# Patient Record
Sex: MALE | Race: WHITE | NOT HISPANIC OR LATINO | Employment: OTHER | URBAN - METROPOLITAN AREA
[De-identification: names, ages, dates, MRNs, and addresses within clinical notes are randomized per-mention and may not be internally consistent; named-entity substitution may affect disease eponyms.]

---

## 2019-10-26 ENCOUNTER — OFFICE VISIT (OUTPATIENT)
Dept: URGENT CARE | Facility: CLINIC | Age: 69
End: 2019-10-26
Payer: MEDICARE

## 2019-10-26 VITALS
HEART RATE: 68 BPM | TEMPERATURE: 98.4 F | HEIGHT: 75 IN | BODY MASS INDEX: 13.55 KG/M2 | OXYGEN SATURATION: 94 % | SYSTOLIC BLOOD PRESSURE: 141 MMHG | RESPIRATION RATE: 18 BRPM | DIASTOLIC BLOOD PRESSURE: 88 MMHG | WEIGHT: 109 LBS

## 2019-10-26 DIAGNOSIS — M79.642 PAIN OF LEFT HAND: Primary | ICD-10-CM

## 2019-10-26 PROCEDURE — G0463 HOSPITAL OUTPT CLINIC VISIT: HCPCS | Performed by: EMERGENCY MEDICINE

## 2019-10-26 PROCEDURE — 99203 OFFICE O/P NEW LOW 30 MIN: CPT | Performed by: EMERGENCY MEDICINE

## 2019-10-26 RX ORDER — HYDROCODONE BITARTRATE AND ACETAMINOPHEN 5; 325 MG/1; MG/1
1 TABLET ORAL EVERY 6 HOURS PRN
COMMUNITY
End: 2020-06-09 | Stop reason: ALTCHOICE

## 2019-10-26 RX ORDER — PROPRANOLOL HYDROCHLORIDE 60 MG/1
60 TABLET ORAL
COMMUNITY

## 2019-10-26 RX ORDER — ALLOPURINOL 300 MG/1
300 TABLET ORAL DAILY
COMMUNITY

## 2019-10-26 RX ORDER — OMEPRAZOLE 40 MG/1
40 CAPSULE, DELAYED RELEASE ORAL DAILY
COMMUNITY
End: 2021-06-18 | Stop reason: ALTCHOICE

## 2019-10-26 RX ORDER — AMINO ACIDS/CHROMIUM
TABLET ORAL
COMMUNITY

## 2019-10-26 RX ORDER — LORATADINE 10 MG/1
10 TABLET ORAL DAILY
COMMUNITY

## 2019-10-26 RX ORDER — MELATONIN
1000 DAILY
COMMUNITY

## 2019-10-26 RX ORDER — UREA 10 %
800 LOTION (ML) TOPICAL DAILY
COMMUNITY

## 2019-10-26 RX ORDER — ASPIRIN 81 MG/1
81 TABLET, CHEWABLE ORAL DAILY
COMMUNITY

## 2019-10-26 RX ORDER — ZINC GLUCONATE 50 MG
50 TABLET ORAL DAILY
COMMUNITY

## 2019-10-26 RX ORDER — CELECOXIB 200 MG/1
200 CAPSULE ORAL 2 TIMES DAILY
COMMUNITY
End: 2021-06-18 | Stop reason: ALTCHOICE

## 2019-10-26 RX ORDER — LOSARTAN POTASSIUM 100 MG/1
100 TABLET ORAL DAILY
COMMUNITY

## 2019-10-26 RX ORDER — TURMERIC ROOT EXTRACT 500 MG
TABLET ORAL
COMMUNITY

## 2019-10-26 RX ORDER — CHLORAL HYDRATE 500 MG
1200 CAPSULE ORAL DAILY
COMMUNITY

## 2019-10-26 RX ORDER — PHENOL 1.4 %
600 AEROSOL, SPRAY (ML) MUCOUS MEMBRANE 2 TIMES DAILY WITH MEALS
COMMUNITY

## 2019-10-26 RX ORDER — MAGNESIUM 30 MG
30 TABLET ORAL 2 TIMES DAILY
COMMUNITY

## 2019-10-26 RX ORDER — NIACIN 500 MG
500 TABLET ORAL
COMMUNITY
End: 2021-06-18 | Stop reason: ALTCHOICE

## 2019-10-26 RX ORDER — PREDNISONE 10 MG/1
TABLET ORAL
Qty: 27 TABLET | Refills: 0 | Status: SHIPPED | OUTPATIENT
Start: 2019-10-26 | End: 2020-06-04 | Stop reason: SDUPTHER

## 2019-10-26 NOTE — PATIENT INSTRUCTIONS
Tendinitis   WHAT YOU NEED TO KNOW:   Tendinitis is painful inflammation or breakdown of your tendons  It may also be called tendinopathy  Tendinitis often occurs in the knee, shoulder, ankle, hip, or elbow  DISCHARGE INSTRUCTIONS:   Medicines:   · Pain medicines  such as acetaminophen or NSAIDs may decrease swelling and pain or fever  These medicines are available without a doctor's order  Ask which medicine to take  Ask how much to take and when to take it  Follow directions  Acetaminophen and NSAIDs can cause liver or kidney damage if not taken correctly  If you take blood thinner medicine, always ask your healthcare provider if NSAIDs are safe for you  Always read the medicine label and follow the directions on it before using these medicine  · Take your medicine as directed  Contact your healthcare provider if you think your medicine is not helping or if you have side effects  Tell him if you are allergic to any medicine  Keep a list of the medicines, vitamins, and herbs you take  Include the amounts, and when and why you take them  Bring the list or the pill bottles to follow-up visits  Carry your medicine list with you in case of an emergency  Management:   · Rest  your tendon as directed to help it heal  Ask your healthcare provider if you need to stop putting weight on your affected area  · Ice  helps decrease swelling and pain  Ice may also help prevent tissue damage  Use an ice pack, or put crushed ice in a plastic bag  Cover it with a towel and place it on the affected area for 10 to 15 minutes every hour or as directed  · Support devices  such as a cane, splint, shoe insert, or brace may help reduce your pain  · Physical therapy  may be ordered by your healthcare provider  This may be used to teach you exercises to help improve movement and strength, and to decrease pain  You may also learn how to improve your posture, and how to lift or exercise correctly    Prevention:   · Stretch and warm up  before you exercise  · Exercise regularly  to strengthen the muscles around your joint  Ease into an exercise routine for the first 3 weeks to prevent another injury  Ask your healthcare provider about the best exercise plan for you  Rest fully between activities  · Use the right equipment  for sports and exercise  Wear braces or tape around weak joints as directed  Follow up with your healthcare provider as directed:  Write down your questions so you remember to ask them during your visits  Contact your healthcare provider if:   · You have increased pain even after you take medicine  · You have questions or concerns about your condition or care  Return to the emergency department if:   · You have increased redness over the joint, or swelling in the joint  · You suddenly cannot move your joint  © 2017 [address] [employer]  Information is for End User's use only and may not be sold, redistributed or otherwise used for commercial purposes  All illustrations and images included in CareNotes® are the copyrighted property of [employer]  or [employer]  The above information is an  only  It is not intended as medical advice for individual conditions or treatments  Talk to your doctor, nurse or pharmacist before following any medical regimen to see if it is safe and effective for you  Gout   WHAT YOU NEED TO KNOW:   Gout is a form of arthritis that causes severe joint pain, redness, swelling, and stiffness  Acute gout pain starts suddenly, gets worse quickly, and stops on its own  Acute gout can become chronic and cause permanent damage to the joints  DISCHARGE INSTRUCTIONS:   Return to the emergency department if:   · You have severe pain in one or more of your joints that you cannot tolerate  · You have a fever or redness that spreads beyond the joint area    Contact your healthcare provider if:   · You have new symptoms, such as a rash, after you start gout treatment  · Your joint pain and swelling do not go away, even after treatment  · You are not urinating as much or as often as you usually do  · You have trouble taking your gout medicines  · You have questions or concerns about your condition or care  Medicines: You may need any of the following:  · Prescription pain medicine  may be given  Ask your healthcare provider how to take this medicine safely  Some prescription pain medicines contain acetaminophen  Do not take other medicines that contain acetaminophen without talking to your healthcare provider  Too much acetaminophen may cause liver damage  Prescription pain medicine may cause constipation  Ask your healthcare provider how to prevent or treat constipation  · NSAIDs , such as ibuprofen, help decrease swelling, pain, and fever  This medicine is available with or without a doctor's order  NSAIDs can cause stomach bleeding or kidney problems in certain people  If you take blood thinner medicine, always ask your healthcare provider if NSAIDs are safe for you  Always read the medicine label and follow directions  · Gout medicine  decreases joint pain and swelling  It may also be given to prevent new gout attacks  · Steroids  reduce inflammation and can help your joint stiffness and pain during gout attacks  · Uric acid medicine  may be given to reduce the amount of uric acid your body makes  Some medicines may help you pass more uric acid when you urinate  · Take your medicine as directed  Contact your healthcare provider if you think your medicine is not helping or if you have side effects  Tell him or her if you are allergic to any medicine  Keep a list of the medicines, vitamins, and herbs you take  Include the amounts, and when and why you take them  Bring the list or the pill bottles to follow-up visits  Carry your medicine list with you in case of an emergency    Follow up with your healthcare provider as directed: Write down your questions so you remember to ask them during your visits  Manage gout:   · Rest your painful joint so it can heal   Your healthcare provider may recommend crutches or a walker if the affected joint is in a leg  · Apply ice to your joint  Ice decreases pain and swelling  Use an ice pack, or put crushed ice in a plastic bag  Cover the ice pack or bag with a towel before you apply it to your painful joint  Apply ice for 15 to 20 minutes every hour, or as directed  · Elevate your joint  Elevation helps reduce swelling and pain  Raise your joint above the level of your heart as often as you can  Prop your painful joint on pillows to keep it above your heart comfortably  · Go to physical therapy if directed  A physical therapist can teach you exercises to improve flexibility and range of motion  Prevent gout attacks:   · Do not eat high-purine foods  These foods include meats, seafood, asparagus, spinach, cauliflower, and some types of beans  Healthcare providers may tell you to eat more low-fat milk products, such as yogurt  Milk products may decrease your risk for gout attacks  Vitamin C and coffee may also help  Your healthcare provider or dietitian can help you create a meal plan  · Drink more liquids as directed  Liquids such as water help remove uric acid from your body  Ask how much liquid to drink each day and which liquids are best for you  · Manage your weight  Weight loss may decrease the amount of uric acid in your body  Exercise can help you lose weight  Talk to your healthcare provider about the best exercises for you  · Control your blood sugar level if you have diabetes  Keep your blood sugar level in a normal range  This can help prevent gout attacks  · Limit or do not drink alcohol as directed  Alcohol can trigger a gout attack  Alcohol also increases your risk for dehydration  Ask your healthcare provider if alcohol is safe for you    © 2017 [facility] is for End User's use only and may not be sold, redistributed or otherwise used for commercial purposes  All illustrations and images included in CareNotes® are the copyrighted property of [employer] M , [employer]  or [employer]  The above information is an  only  It is not intended as medical advice for individual conditions or treatments  Talk to your doctor, nurse or pharmacist before following any medical regimen to see if it is safe and effective for you

## 2019-10-26 NOTE — PROGRESS NOTES
[facility] [facility]        NAME: Milly Loaiza is a 78 y o  male  : 1950    MRN: [id number]  DATE: 2019  TIME: 10:14 AM    Assessment and Plan   Pain of left hand [M79 [id number]]  1  Pain of left hand  predniSONE 10 mg tablet         Patient Instructions     Patient Instructions   Tendinitis   WHAT YOU NEED TO KNOW:   Tendinitis is painful inflammation or breakdown of your tendons  It may also be called tendinopathy  Tendinitis often occurs in the knee, shoulder, ankle, hip, or elbow  DISCHARGE INSTRUCTIONS:   Medicines:   · Pain medicines  such as acetaminophen or NSAIDs may decrease swelling and pain or fever  These medicines are available without a doctor's order  Ask which medicine to take  Ask how much to take and when to take it  Follow directions  Acetaminophen and NSAIDs can cause liver or kidney damage if not taken correctly  If you take blood thinner medicine, always ask your healthcare provider if NSAIDs are safe for you  Always read the medicine label and follow the directions on it before using these medicine  · Take your medicine as directed  Contact your healthcare provider if you think your medicine is not helping or if you have side effects  Tell him if you are allergic to any medicine  Keep a list of the medicines, vitamins, and herbs you take  Include the amounts, and when and why you take them  Bring the list or the pill bottles to follow-up visits  Carry your medicine list with you in case of an emergency  Management:   · Rest  your tendon as directed to help it heal  Ask your healthcare provider if you need to stop putting weight on your affected area  · Ice  helps decrease swelling and pain  Ice may also help prevent tissue damage  Use an ice pack, or put crushed ice in a plastic bag  Cover it with a towel and place it on the affected area for 10 to 15 minutes every hour or as directed      · Support devices  such as a cane, splint, shoe insert, or brace may help reduce your pain  · Physical therapy  may be ordered by your healthcare provider  This may be used to teach you exercises to help improve movement and strength, and to decrease pain  You may also learn how to improve your posture, and how to lift or exercise correctly  Prevention:   · Stretch and warm up  before you exercise  · Exercise regularly  to strengthen the muscles around your joint  Ease into an exercise routine for the first 3 weeks to prevent another injury  Ask your healthcare provider about the best exercise plan for you  Rest fully between activities  · Use the right equipment  for sports and exercise  Wear braces or tape around weak joints as directed  Follow up with your healthcare provider as directed:  Write down your questions so you remember to ask them during your visits  Contact your healthcare provider if:   · You have increased pain even after you take medicine  · You have questions or concerns about your condition or care  Return to the emergency department if:   · You have increased redness over the joint, or swelling in the joint  · You suddenly cannot move your joint  © 2017 [address] [employer]  Information is for End User's use only and may not be sold, redistributed or otherwise used for commercial purposes  All illustrations and images included in CareNotes® are the copyrighted property of [employer]  or [employer]  The above information is an  only  It is not intended as medical advice for individual conditions or treatments  Talk to your doctor, nurse or pharmacist before following any medical regimen to see if it is safe and effective for you  Gout   WHAT YOU NEED TO KNOW:   Gout is a form of arthritis that causes severe joint pain, redness, swelling, and stiffness  Acute gout pain starts suddenly, gets worse quickly, and stops on its own  Acute gout can become chronic and cause permanent damage to the joints    DISCHARGE INSTRUCTIONS:   Return to the emergency department if:   · You have severe pain in one or more of your joints that you cannot tolerate  · You have a fever or redness that spreads beyond the joint area  Contact your healthcare provider if:   · You have new symptoms, such as a rash, after you start gout treatment  · Your joint pain and swelling do not go away, even after treatment  · You are not urinating as much or as often as you usually do  · You have trouble taking your gout medicines  · You have questions or concerns about your condition or care  Medicines: You may need any of the following:  · Prescription pain medicine  may be given  Ask your healthcare provider how to take this medicine safely  Some prescription pain medicines contain acetaminophen  Do not take other medicines that contain acetaminophen without talking to your healthcare provider  Too much acetaminophen may cause liver damage  Prescription pain medicine may cause constipation  Ask your healthcare provider how to prevent or treat constipation  · NSAIDs , such as ibuprofen, help decrease swelling, pain, and fever  This medicine is available with or without a doctor's order  NSAIDs can cause stomach bleeding or kidney problems in certain people  If you take blood thinner medicine, always ask your healthcare provider if NSAIDs are safe for you  Always read the medicine label and follow directions  · Gout medicine  decreases joint pain and swelling  It may also be given to prevent new gout attacks  · Steroids  reduce inflammation and can help your joint stiffness and pain during gout attacks  · Uric acid medicine  may be given to reduce the amount of uric acid your body makes  Some medicines may help you pass more uric acid when you urinate  · Take your medicine as directed  Contact your healthcare provider if you think your medicine is not helping or if you have side effects   Tell him or her if you are allergic to any medicine  Keep a list of the medicines, vitamins, and herbs you take  Include the amounts, and when and why you take them  Bring the list or the pill bottles to follow-up visits  Carry your medicine list with you in case of an emergency  Follow up with your healthcare provider as directed:  Write down your questions so you remember to ask them during your visits  Manage gout:   · Rest your painful joint so it can heal   Your healthcare provider may recommend crutches or a walker if the affected joint is in a leg  · Apply ice to your joint  Ice decreases pain and swelling  Use an ice pack, or put crushed ice in a plastic bag  Cover the ice pack or bag with a towel before you apply it to your painful joint  Apply ice for 15 to 20 minutes every hour, or as directed  · Elevate your joint  Elevation helps reduce swelling and pain  Raise your joint above the level of your heart as often as you can  Prop your painful joint on pillows to keep it above your heart comfortably  · Go to physical therapy if directed  A physical therapist can teach you exercises to improve flexibility and range of motion  Prevent gout attacks:   · Do not eat high-purine foods  These foods include meats, seafood, asparagus, spinach, cauliflower, and some types of beans  Healthcare providers may tell you to eat more low-fat milk products, such as yogurt  Milk products may decrease your risk for gout attacks  Vitamin C and coffee may also help  Your healthcare provider or dietitian can help you create a meal plan  · Drink more liquids as directed  Liquids such as water help remove uric acid from your body  Ask how much liquid to drink each day and which liquids are best for you  · Manage your weight  Weight loss may decrease the amount of uric acid in your body  Exercise can help you lose weight  Talk to your healthcare provider about the best exercises for you      · Control your blood sugar level if you have diabetes  Keep your blood sugar level in a normal range  This can help prevent gout attacks  · Limit or do not drink alcohol as directed  Alcohol can trigger a gout attack  Alcohol also increases your risk for dehydration  Ask your healthcare provider if alcohol is safe for you  © 2017 [address] [employer] Information is for End User's use only and may not be sold, redistributed or otherwise used for commercial purposes  All illustrations and images included in CareNotes® are the copyrighted property of [employer]  or [employer]  The above information is an  only  It is not intended as medical advice for individual conditions or treatments  Talk to your doctor, nurse or pharmacist before following any medical regimen to see if it is safe and effective for you  Follow up with PCP in 3-5 days  Proceed to  ER if symptoms worsen  Chief Complaint     Chief Complaint   Patient presents with    Hand Pain     Left hand pain starting last night  Denies injury          History of Present Illness       Patient complains of left hand pain since last night  He denies injury  He has history of gout, on allopurinol  He recalls that the pain started shortly after he ate quite a bit of shellfish  He denies med noncompliance with allopurinol  Review of Systems   Review of Systems   Constitutional: Negative for chills and fever  Musculoskeletal: Positive for myalgias  Negative for arthralgias, gait problem and joint swelling  Skin: Negative for color change, rash and wound  Neurological: Negative for numbness           Current Medications       Current Outpatient Medications:     allopurinol (ZYLOPRIM) 300 mg tablet, Take 300 mg by mouth daily, Disp: , Rfl:     aspirin 81 mg chewable tablet, Chew 81 mg daily, Disp: , Rfl:     calcium carbonate (OS-BONNIE) 600 MG tablet, Take 600 mg by mouth 2 (two) times a day with meals, Disp: , Rfl:     celecoxib (CeleBREX) 200 mg capsule, Take 200 mg by mouth 2 (two) times a day, Disp: , Rfl:     cholecalciferol (VITAMIN D3) 1,000 units tablet, Take 1,000 Units by mouth daily, Disp: , Rfl:     Chromium 1000 MCG TABS, Take by mouth, Disp: , Rfl:     Flaxseed, Linseed, (FLAXSEED OIL PO), Take by mouth, Disp: , Rfl:     folic acid (FOLVITE) 476 MCG tablet, Take 800 mcg by mouth daily, Disp: , Rfl:     HYDROcodone-acetaminophen (NORCO) 5-325 mg per tablet, Take 1 tablet by mouth every 6 (six) hours as needed for pain, Disp: , Rfl:     loratadine (CLARITIN) 10 mg tablet, Take 10 mg by mouth daily, Disp: , Rfl:     losartan (COZAAR) 100 MG tablet, Take 100 mg by mouth daily, Disp: , Rfl:     magnesium 30 MG tablet, Take 30 mg by mouth 2 (two) times a day, Disp: , Rfl:     niacin 500 mg tablet, Take 500 mg by mouth daily with breakfast, Disp: , Rfl:     Omega-3 Fatty Acids (FISH OIL) 1,000 mg, Take 1,200 mg by mouth daily, Disp: , Rfl:     omeprazole (PriLOSEC) 40 MG capsule, Take 40 mg by mouth daily, Disp: , Rfl:     propranolol (INDERAL) 60 mg tablet, Take 60 mg by mouth 3 (three) times a day, Disp: , Rfl:     SUPER B COMPLEX/C PO, Take by mouth, Disp: , Rfl:     Turmeric 500 MG TABS, Take by mouth, Disp: , Rfl:     zinc gluconate 50 mg tablet, Take 50 mg by mouth daily, Disp: , Rfl:     predniSONE 10 mg tablet, Take once daily all days pills on this schedule 6- 6- 5- 4- 3- 2- 1, Disp: 27 tablet, Rfl: 0    Current Allergies     Allergies as of 10/26/2019 - Reviewed 10/26/2019   Allergen Reaction Noted    Ativan [lorazepam] Delirium 10/26/2019    Levaquin [levofloxacin] Itching 10/26/2019            The following portions of the patient's history were reviewed and updated as appropriate: allergies, current medications, past family history, past medical history, past social history, past surgical history and problem list      Past Medical History:   Diagnosis Date    Allergic     Gout     Hypertension        Past Surgical History:   Procedure Laterality Date    APPENDECTOMY      CHOLECYSTECTOMY      TOTAL KNEE ARTHROPLASTY         History reviewed  No pertinent family history  Medications have been verified  Objective   /88   Pulse 68   Temp 98 4 °F (36 9 °C) (Tympanic)   Resp 18   Ht 6' 3" (1 905 m)   Wt 49 4 kg (109 lb)   SpO2 94%   BMI 13 62 kg/m²        Physical Exam     Physical Exam   Constitutional: He is oriented to person, place, and time  He appears well-developed and well-nourished  No distress  Neck: Neck supple  Cardiovascular: Normal rate and regular rhythm  Pulmonary/Chest: Effort normal and breath sounds normal    Musculoskeletal: He exhibits tenderness  He exhibits no deformity  Tender and swollen left hand at base of thumb, no erythema, increased warmth or other evidence of infection  Neurological: He is alert and oriented to person, place, and time  He has normal reflexes  Skin: Skin is warm and dry  No rash noted  No erythema  Psychiatric: He has a normal mood and affect  His behavior is normal  Judgment and thought content normal    Nursing note and vitals reviewed

## 2019-12-11 ENCOUNTER — APPOINTMENT (EMERGENCY)
Dept: RADIOLOGY | Facility: HOSPITAL | Age: 69
End: 2019-12-11
Payer: MEDICARE

## 2019-12-11 ENCOUNTER — HOSPITAL ENCOUNTER (EMERGENCY)
Facility: HOSPITAL | Age: 69
Discharge: HOME | End: 2019-12-11
Attending: EMERGENCY MEDICINE | Admitting: EMERGENCY MEDICINE
Payer: MEDICARE

## 2019-12-11 ENCOUNTER — APPOINTMENT (EMERGENCY)
Dept: CT IMAGING | Facility: HOSPITAL | Age: 69
End: 2019-12-11
Payer: MEDICARE

## 2019-12-11 VITALS
SYSTOLIC BLOOD PRESSURE: 138 MMHG | RESPIRATION RATE: 20 BRPM | DIASTOLIC BLOOD PRESSURE: 76 MMHG | BODY MASS INDEX: 28.63 KG/M2 | OXYGEN SATURATION: 93 % | HEART RATE: 94 BPM | TEMPERATURE: 98.1 F | WEIGHT: 229.06 LBS

## 2019-12-11 DIAGNOSIS — J18.9 PNEUMONIA OF LEFT UPPER LOBE DUE TO INFECTIOUS ORGANISM: Primary | ICD-10-CM

## 2019-12-11 LAB
ALBUMIN SERPL BCP-MCNC: 3.6 G/DL (ref 3.5–5)
ALP SERPL-CCNC: 88 U/L (ref 46–116)
ALT SERPL W P-5'-P-CCNC: 34 U/L (ref 12–78)
ANION GAP SERPL CALCULATED.3IONS-SCNC: 9 MMOL/L (ref 4–13)
AST SERPL W P-5'-P-CCNC: 33 U/L (ref 5–45)
BASOPHILS # BLD AUTO: 0.05 THOUSANDS/ΜL (ref 0–0.1)
BASOPHILS NFR BLD AUTO: 0 % (ref 0–1)
BILIRUB SERPL-MCNC: 1.73 MG/DL (ref 0.2–1)
BUN SERPL-MCNC: 11 MG/DL (ref 5–25)
CALCIUM SERPL-MCNC: 9.1 MG/DL (ref 8.3–10.1)
CHLORIDE SERPL-SCNC: 96 MMOL/L (ref 100–108)
CO2 SERPL-SCNC: 28 MMOL/L (ref 21–32)
CREAT SERPL-MCNC: 0.82 MG/DL (ref 0.6–1.3)
EOSINOPHIL # BLD AUTO: 0.02 THOUSAND/ΜL (ref 0–0.61)
EOSINOPHIL NFR BLD AUTO: 0 % (ref 0–6)
ERYTHROCYTE [DISTWIDTH] IN BLOOD BY AUTOMATED COUNT: 11.9 % (ref 11.6–15.1)
GFR SERPL CREATININE-BSD FRML MDRD: 90 ML/MIN/1.73SQ M
GLUCOSE SERPL-MCNC: 111 MG/DL (ref 65–140)
HCT VFR BLD AUTO: 42.8 % (ref 36.5–49.3)
HGB BLD-MCNC: 14.8 G/DL (ref 12–17)
IMM GRANULOCYTES # BLD AUTO: 0.09 THOUSAND/UL (ref 0–0.2)
IMM GRANULOCYTES NFR BLD AUTO: 1 % (ref 0–2)
LYMPHOCYTES # BLD AUTO: 1.52 THOUSANDS/ΜL (ref 0.6–4.47)
LYMPHOCYTES NFR BLD AUTO: 9 % (ref 14–44)
MCH RBC QN AUTO: 33.3 PG (ref 26.8–34.3)
MCHC RBC AUTO-ENTMCNC: 34.6 G/DL (ref 31.4–37.4)
MCV RBC AUTO: 96 FL (ref 82–98)
MONOCYTES # BLD AUTO: 1.34 THOUSAND/ΜL (ref 0.17–1.22)
MONOCYTES NFR BLD AUTO: 8 % (ref 4–12)
NEUTROPHILS # BLD AUTO: 13.78 THOUSANDS/ΜL (ref 1.85–7.62)
NEUTS SEG NFR BLD AUTO: 82 % (ref 43–75)
NRBC BLD AUTO-RTO: 0 /100 WBCS
NT-PROBNP SERPL-MCNC: 679 PG/ML
PLATELET # BLD AUTO: 171 THOUSANDS/UL (ref 149–390)
PMV BLD AUTO: 9.3 FL (ref 8.9–12.7)
POTASSIUM SERPL-SCNC: 3.9 MMOL/L (ref 3.5–5.3)
PROT SERPL-MCNC: 8 G/DL (ref 6.4–8.2)
RBC # BLD AUTO: 4.45 MILLION/UL (ref 3.88–5.62)
SODIUM SERPL-SCNC: 133 MMOL/L (ref 136–145)
TROPONIN I SERPL-MCNC: <0.02 NG/ML
WBC # BLD AUTO: 16.8 THOUSAND/UL (ref 4.31–10.16)

## 2019-12-11 PROCEDURE — 71046 X-RAY EXAM CHEST 2 VIEWS: CPT

## 2019-12-11 PROCEDURE — 93005 ELECTROCARDIOGRAM TRACING: CPT

## 2019-12-11 PROCEDURE — 71275 CT ANGIOGRAPHY CHEST: CPT

## 2019-12-11 PROCEDURE — 36415 COLL VENOUS BLD VENIPUNCTURE: CPT | Performed by: PHYSICIAN ASSISTANT

## 2019-12-11 PROCEDURE — 80053 COMPREHEN METABOLIC PANEL: CPT | Performed by: PHYSICIAN ASSISTANT

## 2019-12-11 PROCEDURE — 85025 COMPLETE CBC W/AUTO DIFF WBC: CPT | Performed by: PHYSICIAN ASSISTANT

## 2019-12-11 PROCEDURE — 94640 AIRWAY INHALATION TREATMENT: CPT

## 2019-12-11 PROCEDURE — 99284 EMERGENCY DEPT VISIT MOD MDM: CPT | Performed by: PHYSICIAN ASSISTANT

## 2019-12-11 PROCEDURE — 83880 ASSAY OF NATRIURETIC PEPTIDE: CPT | Performed by: PHYSICIAN ASSISTANT

## 2019-12-11 PROCEDURE — 99285 EMERGENCY DEPT VISIT HI MDM: CPT

## 2019-12-11 PROCEDURE — 84484 ASSAY OF TROPONIN QUANT: CPT | Performed by: PHYSICIAN ASSISTANT

## 2019-12-11 RX ORDER — IPRATROPIUM BROMIDE AND ALBUTEROL SULFATE 2.5; .5 MG/3ML; MG/3ML
3 SOLUTION RESPIRATORY (INHALATION) ONCE
Status: COMPLETED | OUTPATIENT
Start: 2019-12-11 | End: 2019-12-11

## 2019-12-11 RX ORDER — DOXYCYCLINE HYCLATE 100 MG/1
100 CAPSULE ORAL ONCE
Status: COMPLETED | OUTPATIENT
Start: 2019-12-11 | End: 2019-12-11

## 2019-12-11 RX ORDER — DOXYCYCLINE HYCLATE 100 MG/1
100 CAPSULE ORAL EVERY 12 HOURS SCHEDULED
Qty: 20 CAPSULE | Refills: 0 | Status: SHIPPED | OUTPATIENT
Start: 2019-12-11 | End: 2019-12-21

## 2019-12-11 RX ADMIN — IPRATROPIUM BROMIDE AND ALBUTEROL SULFATE 3 ML: 2.5; .5 SOLUTION RESPIRATORY (INHALATION) at 17:41

## 2019-12-11 RX ADMIN — IOHEXOL 85 ML: 350 INJECTION, SOLUTION INTRAVENOUS at 19:24

## 2019-12-11 RX ADMIN — DOXYCYCLINE 100 MG: 100 CAPSULE ORAL at 20:12

## 2019-12-12 NOTE — ED NOTES
Pt states that he has not been getting any sleep at home  Pt states has tried seeing PCP and ENT for symptom relief  Pt has tried mucinex and cough medication       Cesar Gomez RN  12/11/19 [id number]

## 2019-12-12 NOTE — ED ATTENDING ATTESTATION
12/11/2019  IBonita MD, saw and evaluated the patient  I have discussed the patient with the resident/non-physician practitioner and agree with the resident's/non-physician practitioner's findings, Plan of Care, and MDM as documented in the resident's/non-physician practitioner's note, except where noted  All available labs and Radiology studies were reviewed  I was present for key portions of any procedure(s) performed by the resident/non-physician practitioner and I was immediately available to provide assistance  At this point I agree with the current assessment done in the Emergency Department      Bonita Crain MD

## 2019-12-13 LAB
ATRIAL RATE: 95 BPM
P AXIS: 43 DEGREES
PR INTERVAL: 164 MS
QRS AXIS: -17 DEGREES
QRSD INTERVAL: 82 MS
QT INTERVAL: 334 MS
QTC INTERVAL: 419 MS
T WAVE AXIS: 64 DEGREES
VENTRICULAR RATE: 95 BPM

## 2019-12-13 PROCEDURE — 93010 ELECTROCARDIOGRAM REPORT: CPT | Performed by: INTERNAL MEDICINE

## 2019-12-13 NOTE — ED PROVIDER NOTES
History  Chief Complaint   Patient presents with    Shortness of Breath     pt states for the past 3 weeks, cold like symptoms, pt states feels like getting worse with intermittent SOB, constant cough at night and nausea  denies chest pain     The patient is a 71-year-old male with a past medical history of hypertension previous smoking history presents emergency department with a chief complaint of persistent cough x 2 weeks with increased SOB  The patient states that he was 1st evaluated by his ENT physician approximately 2 weeks ago, where he had a persistent dry cough  Patient was placed on a prescription of amoxicillin  Patient completed the course of amoxicillin with little improvement  Patient then followed up additionally 2 more times and was given a prescription of Tessalon Perles and also instructed to take OTC decongestants without improvement  Patient states that over the last 3 days he is now having a more productive cough producing yellow sputum production  Patient admits to subjective fevers and chills  Patient states that over the last 2 days he has felt more short of breath at home  Patient states this typically follows severe coughing spells  Patient denies any chest pain, abdominal pain, dizziness, N/V, dizziness  Prior to Admission Medications   Prescriptions Last Dose Informant Patient Reported? Taking?    Chromium 1000 MCG TABS   Yes No   Sig: Take by mouth   Flaxseed, Linseed, (FLAXSEED OIL PO)   Yes No   Sig: Take by mouth   HYDROcodone-acetaminophen (NORCO) 5-325 mg per tablet   Yes No   Sig: Take 1 tablet by mouth every 6 (six) hours as needed for pain   Omega-3 Fatty Acids (FISH OIL) 1,000 mg   Yes No   Sig: Take 1,200 mg by mouth daily   SUPER B COMPLEX/C PO   Yes No   Sig: Take by mouth   Turmeric 500 MG TABS   Yes No   Sig: Take by mouth   allopurinol (ZYLOPRIM) 300 mg tablet   Yes No   Sig: Take 300 mg by mouth daily   aspirin 81 mg chewable tablet   Yes No   Sig: Chew 81 mg daily   calcium carbonate (OS-BONNIE) 600 MG tablet   Yes No   Sig: Take 600 mg by mouth 2 (two) times a day with meals   celecoxib (CeleBREX) 200 mg capsule   Yes No   Sig: Take 200 mg by mouth 2 (two) times a day   cholecalciferol (VITAMIN D3) 1,000 units tablet   Yes No   Sig: Take 1,000 Units by mouth daily   folic acid (FOLVITE) 065 MCG tablet   Yes No   Sig: Take 800 mcg by mouth daily   loratadine (CLARITIN) 10 mg tablet   Yes No   Sig: Take 10 mg by mouth daily   losartan (COZAAR) 100 MG tablet   Yes No   Sig: Take 100 mg by mouth daily   magnesium 30 MG tablet   Yes No   Sig: Take 30 mg by mouth 2 (two) times a day   niacin 500 mg tablet   Yes No   Sig: Take 500 mg by mouth daily with breakfast   omeprazole (PriLOSEC) 40 MG capsule   Yes No   Sig: Take 40 mg by mouth daily   predniSONE 10 mg tablet   No No   Sig: Take once daily all days pills on this schedule 6- 6- 5- 4- 3- 2- 1   propranolol (INDERAL) 60 mg tablet   Yes No   Sig: Take 60 mg by mouth 3 (three) times a day   zinc gluconate 50 mg tablet   Yes No   Sig: Take 50 mg by mouth daily      Facility-Administered Medications: None       Past Medical History:   Diagnosis Date    Allergic     Gout     Hypertension        Past Surgical History:   Procedure Laterality Date    APPENDECTOMY      CHOLECYSTECTOMY      TOTAL KNEE ARTHROPLASTY         History reviewed  No pertinent family history  I have reviewed and agree with the history as documented  Social History     Tobacco Use    Smoking status: Former Smoker     Types: Cigarettes     Last attempt to quit:      Years since quittin 9    Smokeless tobacco: Never Used   Substance Use Topics    Alcohol use: Yes     Frequency: 2-4 times a month    Drug use: Not Currently        Review of Systems   Constitutional: Negative for chills and fever  HENT: Negative for ear pain  Eyes: Negative for pain and visual disturbance     Respiratory: Negative for shortness of breath and stridor  Cardiovascular: Negative for chest pain and palpitations  Gastrointestinal: Negative for abdominal pain, nausea and vomiting  Genitourinary: Negative for dysuria and hematuria  Musculoskeletal: Negative for arthralgias and back pain  Skin: Negative for color change and rash  Neurological: Negative for seizures and speech difficulty  Physical Exam  Physical Exam   Constitutional: He is oriented to person, place, and time  He appears well-developed and well-nourished  No distress  HENT:   Head: Normocephalic and atraumatic  Mouth/Throat: Oropharynx is clear and moist    Eyes: Pupils are equal, round, and reactive to light  EOM are normal    Neck: Normal range of motion  Cardiovascular: Normal rate and regular rhythm  No murmur heard  Pulmonary/Chest: Effort normal and breath sounds normal  No respiratory distress  Abdominal: Soft  Bowel sounds are normal  There is no tenderness  Neurological: He is alert and oriented to person, place, and time  Skin: Skin is warm and dry  Capillary refill takes less than 2 seconds  Psychiatric: He has a normal mood and affect  His behavior is normal    Nursing note and vitals reviewed        Vital Signs  ED Triage Vitals [12/11/19 1659]   Temperature Pulse Respirations Blood Pressure SpO2   98 1 °F (36 7 °C) 100 22 142/80 95 %      Temp Source Heart Rate Source Patient Position - Orthostatic VS BP Location FiO2 (%)   Oral Monitor Sitting Left arm --      Pain Score       --           Vitals:    12/11/19 1659 12/11/19 1752 12/11/19 1908   BP: 142/80 132/70 138/76   Pulse: 100 93 94   Patient Position - Orthostatic VS: Sitting Lying Sitting         Visual Acuity      ED Medications  Medications   ipratropium-albuterol (DUO-NEB) 0 5-2 5 mg/3 mL inhalation solution 3 mL (3 mL Nebulization Given 12/11/19 1741)   iohexol (OMNIPAQUE) 350 MG/ML injection (MULTI-DOSE) 100 mL (85 mL Intravenous Given 12/11/19 1924)   doxycycline hyclate (VIBRAMYCIN) capsule 100 mg (100 mg Oral Given 12/11/19 2012)       Diagnostic Studies  Results Reviewed     Procedure Component Value Units Date/Time    NT-BNP PRO [[id number]]  (Abnormal) Collected:  12/11/19 1751    Lab Status:  Final result Specimen:  Blood from Arm, Left Updated:  12/11/19 1827     NT-proBNP 679 pg/mL     Comprehensive metabolic panel [[id number]]  (Abnormal) Collected:  12/11/19 1751    Lab Status:  Final result Specimen:  Blood from Arm, Left Updated:  12/11/19 1825     Sodium 133 mmol/L      Potassium 3 9 mmol/L      Chloride 96 mmol/L      CO2 28 mmol/L      ANION GAP 9 mmol/L      BUN 11 mg/dL      Creatinine 0 82 mg/dL      Glucose 111 mg/dL      Calcium 9 1 mg/dL      AST 33 U/L      ALT 34 U/L      Alkaline Phosphatase 88 U/L      Total Protein 8 0 g/dL      Albumin 3 6 g/dL      Total Bilirubin 1 73 mg/dL      eGFR 90 ml/min/1 73sq m     Narrative:       [place] guidelines for Chronic Kidney Disease (CKD):     Stage 1 with normal or high GFR (GFR > 90 mL/min/1 73 square meters)    Stage 2 Mild CKD (GFR = 60-89 mL/min/1 73 square meters)    Stage 3A Moderate CKD (GFR = 45-59 mL/min/1 73 square meters)    Stage 3B Moderate CKD (GFR = 30-44 mL/min/1 73 square meters)    Stage 4 Severe CKD (GFR = 15-29 mL/min/1 73 square meters)    Stage 5 End Stage CKD (GFR <15 mL/min/1 73 square meters)  Note: GFR calculation is accurate only with a steady state creatinine    Troponin I [[id number]]  (Normal) Collected:  12/11/19 1751    Lab Status:  Final result Specimen:  Blood from Arm, Left Updated:  12/11/19 1820     Troponin I <0 02 ng/mL     CBC and differential [[id number]]  (Abnormal) Collected:  12/11/19 1751    Lab Status:  Final result Specimen:  Blood from Arm, Left Updated:  12/11/19 1759     WBC 16 80 Thousand/uL      RBC 4 45 Million/uL      Hemoglobin 14 8 g/dL      Hematocrit 42 8 %      MCV 96 fL      MCH 33 3 pg      MCHC 34 6 g/dL      RDW 11 9 %      MPV 9 3 fL Platelets 376 Thousands/uL      nRBC 0 /100 WBCs      Neutrophils Relative 82 %      Immat GRANS % 1 %      Lymphocytes Relative 9 %      Monocytes Relative 8 %      Eosinophils Relative 0 %      Basophils Relative 0 %      Neutrophils Absolute 13 78 Thousands/µL      Immature Grans Absolute 0 09 Thousand/uL      Lymphocytes Absolute 1 52 Thousands/µL      Monocytes Absolute 1 34 Thousand/µL      Eosinophils Absolute 0 02 Thousand/µL      Basophils Absolute 0 05 Thousands/µL                  CTA ED chest PE study   Final Result by Sanjeev Will MD (12/11 1944)         1  No evidence of acute pulmonary embolus, thoracic aortic aneurysm or dissection  2   Few groundglass opacities in the upper lobes could represent minimal aspiration pneumonitis  Workstation performed: [id number]         XR chest 2 views   Final Result by Addison Oneill MD (12/12 0852)      No acute cardiopulmonary disease  Workstation performed: [id number]                    Procedures  Procedures         ED Course                               MDM  Number of Diagnoses or Management Options  Pneumonia of left upper lobe due to infectious organism [facility]):   Diagnosis management comments: Differential diagnosis included but was not limited to ACS, pneumonia, viral syndrome, pulmonary embolism  The patient is a 69-year-old male who presents emergency department today with a chief complaint of persistent cough times several weeks with the additional complaint of shortness of breath  EKG was unremarkable  Upon physical examination the patient was not in any acute distress  Patient's lung sounds were remarkably clear  Patient's vital signs were stable however the patient's SpO2 on room air was 93%  Patient was mildly tachycardic at 96  Patient was already treated with 1 course of amoxicillin prior to arrival without improvement    Due to the patient's consistent symptoms a CT scan with IV contrast was ordered not only to rule out a pulmonary embolism but also look for any atypical pneumonia  Chest x-ray was relatively unremarkable  Cardiac enzymes and laboratory findings were also otherwise unremarkable  Upon CT scan findings were indicative of ground-glass appearing in upper lobes of bilateral lungs  Due to patient's symptoms patient was treated with doxycycline as an outpatient  Patient was instructed to return if any symptoms worsen  Patient expressed understanding and was in agreement with treatment plan  Amount and/or Complexity of Data Reviewed  Clinical lab tests: ordered and reviewed  Tests in the radiology section of CPT®: ordered and reviewed          Disposition  Final diagnoses:   Pneumonia of left upper lobe due to infectious organism [facility])     Time reflects when diagnosis was documented in both MDM as applicable and the Disposition within this note     Time User Action Codes Description Comment    12/11/2019  8:00 PM Lino Salas Add [J18 1] Pneumonia of left upper lobe due to infectious organism [facility])       ED Disposition     ED Disposition Condition Date/Time Comment    Discharge Stable Wed Dec 11, 2019  7:59 PM Shannon Schwartz discharge to home/self care              Follow-up Information    None         Discharge Medication List as of 12/11/2019  8:01 PM      START taking these medications    Details   doxycycline hyclate (VIBRAMYCIN) 100 mg capsule Take 1 capsule (100 mg total) by mouth every 12 (twelve) hours for 10 days, Starting Wed 12/11/2019, Until Sat 12/21/2019, Normal         CONTINUE these medications which have NOT CHANGED    Details   allopurinol (ZYLOPRIM) 300 mg tablet Take 300 mg by mouth daily, Historical Med      aspirin 81 mg chewable tablet Chew 81 mg daily, Historical Med      calcium carbonate (OS-BONNIE) 600 MG tablet Take 600 mg by mouth 2 (two) times a day with meals, Historical Med      celecoxib (CeleBREX) 200 mg capsule Take 200 mg by mouth 2 (two) times a day, Historical Med      cholecalciferol (VITAMIN D3) 1,000 units tablet Take 1,000 Units by mouth daily, Historical Med      Chromium 1000 MCG TABS Take by mouth, Historical Med      Flaxseed, Linseed, (FLAXSEED OIL PO) Take by mouth, Historical Med      folic acid (FOLVITE) 134 MCG tablet Take 800 mcg by mouth daily, Historical Med      HYDROcodone-acetaminophen (NORCO) 5-325 mg per tablet Take 1 tablet by mouth every 6 (six) hours as needed for pain, Historical Med      loratadine (CLARITIN) 10 mg tablet Take 10 mg by mouth daily, Historical Med      losartan (COZAAR) 100 MG tablet Take 100 mg by mouth daily, Historical Med      magnesium 30 MG tablet Take 30 mg by mouth 2 (two) times a day, Historical Med      niacin 500 mg tablet Take 500 mg by mouth daily with breakfast, Historical Med      Omega-3 Fatty Acids (FISH OIL) 1,000 mg Take 1,200 mg by mouth daily, Historical Med      omeprazole (PriLOSEC) 40 MG capsule Take 40 mg by mouth daily, Historical Med      predniSONE 10 mg tablet Take once daily all days pills on this schedule 6- 6- 5- 4- 3- 2- 1, Normal      propranolol (INDERAL) 60 mg tablet Take 60 mg by mouth 3 (three) times a day, Historical Med      SUPER B COMPLEX/C PO Take by mouth, Historical Med      Turmeric 500 MG TABS Take by mouth, Historical Med      zinc gluconate 50 mg tablet Take 50 mg by mouth daily, Historical Med           No discharge procedures on file      ED Provider  Electronically Signed by           Trever Henry PA-C  12/12/19 2008

## 2020-05-14 ENCOUNTER — HOSPITAL ENCOUNTER (EMERGENCY)
Facility: HOSPITAL | Age: 70
Discharge: HOME | End: 2020-05-14
Attending: EMERGENCY MEDICINE | Admitting: EMERGENCY MEDICINE
Payer: MEDICARE

## 2020-05-14 ENCOUNTER — APPOINTMENT (EMERGENCY)
Dept: RADIOLOGY | Facility: HOSPITAL | Age: 70
End: 2020-05-14
Payer: MEDICARE

## 2020-05-14 ENCOUNTER — APPOINTMENT (EMERGENCY)
Dept: CT IMAGING | Facility: HOSPITAL | Age: 70
End: 2020-05-14
Payer: MEDICARE

## 2020-05-14 VITALS
OXYGEN SATURATION: 97 % | SYSTOLIC BLOOD PRESSURE: 168 MMHG | BODY MASS INDEX: 28.6 KG/M2 | TEMPERATURE: 98 F | HEART RATE: 87 BPM | RESPIRATION RATE: 18 BRPM | WEIGHT: 230 LBS | HEIGHT: 75 IN | DIASTOLIC BLOOD PRESSURE: 88 MMHG

## 2020-05-14 DIAGNOSIS — M79.642 HAND PAIN, LEFT: Primary | ICD-10-CM

## 2020-05-14 PROCEDURE — 99284 EMERGENCY DEPT VISIT MOD MDM: CPT

## 2020-05-14 PROCEDURE — 90471 IMMUNIZATION ADMIN: CPT

## 2020-05-14 PROCEDURE — 73130 X-RAY EXAM OF HAND: CPT

## 2020-05-14 PROCEDURE — 99284 EMERGENCY DEPT VISIT MOD MDM: CPT | Performed by: EMERGENCY MEDICINE

## 2020-05-14 PROCEDURE — 90715 TDAP VACCINE 7 YRS/> IM: CPT | Performed by: EMERGENCY MEDICINE

## 2020-05-14 PROCEDURE — 73090 X-RAY EXAM OF FOREARM: CPT

## 2020-05-14 PROCEDURE — 70450 CT HEAD/BRAIN W/O DYE: CPT

## 2020-05-14 RX ORDER — OXYCODONE HYDROCHLORIDE 5 MG/1
5 TABLET ORAL EVERY 6 HOURS PRN
Qty: 10 TABLET | Refills: 0 | Status: SHIPPED | OUTPATIENT
Start: 2020-05-14 | End: 2020-06-09 | Stop reason: ALTCHOICE

## 2020-05-14 RX ORDER — PREDNISONE 10 MG/1
50 TABLET ORAL DAILY
Qty: 30 TABLET | Refills: 0 | Status: SHIPPED | OUTPATIENT
Start: 2020-05-14 | End: 2020-05-20

## 2020-05-14 RX ORDER — OXYCODONE HYDROCHLORIDE 5 MG/1
5 TABLET ORAL ONCE
Status: COMPLETED | OUTPATIENT
Start: 2020-05-14 | End: 2020-05-14

## 2020-05-14 RX ADMIN — OXYCODONE HYDROCHLORIDE 5 MG: 5 TABLET ORAL at 09:26

## 2020-05-14 RX ADMIN — TETANUS TOXOID, REDUCED DIPHTHERIA TOXOID AND ACELLULAR PERTUSSIS VACCINE, ADSORBED 0.5 ML: 5; 2.5; 8; 8; 2.5 SUSPENSION INTRAMUSCULAR at 09:52

## 2020-06-01 ENCOUNTER — HOSPITAL ENCOUNTER (EMERGENCY)
Facility: HOSPITAL | Age: 70
Discharge: HOME | End: 2020-06-01
Attending: EMERGENCY MEDICINE | Admitting: EMERGENCY MEDICINE
Payer: MEDICARE

## 2020-06-01 ENCOUNTER — APPOINTMENT (EMERGENCY)
Dept: RADIOLOGY | Facility: HOSPITAL | Age: 70
End: 2020-06-01
Payer: MEDICARE

## 2020-06-01 VITALS
DIASTOLIC BLOOD PRESSURE: 90 MMHG | RESPIRATION RATE: 20 BRPM | SYSTOLIC BLOOD PRESSURE: 139 MMHG | HEART RATE: 105 BPM | OXYGEN SATURATION: 98 % | TEMPERATURE: 97.8 F

## 2020-06-01 DIAGNOSIS — M25.551 RIGHT HIP PAIN: Primary | ICD-10-CM

## 2020-06-01 PROCEDURE — 96372 THER/PROPH/DIAG INJ SC/IM: CPT

## 2020-06-01 PROCEDURE — 99284 EMERGENCY DEPT VISIT MOD MDM: CPT | Performed by: EMERGENCY MEDICINE

## 2020-06-01 PROCEDURE — 73502 X-RAY EXAM HIP UNI 2-3 VIEWS: CPT

## 2020-06-01 PROCEDURE — 99283 EMERGENCY DEPT VISIT LOW MDM: CPT

## 2020-06-01 RX ORDER — DEXAMETHASONE SODIUM PHOSPHATE 10 MG/ML
10 INJECTION, SOLUTION INTRAMUSCULAR; INTRAVENOUS ONCE
Status: COMPLETED | OUTPATIENT
Start: 2020-06-01 | End: 2020-06-01

## 2020-06-01 RX ORDER — OXYCODONE HYDROCHLORIDE 5 MG/1
5 TABLET ORAL EVERY 6 HOURS PRN
Qty: 6 TABLET | Refills: 0 | Status: SHIPPED | OUTPATIENT
Start: 2020-06-01 | End: 2020-06-09 | Stop reason: ALTCHOICE

## 2020-06-01 RX ORDER — PREDNISONE 10 MG/1
50 TABLET ORAL DAILY
Qty: 20 TABLET | Refills: 0 | Status: SHIPPED | OUTPATIENT
Start: 2020-06-01 | End: 2020-06-05

## 2020-06-01 RX ADMIN — DEXAMETHASONE SODIUM PHOSPHATE 10 MG: 10 INJECTION, SOLUTION INTRAMUSCULAR; INTRAVENOUS at 14:45

## 2020-06-09 ENCOUNTER — OFFICE VISIT (OUTPATIENT)
Dept: FAMILY MEDICINE CLINIC | Facility: CLINIC | Age: 70
End: 2020-06-09
Payer: MEDICARE

## 2020-06-09 VITALS
DIASTOLIC BLOOD PRESSURE: 88 MMHG | BODY MASS INDEX: 28.35 KG/M2 | SYSTOLIC BLOOD PRESSURE: 138 MMHG | WEIGHT: 228 LBS | HEIGHT: 75 IN

## 2020-06-09 DIAGNOSIS — Z87.39 HISTORY OF GOUT: ICD-10-CM

## 2020-06-09 DIAGNOSIS — K21.00 REFLUX ESOPHAGITIS: Chronic | ICD-10-CM

## 2020-06-09 DIAGNOSIS — Z12.12 SCREENING FOR MALIGNANT NEOPLASM OF THE RECTUM: Primary | ICD-10-CM

## 2020-06-09 DIAGNOSIS — J30.1 SEASONAL ALLERGIC RHINITIS DUE TO POLLEN: Chronic | ICD-10-CM

## 2020-06-09 DIAGNOSIS — M79.642 HAND PAIN, LEFT: ICD-10-CM

## 2020-06-09 DIAGNOSIS — I10 ESSENTIAL HYPERTENSION: ICD-10-CM

## 2020-06-09 DIAGNOSIS — Z12.11 SCREEN FOR COLON CANCER: ICD-10-CM

## 2020-06-09 DIAGNOSIS — M15.9 PRIMARY OSTEOARTHRITIS INVOLVING MULTIPLE JOINTS: Chronic | ICD-10-CM

## 2020-06-09 DIAGNOSIS — E78.00 PURE HYPERCHOLESTEROLEMIA: ICD-10-CM

## 2020-06-09 PROBLEM — F43.10 POSTTRAUMATIC STRESS DISORDER: Chronic | Status: ACTIVE | Noted: 2020-06-09

## 2020-06-09 PROBLEM — M15.0 PRIMARY OSTEOARTHRITIS INVOLVING MULTIPLE JOINTS: Chronic | Status: ACTIVE | Noted: 2020-06-09

## 2020-06-09 PROCEDURE — 3075F SYST BP GE 130 - 139MM HG: CPT | Performed by: FAMILY MEDICINE

## 2020-06-09 PROCEDURE — 3079F DIAST BP 80-89 MM HG: CPT | Performed by: FAMILY MEDICINE

## 2020-06-09 PROCEDURE — 3008F BODY MASS INDEX DOCD: CPT | Performed by: FAMILY MEDICINE

## 2020-06-09 PROCEDURE — 1160F RVW MEDS BY RX/DR IN RCRD: CPT | Performed by: FAMILY MEDICINE

## 2020-06-09 PROCEDURE — 1036F TOBACCO NON-USER: CPT | Performed by: FAMILY MEDICINE

## 2020-06-09 PROCEDURE — 99204 OFFICE O/P NEW MOD 45 MIN: CPT | Performed by: FAMILY MEDICINE

## 2020-12-09 ENCOUNTER — OFFICE VISIT (OUTPATIENT)
Dept: FAMILY MEDICINE CLINIC | Facility: CLINIC | Age: 70
End: 2020-12-09
Payer: MEDICARE

## 2020-12-09 VITALS
BODY MASS INDEX: 29.72 KG/M2 | HEIGHT: 75 IN | OXYGEN SATURATION: 97 % | WEIGHT: 239 LBS | TEMPERATURE: 98 F | HEART RATE: 77 BPM | DIASTOLIC BLOOD PRESSURE: 88 MMHG | SYSTOLIC BLOOD PRESSURE: 136 MMHG

## 2020-12-09 DIAGNOSIS — K21.00 GASTROESOPHAGEAL REFLUX DISEASE WITH ESOPHAGITIS WITHOUT HEMORRHAGE: Chronic | ICD-10-CM

## 2020-12-09 DIAGNOSIS — Z00.00 ENCOUNTER FOR ANNUAL WELLNESS EXAM IN MEDICARE PATIENT: ICD-10-CM

## 2020-12-09 DIAGNOSIS — Z87.39 HISTORY OF GOUT: ICD-10-CM

## 2020-12-09 DIAGNOSIS — Z23 NEEDS FLU SHOT: ICD-10-CM

## 2020-12-09 DIAGNOSIS — I10 ESSENTIAL HYPERTENSION: Primary | ICD-10-CM

## 2020-12-09 DIAGNOSIS — E78.00 PURE HYPERCHOLESTEROLEMIA: ICD-10-CM

## 2020-12-09 DIAGNOSIS — M15.9 PRIMARY OSTEOARTHRITIS INVOLVING MULTIPLE JOINTS: Chronic | ICD-10-CM

## 2020-12-09 DIAGNOSIS — J30.1 SEASONAL ALLERGIC RHINITIS DUE TO POLLEN: Chronic | ICD-10-CM

## 2020-12-09 DIAGNOSIS — N40.0 BENIGN PROSTATIC HYPERPLASIA WITHOUT URINARY OBSTRUCTION: ICD-10-CM

## 2020-12-09 PROCEDURE — G0439 PPPS, SUBSEQ VISIT: HCPCS | Performed by: FAMILY MEDICINE

## 2020-12-09 PROCEDURE — 99214 OFFICE O/P EST MOD 30 MIN: CPT | Performed by: FAMILY MEDICINE

## 2020-12-09 RX ORDER — AMOXICILLIN 500 MG/1
TABLET, FILM COATED ORAL
COMMUNITY

## 2021-06-18 ENCOUNTER — OFFICE VISIT (OUTPATIENT)
Dept: FAMILY MEDICINE CLINIC | Facility: CLINIC | Age: 71
End: 2021-06-18
Payer: MEDICARE

## 2021-06-18 VITALS
HEIGHT: 75 IN | BODY MASS INDEX: 25.74 KG/M2 | WEIGHT: 207 LBS | SYSTOLIC BLOOD PRESSURE: 126 MMHG | HEART RATE: 65 BPM | DIASTOLIC BLOOD PRESSURE: 72 MMHG | OXYGEN SATURATION: 95 %

## 2021-06-18 DIAGNOSIS — K21.00 GASTROESOPHAGEAL REFLUX DISEASE WITH ESOPHAGITIS WITHOUT HEMORRHAGE: Chronic | ICD-10-CM

## 2021-06-18 DIAGNOSIS — Z87.39 HISTORY OF GOUT: ICD-10-CM

## 2021-06-18 DIAGNOSIS — F43.10 POSTTRAUMATIC STRESS DISORDER: Chronic | ICD-10-CM

## 2021-06-18 DIAGNOSIS — I10 ESSENTIAL HYPERTENSION: Primary | ICD-10-CM

## 2021-06-18 DIAGNOSIS — M15.9 PRIMARY OSTEOARTHRITIS INVOLVING MULTIPLE JOINTS: Chronic | ICD-10-CM

## 2021-06-18 PROCEDURE — 99214 OFFICE O/P EST MOD 30 MIN: CPT | Performed by: FAMILY MEDICINE

## 2021-06-18 NOTE — PATIENT INSTRUCTIONS
Overall seems to be doing very well has adjusted diet and activity in showed excellent weight loss  The liver enzymes which are abnormal have now been okay    Continue current meds and follow-up

## 2021-06-18 NOTE — PROGRESS NOTES
Assessment/Plan:    Essential hypertension   Blood pressure is better  Continue current regimen  Reviewed labs from the [place]    Reflux esophagitis   Asymptomatic, and is no longer taking the Prilosec    Primary osteoarthritis involving multiple joints   Overall doing better despite not taking the Celebrex  Push activity as tolerated    History of gout   Asymptomatic, continue current regimen    Posttraumatic stress disorder   Seems to be doing very well  Continue overall follow-up       Diagnoses and all orders for this visit:    Essential hypertension    Gastroesophageal reflux disease with esophagitis without hemorrhage    Primary osteoarthritis involving multiple joints    History of gout    Posttraumatic stress disorder    Other orders  -     Cancel: Hepatitis C Antibody ([facility]); Future  -     Cancel: PNEUMOCOCCAL POLYSACCHARIDE VACCINE 23-VALENT =>3YO SQ IM  -     Cancel: Cologuard; Future  -     Cancel: Ambulatory referral to Gastroenterology; Future          Subjective:      Patient ID: Noe Murphy is a 66 y o  male  Patient seen for 1st office visit  Has history of hypertension, gout, reflux esophagitis, hypercholesterolemia, prostatic hypertrophy, allergic rhinitis and degenerative arthritis  Overall has been feeling well  Does follow up with the [place] and gets lab work there as well as his medication normally  Also has history of posttraumatic stress disorder for which he follows up with Psychology with the [place] and is doing well at this time  With lab work had shown elevated liver enzymes and was told to stop drinking, stop the Celebrex and stop the niacin period the liver enzymes have gone back to normal   Has been watching diet and exercising on a regular basis and has lost  32 lb    Overall is feeling much better      The following portions of the patient's history were reviewed and updated as appropriate: allergies, current medications, past medical history, past social history and problem list BMI Counseling: Body mass index is 25 87 kg/m²  The BMI is above normal  Nutrition recommendations include moderation in carbohydrate intake  Exercise recommendations include moderate physical activity 150 minutes/week  No pharmacotherapy was ordered       Review of Systems   Constitutional: Negative for activity change, appetite change, chills, fatigue, fever and unexpected weight change  HENT: Negative for congestion, rhinorrhea, trouble swallowing and voice change  Eyes: Negative for visual disturbance  Respiratory: Negative for apnea, cough, chest tightness and shortness of breath  Cardiovascular: Negative for chest pain, palpitations and leg swelling  Gastrointestinal: Negative for abdominal distention, abdominal pain, constipation and diarrhea  Endocrine: Negative for polyuria ( nocturia x1)  Genitourinary: Negative for difficulty urinating  Musculoskeletal: Negative for arthralgias and myalgias  Skin: Negative for rash  Allergic/Immunologic: Positive for environmental allergies  Neurological: Negative for dizziness, weakness, light-headedness, numbness and headaches  Hematological: Negative for adenopathy  Psychiatric/Behavioral: Negative for agitation, confusion and sleep disturbance  Objective:      /72 (BP Location: Right arm, Patient Position: Sitting, Cuff Size: Standard)   Pulse 65   Ht 6' 3" (1 905 m)   Wt 93 9 kg (207 lb)   SpO2 95%   BMI 25 87 kg/m²          Physical Exam  Vitals and nursing note reviewed  Constitutional:       Appearance: Normal appearance  He is well-developed  HENT:      Head: Normocephalic  Eyes:      Conjunctiva/sclera: Conjunctivae normal    Neck:      Thyroid: No thyromegaly  Cardiovascular:      Rate and Rhythm: Normal rate and regular rhythm  Heart sounds: Normal heart sounds  No murmur ( rate is 66) heard       Pulmonary:      Effort: Pulmonary effort is normal       Breath sounds: Normal breath sounds  Abdominal:      General: Abdomen is flat  There is no distension  Palpations: Abdomen is soft  There is no mass  Tenderness: There is no abdominal tenderness  Musculoskeletal:         General: Normal range of motion  Cervical back: Normal range of motion and neck supple  Right lower leg: No edema  Left lower leg: No edema  Lymphadenopathy:      Cervical: No cervical adenopathy  Skin:     General: Skin is warm and dry  Findings: No rash  Neurological:      Mental Status: He is alert and oriented to person, place, and time  Motor: No weakness  Coordination: Coordination normal       Gait: Gait normal       Deep Tendon Reflexes: Reflexes abnormal ( reflexes 1+)  Psychiatric:         Mood and Affect: Mood normal          Behavior: Behavior normal          Thought Content:  Thought content normal          Judgment: Judgment normal

## 2021-11-29 ENCOUNTER — HOSPITAL ENCOUNTER (EMERGENCY)
Facility: HOSPITAL | Age: 71
Discharge: HOME | End: 2021-11-29
Attending: EMERGENCY MEDICINE
Payer: MEDICARE

## 2021-11-29 VITALS
BODY MASS INDEX: 25.12 KG/M2 | RESPIRATION RATE: 18 BRPM | OXYGEN SATURATION: 98 % | SYSTOLIC BLOOD PRESSURE: 129 MMHG | HEIGHT: 75 IN | TEMPERATURE: 97.7 F | DIASTOLIC BLOOD PRESSURE: 82 MMHG | WEIGHT: 202 LBS | HEART RATE: 65 BPM

## 2021-11-29 DIAGNOSIS — M10.9 GOUT: ICD-10-CM

## 2021-11-29 DIAGNOSIS — M10.9 ACUTE GOUT: Primary | ICD-10-CM

## 2021-11-29 PROCEDURE — 99283 EMERGENCY DEPT VISIT LOW MDM: CPT

## 2021-11-29 PROCEDURE — 99284 EMERGENCY DEPT VISIT MOD MDM: CPT | Performed by: EMERGENCY MEDICINE

## 2021-11-29 RX ORDER — INDOMETHACIN 25 MG/1
50 CAPSULE ORAL ONCE
Status: COMPLETED | OUTPATIENT
Start: 2021-11-29 | End: 2021-11-29

## 2021-11-29 RX ORDER — INDOMETHACIN 25 MG/1
25 CAPSULE ORAL 3 TIMES DAILY PRN
Qty: 30 CAPSULE | Refills: 0 | Status: SHIPPED | OUTPATIENT
Start: 2021-11-29

## 2021-11-29 RX ADMIN — INDOMETHACIN 50 MG: 25 CAPSULE ORAL at 10:27

## 2021-12-13 ENCOUNTER — OFFICE VISIT (OUTPATIENT)
Dept: FAMILY MEDICINE CLINIC | Facility: CLINIC | Age: 71
End: 2021-12-13
Payer: MEDICARE

## 2021-12-13 VITALS
HEIGHT: 75 IN | SYSTOLIC BLOOD PRESSURE: 126 MMHG | BODY MASS INDEX: 25.12 KG/M2 | OXYGEN SATURATION: 90 % | HEART RATE: 65 BPM | WEIGHT: 202 LBS | DIASTOLIC BLOOD PRESSURE: 74 MMHG

## 2021-12-13 DIAGNOSIS — Z23 ENCOUNTER FOR IMMUNIZATION: ICD-10-CM

## 2021-12-13 DIAGNOSIS — Z87.39 HISTORY OF GOUT: ICD-10-CM

## 2021-12-13 DIAGNOSIS — M15.9 PRIMARY OSTEOARTHRITIS INVOLVING MULTIPLE JOINTS: Chronic | ICD-10-CM

## 2021-12-13 DIAGNOSIS — Z00.00 MEDICARE ANNUAL WELLNESS VISIT, SUBSEQUENT: ICD-10-CM

## 2021-12-13 DIAGNOSIS — I10 ESSENTIAL HYPERTENSION: ICD-10-CM

## 2021-12-13 DIAGNOSIS — N40.0 BENIGN PROSTATIC HYPERPLASIA WITHOUT URINARY OBSTRUCTION: Primary | ICD-10-CM

## 2021-12-13 DIAGNOSIS — G62.9 NEUROPATHY: ICD-10-CM

## 2021-12-13 PROCEDURE — 1123F ACP DISCUSS/DSCN MKR DOCD: CPT | Performed by: FAMILY MEDICINE

## 2021-12-13 PROCEDURE — 99214 OFFICE O/P EST MOD 30 MIN: CPT | Performed by: FAMILY MEDICINE

## 2021-12-13 PROCEDURE — G0439 PPPS, SUBSEQ VISIT: HCPCS | Performed by: FAMILY MEDICINE

## 2021-12-13 RX ORDER — AMITRIPTYLINE HYDROCHLORIDE 10 MG/1
10 TABLET, FILM COATED ORAL
Qty: 60 TABLET | Refills: 5 | Status: SHIPPED | OUTPATIENT
Start: 2021-12-13

## 2021-12-13 RX ORDER — GABAPENTIN 100 MG/1
100 CAPSULE ORAL 3 TIMES DAILY
COMMUNITY

## 2022-04-14 ENCOUNTER — OFFICE VISIT (OUTPATIENT)
Dept: UROLOGY | Facility: CLINIC | Age: 72
End: 2022-04-14
Payer: MEDICARE

## 2022-04-14 VITALS
OXYGEN SATURATION: 96 % | BODY MASS INDEX: 24.87 KG/M2 | HEART RATE: 80 BPM | SYSTOLIC BLOOD PRESSURE: 140 MMHG | DIASTOLIC BLOOD PRESSURE: 82 MMHG | HEIGHT: 75 IN | WEIGHT: 200 LBS

## 2022-04-14 DIAGNOSIS — N40.0 BENIGN PROSTATIC HYPERPLASIA WITHOUT URINARY OBSTRUCTION: ICD-10-CM

## 2022-04-14 DIAGNOSIS — Z80.51 FAMILY HISTORY OF RENAL CELL CARCINOMA: Primary | ICD-10-CM

## 2022-04-14 DIAGNOSIS — Z77.098 H/O AGENT ORANGE EXPOSURE: ICD-10-CM

## 2022-04-14 DIAGNOSIS — Z12.5 PROSTATE CANCER SCREENING: ICD-10-CM

## 2022-04-14 DIAGNOSIS — R82.81 PYURIA: ICD-10-CM

## 2022-04-14 LAB
SL AMB  POCT GLUCOSE, UA: ABNORMAL
SL AMB LEUKOCYTE ESTERASE,UA: ABNORMAL
SL AMB POCT BILIRUBIN,UA: ABNORMAL
SL AMB POCT BLOOD,UA: ABNORMAL
SL AMB POCT CLARITY,UA: ABNORMAL
SL AMB POCT COLOR,UA: YELLOW
SL AMB POCT KETONES,UA: ABNORMAL
SL AMB POCT NITRITE,UA: POSITIVE
SL AMB POCT PH,UA: 6
SL AMB POCT SPECIFIC GRAVITY,UA: 1.01
SL AMB POCT URINE PROTEIN: ABNORMAL
SL AMB POCT UROBILINOGEN: 0.2

## 2022-04-14 PROCEDURE — 99204 OFFICE O/P NEW MOD 45 MIN: CPT | Performed by: UROLOGY

## 2022-04-14 PROCEDURE — 81002 URINALYSIS NONAUTO W/O SCOPE: CPT | Performed by: UROLOGY

## 2022-04-14 NOTE — PROGRESS NOTES
[address] [facility]  [facility]  [address] [facility] [place] [address] [facility]  [phone]  www  [facility]  org      NAME: Sancho Pueblo  AGE: 67 y o  SEX: male  : 1950   MRN: [id number]    DATE: 2022  TIME: 2:12 PM    Assessment and Plan:    Family history of renal cell carcinoma:  Check kidney ultrasound, send results  Prostate cancer screening:  Check PSA and send the results  Today's urinalysis shows trace blood positive nitrites 2+ leukocyte esterase but he is asymptomatic-we will have him get a culture and a urinalysis over the lab at the hospital and send him the results  If all this is normal, follow-up in 1 year  We will determine the need for further imaging etc   At that time  Chief Complaint   No chief complaint on file  History of Present Illness   New patient office visit:  79-year-old man , former pt of Dr Rick Najera, has 2 siblings with renal cell carcinoma  No family history of prostate cancer  No voiding problems  Has chronic lower extremity pain possibly due to neuropathy from [place]  Today's urinalysis shows trace blood, positive nitrites and 2+ leukocyte esterase  He has no symptoms of urinary tract infection  The following portions of the patient's history were reviewed and updated as appropriate: allergies, current medications, past family history, past medical history, past social history, past surgical history and problem list   Past Medical History:   Diagnosis Date    Allergic     Gout     Hypertension     PTSD (post-traumatic stress disorder)      Past Surgical History:   Procedure Laterality Date    APPENDECTOMY      CHOLECYSTECTOMY      TOTAL KNEE ARTHROPLASTY       shoulder  Review of Systems   Review of Systems   Constitutional: Negative for fever  Respiratory: Negative for shortness of breath  Cardiovascular: Negative for chest pain  Genitourinary: Negative  Active Problem List     Patient Active Problem List   Diagnosis    Pure hypercholesterolemia    History of gout    Essential hypertension    Benign prostatic hyperplasia without urinary obstruction    Reflux esophagitis    Seasonal allergic rhinitis due to pollen    Primary osteoarthritis involving multiple joints    Posttraumatic stress disorder    Neuropathy       Objective   /82 (BP Location: Left arm, Patient Position: Sitting, Cuff Size: Large)   Pulse 80   Ht 6' 3" (1 905 m)   Wt 90 7 kg (200 lb)   SpO2 96%   BMI 25 00 kg/m²     Physical Exam  Vitals reviewed  Constitutional:       Appearance: Normal appearance  He is normal weight  HENT:      Head: Normocephalic and atraumatic  Eyes:      Extraocular Movements: Extraocular movements intact  Pulmonary:      Effort: Pulmonary effort is normal    Abdominal:      General: There is no distension  Palpations: Abdomen is soft  There is no mass  Tenderness: There is no abdominal tenderness  There is no right CVA tenderness, left CVA tenderness, guarding or rebound  Hernia: No hernia is present  Genitourinary:     Penis: Normal        Testes: Normal       Prostate: Normal       Rectum: Normal       Comments: Normal circumcised phallus, testicles are descended bilaterally within normal limits, rectal exam reveals normal tone no masses and his prostate is about 30 g, smooth symmetric benign  No nodules  Musculoskeletal:         General: Normal range of motion  Cervical back: Normal range of motion  Skin:     Coloration: Skin is not jaundiced or pale  Neurological:      General: No focal deficit present  Mental Status: He is alert and oriented to person, place, and time  Psychiatric:         Mood and Affect: Mood normal          Behavior: Behavior normal          Thought Content:  Thought content normal          Judgment: Judgment normal              Current Medications     Current Outpatient Medications:     allopurinol (ZYLOPRIM) 300 mg tablet, Take 300 mg by mouth daily, Disp: , Rfl:     amitriptyline (ELAVIL) 10 mg tablet, Take 1 tablet (10 mg total) by mouth daily at bedtime If not effective after 1 week may increase to 2 tablets, Disp: 60 tablet, Rfl: 5    amoxicillin (AMOXIL) 500 MG tablet, amoxicillin 500 mg tablet  take 4 tablets by mouth 1 hour PRIOR TO DENTAL PROCEDURES, Disp: , Rfl:     calcium carbonate (OS-BONNIE) 600 MG tablet, Take 600 mg by mouth 2 (two) times a day with meals, Disp: , Rfl:     cholecalciferol (VITAMIN D3) 1,000 units tablet, Take 1,000 Units by mouth daily, Disp: , Rfl:     Flaxseed, Linseed, (FLAXSEED OIL PO), Take by mouth, Disp: , Rfl:     folic acid (FOLVITE) 794 MCG tablet, Take 800 mcg by mouth daily, Disp: , Rfl:     gabapentin (NEURONTIN) 100 mg capsule, Take 100 mg by mouth 3 (three) times a day, Disp: , Rfl:     indomethacin (INDOCIN) 25 mg capsule, Take 1 capsule (25 mg total) by mouth 3 (three) times a day as needed for mild pain, Disp: 30 capsule, Rfl: 0    loratadine (CLARITIN) 10 mg tablet, Take 10 mg by mouth daily, Disp: , Rfl:     losartan (COZAAR) 100 MG tablet, Take 100 mg by mouth daily, Disp: , Rfl:     magnesium 30 MG tablet, Take 30 mg by mouth 2 (two) times a day, Disp: , Rfl:     Omega-3 Fatty Acids (FISH OIL) 1,000 mg, Take 1,200 mg by mouth daily, Disp: , Rfl:     propranolol (INDERAL) 60 mg tablet, Take 60 mg by mouth daily after dinner, Disp: , Rfl:     SUPER B COMPLEX/C PO, Take by mouth, Disp: , Rfl:     Turmeric 500 MG TABS, Take by mouth, Disp: , Rfl:     zinc gluconate 50 mg tablet, Take 50 mg by mouth daily, Disp: , Rfl:     aspirin 81 mg chewable tablet, Chew 81 mg daily (Patient not taking: Reported on 4/14/2022 ), Disp: , Rfl:     Chromium 1000 MCG TABS, Take by mouth, Disp: , Rfl:         Rosa Long MD

## 2022-04-16 ENCOUNTER — APPOINTMENT (OUTPATIENT)
Dept: LAB | Facility: HOSPITAL | Age: 72
End: 2022-04-16
Attending: UROLOGY
Payer: MEDICARE

## 2022-04-16 ENCOUNTER — HOSPITAL ENCOUNTER (OUTPATIENT)
Dept: ULTRASOUND IMAGING | Facility: HOSPITAL | Age: 72
Discharge: HOME | End: 2022-04-16
Attending: UROLOGY
Payer: MEDICARE

## 2022-04-16 DIAGNOSIS — Z12.5 PROSTATE CANCER SCREENING: ICD-10-CM

## 2022-04-16 DIAGNOSIS — R82.81 PYURIA: ICD-10-CM

## 2022-04-16 DIAGNOSIS — Z77.098 H/O AGENT ORANGE EXPOSURE: ICD-10-CM

## 2022-04-16 DIAGNOSIS — Z80.51 FAMILY HISTORY OF RENAL CELL CARCINOMA: ICD-10-CM

## 2022-04-16 LAB
BACTERIA UR QL AUTO: ABNORMAL /HPF
BILIRUB UR QL STRIP: NEGATIVE
CLARITY UR: ABNORMAL
COLOR UR: ABNORMAL
GLUCOSE UR STRIP-MCNC: NEGATIVE MG/DL
HGB UR QL STRIP.AUTO: ABNORMAL
KETONES UR STRIP-MCNC: NEGATIVE MG/DL
LEUKOCYTE ESTERASE UR QL STRIP: ABNORMAL
NITRITE UR QL STRIP: NEGATIVE
NON-SQ EPI CELLS URNS QL MICRO: ABNORMAL /HPF
PH UR STRIP.AUTO: 6.5 [PH]
PROT UR STRIP-MCNC: NEGATIVE MG/DL
PSA SERPL-MCNC: 1.5 NG/ML (ref 0–4)
RBC #/AREA URNS AUTO: ABNORMAL /HPF
SP GR UR STRIP.AUTO: 1.01 (ref 1–1.03)
UROBILINOGEN UR QL STRIP.AUTO: 0.2 E.U./DL
WBC #/AREA URNS AUTO: ABNORMAL /HPF

## 2022-04-16 PROCEDURE — G0103 PSA SCREENING: HCPCS

## 2022-04-16 PROCEDURE — 87186 SC STD MICRODIL/AGAR DIL: CPT

## 2022-04-16 PROCEDURE — 76770 US EXAM ABDO BACK WALL COMP: CPT

## 2022-04-16 PROCEDURE — 87077 CULTURE AEROBIC IDENTIFY: CPT

## 2022-04-16 PROCEDURE — 87086 URINE CULTURE/COLONY COUNT: CPT

## 2022-04-16 PROCEDURE — 36415 COLL VENOUS BLD VENIPUNCTURE: CPT

## 2022-04-16 PROCEDURE — 81001 URINALYSIS AUTO W/SCOPE: CPT | Performed by: UROLOGY

## 2022-04-18 ENCOUNTER — TELEPHONE (OUTPATIENT)
Dept: UROLOGY | Facility: CLINIC | Age: 72
End: 2022-04-18

## 2022-04-18 LAB — BACTERIA UR CULT: ABNORMAL

## 2022-04-18 NOTE — TELEPHONE ENCOUNTER
----- Message from Radha Moody MD sent at 4/18/2022  2:32 PM EDT -----  To my staff-please let him know that I called Macrobid into his pharmacy    To Dr Rosio Cardenas

## 2022-04-20 ENCOUNTER — TELEPHONE (OUTPATIENT)
Dept: UROLOGY | Facility: CLINIC | Age: 72
End: 2022-04-20

## 2022-04-21 ENCOUNTER — TELEPHONE (OUTPATIENT)
Dept: UROLOGY | Facility: CLINIC | Age: 72
End: 2022-04-21

## 2022-04-21 NOTE — TELEPHONE ENCOUNTER
Spoke w/ pt- had UTI, treated with Mbid  US shows prominent renal column and thickening of bladder wall- ordered CT scan with and without, renal function panel and cysto  He wishes to proceed- will schedule

## 2022-04-22 ENCOUNTER — TELEPHONE (OUTPATIENT)
Dept: UROLOGY | Facility: CLINIC | Age: 72
End: 2022-04-22

## 2022-04-22 NOTE — TELEPHONE ENCOUNTER
----- Message from Norris Jarvis MD sent at 4/21/2022  4:07 PM EDT -----  Please call pt to set up CT, cystoscopy- renal function panel ordered as well   I spoke with him- very nice man

## 2022-04-22 NOTE — TELEPHONE ENCOUNTER
Called and spoke with Felicia Medeiros  He is aware of CT scan and will contact central scheduling to schedule  Also aware to have blood work completed prior to CT scan at any [facility]  Patient scheduled for cysto in [place] with Dr Gardenia Ly on 6/28/22  He was provided with office address  Advised patient office will contact him with CT scan results and can move up cysto if necessary based on results  He is aware to arrive early for cysto appointment with a comfortably full bladder to provide urine sample  Procedure explained to patient as well

## 2022-04-22 NOTE — TELEPHONE ENCOUNTER
Called and spoke with patient  He was driving and his phone service kept cutting in and out  Patient stated he would return call when he gets home  When patient returns call, will schedule him for CT scan with blood work prior, and cysto in [place] with Dr John Lopez

## 2022-04-29 ENCOUNTER — APPOINTMENT (OUTPATIENT)
Dept: LAB | Facility: HOSPITAL | Age: 72
End: 2022-04-29
Attending: UROLOGY
Payer: MEDICARE

## 2022-04-29 DIAGNOSIS — N32.89 BLADDER WALL THICKENING: ICD-10-CM

## 2022-04-29 DIAGNOSIS — Z80.51 FAMILY HISTORY OF RENAL CELL CARCINOMA: ICD-10-CM

## 2022-04-29 LAB
ALBUMIN SERPL BCP-MCNC: 3.8 G/DL (ref 3.5–5)
ANION GAP SERPL CALCULATED.3IONS-SCNC: 5 MMOL/L (ref 4–13)
BUN SERPL-MCNC: 12 MG/DL (ref 5–25)
CALCIUM SERPL-MCNC: 8.9 MG/DL (ref 8.3–10.1)
CHLORIDE SERPL-SCNC: 101 MMOL/L (ref 100–108)
CO2 SERPL-SCNC: 31 MMOL/L (ref 21–32)
CREAT SERPL-MCNC: 0.83 MG/DL (ref 0.6–1.3)
GFR SERPL CREATININE-BSD FRML MDRD: 87 ML/MIN/1.73SQ M
GLUCOSE P FAST SERPL-MCNC: 104 MG/DL (ref 65–99)
PHOSPHATE SERPL-MCNC: 3.7 MG/DL (ref 2.3–4.1)
POTASSIUM SERPL-SCNC: 4.6 MMOL/L (ref 3.5–5.3)
SODIUM SERPL-SCNC: 137 MMOL/L (ref 136–145)

## 2022-04-29 PROCEDURE — 36415 COLL VENOUS BLD VENIPUNCTURE: CPT

## 2022-04-29 PROCEDURE — 80069 RENAL FUNCTION PANEL: CPT

## 2022-05-03 ENCOUNTER — HOSPITAL ENCOUNTER (OUTPATIENT)
Dept: CT IMAGING | Facility: HOSPITAL | Age: 72
Discharge: HOME | End: 2022-05-03
Attending: UROLOGY
Payer: MEDICARE

## 2022-05-03 DIAGNOSIS — N32.89 BLADDER WALL THICKENING: ICD-10-CM

## 2022-05-03 DIAGNOSIS — Z80.51 FAMILY HISTORY OF RENAL CELL CARCINOMA: ICD-10-CM

## 2022-05-03 PROCEDURE — G1004 CDSM NDSC: HCPCS

## 2022-05-03 PROCEDURE — 74178 CT ABD&PLV WO CNTR FLWD CNTR: CPT

## 2022-05-03 RX ADMIN — IOHEXOL 100 ML: 350 INJECTION, SOLUTION INTRAVENOUS at 10:47

## 2022-05-10 ENCOUNTER — TELEPHONE (OUTPATIENT)
Dept: UROLOGY | Facility: CLINIC | Age: 72
End: 2022-05-10

## 2022-05-10 NOTE — TELEPHONE ENCOUNTER
Called and left voicemail message for patient per communication consent with CT scan results and that Dr Alejandra Luther will see him as scheduled for upcoming cystoscopy  Advised to contact the office with any questions

## 2022-06-14 ENCOUNTER — OFFICE VISIT (OUTPATIENT)
Dept: FAMILY MEDICINE CLINIC | Facility: CLINIC | Age: 72
End: 2022-06-14
Payer: MEDICARE

## 2022-06-14 VITALS
DIASTOLIC BLOOD PRESSURE: 68 MMHG | WEIGHT: 202 LBS | BODY MASS INDEX: 25.12 KG/M2 | HEART RATE: 65 BPM | HEIGHT: 75 IN | SYSTOLIC BLOOD PRESSURE: 120 MMHG | OXYGEN SATURATION: 97 %

## 2022-06-14 DIAGNOSIS — Z87.39 HISTORY OF GOUT: ICD-10-CM

## 2022-06-14 DIAGNOSIS — J30.1 SEASONAL ALLERGIC RHINITIS DUE TO POLLEN: Chronic | ICD-10-CM

## 2022-06-14 DIAGNOSIS — G62.9 NEUROPATHY: Chronic | ICD-10-CM

## 2022-06-14 DIAGNOSIS — I10 ESSENTIAL HYPERTENSION: Primary | ICD-10-CM

## 2022-06-14 PROCEDURE — 99214 OFFICE O/P EST MOD 30 MIN: CPT | Performed by: FAMILY MEDICINE

## 2022-06-14 NOTE — PROGRESS NOTES
Assessment/Plan:    Essential hypertension  Overall stable, continue current regimen    Neuropathy  Seems to be doing better with the amitriptyline and may continue    Seasonal allergic rhinitis due to pollen  Doing well, continue p r n  Meds    History of gout  Asymptomatic, continue current regimen       Diagnoses and all orders for this visit:    Essential hypertension    Neuropathy    Seasonal allergic rhinitis due to pollen    History of gout          Subjective:      Patient ID: Jan Radn is a 67 y o  male  Patient   Has history of hypertension, gout, reflux esophagitis, hypercholesterolemia, prostatic hypertrophy, allergic rhinitis and degenerative arthritis  Overall has been feeling well  Does follow up with the [address] [place] [address] and gets lab work there as well as his medication normally  Also has history of posttraumatic stress disorder for which he follows up with Psychology with the [address] [facility] [address] [facility] and is doing well at this time  Had been having trouble with nerve pain in his legs particularly bothersome in the evening  Was given trial of amitriptyline to take at bedtime and this is settling it down for so that he can sleep  Would like to continue this      The following portions of the patient's history were reviewed and updated as appropriate: allergies, current medications, past medical history, past social history and problem list     Review of Systems   Constitutional: Negative for activity change, appetite change, chills, fatigue, fever and unexpected weight change  HENT: Negative for congestion, rhinorrhea and trouble swallowing  Eyes: Negative for visual disturbance  Respiratory: Negative for apnea, cough, chest tightness and shortness of breath  Cardiovascular: Negative for chest pain, palpitations and leg swelling  Gastrointestinal: Negative for abdominal distention, abdominal pain, constipation and diarrhea  Endocrine: Negative for polyuria (Nocturia times 0-1)     Genitourinary: Negative for difficulty urinating  Musculoskeletal: Negative for arthralgias and myalgias  Skin: Negative for rash  Allergic/Immunologic: Positive for environmental allergies  Neurological: Negative for dizziness, weakness, light-headedness, numbness and headaches  Hematological: Negative for adenopathy  Psychiatric/Behavioral: Negative for agitation and sleep disturbance  Objective:      /68 (BP Location: Left arm, Patient Position: Sitting, Cuff Size: Standard)   Pulse 65   Ht 6' 3" (1 905 m)   Wt 91 6 kg (202 lb)   SpO2 97%   BMI 25 25 kg/m²          Physical Exam  Vitals and nursing note reviewed  Constitutional:       Appearance: Normal appearance  He is well-developed  HENT:      Head: Normocephalic  Eyes:      Conjunctiva/sclera: Conjunctivae normal    Neck:      Thyroid: No thyromegaly  Vascular: No carotid bruit  Cardiovascular:      Rate and Rhythm: Normal rate and regular rhythm  Heart sounds: Normal heart sounds  No murmur (Rate is 66) heard  Pulmonary:      Effort: Pulmonary effort is normal       Breath sounds: Normal breath sounds  Abdominal:      General: Abdomen is flat  There is no distension  Palpations: Abdomen is soft  There is no mass  Tenderness: There is no abdominal tenderness  Musculoskeletal:         General: Normal range of motion  Cervical back: Normal range of motion and neck supple  No tenderness  Left lower leg: No edema  Lymphadenopathy:      Cervical: No cervical adenopathy  Skin:     General: Skin is warm and dry  Findings: No rash  Neurological:      Mental Status: He is alert and oriented to person, place, and time  Motor: No weakness        Coordination: Coordination normal       Gait: Gait normal       Deep Tendon Reflexes: Reflexes normal    Psychiatric:         Mood and Affect: Mood normal

## 2022-06-14 NOTE — PATIENT INSTRUCTIONS
Overall seems to be doing well  The amitriptyline seems to be helping the neuropathy and should continue  Will continue all meds as is    Continue to push the walking activity and continue to watch the diet

## 2022-06-28 ENCOUNTER — PROCEDURE VISIT (OUTPATIENT)
Dept: UROLOGY | Facility: CLINIC | Age: 72
End: 2022-06-28
Payer: MEDICARE

## 2022-06-28 VITALS
DIASTOLIC BLOOD PRESSURE: 88 MMHG | SYSTOLIC BLOOD PRESSURE: 132 MMHG | BODY MASS INDEX: 25 KG/M2 | HEART RATE: 74 BPM | WEIGHT: 200 LBS

## 2022-06-28 DIAGNOSIS — N32.89 BLADDER WALL THICKENING: Primary | ICD-10-CM

## 2022-06-28 DIAGNOSIS — R82.81 PYURIA: ICD-10-CM

## 2022-06-28 DIAGNOSIS — Z12.5 PROSTATE CANCER SCREENING: ICD-10-CM

## 2022-06-28 LAB
SL AMB  POCT GLUCOSE, UA: NORMAL
SL AMB LEUKOCYTE ESTERASE,UA: NORMAL
SL AMB POCT BILIRUBIN,UA: NORMAL
SL AMB POCT BLOOD,UA: NORMAL
SL AMB POCT CLARITY,UA: CLEAR
SL AMB POCT COLOR,UA: YELLOW
SL AMB POCT KETONES,UA: NORMAL
SL AMB POCT NITRITE,UA: NORMAL
SL AMB POCT PH,UA: 5
SL AMB POCT SPECIFIC GRAVITY,UA: 1.01
SL AMB POCT URINE PROTEIN: NORMAL
SL AMB POCT UROBILINOGEN: 0.2

## 2022-06-28 PROCEDURE — 52000 CYSTOURETHROSCOPY: CPT | Performed by: UROLOGY

## 2022-06-28 PROCEDURE — 87186 SC STD MICRODIL/AGAR DIL: CPT | Performed by: UROLOGY

## 2022-06-28 PROCEDURE — 87077 CULTURE AEROBIC IDENTIFY: CPT | Performed by: UROLOGY

## 2022-06-28 PROCEDURE — 99213 OFFICE O/P EST LOW 20 MIN: CPT | Performed by: UROLOGY

## 2022-06-28 PROCEDURE — 88112 CYTOPATH CELL ENHANCE TECH: CPT | Performed by: PATHOLOGY

## 2022-06-28 PROCEDURE — 1123F ACP DISCUSS/DSCN MKR DOCD: CPT | Performed by: UROLOGY

## 2022-06-28 PROCEDURE — 81002 URINALYSIS NONAUTO W/O SCOPE: CPT | Performed by: UROLOGY

## 2022-06-28 PROCEDURE — 87086 URINE CULTURE/COLONY COUNT: CPT | Performed by: UROLOGY

## 2022-06-28 RX ORDER — CEPHALEXIN 500 MG/1
500 CAPSULE ORAL ONCE
Qty: 1 CAPSULE | Refills: 0 | Status: SHIPPED | OUTPATIENT
Start: 2022-06-28 | End: 2022-06-28

## 2022-06-28 NOTE — LETTER
2022     Ajith Arguello, [address]  [facility] to [facility] [id number]    Patient: Jozef Garland   YOB: 1950   Date of Visit: 2022       Dear Dr Juan Moore: Thank you for referring Jozef Garland to me for evaluation  Below are my notes for this consultation  If you have questions, please do not hesitate to call me  I look forward to following your patient along with you  Sincerely,        Kwame Gauthier MD        CC: No Recipients  Kwame Gauthier MD  2022  8:55 AM  Incomplete  [address] [facility]  [address], [address] [place] [phone]  www  [facility]  org      NAME: Jozef Garland  AGE: 67 y o  SEX: male  : 1950   MRN: [id number]    DATE: 2022  TIME: 8:55 AM    Assessment and Plan:    Chronic pyuria, asymptomatic  Urine culture sent today along with cytology given that he does has some erythema of the bladder wall and I have asked the lab to check for tuberculosis/mycobacterium  No history of TB  I will send him the results  If these are negative, he will simply live with this condition  Strong family history of renal cell carcinoma:  Check a renal ultrasound in about 2 years  Prostate cancer screening:  PSA normal at 1 5 2022  Follow-up was 1 year with PSA  Chief Complaint   No chief complaint on file  History of Present Illness   Renal ultrasound shows prominent renal column thickening of bladder wall-scheduled for cystoscopy  CT renal protocol was negative  No renal masses  He has a strong family history of renal cell carcinoma  Also has history of pyuria  This is asymptomatic and he has had this for some time  Urine culture negative  No sign of bacteria the last urinalysis         Cystoscopy     Date/Time 2022 8:18 AM     Performed by  Kwame Gauthier MD     Authorized by Kwame Gauthier MD      Universal Protocol:  Consent: Verbal consent obtained  Written consent obtained  Procedure Details:  Procedure type: cystoscopy    Additional Procedure Details: Cystoscopy Procedure Note        Pre-operative Diagnosis:  Bladder wall thickening on renal ultrasound    Post-operative Diagnosis:  Same , sediment and some erythema of the bladder wall    Procedure: Flexible cystoscopy    Surgeon: Mercedes Hall MD    Anesthesia: 1% Xylocaine per urethra    EBL: Minimal    Complications: none    Procedure Details   The risks, benefits, complications, treatment options, and expected outcomes were discussed with the patient  The patient concurred with the proposed plan, giving informed consent  Cystoscopy was performed today under local anesthesia, using sterile technique  The patient was placed in the supine position, prepped with Betadine, and draped in the usual sterile fashion  The flexible cystocope was used to inspect both the urethra and bladder    Findings:  Urethra:  Normal without stricture, prostate only moderately occlusive  Bladder:  Smooth, not trabeculated and there were no stones tumors or other lesions except for some mild erythema of the bladder wall globally and debris  The orifices were orthotopic and intact  No stone or tumor             Specimens:  None                 Complications:  None           Disposition: To home            Condition:  Stable          The following portions of the patient's history were reviewed and updated as appropriate: allergies, current medications, past family history, past medical history, past social history, past surgical history and problem list   Past Medical History:   Diagnosis Date    Allergic     Gout     Hypertension     PTSD (post-traumatic stress disorder)      Past Surgical History:   Procedure Laterality Date    APPENDECTOMY      CHOLECYSTECTOMY      TOTAL KNEE ARTHROPLASTY       shoulder  Review of Systems   Review of Systems   Genitourinary: Negative          Active Problem List     Patient Active Problem List   Diagnosis    Pure hypercholesterolemia    History of gout    Essential hypertension    Benign prostatic hyperplasia without urinary obstruction    Reflux esophagitis    Seasonal allergic rhinitis due to pollen    Primary osteoarthritis involving multiple joints    Posttraumatic stress disorder    Neuropathy       Objective   /88   Pulse 74   Wt 90 7 kg (200 lb)   BMI 25 00 kg/m²     Physical Exam        Current Medications     Current Outpatient Medications:     allopurinol (ZYLOPRIM) 300 mg tablet, Take 300 mg by mouth daily, Disp: , Rfl:     amitriptyline (ELAVIL) 10 mg tablet, Take 1 tablet (10 mg total) by mouth daily at bedtime If not effective after 1 week may increase to 2 tablets, Disp: 60 tablet, Rfl: 5    amoxicillin (AMOXIL) 500 MG tablet, amoxicillin 500 mg tablet  take 4 tablets by mouth 1 hour PRIOR TO DENTAL PROCEDURES, Disp: , Rfl:     calcium carbonate (OS-BONNIE) 600 MG tablet, Take 600 mg by mouth 2 (two) times a day with meals, Disp: , Rfl:     cephalexin (KEFLEX) 500 mg capsule, Take 1 capsule (500 mg total) by mouth 1 (one) time for 1 dose Take after procedure, Disp: 1 capsule, Rfl: 0    cholecalciferol (VITAMIN D3) 1,000 units tablet, Take 1,000 Units by mouth daily, Disp: , Rfl:     Chromium 1000 MCG TABS, Take by mouth, Disp: , Rfl:     Flaxseed, Linseed, (FLAXSEED OIL PO), Take by mouth, Disp: , Rfl:     folic acid (FOLVITE) 669 MCG tablet, Take 800 mcg by mouth daily, Disp: , Rfl:     gabapentin (NEURONTIN) 100 mg capsule, Take 100 mg by mouth 3 (three) times a day, Disp: , Rfl:     indomethacin (INDOCIN) 25 mg capsule, Take 1 capsule (25 mg total) by mouth 3 (three) times a day as needed for mild pain, Disp: 30 capsule, Rfl: 0    loratadine (CLARITIN) 10 mg tablet, Take 10 mg by mouth daily, Disp: , Rfl:     losartan (COZAAR) 100 MG tablet, Take 100 mg by mouth daily, Disp: , Rfl:    magnesium 30 MG tablet, Take 30 mg by mouth 2 (two) times a day, Disp: , Rfl:     nitrofurantoin (MACROBID) 100 mg capsule, Take 1 capsule (100 mg total) by mouth 2 (two) times a day, Disp: 14 capsule, Rfl: 0    Omega-3 Fatty Acids (FISH OIL) 1,000 mg, Take 1,200 mg by mouth daily, Disp: , Rfl:     propranolol (INDERAL) 60 mg tablet, Take 60 mg by mouth daily after dinner, Disp: , Rfl:     SUPER B COMPLEX/C PO, Take by mouth, Disp: , Rfl:     Turmeric 500 MG TABS, Take by mouth, Disp: , Rfl:     zinc gluconate 50 mg tablet, Take 50 mg by mouth daily, Disp: , Rfl:     aspirin 81 mg chewable tablet, Chew 81 mg daily (Patient not taking: No sig reported), Disp: , Rfl:         MD Nhung Tanner MD  2022  8:19 AM  Sign when Signing Visit  [address] [facility]  [place] [address]  AWK:[phone]  www  [facility]  org      NAME: Su Story  AGE: 67 y o  SEX: male  : 1950   MRN: [id number]    DATE: 2022  TIME: 8:17 AM    Assessment and Plan               Chief Complaint   No chief complaint on file  History of Present Illness   Renal ultrasound shows prominent renal column thickening of bladder wall-scheduled for cystoscopy  CT renal protocol was negative  No renal masses  He has a strong family history of renal cell carcinoma  Also has history of pyuria  Cystoscopy     Date/Time 2022 8:18 AM     Performed by  Nhung Reddy MD     Authorized by Nhung Reddy MD      Universal Protocol:  Consent: Verbal consent obtained  Written consent obtained        Procedure Details:  Procedure type: cystoscopy    Additional Procedure Details: Cystoscopy Procedure Note        Pre-operative Diagnosis:  Bladder wall thickening on renal ultrasound    Post-operative Diagnosis:  Same    Procedure: Flexible cystoscopy    Surgeon: Cory Ramos MD    Anesthesia: 1% Xylocaine per urethra    EBL: Minimal    Complications: none    Procedure Details   The risks, benefits, complications, treatment options, and expected outcomes were discussed with the patient  The patient concurred with the proposed plan, giving informed consent  Cystoscopy was performed today under local anesthesia, using sterile technique  The patient was placed in the supine position, prepped with Betadine, and draped in the usual sterile fashion  The flexible cystocope was used to inspect both the urethra and bladder    Findings:  Urethra:    Bladder:  Smooth, not trabeculated and there were no stones tumors or other lesions  The orifices were orthotopic and intact  Specimens:  None                 Complications:  None           Disposition: To home            Condition:  Stable          The following portions of the patient's history were reviewed and updated as appropriate: allergies, current medications, past family history, past medical history, past social history, past surgical history and problem list   Past Medical History:   Diagnosis Date    Allergic     Gout     Hypertension     PTSD (post-traumatic stress disorder)      Past Surgical History:   Procedure Laterality Date    APPENDECTOMY      CHOLECYSTECTOMY      TOTAL KNEE ARTHROPLASTY       shoulder  Review of Systems   Review of Systems    Active Problem List     Patient Active Problem List   Diagnosis    Pure hypercholesterolemia    History of gout    Essential hypertension    Benign prostatic hyperplasia without urinary obstruction    Reflux esophagitis    Seasonal allergic rhinitis due to pollen    Primary osteoarthritis involving multiple joints    Posttraumatic stress disorder    Neuropathy       Objective   There were no vitals taken for this visit      Physical Exam        Current Medications     Current Outpatient Medications:     allopurinol (ZYLOPRIM) 300 mg tablet, Take 300 mg by mouth daily, Disp: , Rfl:     amitriptyline (ELAVIL) 10 mg tablet, Take 1 tablet (10 mg total) by mouth daily at bedtime If not effective after 1 week may increase to 2 tablets, Disp: 60 tablet, Rfl: 5    amoxicillin (AMOXIL) 500 MG tablet, amoxicillin 500 mg tablet  take 4 tablets by mouth 1 hour PRIOR TO DENTAL PROCEDURES, Disp: , Rfl:     aspirin 81 mg chewable tablet, Chew 81 mg daily (Patient not taking: No sig reported), Disp: , Rfl:     calcium carbonate (OS-BONNIE) 600 MG tablet, Take 600 mg by mouth 2 (two) times a day with meals, Disp: , Rfl:     cholecalciferol (VITAMIN D3) 1,000 units tablet, Take 1,000 Units by mouth daily, Disp: , Rfl:     Chromium 1000 MCG TABS, Take by mouth, Disp: , Rfl:     Flaxseed, Linseed, (FLAXSEED OIL PO), Take by mouth, Disp: , Rfl:     folic acid (FOLVITE) 950 MCG tablet, Take 800 mcg by mouth daily, Disp: , Rfl:     gabapentin (NEURONTIN) 100 mg capsule, Take 100 mg by mouth 3 (three) times a day, Disp: , Rfl:     indomethacin (INDOCIN) 25 mg capsule, Take 1 capsule (25 mg total) by mouth 3 (three) times a day as needed for mild pain, Disp: 30 capsule, Rfl: 0    loratadine (CLARITIN) 10 mg tablet, Take 10 mg by mouth daily, Disp: , Rfl:     losartan (COZAAR) 100 MG tablet, Take 100 mg by mouth daily, Disp: , Rfl:     magnesium 30 MG tablet, Take 30 mg by mouth 2 (two) times a day, Disp: , Rfl:     nitrofurantoin (MACROBID) 100 mg capsule, Take 1 capsule (100 mg total) by mouth 2 (two) times a day, Disp: 14 capsule, Rfl: 0    Omega-3 Fatty Acids (FISH OIL) 1,000 mg, Take 1,200 mg by mouth daily, Disp: , Rfl:     propranolol (INDERAL) 60 mg tablet, Take 60 mg by mouth daily after dinner, Disp: , Rfl:     SUPER B COMPLEX/C PO, Take by mouth, Disp: , Rfl:     Turmeric 500 MG TABS, Take by mouth, Disp: , Rfl:     zinc gluconate 50 mg tablet, Take 50 mg by mouth daily, Disp: , Rfl:         Simone Guerra MD

## 2022-06-28 NOTE — PROGRESS NOTES
[address] [facility]  [facility]  [address] [facility]  [address] [facility], [address] [place] [address]  [phone]  www  [facility]  org      NAME: Jan Rand  AGE: 67 y o  SEX: male  : 1950   MRN: [id number]    DATE: 2022  TIME: 8:55 AM    Assessment and Plan:    Chronic pyuria, asymptomatic  Urine culture sent today along with cytology given that he does has some erythema of the bladder wall and I have asked the lab to check for tuberculosis/mycobacterium  No history of TB  I will send him the results  If these are negative, he will simply live with this condition  Strong family history of renal cell carcinoma:  Check a renal ultrasound in about 2 years  Prostate cancer screening:  PSA normal at 1 5 2022  Follow-up was 1 year with PSA  Chief Complaint   No chief complaint on file  History of Present Illness   Renal ultrasound shows prominent renal column thickening of bladder wall-scheduled for cystoscopy  CT renal protocol was negative  No renal masses  He has a strong family history of renal cell carcinoma  Also has history of pyuria  This is asymptomatic and he has had this for some time  Urine culture negative  No sign of bacteria the last urinalysis  Cystoscopy     Date/Time 2022 8:18 AM     Performed by  Evaristo Dunham MD     Authorized by Evaristo Dunham MD      Universal Protocol:  Consent: Verbal consent obtained  Written consent obtained        Procedure Details:  Procedure type: cystoscopy    Additional Procedure Details: Cystoscopy Procedure Note        Pre-operative Diagnosis:  Bladder wall thickening on renal ultrasound    Post-operative Diagnosis:  Same , sediment and some erythema of the bladder wall    Procedure: Flexible cystoscopy    Surgeon: Flora Hollins MD    Anesthesia: 1% Xylocaine per urethra    EBL: Minimal    Complications: none    Procedure Details   The risks, benefits, complications, treatment options, and expected outcomes were discussed with the patient  The patient concurred with the proposed plan, giving informed consent  Cystoscopy was performed today under local anesthesia, using sterile technique  The patient was placed in the supine position, prepped with Betadine, and draped in the usual sterile fashion  The flexible cystocope was used to inspect both the urethra and bladder    Findings:  Urethra:  Normal without stricture, prostate only moderately occlusive  Bladder:  Smooth, not trabeculated and there were no stones tumors or other lesions except for some mild erythema of the bladder wall globally and debris  The orifices were orthotopic and intact  No stone or tumor  Specimens:  None                 Complications:  None           Disposition: To home            Condition:  Stable          The following portions of the patient's history were reviewed and updated as appropriate: allergies, current medications, past family history, past medical history, past social history, past surgical history and problem list   Past Medical History:   Diagnosis Date    Allergic     Gout     Hypertension     PTSD (post-traumatic stress disorder)      Past Surgical History:   Procedure Laterality Date    APPENDECTOMY      CHOLECYSTECTOMY      TOTAL KNEE ARTHROPLASTY       shoulder  Review of Systems   Review of Systems   Genitourinary: Negative  Neurological: Positive for numbness  Is experiencing new lower extremity neuropathy         Active Problem List     Patient Active Problem List   Diagnosis    Pure hypercholesterolemia    History of gout    Essential hypertension    Benign prostatic hyperplasia without urinary obstruction    Reflux esophagitis    Seasonal allergic rhinitis due to pollen    Primary osteoarthritis involving multiple joints    Posttraumatic stress disorder    Neuropathy       Objective   /88   Pulse 74   Wt 90 7 kg (200 lb)   BMI 25 00 kg/m²     Physical Exam  Vitals reviewed  Constitutional:       Appearance: Normal appearance  HENT:      Head: Normocephalic and atraumatic  Eyes:      Extraocular Movements: Extraocular movements intact  Pulmonary:      Effort: Pulmonary effort is normal    Musculoskeletal:         General: Normal range of motion  Cervical back: Normal range of motion  Skin:     Coloration: Skin is not jaundiced or pale  Neurological:      General: No focal deficit present  Mental Status: He is alert and oriented to person, place, and time  Mental status is at baseline  Psychiatric:         Mood and Affect: Mood normal          Behavior: Behavior normal          Thought Content:  Thought content normal          Judgment: Judgment normal              Current Medications     Current Outpatient Medications:     allopurinol (ZYLOPRIM) 300 mg tablet, Take 300 mg by mouth daily, Disp: , Rfl:     amitriptyline (ELAVIL) 10 mg tablet, Take 1 tablet (10 mg total) by mouth daily at bedtime If not effective after 1 week may increase to 2 tablets, Disp: 60 tablet, Rfl: 5    amoxicillin (AMOXIL) 500 MG tablet, amoxicillin 500 mg tablet  take 4 tablets by mouth 1 hour PRIOR TO DENTAL PROCEDURES, Disp: , Rfl:     calcium carbonate (OS-BONNIE) 600 MG tablet, Take 600 mg by mouth 2 (two) times a day with meals, Disp: , Rfl:     cephalexin (KEFLEX) 500 mg capsule, Take 1 capsule (500 mg total) by mouth 1 (one) time for 1 dose Take after procedure, Disp: 1 capsule, Rfl: 0    cholecalciferol (VITAMIN D3) 1,000 units tablet, Take 1,000 Units by mouth daily, Disp: , Rfl:     Chromium 1000 MCG TABS, Take by mouth, Disp: , Rfl:     Flaxseed, Linseed, (FLAXSEED OIL PO), Take by mouth, Disp: , Rfl:     folic acid (FOLVITE) 323 MCG tablet, Take 800 mcg by mouth daily, Disp: , Rfl:     gabapentin (NEURONTIN) 100 mg capsule, Take 100 mg by mouth 3 (three) times a day, Disp: , Rfl:     indomethacin (INDOCIN) 25 mg capsule, Take 1 capsule (25 mg total) by mouth 3 (three) times a day as needed for mild pain, Disp: 30 capsule, Rfl: 0    loratadine (CLARITIN) 10 mg tablet, Take 10 mg by mouth daily, Disp: , Rfl:     losartan (COZAAR) 100 MG tablet, Take 100 mg by mouth daily, Disp: , Rfl:     magnesium 30 MG tablet, Take 30 mg by mouth 2 (two) times a day, Disp: , Rfl:     nitrofurantoin (MACROBID) 100 mg capsule, Take 1 capsule (100 mg total) by mouth 2 (two) times a day, Disp: 14 capsule, Rfl: 0    Omega-3 Fatty Acids (FISH OIL) 1,000 mg, Take 1,200 mg by mouth daily, Disp: , Rfl:     propranolol (INDERAL) 60 mg tablet, Take 60 mg by mouth daily after dinner, Disp: , Rfl:     SUPER B COMPLEX/C PO, Take by mouth, Disp: , Rfl:     Turmeric 500 MG TABS, Take by mouth, Disp: , Rfl:     zinc gluconate 50 mg tablet, Take 50 mg by mouth daily, Disp: , Rfl:     aspirin 81 mg chewable tablet, Chew 81 mg daily (Patient not taking: No sig reported), Disp: , Rfl:         Patrizia Bell MD

## 2022-06-30 LAB — BACTERIA UR CULT: ABNORMAL

## 2022-07-01 ENCOUNTER — TELEPHONE (OUTPATIENT)
Dept: UROLOGY | Facility: CLINIC | Age: 72
End: 2022-07-01

## 2022-07-01 DIAGNOSIS — R82.81 PYURIA: Primary | ICD-10-CM

## 2022-07-01 RX ORDER — CEPHALEXIN 500 MG/1
500 CAPSULE ORAL EVERY 6 HOURS SCHEDULED
Qty: 28 CAPSULE | Refills: 0 | Status: SHIPPED | OUTPATIENT
Start: 2022-07-01 | End: 2022-07-08

## 2022-07-01 NOTE — TELEPHONE ENCOUNTER
Contacted patient and made him aware of urine culture results and that antibiotic was called into his pharmacy  Patient verbalized understanding

## 2022-07-01 NOTE — TELEPHONE ENCOUNTER
Called and left voicemail message for patient per communication consent with negative urine cytology results  Advised him to call the office with any questions

## 2022-09-15 ENCOUNTER — IMMUNIZATIONS (OUTPATIENT)
Dept: FAMILY MEDICINE CLINIC | Facility: CLINIC | Age: 72
End: 2022-09-15
Payer: MEDICARE

## 2022-09-15 DIAGNOSIS — Z23 ENCOUNTER FOR IMMUNIZATION: Primary | ICD-10-CM

## 2022-09-15 PROCEDURE — G0008 ADMIN INFLUENZA VIRUS VAC: HCPCS

## 2022-09-15 PROCEDURE — 90662 IIV NO PRSV INCREASED AG IM: CPT

## 2022-10-24 ENCOUNTER — TELEPHONE (OUTPATIENT)
Dept: CARDIOLOGY CLINIC | Facility: CLINIC | Age: 72
End: 2022-10-24

## 2022-12-14 ENCOUNTER — OFFICE VISIT (OUTPATIENT)
Dept: FAMILY MEDICINE CLINIC | Facility: CLINIC | Age: 72
End: 2022-12-14

## 2022-12-14 VITALS
BODY MASS INDEX: 24.87 KG/M2 | WEIGHT: 200 LBS | HEIGHT: 75 IN | HEART RATE: 98 BPM | DIASTOLIC BLOOD PRESSURE: 72 MMHG | SYSTOLIC BLOOD PRESSURE: 126 MMHG | OXYGEN SATURATION: 95 %

## 2022-12-14 DIAGNOSIS — I10 ESSENTIAL HYPERTENSION: Primary | ICD-10-CM

## 2022-12-14 DIAGNOSIS — F43.10 POSTTRAUMATIC STRESS DISORDER: Chronic | ICD-10-CM

## 2022-12-14 DIAGNOSIS — G62.9 NEUROPATHY: Chronic | ICD-10-CM

## 2022-12-14 DIAGNOSIS — Z87.39 HISTORY OF GOUT: ICD-10-CM

## 2022-12-14 DIAGNOSIS — Z00.00 MEDICARE ANNUAL WELLNESS VISIT, SUBSEQUENT: ICD-10-CM

## 2022-12-14 DIAGNOSIS — E78.00 PURE HYPERCHOLESTEROLEMIA: ICD-10-CM

## 2022-12-14 DIAGNOSIS — M15.9 PRIMARY OSTEOARTHRITIS INVOLVING MULTIPLE JOINTS: Chronic | ICD-10-CM

## 2022-12-14 NOTE — PATIENT INSTRUCTIONS
Medicare Preventive Visit Patient Instructions  Thank you for completing your Welcome to Medicare Visit or Medicare Annual Wellness Visit today  Your next wellness visit will be due in one year (12/15/2023)  The screening/preventive services that you may require over the next 5-10 years are detailed below  Some tests may not apply to you based off risk factors and/or age  Screening tests ordered at today's visit but not completed yet may show as past due  Also, please note that scanned in results may not display below  Preventive Screenings:  Service Recommendations Previous Testing/Comments   Colorectal Cancer Screening  Colonoscopy    Fecal Occult Blood Test (FOBT)/Fecal Immunochemical Test (FIT)  Fecal DNA/Cologuard Test  Flexible Sigmoidoscopy Age: 39-70 years old   Colonoscopy: every 10 years (May be performed more frequently if at higher risk)  OR  FOBT/FIT: every 1 year  OR  Cologuard: every 3 years  OR  Sigmoidoscopy: every 5 years  Screening may be recommended earlier than age 39 if at higher risk for colorectal cancer  Also, an individualized decision between you and your healthcare provider will decide whether screening between the ages of 74-80 would be appropriate   Colonoscopy: Not on file  FOBT/FIT: Not on file  Cologuard: 08/30/2021  Sigmoidoscopy: Not on file    Screening Current  Due for Colonoscopy - Low Risk     Prostate Cancer Screening Individualized decision between patient and health care provider in men between ages of 53-78   Medicare will cover every 12 months beginning on the day after your 50th birthday PSA: 1 5 ng/mL     Screening Current     Hepatitis C Screening Once for adults born between [facility]  More frequently in patients at high risk for Hepatitis C Hep C Antibody: Not on file    Risks and Benefits Discussed   Diabetes Screening 1-2 times per year if you're at risk for diabetes or have pre-diabetes Fasting glucose: 104 mg/dL (4/29/2022)  A1C: No results in last 5 years (No results in last 5 years)  Screening Current   Cholesterol Screening Once every 5 years if you don't have a lipid disorder  May order more often based on risk factors  Lipid panel: Not on file  Screening Not Indicated  History Lipid Disorder      Other Preventive Screenings Covered by Medicare:  Abdominal Aortic Aneurysm (AAA) Screening: covered once if your at risk  You're considered to be at risk if you have a family history of AAA or a male between the age of 73-68 who smoking at least 100 cigarettes in your lifetime  Lung Cancer Screening: covers low dose CT scan once per year if you meet all of the following conditions: (1) Age 50-69; (2) No signs or symptoms of lung cancer; (3) Current smoker or have quit smoking within the last 15 years; (4) You have a tobacco smoking history of at least 20 pack years (packs per day x number of years you smoked); (5) You get a written order from a healthcare provider  Glaucoma Screening: covered annually if you're considered high risk: (1) You have diabetes OR (2) Family history of glaucoma OR (3)  aged 48 and older OR (3)  American aged 72 and older  Osteoporosis Screening: covered every 2 years if you meet one of the following conditions: (1) Have a vertebral abnormality; (2) On glucocorticoid therapy for more than 3 months; (3) Have primary hyperparathyroidism; (4) On osteoporosis medications and need to assess response to drug therapy  HIV Screening: covered annually if you're between the age of 12-76  Also covered annually if you are younger than 13 and older than 72 with risk factors for HIV infection  For pregnant patients, it is covered up to 3 times per pregnancy      Immunizations:  Immunization Recommendations   Influenza Vaccine Annual influenza vaccination during flu season is recommended for all persons aged >= 6 months who do not have contraindications   Pneumococcal Vaccine   * Pneumococcal conjugate vaccine = PCV13 (Prevnar 13), PCV15 (Vaxneuvance), PCV20 (Prevnar 20)  * Pneumococcal polysaccharide vaccine = PPSV23 (Pneumovax) Adults 2364 years old: 1-3 doses may be recommended based on certain risk factors  Adults 72 years old: 1-2 doses may be recommended based off what pneumonia vaccine you previously received   Hepatitis B Vaccine 3 dose series if at intermediate or high risk (ex: diabetes, end stage renal disease, liver disease)   Tetanus (Td) Vaccine - COST NOT COVERED BY MEDICARE PART B Following completion of primary series, a booster dose should be given every 10 years to maintain immunity against tetanus  Td may also be given as tetanus wound prophylaxis  Tdap Vaccine - COST NOT COVERED BY MEDICARE PART B Recommended at least once for all adults  For pregnant patients, recommended with each pregnancy  Shingles Vaccine (Shingrix) - COST NOT COVERED BY MEDICARE PART B  2 shot series recommended in those aged 48 and above     Health Maintenance Due:      Topic Date Due    Hepatitis C Screening  Never done    Colorectal Cancer Screening  08/30/2024     Immunizations Due:      Topic Date Due    Hepatitis B Vaccine (1 of 3 - 3-dose series) Never done    Pneumococcal Vaccine: 65+ Years (2 - PPSV23 if available, else PCV20) 04/22/2017    COVID-19 Vaccine (4 - Booster for Felizardo Naranjo series) 03/01/2022     Advance Directives   What are advance directives? Advance directives are legal documents that state your wishes and plans for medical care  These plans are made ahead of time in case you lose your ability to make decisions for yourself  Advance directives can apply to any medical decision, such as the treatments you want, and if you want to donate organs  What are the types of advance directives? There are many types of advance directives, and each state has rules about how to use them  You may choose a combination of any of the following:  Living will: This is a written record of the treatment you want   You can also choose which treatments you do not want, which to limit, and which to stop at a certain time  This includes surgery, medicine, IV fluid, and tube feedings  [facility] power of  for healthcare [facility]): This is a written record that states who you want to make healthcare choices for you when you are unable to make them for yourself  This person, called a proxy, is usually a family member or a friend  You may choose more than 1 proxy  Do not resuscitate (DNR) order:  A DNR order is used in case your heart stops beating or you stop breathing  It is a request not to have certain forms of treatment, such as CPR  A DNR order may be included in other types of advance directives  Medical directive: This covers the care that you want if you are in a coma, near death, or unable to make decisions for yourself  You can list the treatments you want for each condition  Treatment may include pain medicine, surgery, blood transfusions, dialysis, IV or tube feedings, and a ventilator (breathing machine)  Values history: This document has questions about your views, beliefs, and how you feel and think about life  This information can help others choose the care that you would choose  Why are advance directives important? An advance directive helps you control your care  Although spoken wishes may be used, it is better to have your wishes written down  Spoken wishes can be misunderstood, or not followed  Treatments may be given even if you do not want them  An advance directive may make it easier for your family to make difficult choices about your care  Weight Management   Why it is important to manage your weight:  Being overweight increases your risk of health conditions such as heart disease, high blood pressure, type 2 diabetes, and certain types of cancer  It can also increase your risk for osteoarthritis, sleep apnea, and other respiratory problems  Aim for a slow, steady weight loss   Even a small amount of weight loss can lower your risk of health problems  How to lose weight safely:  A safe and healthy way to lose weight is to eat fewer calories and get regular exercise  You can lose up about 1 pound a week by decreasing the number of calories you eat by 500 calories each day  Healthy meal plan for weight management:  A healthy meal plan includes a variety of foods, contains fewer calories, and helps you stay healthy  A healthy meal plan includes the following:  Eat whole-grain foods more often  A healthy meal plan should contain fiber  Fiber is the part of grains, fruits, and vegetables that is not broken down by your body  Whole-grain foods are healthy and provide extra fiber in your diet  Some examples of whole-grain foods are whole-wheat breads and pastas, oatmeal, brown rice, and bulgur  Eat a variety of vegetables every day  Include dark, leafy greens such as spinach, kale, adalberto greens, and mustard greens  Eat yellow and orange vegetables such as carrots, sweet potatoes, and winter squash  Eat a variety of fruits every day  Choose fresh or canned fruit (canned in its own juice or light syrup) instead of juice  Fruit juice has very little or no fiber  Eat low-fat dairy foods  Drink fat-free (skim) milk or 1% milk  Eat fat-free yogurt and low-fat cottage cheese  Try low-fat cheeses such as mozzarella and other reduced-fat cheeses  Choose meat and other protein foods that are low in fat  Choose beans or other legumes such as split peas or lentils  Choose fish, skinless poultry (chicken or turkey), or lean cuts of red meat (beef or pork)  Before you cook meat or poultry, cut off any visible fat  Use less fat and oil  Try baking foods instead of frying them  Add less fat, such as margarine, sour cream, regular salad dressing and mayonnaise to foods  Eat fewer high-fat foods  Some examples of high-fat foods include french fries, doughnuts, ice cream, and cakes  Eat fewer sweets    Limit foods and drinks that are high in sugar  This includes candy, cookies, regular soda, and sweetened drinks  Exercise:  Exercise at least 30 minutes per day on most days of the week  Some examples of exercise include walking, biking, dancing, and swimming  You can also fit in more physical activity by taking the stairs instead of the elevator or parking farther away from stores  Ask your healthcare provider about the best exercise plan for you  © Copyright [employer] 2018 Information is for End User's use only and may not be sold, redistributed or otherwise used for commercial purposes  All illustrations and images included in CareNotes® are the copyrighted property of [employer]  or [address]  Patient is very functional   He is dealing with the stress from his wife's recent illness  Has had flu shot and is going to get the new COVID booster  Does get lab work from the [place] and should always have this forwarded to here  Next time coming in should bring and the living will and we will scanned into the chart    Continue to push the physical activity and continue watching the diet

## 2022-12-14 NOTE — PROGRESS NOTES
Assessment and Plan:     Problem List Items Addressed This Visit        Cardiovascular and Mediastinum    Essential hypertension - Primary     Overall stable, continue current regimen  Is getting his lab work done at the [facility]    Neuropathy (Chronic)     Seems to be doing better  Continue current regimen and follow-up            Musculoskeletal and Integument    Primary osteoarthritis involving multiple joints (Chronic)     Seems to be functioning well  Continue current regimen and push activity as tolerated            Other    Pure hypercholesterolemia     Stable, continue current regimen         Posttraumatic stress disorder (Chronic)     Seems to be doing well  [facility]         History of gout     Asymptomatic, continue current regimen        Other Visit Diagnoses     Medicare annual wellness visit, subsequent            BMI Counseling: Body mass index is 25 kg/m²  The BMI is above normal  Nutrition recommendations include moderation in carbohydrate intake  Exercise recommendations include moderate physical activity 150 minutes/week  No pharmacotherapy was ordered  Rationale for BMI follow-up plan is due to patient being overweight or obese  Depression Screening and Follow-up Plan: Patient was screened for depression during today's encounter  They screened negative with a PHQ-2 score of 1  Preventive health issues were discussed with patient, and age appropriate screening tests were ordered as noted in patient's After Visit Summary  Personalized health advice and appropriate referrals for health education or preventive services given if needed, as noted in patient's After Visit Summary  History of Present Illness:     Patient presents for a Medicare Wellness Visit    Patient   Has history of hypertension, gout, reflux esophagitis, hypercholesterolemia, prostatic hypertrophy, allergic rhinitis and degenerative arthritis    Has been under a lot of stress due to wife's recent injury  Does follow up with the [facility] and gets lab work there as well as his medication normally  Also has history of posttraumatic stress disorder for which he follows up with Psychology with the [facility] and is doing well at this time  Had been having trouble with nerve pain in his legs particularly bothersome in the evening  Was given trial of amitriptyline to take at bedtime and this is settling it down for so that he can sleep  However is having some more trouble with sleep due to stress     Patient Care Team:  Majo Livingston MD as PCP - General (Family Medicine)     Review of Systems:     Review of Systems   Constitutional: Negative for activity change, appetite change, chills, fatigue, fever and unexpected weight change  HENT: Positive for hearing loss (Has bilateral hearing aids)  Negative for congestion, dental problem, rhinorrhea, trouble swallowing and voice change  Eyes: Negative for visual disturbance  Respiratory: Negative for apnea, cough, chest tightness and shortness of breath  Cardiovascular: Negative for chest pain, palpitations and leg swelling  Gastrointestinal: Negative for abdominal distention, abdominal pain, constipation and diarrhea  Endocrine: Negative for polyuria (Nocturia x1)  Genitourinary: Negative for difficulty urinating and enuresis  Musculoskeletal: Negative for arthralgias and myalgias  Skin: Negative for rash  Allergic/Immunologic: Positive for environmental allergies  Neurological: Positive for numbness (Also nerve pain in both legs that does seem to go away if taking Advil)  Negative for dizziness, weakness, light-headedness and headaches  Hematological: Negative for adenopathy  Psychiatric/Behavioral: Positive for sleep disturbance  Negative for agitation and confusion  The patient is nervous/anxious (Related to his wife's injury although this is improving)           Problem List:     Patient Active Problem List Diagnosis   • Pure hypercholesterolemia   • History of gout   • Essential hypertension   • Benign prostatic hyperplasia without urinary obstruction   • Reflux esophagitis   • Seasonal allergic rhinitis due to pollen   • Primary osteoarthritis involving multiple joints   • Posttraumatic stress disorder   • Neuropathy      Past Medical and Surgical History:     Past Medical History:   Diagnosis Date   • Allergic    • Gout    • Hypertension    • PTSD (post-traumatic stress disorder)      Past Surgical History:   Procedure Laterality Date   • APPENDECTOMY     • CHOLECYSTECTOMY     • TOTAL KNEE ARTHROPLASTY        Family History:     Family History   Problem Relation Age of Onset   • Lung cancer Father       Social History:     Social History     Socioeconomic History   • Marital status: /Civil Union     Spouse name: None   • Number of children: None   • Years of education: None   • Highest education level: None   Occupational History   • None   Tobacco Use   • Smoking status: Former     Packs/day: 0 50     Years: 20 00     Pack years: 10 00     Types: Cigarettes     Quit date: 2007     Years since quitting: 15 9   • Smokeless tobacco: Never   Vaping Use   • Vaping Use: Never used   Substance and Sexual Activity   • Alcohol use: Yes   • Drug use: Not Currently   • Sexual activity: None   Other Topics Concern   • None   Social History Narrative   • None     Social Determinants of Health     Financial Resource Strain: Low Risk    • Difficulty of Paying Living Expenses: Not hard at all   Food Insecurity: Not on file   Transportation Needs: No Transportation Needs   • Lack of Transportation (Medical): No   • Lack of Transportation (Non-Medical):  No   Physical Activity: Not on file   Stress: Not on file   Social Connections: Not on file   Intimate Partner Violence: Not on file   Housing Stability: Not on file      Medications and Allergies:     Current Outpatient Medications   Medication Sig Dispense Refill   • allopurinol (ZYLOPRIM) 300 mg tablet Take 300 mg by mouth daily     • amitriptyline (ELAVIL) 10 mg tablet Take 1 tablet (10 mg total) by mouth daily at bedtime If not effective after 1 week may increase to 2 tablets 60 tablet 5   • aspirin 81 mg chewable tablet Chew 81 mg daily     • calcium carbonate (OS-BONNIE) 600 MG tablet Take 600 mg by mouth 2 (two) times a day with meals     • cholecalciferol (VITAMIN D3) 1,000 units tablet Take 1,000 Units by mouth daily     • Chromium 1000 MCG TABS Take by mouth     • Flaxseed, Linseed, (FLAXSEED OIL PO) Take by mouth     • folic acid (FOLVITE) 728 MCG tablet Take 800 mcg by mouth daily     • gabapentin (NEURONTIN) 100 mg capsule Take 100 mg by mouth 3 (three) times a day     • indomethacin (INDOCIN) 25 mg capsule Take 1 capsule (25 mg total) by mouth 3 (three) times a day as needed for mild pain 30 capsule 0   • loratadine (CLARITIN) 10 mg tablet Take 10 mg by mouth daily     • losartan (COZAAR) 100 MG tablet Take 100 mg by mouth daily     • magnesium 30 MG tablet Take 30 mg by mouth 2 (two) times a day     • nitrofurantoin (MACROBID) 100 mg capsule Take 1 capsule (100 mg total) by mouth 2 (two) times a day 14 capsule 0   • Omega-3 Fatty Acids (FISH OIL) 1,000 mg Take 1,200 mg by mouth daily     • propranolol (INDERAL) 60 mg tablet Take 60 mg by mouth daily after dinner     • SUPER B COMPLEX/C PO Take by mouth     • Turmeric 500 MG TABS Take by mouth     • zinc gluconate 50 mg tablet Take 50 mg by mouth daily     • amoxicillin (AMOXIL) 500 MG tablet amoxicillin 500 mg tablet   take 4 tablets by mouth 1 hour PRIOR TO DENTAL PROCEDURES       No current facility-administered medications for this visit       Allergies   Allergen Reactions   • Ativan [Lorazepam] Delirium   • Fluoxetine Diarrhea     Other reaction(s): Diarrhea, Tremor, Sweating, Sweating, Diarrhea, Tremor, Sweating, Sweating, Diarrhea, Tremor, Sweating, Sweating   • Levaquin [Levofloxacin] Itching   • Venlafaxine Other reaction(s): Tremor, Sweating, Tremor, Sweating      Immunizations:     Immunization History   Administered Date(s) Administered   • COVID-19 MODERNA VACC 0 5 ML IM 01/14/2021, 02/11/2021, 11/01/2021   • DTaP,unspecified 10/26/2010   • INFLUENZA 10/22/2008, 11/03/2009, 10/20/2010, 10/24/2011, 09/17/2012, 10/21/2013, 10/05/2015, 10/28/2016, 09/01/2020, 10/12/2021, 10/12/2021   • Influenza Split High Dose Preservative Free IM 10/03/2017   • Influenza, high dose seasonal 0 7 mL 09/15/2022   • Pneumococcal 08/07/2014   • Pneumococcal Conjugate 13-Valent 04/22/2016   • Tdap 05/14/2020, 04/22/2022   • Zoster 10/29/2013   • Zoster Vaccine Recombinant 07/14/2022, 09/22/2022   • influenza, trivalent, adjuvanted 10/03/2018, 10/03/2019      Health Maintenance:         Topic Date Due   • Hepatitis C Screening  Never done   • Colorectal Cancer Screening  08/30/2024         Topic Date Due   • Hepatitis B Vaccine (1 of 3 - 3-dose series) Never done   • Pneumococcal Vaccine: 65+ Years (2 - PPSV23 if available, else PCV20) 04/22/2017   • COVID-19 Vaccine (4 - Booster for Jyothitre Acostah series) 03/01/2022      Medicare Screening Tests and Risk Assessments:     Raghavendra Valdivia is here for his Subsequent Wellness visit  Last Medicare Wellness visit information reviewed, patient interviewed and updates made to the record today  Health Risk Assessment:   Patient rates overall health as excellent  Patient feels that their physical health rating is same  Patient is very satisfied with their life  Eyesight was rated as same  Hearing was rated as same  Patient feels that their emotional and mental health rating is same  Patients states they are never, rarely angry  Patient states they are never, rarely unusually tired/fatigued  Pain experienced in the last 7 days has been some  Patient's pain rating has been 3/10  Patient states that he has experienced no weight loss or gain in last 6 months   Neuropathy  In the legs particularly in the evening    Depression Screening:   PHQ-2 Score: 1      Fall Risk Screening: In the past year, patient has experienced: no history of falling in past year      Home Safety:  Patient does not have trouble with stairs inside or outside of their home  Patient has working smoke alarms and has working carbon monoxide detector  Home safety hazards include: none  No issues    Nutrition:   Current diet is Regular and Limited junk food  Watch starch in diet    Medications:   Patient is currently taking over-the-counter supplements  OTC medications include: see medication list  Patient is able to manage medications  No issues    Activities of Daily Living (ADLs)/Instrumental Activities of Daily Living (IADLs):   Walk and transfer into and out of bed and chair?: Yes  Dress and groom yourself?: Yes    Bathe or shower yourself?: Yes    Feed yourself? Yes  Do your laundry/housekeeping?: Yes  Manage your money, pay your bills and track your expenses?: Yes  Make your own meals?: Yes    Do your own shopping?: Yes    ADL comments: Overall patient is very functional at home  No issues    Previous Hospitalizations:   Any hospitalizations or ED visits within the last 12 months?: No      Hospitalization Comments: Patient following up with [facility] at this time for vascular surgery although workup was negative    Also [facility]    Advance Care Planning:   Living will: Yes    Durable POA for healthcare: No    Advanced directive: Yes    Advanced directive counseling given: Yes    Five wishes given: No    End of Life Decisions reviewed with patient: No      Comments:  Should bring this into office to have scanned into the chart    Cognitive Screening:   Provider or family/friend/caregiver concerned regarding cognition?: No    PREVENTIVE SCREENINGS      Cardiovascular Screening:    General: Screening Not Indicated and History Lipid Disorder      Diabetes Screening:     General: Screening Current      Colorectal Cancer Screening:     General: Screening Current    Due for: Colonoscopy - Low Risk      Prostate Cancer Screening:    General: Screening Current      Osteoporosis Screening:    General: Screening Not Indicated      Abdominal Aortic Aneurysm (AAA) Screening:    Risk factors include: age between 73-69 yo and tobacco use        General: Screening Current      Lung Cancer Screening:     General: Screening Not Indicated      Hepatitis C Screening:    General: Risks and Benefits Discussed      Preventive Screening Comments: Should continue follow-up for routine eye care and have records forwarded to the office  Recommended COVID booster in December    Screening, Brief Intervention, and Referral to Treatment (SBIRT)    Screening  Typical number of drinks in a day: 0  Typical number of drinks in a week: 3  Interpretation: Low risk drinking behavior  Single Item Drug Screening:  How often have you used an illegal drug (including marijuana) or a prescription medication for non-medical reasons in the past year? never    Single Item Drug Screen Score: 0  Interpretation: Negative screen for possible drug use disorder    Brief Intervention  Alcohol & drug use screenings were reviewed  No concerns regarding substance use disorder identified  No results found  Physical Exam:     /72 (BP Location: Left arm, Patient Position: Sitting, Cuff Size: Standard)   Pulse 98   Ht 6' 3" (1 905 m)   Wt 90 7 kg (200 lb)   SpO2 95%   BMI 25 00 kg/m²     Physical Exam  Vitals and nursing note reviewed  Constitutional:       Appearance: Normal appearance  HENT:      Head: Normocephalic  Right Ear: Tympanic membrane, ear canal and external ear normal  Decreased hearing (25 dB hearing screen is off  No difficulty with use of hearing aid with hearing) noted        Left Ear: Tympanic membrane, ear canal and external ear normal  Decreased hearing (25 dB hearing screen is off  No difficulty with use of hearing aid with hearing) noted  Nose: Nose normal       Mouth/Throat:      Mouth: Mucous membranes are moist       Dentition: Normal dentition  Eyes:      Extraocular Movements: Extraocular movements intact  Conjunctiva/sclera: Conjunctivae normal       Pupils: Pupils are equal, round, and reactive to light  Neck:      Vascular: No carotid bruit  Cardiovascular:      Rate and Rhythm: Normal rate and regular rhythm  Pulses: Normal pulses  Dorsalis pedis pulses are 2+ on the right side and 2+ on the left side  Posterior tibial pulses are 2+ on the right side and 2+ on the left side  Heart sounds: Normal heart sounds  No murmur (Rate is 72) heard  Pulmonary:      Effort: Pulmonary effort is normal       Breath sounds: Normal breath sounds  Abdominal:      General: Abdomen is flat  There is no distension  Palpations: Abdomen is soft  There is no mass  Tenderness: There is no abdominal tenderness  Musculoskeletal:         General: No swelling  Cervical back: Normal range of motion and neck supple  No tenderness  No muscular tenderness  Right lower leg: No edema  Left lower leg: No edema  Right foot: No deformity  Left foot: No deformity  Feet:      Right foot:      Protective Sensation: 8 sites tested  8 sites sensed  Skin integrity: Skin integrity normal       Left foot:      Protective Sensation: 8 sites tested  8 sites sensed  Skin integrity: Skin integrity normal    Lymphadenopathy:      Cervical: No cervical adenopathy  Skin:     General: Skin is warm and dry  Capillary Refill: Capillary refill takes 2 to 3 seconds  Findings: No rash  Neurological:      General: No focal deficit present  Mental Status: He is alert and oriented to person, place, and time  Cranial Nerves: No cranial nerve deficit  Sensory: No sensory deficit  Motor: No weakness  Coordination: Coordination normal       Gait: Gait normal       Deep Tendon Reflexes: Reflexes abnormal (Reflexes diminished)  Psychiatric:         Mood and Affect: Mood normal          Behavior: Behavior normal          Thought Content:  Thought content normal          Judgment: Judgment normal           Jorge Malik MD

## 2023-06-08 ENCOUNTER — RA CDI HCC (OUTPATIENT)
Dept: OTHER | Facility: HOSPITAL | Age: 73
End: 2023-06-08

## 2023-06-08 NOTE — PROGRESS NOTES
[address]  coding opportunities       Chart reviewed, no opportunity found: CHART REVIEWED, NO OPPORTUNITY FOUND        Patients Insurance     Medicare Insurance: Medicare

## 2023-06-09 ENCOUNTER — TELEPHONE (OUTPATIENT)
Dept: UROLOGY | Facility: CLINIC | Age: 73
End: 2023-06-09

## 2023-06-09 DIAGNOSIS — Z12.5 PROSTATE CANCER SCREENING: Primary | ICD-10-CM

## 2023-06-09 NOTE — TELEPHONE ENCOUNTER
During pre-charting, I noticed pt had outstanding testing due (PSA)  Telephone call to pt to remind them to have it done

## 2023-06-15 ENCOUNTER — OFFICE VISIT (OUTPATIENT)
Dept: FAMILY MEDICINE CLINIC | Facility: CLINIC | Age: 73
End: 2023-06-15
Payer: MEDICARE

## 2023-06-15 VITALS
OXYGEN SATURATION: 92 % | HEART RATE: 67 BPM | WEIGHT: 202 LBS | DIASTOLIC BLOOD PRESSURE: 72 MMHG | SYSTOLIC BLOOD PRESSURE: 120 MMHG | BODY MASS INDEX: 25.12 KG/M2 | HEIGHT: 75 IN

## 2023-06-15 DIAGNOSIS — K21.00 GASTROESOPHAGEAL REFLUX DISEASE WITH ESOPHAGITIS WITHOUT HEMORRHAGE: Chronic | ICD-10-CM

## 2023-06-15 DIAGNOSIS — J30.1 SEASONAL ALLERGIC RHINITIS DUE TO POLLEN: Chronic | ICD-10-CM

## 2023-06-15 DIAGNOSIS — N40.0 BENIGN PROSTATIC HYPERPLASIA WITHOUT URINARY OBSTRUCTION: ICD-10-CM

## 2023-06-15 DIAGNOSIS — G62.9 NEUROPATHY: Chronic | ICD-10-CM

## 2023-06-15 DIAGNOSIS — Z87.39 HISTORY OF GOUT: ICD-10-CM

## 2023-06-15 DIAGNOSIS — E78.00 PURE HYPERCHOLESTEROLEMIA: ICD-10-CM

## 2023-06-15 DIAGNOSIS — I10 ESSENTIAL HYPERTENSION: Primary | ICD-10-CM

## 2023-06-15 PROCEDURE — 99214 OFFICE O/P EST MOD 30 MIN: CPT | Performed by: FAMILY MEDICINE

## 2023-06-15 NOTE — PATIENT INSTRUCTIONS
Overall seems to be doing very well  Continue to push the physical activity  [facility] and is due to check with urology in the next week    Continue current meds

## 2023-06-19 ENCOUNTER — OFFICE VISIT (OUTPATIENT)
Dept: UROLOGY | Facility: CLINIC | Age: 73
End: 2023-06-19
Payer: MEDICARE

## 2023-06-19 VITALS
HEIGHT: 75 IN | BODY MASS INDEX: 24.87 KG/M2 | DIASTOLIC BLOOD PRESSURE: 70 MMHG | SYSTOLIC BLOOD PRESSURE: 128 MMHG | WEIGHT: 200 LBS | HEART RATE: 64 BPM

## 2023-06-19 DIAGNOSIS — Z80.51 FAMILY HISTORY OF RENAL CELL CARCINOMA: ICD-10-CM

## 2023-06-19 DIAGNOSIS — Z12.5 PROSTATE CANCER SCREENING: Primary | ICD-10-CM

## 2023-06-19 PROCEDURE — 99213 OFFICE O/P EST LOW 20 MIN: CPT | Performed by: UROLOGY

## 2023-06-19 NOTE — LETTER
2023     Abhilash Nick, [address] [place] [address]    Patient: Lisa Varela   YOB: 1950   Date of Visit: 2023       Dear Dr Bessie Jain: Thank you for referring Lisa Varela to me for evaluation  Below are my notes for this consultation  If you have questions, please do not hesitate to call me  I look forward to following your patient along with you  Sincerely,        Kodak Vargas MD        CC: No Recipients    Kodak Vargas MD  2023  2:29 PM  Sign when Signing Visit  [address] [facility]  [address], [address] [place] [id number]  www  [facility]  org      NAME: Lisa Varela  AGE: 75 y o  SEX: male  : 1950   MRN: [id number]    DATE: 2023  TIME: 2:24 PM    Assessment and Plan:    Chronic pyuria: Had a urinary tract infection was treated  No voiding complaints  Status post negative cystoscopy  Prostate cancer screening: Check PSA, he will have this done at the [place] tomorrow  Send him the results  Strong family history of renal cell carcinoma: Check renal ultrasound in 1 year  Chief Complaint   No chief complaint on file  History of Present Illness   Follow-up chronic pyuria which is asymptomatic  Underwent cystoscopy by me 2022 showed some mild erythema of the bladder wall globally and some debris  No stone or tumor  CT renal protocol was normal   Urine cytology was negative  He also has a strong family history of renal cell carcinoma we check a renal ultrasound about every 2 years  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 who has cognitive deficits due to a traumatic brain injury last year after a fall  Voiding well, no complaints  At the time of the scope, he did have a urinary tract infection of E  coli which was treated with an antibiotic      Prostate cancer screening: PSA "ordered but not done yet  The following portions of the patient's history were reviewed and updated as appropriate: allergies, current medications, past family history, past medical history, past social history, past surgical history and problem list   Past Medical History:   Diagnosis Date   • Allergic    • Gout    • Hypertension    • PTSD (post-traumatic stress disorder)      Past Surgical History:   Procedure Laterality Date   • APPENDECTOMY     • CHOLECYSTECTOMY     • TOTAL KNEE ARTHROPLASTY       shoulder  Review of Systems   Review of Systems   Genitourinary: Negative  Negative for hematuria  Active Problem List     Patient Active Problem List   Diagnosis   • Pure hypercholesterolemia   • History of gout   • Essential hypertension   • Benign prostatic hyperplasia without urinary obstruction   • Reflux esophagitis   • Seasonal allergic rhinitis due to pollen   • Primary osteoarthritis involving multiple joints   • Posttraumatic stress disorder   • Neuropathy       Objective   /70 (BP Location: Right arm, Patient Position: Sitting, Cuff Size: Large)   Pulse 64   Ht 6' 3\" (1 905 m)   Wt 90 7 kg (200 lb)   BMI 25 00 kg/m²     Physical Exam  Vitals reviewed  Constitutional:       Appearance: Normal appearance  He is normal weight  HENT:      Head: Normocephalic and atraumatic  Eyes:      Extraocular Movements: Extraocular movements intact  Pulmonary:      Effort: Pulmonary effort is normal    Musculoskeletal:         General: Normal range of motion  Cervical back: Normal range of motion  Skin:     Coloration: Skin is not jaundiced or pale  Neurological:      General: No focal deficit present  Mental Status: He is alert and oriented to person, place, and time  Mental status is at baseline  Psychiatric:         Mood and Affect: Mood normal          Behavior: Behavior normal          Thought Content:  Thought content normal          Judgment: Judgment normal  " Current Medications     Current Outpatient Medications:   •  allopurinol (ZYLOPRIM) 300 mg tablet, Take 300 mg by mouth daily, Disp: , Rfl:   •  amitriptyline (ELAVIL) 10 mg tablet, Take 1 tablet (10 mg total) by mouth daily at bedtime If not effective after 1 week may increase to 2 tablets, Disp: 60 tablet, Rfl: 5  •  amoxicillin (AMOXIL) 500 MG tablet, amoxicillin 500 mg tablet  take 4 tablets by mouth 1 hour PRIOR TO DENTAL PROCEDURES, Disp: , Rfl:   •  aspirin 81 mg chewable tablet, Chew 81 mg daily, Disp: , Rfl:   •  calcium carbonate (OS-BONNIE) 600 MG tablet, Take 600 mg by mouth 2 (two) times a day with meals, Disp: , Rfl:   •  cholecalciferol (VITAMIN D3) 1,000 units tablet, Take 1,000 Units by mouth daily, Disp: , Rfl:   •  Chromium 1000 MCG TABS, Take by mouth, Disp: , Rfl:   •  Flaxseed, Linseed, (FLAXSEED OIL PO), Take by mouth, Disp: , Rfl:   •  folic acid (FOLVITE) 617 MCG tablet, Take 800 mcg by mouth daily, Disp: , Rfl:   •  gabapentin (NEURONTIN) 100 mg capsule, Take 100 mg by mouth 3 (three) times a day, Disp: , Rfl:   •  indomethacin (INDOCIN) 25 mg capsule, Take 1 capsule (25 mg total) by mouth 3 (three) times a day as needed for mild pain, Disp: 30 capsule, Rfl: 0  •  loratadine (CLARITIN) 10 mg tablet, Take 10 mg by mouth daily, Disp: , Rfl:   •  losartan (COZAAR) 100 MG tablet, Take 100 mg by mouth daily, Disp: , Rfl:   •  magnesium 30 MG tablet, Take 30 mg by mouth 2 (two) times a day, Disp: , Rfl:   •  Omega-3 Fatty Acids (FISH OIL) 1,000 mg, Take 1,200 mg by mouth daily, Disp: , Rfl:   •  propranolol (INDERAL) 60 mg tablet, Take 60 mg by mouth daily after dinner, Disp: , Rfl:   •  SUPER B COMPLEX/C PO, Take by mouth, Disp: , Rfl:   •  Turmeric 500 MG TABS, Take by mouth, Disp: , Rfl:   •  zinc gluconate 50 mg tablet, Take 50 mg by mouth daily, Disp: , Rfl:   •  nitrofurantoin (MACROBID) 100 mg capsule, Take 1 capsule (100 mg total) by mouth 2 (two) times a day (Patient not taking: Reported on 6/19/2023), Disp: 14 capsule, Rfl: 0        Inder Gavin MD

## 2023-06-19 NOTE — PROGRESS NOTES
[address] [facility]  [facility]  [address] [facility] [address]  [place], [address] [place]  [phone]  www  [facility]  org      NAME: Francisco J Shipley  AGE: 75 y o  SEX: male  : 1950   MRN: [id number]    DATE: 2023  TIME: 2:24 PM    Assessment and Plan:    Chronic pyuria: Had a urinary tract infection was treated  No voiding complaints  Status post negative cystoscopy  Prostate cancer screening: Check PSA, he will have this done at the [place] tomorrow  Send him the results  Strong family history of renal cell carcinoma: Check renal ultrasound in 1 year  Chief Complaint   No chief complaint on file  History of Present Illness   Follow-up chronic pyuria which is asymptomatic  Underwent cystoscopy by me 2022 showed some mild erythema of the bladder wall globally and some debris  No stone or tumor  CT renal protocol was normal   Urine cytology was negative  He also has a strong family history of renal cell carcinoma we check a renal ultrasound about every 2 years  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 who has cognitive deficits due to a traumatic brain injury last year after a fall  Voiding well, no complaints  At the time of the scope, he did have a urinary tract infection of E  coli which was treated with an antibiotic  Prostate cancer screening: PSA ordered but not done yet        The following portions of the patient's history were reviewed and updated as appropriate: allergies, current medications, past family history, past medical history, past social history, past surgical history and problem list   Past Medical History:   Diagnosis Date   • Allergic    • Gout    • Hypertension    • PTSD (post-traumatic stress disorder)      Past Surgical History:   Procedure Laterality Date   • APPENDECTOMY     • CHOLECYSTECTOMY     • TOTAL KNEE ARTHROPLASTY       shoulder  Review of Systems   Review of Systems "  Genitourinary: Negative  Negative for hematuria  Active Problem List     Patient Active Problem List   Diagnosis   • Pure hypercholesterolemia   • History of gout   • Essential hypertension   • Benign prostatic hyperplasia without urinary obstruction   • Reflux esophagitis   • Seasonal allergic rhinitis due to pollen   • Primary osteoarthritis involving multiple joints   • Posttraumatic stress disorder   • Neuropathy       Objective   /70 (BP Location: Right arm, Patient Position: Sitting, Cuff Size: Large)   Pulse 64   Ht 6' 3\" (1 905 m)   Wt 90 7 kg (200 lb)   BMI 25 00 kg/m²     Physical Exam  Vitals reviewed  Constitutional:       Appearance: Normal appearance  He is normal weight  HENT:      Head: Normocephalic and atraumatic  Eyes:      Extraocular Movements: Extraocular movements intact  Pulmonary:      Effort: Pulmonary effort is normal    Musculoskeletal:         General: Normal range of motion  Cervical back: Normal range of motion  Skin:     Coloration: Skin is not jaundiced or pale  Neurological:      General: No focal deficit present  Mental Status: He is alert and oriented to person, place, and time  Mental status is at baseline  Psychiatric:         Mood and Affect: Mood normal          Behavior: Behavior normal          Thought Content:  Thought content normal          Judgment: Judgment normal              Current Medications     Current Outpatient Medications:   •  allopurinol (ZYLOPRIM) 300 mg tablet, Take 300 mg by mouth daily, Disp: , Rfl:   •  amitriptyline (ELAVIL) 10 mg tablet, Take 1 tablet (10 mg total) by mouth daily at bedtime If not effective after 1 week may increase to 2 tablets, Disp: 60 tablet, Rfl: 5  •  amoxicillin (AMOXIL) 500 MG tablet, amoxicillin 500 mg tablet  take 4 tablets by mouth 1 hour PRIOR TO DENTAL PROCEDURES, Disp: , Rfl:   •  aspirin 81 mg chewable tablet, Chew 81 mg daily, Disp: , Rfl:   •  calcium carbonate (OS-BONNIE) 600 MG " tablet, Take 600 mg by mouth 2 (two) times a day with meals, Disp: , Rfl:   •  cholecalciferol (VITAMIN D3) 1,000 units tablet, Take 1,000 Units by mouth daily, Disp: , Rfl:   •  Chromium 1000 MCG TABS, Take by mouth, Disp: , Rfl:   •  Flaxseed, Linseed, (FLAXSEED OIL PO), Take by mouth, Disp: , Rfl:   •  folic acid (FOLVITE) 171 MCG tablet, Take 800 mcg by mouth daily, Disp: , Rfl:   •  gabapentin (NEURONTIN) 100 mg capsule, Take 100 mg by mouth 3 (three) times a day, Disp: , Rfl:   •  indomethacin (INDOCIN) 25 mg capsule, Take 1 capsule (25 mg total) by mouth 3 (three) times a day as needed for mild pain, Disp: 30 capsule, Rfl: 0  •  loratadine (CLARITIN) 10 mg tablet, Take 10 mg by mouth daily, Disp: , Rfl:   •  losartan (COZAAR) 100 MG tablet, Take 100 mg by mouth daily, Disp: , Rfl:   •  magnesium 30 MG tablet, Take 30 mg by mouth 2 (two) times a day, Disp: , Rfl:   •  Omega-3 Fatty Acids (FISH OIL) 1,000 mg, Take 1,200 mg by mouth daily, Disp: , Rfl:   •  propranolol (INDERAL) 60 mg tablet, Take 60 mg by mouth daily after dinner, Disp: , Rfl:   •  SUPER B COMPLEX/C PO, Take by mouth, Disp: , Rfl:   •  Turmeric 500 MG TABS, Take by mouth, Disp: , Rfl:   •  zinc gluconate 50 mg tablet, Take 50 mg by mouth daily, Disp: , Rfl:   •  nitrofurantoin (MACROBID) 100 mg capsule, Take 1 capsule (100 mg total) by mouth 2 (two) times a day (Patient not taking: Reported on 6/19/2023), Disp: 14 capsule, Rfl: 0        Emery Rosales MD

## 2023-12-26 ENCOUNTER — RA CDI HCC (OUTPATIENT)
Dept: OTHER | Facility: HOSPITAL | Age: 73
End: 2023-12-26

## 2023-12-27 NOTE — PROGRESS NOTES
HCC coding opportunities       Chart reviewed, no opportunity found: CHART REVIEWED, NO OPPORTUNITY FOUND        Patients Insurance     Medicare Insurance: Medicare        HCC coding opportunities       Chart reviewed, no opportunity found: CHART REVIEWED, NO OPPORTUNITY FOUND        Patients Insurance     Medicare Insurance: Medicare

## 2024-01-02 ENCOUNTER — OFFICE VISIT (OUTPATIENT)
Dept: FAMILY MEDICINE CLINIC | Facility: CLINIC | Age: 74
End: 2024-01-02
Payer: MEDICARE

## 2024-01-02 VITALS
HEART RATE: 64 BPM | BODY MASS INDEX: 24.39 KG/M2 | HEIGHT: 75 IN | SYSTOLIC BLOOD PRESSURE: 130 MMHG | OXYGEN SATURATION: 97 % | DIASTOLIC BLOOD PRESSURE: 36 MMHG | WEIGHT: 196.2 LBS

## 2024-01-02 DIAGNOSIS — E78.00 PURE HYPERCHOLESTEROLEMIA: ICD-10-CM

## 2024-01-02 DIAGNOSIS — G62.9 NEUROPATHY: Chronic | ICD-10-CM

## 2024-01-02 DIAGNOSIS — Z23 ENCOUNTER FOR IMMUNIZATION: ICD-10-CM

## 2024-01-02 DIAGNOSIS — J30.1 SEASONAL ALLERGIC RHINITIS DUE TO POLLEN: Chronic | ICD-10-CM

## 2024-01-02 DIAGNOSIS — Z87.39 HISTORY OF GOUT: ICD-10-CM

## 2024-01-02 DIAGNOSIS — I10 ESSENTIAL HYPERTENSION: Primary | ICD-10-CM

## 2024-01-02 DIAGNOSIS — Z00.00 MEDICARE ANNUAL WELLNESS VISIT, SUBSEQUENT: ICD-10-CM

## 2024-01-02 PROCEDURE — G0444 DEPRESSION SCREEN ANNUAL: HCPCS | Performed by: FAMILY MEDICINE

## 2024-01-02 PROCEDURE — G0438 PPPS, INITIAL VISIT: HCPCS | Performed by: FAMILY MEDICINE

## 2024-01-02 PROCEDURE — 99214 OFFICE O/P EST MOD 30 MIN: CPT | Performed by: FAMILY MEDICINE

## 2024-01-02 NOTE — PROGRESS NOTES
Assessment and Plan:     Problem List Items Addressed This Visit          Respiratory    Seasonal allergic rhinitis due to pollen (Chronic)     Has been doing well.  Currently has resolving URI and should use saline nasal rinse at bedtime and in the morning            Cardiovascular and Mediastinum    Essential hypertension - Primary     Overall stable, continue current regimen.  [facility] and should have this [facility] to office            Nervous and Auditory    Neuropathy (Chronic)     Doing better with use of the amitriptyline and will continue            Other    Pure hypercholesterolemia     Continue current supplements         History of gout     Asymptomatic, continue current regimen          Other Visit Diagnoses       Encounter for immunization        Medicare annual wellness visit, subsequent                Depression Screening and Follow-up Plan: Patient was screened for depression during today's encounter. They screened negative with a PHQ-2 score of 1.      Preventive health issues were discussed with patient, and age appropriate screening tests were ordered as noted in patient's After Visit Summary.  Personalized health advice and appropriate referrals for health education or preventive services given if needed, as noted in patient's After Visit Summary.     History of Present Illness:     Patient presents for a Medicare Wellness Visit      Patient   Has history of hypertension, gout, reflux esophagitis, hypercholesterolemia, prostatic hypertrophy, allergic rhinitis and degenerative arthritis.    [facility] as well as his medication normally.  Also has history of posttraumatic stress disorder for which he [facility] and is doing well at this time.Had been having trouble with nerve pain in his legs particularly bothersome in the evening.  Was given trial of amitriptyline to take at bedtime and this is settling it  down for so that he can sleep.  Overall has been feeling well       Patient Care Team:  Ruben Finch MD as PCP - General (Family Medicine)     Review of Systems:     Review of Systems   Constitutional:  Negative for activity change, appetite change, chills, fatigue, fever and unexpected weight change.   HENT:  Positive for congestion (Mucus is clear) and hearing loss (Has bilateral hearing aids which worked well). Negative for dental problem, rhinorrhea, trouble swallowing and voice change.    Eyes:  Negative for visual disturbance.   Respiratory:  Positive for cough. Negative for apnea, chest tightness, shortness of breath and wheezing.    Cardiovascular:  Negative for chest pain, palpitations and leg swelling.   Gastrointestinal:  Negative for abdominal distention, abdominal pain, constipation and diarrhea.   Endocrine: Negative for polyuria (Nocturia x 1).   Genitourinary:  Negative for difficulty urinating and enuresis.   Musculoskeletal:  Negative for arthralgias and myalgias.   Skin:  Negative for rash.   Allergic/Immunologic: Negative for environmental allergies.   Neurological:  Negative for dizziness, weakness, light-headedness, numbness and headaches.   Hematological:  Negative for adenopathy.   Psychiatric/Behavioral:  Positive for sleep disturbance. Negative for agitation and confusion.         Problem List:     Patient Active Problem List   Diagnosis    Pure hypercholesterolemia    History of gout    Essential hypertension    Benign prostatic hyperplasia without urinary obstruction    Reflux esophagitis    Seasonal allergic rhinitis due to pollen    Primary osteoarthritis involving multiple joints    Posttraumatic stress disorder    Neuropathy      Past Medical and Surgical History:     Past Medical History:   Diagnosis Date    Allergic     Gout     Hypertension     PTSD (post-traumatic stress disorder)      Past Surgical History:   Procedure Laterality Date    APPENDECTOMY      CHOLECYSTECTOMY       TOTAL KNEE ARTHROPLASTY        Family History:     Family History   Problem Relation Age of Onset    Lung cancer Father       Social History:     Social History     Socioeconomic History    Marital status: /Civil Union     Spouse name: None    Number of children: None    Years of education: None    Highest education level: None   Occupational History    None   Tobacco Use    Smoking status: Former     Current packs/day: 0.00     Average packs/day: 0.5 packs/day for 20.0 years (10.0 total pack years)     Types: Cigarettes     Start date:      Quit date:      Years since quittin.0    Smokeless tobacco: Never   Vaping Use    Vaping status: Never Used   Substance and Sexual Activity    Alcohol use: Yes    Drug use: Not Currently    Sexual activity: None   Other Topics Concern    None   Social History Narrative    None     Social Determinants of Health     Financial Resource Strain: Low Risk  (2024)    Overall Financial Resource Strain (CARDIA)     Difficulty of Paying Living Expenses: Not hard at all   Food Insecurity: Not on file   Transportation Needs: No Transportation Needs (2024)    PRAPARE - Transportation     Lack of Transportation (Medical): No     Lack of Transportation (Non-Medical): No   Physical Activity: Not on file   Stress: Not on file   Social Connections: Not on file   Intimate Partner Violence: Not on file   Housing Stability: Not on file      Medications and Allergies:     Current Outpatient Medications   Medication Sig Dispense Refill    allopurinol (ZYLOPRIM) 300 mg tablet Take 300 mg by mouth daily      amitriptyline (ELAVIL) 10 mg tablet Take 1 tablet (10 mg total) by mouth daily at bedtime If not effective after 1 week may increase to 2 tablets 60 tablet 5    amoxicillin (AMOXIL) 500 MG tablet amoxicillin 500 mg tablet   take 4 tablets by mouth 1 hour PRIOR TO DENTAL PROCEDURES      aspirin 81 mg chewable tablet Chew 81 mg daily      calcium carbonate (OS-BONNIE) 600  MG tablet Take 600 mg by mouth 2 (two) times a day with meals      cholecalciferol (VITAMIN D3) 1,000 units tablet Take 1,000 Units by mouth daily      Chromium 1000 MCG TABS Take by mouth      Flaxseed, Linseed, (FLAXSEED OIL PO) Take by mouth      folic acid (FOLVITE) 800 MCG tablet Take 800 mcg by mouth daily      gabapentin (NEURONTIN) 100 mg capsule Take 100 mg by mouth 3 (three) times a day      indomethacin (INDOCIN) 25 mg capsule Take 1 capsule (25 mg total) by mouth 3 (three) times a day as needed for mild pain 30 capsule 0    loratadine (CLARITIN) 10 mg tablet Take 10 mg by mouth daily      losartan (COZAAR) 100 MG tablet Take 100 mg by mouth daily      magnesium 30 MG tablet Take 30 mg by mouth 2 (two) times a day      Omega-3 Fatty Acids (FISH OIL) 1,000 mg Take 1,200 mg by mouth daily      propranolol (INDERAL) 60 mg tablet Take 60 mg by mouth daily after dinner      SUPER B COMPLEX/C PO Take by mouth      Turmeric 500 MG TABS Take by mouth      zinc gluconate 50 mg tablet Take 50 mg by mouth daily       No current facility-administered medications for this visit.     Allergies   Allergen Reactions    Ativan [Lorazepam] Delirium    Fluoxetine Diarrhea     Other reaction(s): Diarrhea, Tremor, Sweating, Sweating, Diarrhea, Tremor, Sweating, Sweating, Diarrhea, Tremor, Sweating, Sweating    Levaquin [Levofloxacin] Itching    Venlafaxine      Other reaction(s): Tremor, Sweating, Tremor, Sweating      Immunizations:     Immunization History   Administered Date(s) Administered    COVID-19 MODERNA VACC 0.5 ML IM 01/14/2021, 02/11/2021, 11/01/2021    COVID-19 Moderna Vac BIVALENT 12 Yr+ IM 0.5 ML 01/19/2023    DTaP,unspecified 10/26/2010    INFLUENZA 10/22/2008, 11/03/2009, 10/20/2010, 10/24/2011, 09/17/2012, 10/21/2013, 10/05/2015, 10/28/2016, 09/01/2020, 10/12/2021, 10/12/2021, 11/01/2022    Influenza Split High Dose Preservative Free IM 10/03/2017    Influenza, high dose seasonal 0.7 mL 09/15/2022,  10/19/2023    Pneumococcal 08/07/2014    Pneumococcal Conjugate 13-Valent 04/22/2016    Tdap 05/14/2020, 04/22/2022    Zoster 10/29/2013    Zoster Vaccine Recombinant 07/14/2022, 09/22/2022    influenza, trivalent, adjuvanted 10/03/2018, 10/03/2019      Health Maintenance:         Topic Date Due    Hepatitis C Screening  Never done    Colorectal Cancer Screening  08/30/2024         Topic Date Due    Pneumococcal Vaccine: 65+ Years (2 - PPSV23 or PCV20) 04/22/2017    COVID-19 Vaccine (5 - 2023-24 season) 09/01/2023      Medicare Screening Tests and Risk Assessments:     Jasson is here for his Subsequent Wellness visit. Last Medicare Wellness visit information reviewed, patient interviewed and updates made to the record today.      Health Risk Assessment:   Patient rates overall health as very good. Patient feels that their physical health rating is slightly better. Patient is very satisfied with their life. Eyesight was rated as same. Hearing was rated as same. Patient feels that their emotional and mental health rating is slightly better. Patients states they are never, rarely angry. Patient states they are never, rarely unusually tired/fatigued. Pain experienced in the last 7 days has been none. Patient states that he has experienced weight loss or gain in last 6 months. No issues    Depression Screening:   PHQ-2 Score: 1      Fall Risk Screening:   In the past year, patient has experienced: no history of falling in past year      Home Safety:  Patient does not have trouble with stairs inside or outside of their home. Patient has working smoke alarms and has working carbon monoxide detector. Home safety hazards include: none. No issues    Nutrition:   Current diet is Regular and No Added Salt. Watch starch in diet    Medications:   Patient is not currently taking any over-the-counter supplements. Patient is able to manage medications. No issues    Activities of Daily Living (ADLs)/Instrumental Activities of  Daily Living (IADLs):   Walk and transfer into and out of bed and chair?: Yes  Dress and groom yourself?: Yes    Bathe or shower yourself?: Yes    Feed yourself? Yes  Do your laundry/housekeeping?: Yes  Manage your money, pay your bills and track your expenses?: Yes  Make your own meals?: Yes    Do your own shopping?: Yes    ADL comments: Overall patient functions well around the house.  No issues    Previous Hospitalizations:   Any hospitalizations or ED visits within the last 12 months?: No      [facility]ization Comments: [facility]    Advance Care Planning:   Living will: Yes    Durable POA for healthcare: Yes    Advanced directive: Yes    Advanced directive counseling given: Yes    Five wishes given: No    End of Life Decisions reviewed with patient: No      Comments: No issues    Cognitive Screening:   Provider or family/friend/caregiver concerned regarding cognition?: No    PREVENTIVE SCREENINGS      Cardiovascular Screening:    General: Screening Not Indicated and History Lipid Disorder      Diabetes Screening:     General: Screening Current      Colorectal Cancer Screening:     General: Screening Current      Prostate Cancer Screening:    General: Screening Current      Osteoporosis Screening:    General: Screening Not Indicated      Abdominal Aortic Aneurysm (AAA) Screening:    Risk factors include: age between 65-76 yo and tobacco use        General: Screening Current      Lung Cancer Screening:     General: Screening Not Indicated      Hepatitis C Screening:    General: Screening Not Indicated      Preventive Screening Comments: Continue follow-up with routine eye care and have reports forwarded to office as well [facility]    Screening, Brief Intervention, and Referral to Treatment (SBIRT)    Screening  Typical number of drinks in a day: 0  Typical number of drinks in a week: 3  Interpretation: Low risk drinking behavior.    Single Item Drug Screening:  How often have you  "used an illegal drug (including marijuana) or a prescription medication for non-medical reasons in the past year? never    Single Item Drug Screen Score: 0  Interpretation: Negative screen for possible drug use disorder    Brief Intervention  Alcohol & drug use screenings were reviewed. No concerns regarding substance use disorder identified.     Annual Depression Screening  Time spent screening and evaluating the patient for depression during today's encounter was 5 minutes.    No results found.     Physical Exam:     Blood Pressure (Abnormal) 130/36 (BP Location: Left arm, Patient Position: Sitting, Cuff Size: Large)   Pulse 64   Height 6' 3\" (1.905 m)   Weight 89 kg (196 lb 3.2 oz)   Oxygen Saturation 97%   Body Mass Index 24.52 kg/m²     Physical Exam  Vitals and nursing note reviewed.   Constitutional:       Appearance: Normal appearance.   HENT:      Head: Normocephalic.      Right Ear: Tympanic membrane, ear canal and external ear normal. Decreased hearing (Has bilateral hearing aids.  No difficulty with conversation) noted.      Left Ear: Tympanic membrane, ear canal and external ear normal. Decreased hearing noted.      Nose: Nose normal.      Mouth/Throat:      Mouth: Mucous membranes are moist.      Dentition: Normal dentition.   Eyes:      Extraocular Movements: Extraocular movements intact.      Conjunctiva/sclera: Conjunctivae normal.      Pupils: Pupils are equal, round, and reactive to light.   Neck:      Vascular: No carotid bruit.   Cardiovascular:      Rate and Rhythm: Normal rate and regular rhythm.      Pulses:           Dorsalis pedis pulses are 2+ on the right side and 2+ on the left side.        Posterior tibial pulses are 1+ on the right side and 1+ on the left side.      Heart sounds: No murmur (Rate is 72) heard.  Pulmonary:      Effort: Pulmonary effort is normal.      Breath sounds: Normal breath sounds.   Abdominal:      General: Abdomen is flat. There is no distension.      " Palpations: Abdomen is soft. There is no mass.      Tenderness: There is no abdominal tenderness.   Musculoskeletal:         General: No swelling.      Cervical back: Normal range of motion and neck supple. No tenderness. No muscular tenderness.      Right lower leg: No edema.      Left lower leg: No edema.      Right foot: No deformity.      Left foot: No deformity.   Feet:      Right foot:      Protective Sensation: 8 sites tested.  8 sites sensed.      Skin integrity: Skin integrity normal.      Left foot:      Protective Sensation: 8 sites tested.  8 sites sensed.      Skin integrity: Skin integrity normal.   Lymphadenopathy:      Cervical: No cervical adenopathy.   Skin:     General: Skin is warm and dry.      Capillary Refill: Capillary refill takes 2 to 3 seconds.      Findings: No rash.   Neurological:      General: No focal deficit present.      Mental Status: He is alert and oriented to person, place, and time.      Cranial Nerves: No cranial nerve deficit.      Sensory: No sensory deficit.      Motor: No weakness.      Coordination: Coordination normal.      Gait: Gait normal.      Deep Tendon Reflexes: Reflexes abnormal (Reflexes diminished).   Psychiatric:         Mood and Affect: Mood normal.         Behavior: Behavior normal.         Thought Content: Thought content normal.         Judgment: Judgment normal.          Ruben Finch MD

## 2024-01-02 NOTE — ASSESSMENT & PLAN NOTE
Has been doing well.  Currently has resolving URI and should use saline nasal rinse at bedtime and in the morning

## 2024-01-02 NOTE — ASSESSMENT & PLAN NOTE
Overall stable, continue current regimen.  [facility] and should have this forwarded to office

## 2024-01-02 NOTE — PATIENT INSTRUCTIONS
Medicare Preventive Visit Patient Instructions  Thank you for completing your Welcome to Medicare Visit or Medicare Annual Wellness Visit today. Your next wellness visit will be due in one year (1/2/2025).  The screening/preventive services that you may require over the next 5-10 years are detailed below. Some tests may not apply to you based off risk factors and/or age. Screening tests ordered at today's visit but not completed yet may show as past due. Also, please note that scanned in results may not display below.  Preventive Screenings:  Service Recommendations Previous Testing/Comments   Colorectal Cancer Screening  Colonoscopy    Fecal Occult Blood Test (FOBT)/Fecal Immunochemical Test (FIT)  Fecal DNA/Cologuard Test  Flexible Sigmoidoscopy Age: 45-75 years old   Colonoscopy: every 10 years (May be performed more frequently if at higher risk)  OR  FOBT/FIT: every 1 year  OR  Cologuard: every 3 years  OR  Sigmoidoscopy: every 5 years  Screening may be recommended earlier than age 45 if at higher risk for colorectal cancer. Also, an individualized decision between you and your healthcare provider will decide whether screening between the ages of 76-85 would be appropriate. Colonoscopy: Not on file  FOBT/FIT: Not on file  Cologuard: 08/30/2021  Sigmoidoscopy: Not on file    Screening Current     Prostate Cancer Screening Individualized decision between patient and health care provider in men between ages of 55-69   Medicare will cover every 12 months beginning on the day after your 50th birthday PSA: 1.5 ng/mL           Hepatitis C Screening Once for adults born between 1945 and 1965  More frequently in patients at high risk for Hepatitis C Hep C Antibody: Not on file        Diabetes Screening 1-2 times per year if you're at risk for diabetes or have pre-diabetes Fasting glucose: 104 mg/dL (4/29/2022)  A1C: No results in last 5 years (No results in last 5 years)      Cholesterol Screening Once every 5 years if  you don't have a lipid disorder. May order more often based on risk factors. Lipid panel: Not on file  Screening Not Indicated  History Lipid Disorder      Other Preventive Screenings Covered by Medicare:  Abdominal Aortic Aneurysm (AAA) Screening: covered once if your at risk. You're considered to be at risk if you have a family history of AAA or a male between the age of 65-75 who smoking at least 100 cigarettes in your lifetime.  Lung Cancer Screening: covers low dose CT scan once per year if you meet all of the following conditions: (1) Age 55-77; (2) No signs or symptoms of lung cancer; (3) Current smoker or have quit smoking within the last 15 years; (4) You have a tobacco smoking history of at least 20 pack years (packs per day x number of years you smoked); (5) You get a written order from a healthcare provider.  Glaucoma Screening: covered annually if you're considered high risk: (1) You have diabetes OR (2) Family history of glaucoma OR (3)  aged 50 and older OR (4)  American aged 65 and older  Osteoporosis Screening: covered every 2 years if you meet one of the following conditions: (1) Have a vertebral abnormality; (2) On glucocorticoid therapy for more than 3 months; (3) Have primary hyperparathyroidism; (4) On osteoporosis medications and need to assess response to drug therapy.  HIV Screening: covered annually if you're between the age of 15-65. Also covered annually if you are younger than 15 and older than 65 with risk factors for HIV infection. For pregnant patients, it is covered up to 3 times per pregnancy.    Immunizations:  Immunization Recommendations   Influenza Vaccine Annual influenza vaccination during flu season is recommended for all persons aged >= 6 months who do not have contraindications   Pneumococcal Vaccine   * Pneumococcal conjugate vaccine = PCV13 (Prevnar 13), PCV15 (Vaxneuvance), PCV20 (Prevnar 20)  * Pneumococcal polysaccharide vaccine = PPSV23  (Pneumovax) Adults 19-63 yo with certain risk factors or if 65+ yo  If never received any pneumonia vaccine: recommend Prevnar 20 (PCV20)  Give PCV20 if previously received 1 dose of PCV13 or PPSV23   Hepatitis B Vaccine 3 dose series if at intermediate or high risk (ex: diabetes, end stage renal disease, liver disease)   Respiratory syncytial virus (RSV) Vaccine - COVERED BY MEDICARE PART D  * RSVPreF3 (Arexvy) CDC recommends that adults 60 years of age and older may receive a single dose of RSV vaccine using shared clinical decision-making (SCDM)   Tetanus (Td) Vaccine - COST NOT COVERED BY MEDICARE PART B Following completion of primary series, a booster dose should be given every 10 years to maintain immunity against tetanus. Td may also be given as tetanus wound prophylaxis.   Tdap Vaccine - COST NOT COVERED BY MEDICARE PART B Recommended at least once for all adults. For pregnant patients, recommended with each pregnancy.   Shingles Vaccine (Shingrix) - COST NOT COVERED BY MEDICARE PART B  2 shot series recommended in those 19 years and older who have or will have weakened immune systems or those 50 years and older     Health Maintenance Due:      Topic Date Due    Hepatitis C Screening  Never done    Colorectal Cancer Screening  08/30/2024     Immunizations Due:      Topic Date Due    Pneumococcal Vaccine: 65+ Years (2 - PPSV23 or PCV20) 04/22/2017    COVID-19 Vaccine (5 - 2023-24 season) 09/01/2023     Advance Directives   What are advance directives?  Advance directives are legal documents that state your wishes and plans for medical care. These plans are made ahead of time in case you lose your ability to make decisions for yourself. Advance directives can apply to any medical decision, such as the treatments you want, and if you want to donate organs.   What are the types of advance directives?  There are many types of advance directives, and each state has rules about how to use them. You may choose a  combination of any of the following:  Living will:  This is a written record of the treatment you want. You can also choose which treatments you do not want, which to limit, and which to stop at a certain time. This includes surgery, medicine, IV fluid, and tube feedings.   Durable power of  for healthcare (DPAHC):  This is a written record that states who you want to make healthcare choices for you when you are unable to make them for yourself. This person, called a proxy, is usually a family member or a friend. You may choose more than 1 proxy.  Do not resuscitate (DNR) order:  A DNR order is used in case your heart stops beating or you stop breathing. It is a request not to have certain forms of treatment, such as CPR. A DNR order may be included in other types of advance directives.  Medical directive:  This covers the care that you want if you are in a coma, near death, or unable to make decisions for yourself. You can list the treatments you want for each condition. Treatment may include pain medicine, surgery, blood transfusions, dialysis, IV or tube feedings, and a ventilator (breathing machine).  Values history:  This document has questions about your views, beliefs, and how you feel and think about life. This information can help others choose the care that you would choose.  Why are advance directives important?  An advance directive helps you control your care. Although spoken wishes may be used, it is better to have your wishes written down. Spoken wishes can be misunderstood, or not followed. Treatments may be given even if you do not want them. An advance directive may make it easier for your family to make difficult choices about your care.   Weight Management   Why it is important to manage your weight:  Being overweight increases your risk of health conditions such as heart disease, high blood pressure, type 2 diabetes, and certain types of cancer. It can also increase your risk for  osteoarthritis, sleep apnea, and other respiratory problems. Aim for a slow, steady weight loss. Even a small amount of weight loss can lower your risk of health problems.  How to lose weight safely:  A safe and healthy way to lose weight is to eat fewer calories and get regular exercise. You can lose up about 1 pound a week by decreasing the number of calories you eat by 500 calories each day.   Healthy meal plan for weight management:  A healthy meal plan includes a variety of foods, contains fewer calories, and helps you stay healthy. A healthy meal plan includes the following:  Eat whole-grain foods more often.  A healthy meal plan should contain fiber. Fiber is the part of grains, fruits, and vegetables that is not broken down by your body. Whole-grain foods are healthy and provide extra fiber in your diet. Some examples of whole-grain foods are whole-wheat breads and pastas, oatmeal, brown rice, and bulgur.  Eat a variety of vegetables every day.  Include dark, leafy greens such as spinach, kale, adalberto greens, and mustard greens. Eat yellow and orange vegetables such as carrots, sweet potatoes, and winter squash.   Eat a variety of fruits every day.  Choose fresh or canned fruit (canned in its own juice or light syrup) instead of juice. Fruit juice has very little or no fiber.  Eat low-fat dairy foods.  Drink fat-free (skim) milk or 1% milk. Eat fat-free yogurt and low-fat cottage cheese. Try low-fat cheeses such as mozzarella and other reduced-fat cheeses.  Choose meat and other protein foods that are low in fat.  Choose beans or other legumes such as split peas or lentils. Choose fish, skinless poultry (chicken or turkey), or lean cuts of red meat (beef or pork). Before you cook meat or poultry, cut off any visible fat.   Use less fat and oil.  Try baking foods instead of frying them. Add less fat, such as margarine, sour cream, regular salad dressing and mayonnaise to foods. Eat fewer high-fat foods. Some  examples of high-fat foods include french fries, doughnuts, ice cream, and cakes.  Eat fewer sweets.  Limit foods and drinks that are high in sugar. This includes candy, cookies, regular soda, and sweetened drinks.  Exercise:  Exercise at least 30 minutes per day on most days of the week. Some examples of exercise include walking, biking, dancing, and swimming. You can also fit in more physical activity by taking the stairs instead of the elevator or parking farther away from stores. Ask your healthcare provider about the best exercise plan for you.      © Copyright [employer] 2018 Information is for End User's use only and may not be sold, redistributed or otherwise used for commercial purposes. All illustrations and images included in CareNotes® are the copyrighted property of [employer].D.A[employer] or [employer].  Overall patient is very functional.  [facility]  Continue to push to basic physical activity daily and watch the starch in the diet

## 2024-01-08 ENCOUNTER — TELEPHONE (OUTPATIENT)
Dept: FAMILY MEDICINE CLINIC | Facility: CLINIC | Age: 74
End: 2024-01-08

## 2024-01-08 NOTE — TELEPHONE ENCOUNTER
Phone call from patient, since last visit he developed a sinus infection. It has mostly cleared up however he is still having sinus pressure/pain. He would like to know what you recommend or if you could call something in for the pain. Please advise.

## 2024-06-12 ENCOUNTER — HOSPITAL ENCOUNTER (INPATIENT)
Facility: HOSPITAL | Age: 74
LOS: 1 days | Discharge: HOME | DRG: 643 | End: 2024-06-14
Attending: STUDENT IN AN ORGANIZED HEALTH CARE EDUCATION/TRAINING PROGRAM | Admitting: FAMILY MEDICINE
Payer: MEDICARE

## 2024-06-12 ENCOUNTER — APPOINTMENT (EMERGENCY)
Dept: RADIOLOGY | Facility: HOSPITAL | Age: 74
DRG: 643 | End: 2024-06-12
Payer: MEDICARE

## 2024-06-12 ENCOUNTER — OFFICE VISIT (OUTPATIENT)
Dept: FAMILY MEDICINE CLINIC | Facility: CLINIC | Age: 74
End: 2024-06-12
Payer: MEDICARE

## 2024-06-12 VITALS
SYSTOLIC BLOOD PRESSURE: 124 MMHG | OXYGEN SATURATION: 90 % | BODY MASS INDEX: 24.25 KG/M2 | HEART RATE: 70 BPM | HEIGHT: 75 IN | WEIGHT: 195 LBS | DIASTOLIC BLOOD PRESSURE: 74 MMHG

## 2024-06-12 DIAGNOSIS — R07.9 CHEST PAIN, UNSPECIFIED TYPE: Primary | ICD-10-CM

## 2024-06-12 DIAGNOSIS — J18.9 PNEUMONIA OF LEFT LOWER LOBE DUE TO INFECTIOUS ORGANISM: ICD-10-CM

## 2024-06-12 DIAGNOSIS — E87.1 HYPONATREMIA: ICD-10-CM

## 2024-06-12 DIAGNOSIS — J30.1 SEASONAL ALLERGIC RHINITIS DUE TO POLLEN: Primary | Chronic | ICD-10-CM

## 2024-06-12 LAB
2HR DELTA HS TROPONIN: -1 NG/L
ALBUMIN SERPL BCP-MCNC: 4 G/DL (ref 3.5–5)
ALP SERPL-CCNC: 76 U/L (ref 34–104)
ALT SERPL W P-5'-P-CCNC: 14 U/L (ref 7–52)
ANION GAP SERPL CALCULATED.3IONS-SCNC: 7 MMOL/L (ref 4–13)
AST SERPL W P-5'-P-CCNC: 21 U/L (ref 13–39)
BASOPHILS # BLD AUTO: 0.04 THOUSANDS/ÂΜL (ref 0–0.1)
BASOPHILS NFR BLD AUTO: 0 % (ref 0–1)
BILIRUB SERPL-MCNC: 1.65 MG/DL (ref 0.2–1)
BUN SERPL-MCNC: 10 MG/DL (ref 5–25)
CALCIUM SERPL-MCNC: 8.8 MG/DL (ref 8.4–10.2)
CARDIAC TROPONIN I PNL SERPL HS: 3 NG/L
CARDIAC TROPONIN I PNL SERPL HS: 4 NG/L
CHLORIDE SERPL-SCNC: 94 MMOL/L (ref 96–108)
CO2 SERPL-SCNC: 26 MMOL/L (ref 21–32)
CREAT SERPL-MCNC: 0.63 MG/DL (ref 0.6–1.3)
EOSINOPHIL # BLD AUTO: 0.09 THOUSAND/ÂΜL (ref 0–0.61)
EOSINOPHIL NFR BLD AUTO: 1 % (ref 0–6)
ERYTHROCYTE [DISTWIDTH] IN BLOOD BY AUTOMATED COUNT: 12.3 % (ref 11.6–15.1)
GFR SERPL CREATININE-BSD FRML MDRD: 96 ML/MIN/1.73SQ M
GLUCOSE SERPL-MCNC: 105 MG/DL (ref 65–140)
HCT VFR BLD AUTO: 40 % (ref 36.5–49.3)
HGB BLD-MCNC: 13.7 G/DL (ref 12–17)
IMM GRANULOCYTES # BLD AUTO: 0.06 THOUSAND/UL (ref 0–0.2)
IMM GRANULOCYTES NFR BLD AUTO: 1 % (ref 0–2)
LYMPHOCYTES # BLD AUTO: 1.6 THOUSANDS/ÂΜL (ref 0.6–4.47)
LYMPHOCYTES NFR BLD AUTO: 12 % (ref 14–44)
MCH RBC QN AUTO: 33.4 PG (ref 26.8–34.3)
MCHC RBC AUTO-ENTMCNC: 34.3 G/DL (ref 31.4–37.4)
MCV RBC AUTO: 98 FL (ref 82–98)
MONOCYTES # BLD AUTO: 1.46 THOUSAND/ÂΜL (ref 0.17–1.22)
MONOCYTES NFR BLD AUTO: 11 % (ref 4–12)
NEUTROPHILS # BLD AUTO: 9.95 THOUSANDS/ÂΜL (ref 1.85–7.62)
NEUTS SEG NFR BLD AUTO: 75 % (ref 43–75)
NRBC BLD AUTO-RTO: 0 /100 WBCS
PLATELET # BLD AUTO: 187 THOUSANDS/UL (ref 149–390)
PMV BLD AUTO: 9 FL (ref 8.9–12.7)
POTASSIUM SERPL-SCNC: 4.2 MMOL/L (ref 3.5–5.3)
PROT SERPL-MCNC: 6.9 G/DL (ref 6.4–8.4)
RBC # BLD AUTO: 4.1 MILLION/UL (ref 3.88–5.62)
SODIUM SERPL-SCNC: 127 MMOL/L (ref 135–147)
WBC # BLD AUTO: 13.2 THOUSAND/UL (ref 4.31–10.16)

## 2024-06-12 PROCEDURE — 36415 COLL VENOUS BLD VENIPUNCTURE: CPT | Performed by: STUDENT IN AN ORGANIZED HEALTH CARE EDUCATION/TRAINING PROGRAM

## 2024-06-12 PROCEDURE — 99223 1ST HOSP IP/OBS HIGH 75: CPT

## 2024-06-12 PROCEDURE — G2211 COMPLEX E/M VISIT ADD ON: HCPCS | Performed by: FAMILY MEDICINE

## 2024-06-12 PROCEDURE — 86140 C-REACTIVE PROTEIN: CPT

## 2024-06-12 PROCEDURE — 83930 ASSAY OF BLOOD OSMOLALITY: CPT

## 2024-06-12 PROCEDURE — 96360 HYDRATION IV INFUSION INIT: CPT

## 2024-06-12 PROCEDURE — 99213 OFFICE O/P EST LOW 20 MIN: CPT | Performed by: FAMILY MEDICINE

## 2024-06-12 PROCEDURE — 99285 EMERGENCY DEPT VISIT HI MDM: CPT

## 2024-06-12 PROCEDURE — 93005 ELECTROCARDIOGRAM TRACING: CPT

## 2024-06-12 PROCEDURE — 84484 ASSAY OF TROPONIN QUANT: CPT | Performed by: STUDENT IN AN ORGANIZED HEALTH CARE EDUCATION/TRAINING PROGRAM

## 2024-06-12 PROCEDURE — 99285 EMERGENCY DEPT VISIT HI MDM: CPT | Performed by: STUDENT IN AN ORGANIZED HEALTH CARE EDUCATION/TRAINING PROGRAM

## 2024-06-12 PROCEDURE — 87040 BLOOD CULTURE FOR BACTERIA: CPT | Performed by: STUDENT IN AN ORGANIZED HEALTH CARE EDUCATION/TRAINING PROGRAM

## 2024-06-12 PROCEDURE — 85025 COMPLETE CBC W/AUTO DIFF WBC: CPT | Performed by: STUDENT IN AN ORGANIZED HEALTH CARE EDUCATION/TRAINING PROGRAM

## 2024-06-12 PROCEDURE — 71045 X-RAY EXAM CHEST 1 VIEW: CPT

## 2024-06-12 PROCEDURE — 84145 PROCALCITONIN (PCT): CPT

## 2024-06-12 PROCEDURE — 80053 COMPREHEN METABOLIC PANEL: CPT | Performed by: STUDENT IN AN ORGANIZED HEALTH CARE EDUCATION/TRAINING PROGRAM

## 2024-06-12 RX ORDER — CEFTRIAXONE 1 G/50ML
1000 INJECTION, SOLUTION INTRAVENOUS ONCE
Status: COMPLETED | OUTPATIENT
Start: 2024-06-12 | End: 2024-06-12

## 2024-06-12 RX ADMIN — CEFTRIAXONE 1000 MG: 1 INJECTION, SOLUTION INTRAVENOUS at 23:05

## 2024-06-12 RX ADMIN — SODIUM CHLORIDE 1000 ML: 0.9 INJECTION, SOLUTION INTRAVENOUS at 21:39

## 2024-06-12 NOTE — PATIENT INSTRUCTIONS
Discussed problem.  I feel is having mild allergic rhinitis probably to find spores.  Should use a saline nasal rinse at bedtime and in the morning and if coming in from outside and noticing congestion may use it again.  Should take the Claritin 10 mg and may take this up to twice a day if needed

## 2024-06-12 NOTE — PROGRESS NOTES
Ambulatory Visit  Name: Jasson Carter      : 1950      MRN: [id number]  Encounter Provider: Ruben Finch MD  Encounter Date: 2024   Encounter department: [facility]    Assessment & Plan   1. Seasonal allergic rhinitis due to pollen  Assessment & Plan:  Discussed problem.  I feel this relates to the seasonal allergies and should be using the saline nasal rinse at bedtime and in the morning and if comes in during a day and is having congestion may use again.  Should start the Claritin and may take this twice a day if needed         History of Present Illness       Patient   Has history of hypertension, gout, reflux esophagitis, hypercholesterolemia, prostatic hypertrophy, allergic rhinitis and degenerative arthritis.    [facility] and [facility] as well as his medication normally.  Also has history of posttraumatic stress disorder for which he follows [facility] with [facility] with [facility] and is doing well at this time.  Seen today for problems with cough and congestion over the last 3 to 4 weeks.  Mucus is clear to white.  Has not been taking the Claritin but was taking Mucinex which did not seem to help      Review of Systems   Constitutional:  Negative for fever.   HENT:  Positive for congestion (Mucus is clear to white). Negative for sore throat.    Eyes:  Positive for itching.   Respiratory:  Positive for cough. Negative for shortness of breath and wheezing.    Cardiovascular:  Negative for chest pain, palpitations and leg swelling.   Gastrointestinal:  Negative for nausea.   Allergic/Immunologic: Positive for environmental allergies.   Neurological:  Negative for dizziness.   Psychiatric/Behavioral:  Negative for sleep disturbance.      Past Medical History:   Diagnosis Date    Allergic     Gout     Hypertension     PTSD (post-traumatic stress disorder)      Past Surgical History:   Procedure Laterality Date    APPENDECTOMY       CHOLECYSTECTOMY      TOTAL KNEE ARTHROPLASTY       Family History   Problem Relation Age of Onset    Lung cancer Father      Social History     Tobacco Use    Smoking status: Former     Current packs/day: 0.00     Average packs/day: 0.5 packs/day for 20.0 years (10.0 total pack years)     Types: Cigarettes     Start date:      Quit date:      Years since quittin.4    Smokeless tobacco: Never   Vaping Use    Vaping status: Never Used   Substance and Sexual Activity    Alcohol use: Yes    Drug use: Not Currently    Sexual activity: Not on file     Current Outpatient Medications on File Prior to Visit   Medication Sig    allopurinol (ZYLOPRIM) 300 mg tablet Take 300 mg by mouth daily    amitriptyline (ELAVIL) 10 mg tablet Take 1 tablet (10 mg total) by mouth daily at bedtime If not effective after 1 week may increase to 2 tablets    aspirin 81 mg chewable tablet Chew 81 mg daily    calcium carbonate (OS-BONNIE) 600 MG tablet Take 600 mg by mouth 2 (two) times a day with meals    cholecalciferol (VITAMIN D3) 1,000 units tablet Take 1,000 Units by mouth daily    Chromium 1000 MCG TABS Take by mouth    Flaxseed, Linseed, (FLAXSEED OIL PO) Take by mouth    folic acid (FOLVITE) 800 MCG tablet Take 800 mcg by mouth daily    gabapentin (NEURONTIN) 100 mg capsule Take 100 mg by mouth 3 (three) times a day    indomethacin (INDOCIN) 25 mg capsule Take 1 capsule (25 mg total) by mouth 3 (three) times a day as needed for mild pain    loratadine (CLARITIN) 10 mg tablet Take 10 mg by mouth daily    losartan (COZAAR) 100 MG tablet Take 100 mg by mouth daily    magnesium 30 MG tablet Take 30 mg by mouth 2 (two) times a day    Omega-3 Fatty Acids (FISH OIL) 1,000 mg Take 1,200 mg by mouth daily    propranolol (INDERAL) 60 mg tablet Take 60 mg by mouth daily after dinner    SUPER B COMPLEX/C PO Take by mouth    Turmeric 500 MG TABS Take by mouth    zinc gluconate 50 mg tablet Take 50 mg by mouth daily    amoxicillin (AMOXIL)  "500 MG tablet amoxicillin 500 mg tablet   take 4 tablets by mouth 1 hour PRIOR TO DENTAL PROCEDURES     Allergies   Allergen Reactions    Ativan [Lorazepam] Delirium    Fluoxetine Diarrhea     Other reaction(s): Diarrhea, Tremor, Sweating, Sweating, Diarrhea, Tremor, Sweating, Sweating, Diarrhea, Tremor, Sweating, Sweating    Levaquin [Levofloxacin] Itching    Venlafaxine      Other reaction(s): Tremor, Sweating, Tremor, Sweating     Immunization History   Administered Date(s) Administered    COVID-19 MODERNA VACC 0.5 ML IM 01/14/2021, 02/11/2021, 11/01/2021    COVID-19 Moderna Vac BIVALENT 12 Yr+ IM 0.5 ML 01/19/2023    DTaP,unspecified 10/26/2010    INFLUENZA 10/22/2008, 11/03/2009, 10/20/2010, 10/24/2011, 09/17/2012, 10/21/2013, 10/05/2015, 10/28/2016, 09/01/2020, 10/12/2021, 10/12/2021, 11/01/2022    Influenza Split High Dose Preservative Free IM 10/03/2017    Influenza, high dose seasonal 0.7 mL 09/15/2022, 10/19/2023    Pneumococcal 08/07/2014    Pneumococcal Conjugate 13-Valent 04/22/2016    Tdap 05/14/2020, 04/22/2022    Zoster 10/29/2013    Zoster Vaccine Recombinant 07/14/2022, 09/22/2022    influenza, trivalent, adjuvanted 10/03/2018, 10/03/2019     Objective     Blood Pressure 124/74 (BP Location: Left arm, Patient Position: Sitting, Cuff Size: Standard)   Pulse 70   Height 6' 3\" (1.905 m)   Weight 88.5 kg (195 lb)   Oxygen Saturation 90%   Body Mass Index 24.37 kg/m²     Physical Exam  Vitals and nursing note reviewed.   Constitutional:       Appearance: Normal appearance.   HENT:      Head: Normocephalic.      Right Ear: Tympanic membrane, ear canal and external ear normal.      Left Ear: Tympanic membrane, ear canal and external ear normal.      Nose: Nose normal.      Mouth/Throat:      Mouth: Mucous membranes are moist.   Eyes:      Conjunctiva/sclera: Conjunctivae normal.   Cardiovascular:      Rate and Rhythm: Normal rate and regular rhythm.      Pulses: Normal pulses.      Heart sounds: " Normal heart sounds. No murmur (Rate is 66) heard.  Pulmonary:      Effort: Pulmonary effort is normal.      Breath sounds: Normal breath sounds.   Abdominal:      General: Abdomen is flat. There is no distension.      Palpations: Abdomen is soft. There is no mass.      Tenderness: There is no abdominal tenderness.   Musculoskeletal:         General: No swelling.      Cervical back: Normal range of motion and neck supple. No tenderness. No muscular tenderness.      Right lower leg: No edema.      Left lower leg: No edema.   Lymphadenopathy:      Cervical: No cervical adenopathy.   Skin:     General: Skin is warm and dry.      Findings: No rash.   Neurological:      Mental Status: He is alert.      Motor: No weakness.      Coordination: Coordination normal.      Gait: Gait normal.      Deep Tendon Reflexes: Reflexes normal.   Psychiatric:         Mood and Affect: Mood normal.       Administrative Statements

## 2024-06-12 NOTE — ASSESSMENT & PLAN NOTE
Discussed problem.  I feel this relates to the seasonal allergies and should be using the saline nasal rinse at bedtime and in the morning and if comes in during a day and is having congestion may use again.  Should start the Claritin and may take this twice a day if needed

## 2024-06-13 PROBLEM — J18.9 PNEUMONIA: Status: ACTIVE | Noted: 2024-06-13

## 2024-06-13 PROBLEM — E87.1 HYPONATREMIA: Status: ACTIVE | Noted: 2024-06-13

## 2024-06-13 LAB
ANION GAP SERPL CALCULATED.3IONS-SCNC: 11 MMOL/L (ref 4–13)
ANION GAP SERPL CALCULATED.3IONS-SCNC: 5 MMOL/L (ref 4–13)
ANION GAP SERPL CALCULATED.3IONS-SCNC: 7 MMOL/L (ref 4–13)
ANION GAP SERPL CALCULATED.3IONS-SCNC: 9 MMOL/L (ref 4–13)
ATRIAL RATE: 82 BPM
BUN SERPL-MCNC: 6 MG/DL (ref 5–25)
BUN SERPL-MCNC: 8 MG/DL (ref 5–25)
BUN SERPL-MCNC: 9 MG/DL (ref 5–25)
BUN SERPL-MCNC: 9 MG/DL (ref 5–25)
CALCIUM SERPL-MCNC: 4.7 MG/DL (ref 8.4–10.2)
CALCIUM SERPL-MCNC: 8.9 MG/DL (ref 8.4–10.2)
CALCIUM SERPL-MCNC: 9.1 MG/DL (ref 8.4–10.2)
CALCIUM SERPL-MCNC: 9.2 MG/DL (ref 8.4–10.2)
CHLORIDE SERPL-SCNC: 117 MMOL/L (ref 96–108)
CHLORIDE SERPL-SCNC: 97 MMOL/L (ref 96–108)
CHLORIDE SERPL-SCNC: 97 MMOL/L (ref 96–108)
CHLORIDE SERPL-SCNC: 98 MMOL/L (ref 96–108)
CO2 SERPL-SCNC: 17 MMOL/L (ref 21–32)
CO2 SERPL-SCNC: 24 MMOL/L (ref 21–32)
CO2 SERPL-SCNC: 25 MMOL/L (ref 21–32)
CO2 SERPL-SCNC: 26 MMOL/L (ref 21–32)
CREAT SERPL-MCNC: 0.28 MG/DL (ref 0.6–1.3)
CREAT SERPL-MCNC: 0.5 MG/DL (ref 0.6–1.3)
CREAT SERPL-MCNC: 0.64 MG/DL (ref 0.6–1.3)
CREAT SERPL-MCNC: 0.69 MG/DL (ref 0.6–1.3)
CRP SERPL QL: 97.3 MG/L
GFR SERPL CREATININE-BSD FRML MDRD: 106 ML/MIN/1.73SQ M
GFR SERPL CREATININE-BSD FRML MDRD: 134 ML/MIN/1.73SQ M
GFR SERPL CREATININE-BSD FRML MDRD: 92 ML/MIN/1.73SQ M
GFR SERPL CREATININE-BSD FRML MDRD: 95 ML/MIN/1.73SQ M
GLUCOSE SERPL-MCNC: 114 MG/DL (ref 65–140)
GLUCOSE SERPL-MCNC: 119 MG/DL (ref 65–140)
GLUCOSE SERPL-MCNC: 91 MG/DL (ref 65–140)
GLUCOSE SERPL-MCNC: 96 MG/DL (ref 65–140)
L PNEUMO1 AG UR QL IA.RAPID: NEGATIVE
OSMOLALITY UR/SERPL-RTO: 289 MMOL/KG (ref 282–298)
OSMOLALITY UR: 353 MMOL/KG
P AXIS: 61 DEGREES
POTASSIUM SERPL-SCNC: 2.3 MMOL/L (ref 3.5–5.3)
POTASSIUM SERPL-SCNC: 4 MMOL/L (ref 3.5–5.3)
POTASSIUM SERPL-SCNC: 4.2 MMOL/L (ref 3.5–5.3)
POTASSIUM SERPL-SCNC: 4.2 MMOL/L (ref 3.5–5.3)
PR INTERVAL: 224 MS
PROCALCITONIN SERPL-MCNC: <0.05 NG/ML
QRS AXIS: -21 DEGREES
QRSD INTERVAL: 86 MS
QT INTERVAL: 354 MS
QTC INTERVAL: 413 MS
S PNEUM AG UR QL: NEGATIVE
SODIUM 24H UR-SCNC: 72 MOL/L
SODIUM SERPL-SCNC: 131 MMOL/L (ref 135–147)
SODIUM SERPL-SCNC: 131 MMOL/L (ref 135–147)
SODIUM SERPL-SCNC: 132 MMOL/L (ref 135–147)
SODIUM SERPL-SCNC: 139 MMOL/L (ref 135–147)
T WAVE AXIS: 42 DEGREES
VENTRICULAR RATE: 82 BPM

## 2024-06-13 PROCEDURE — 80048 BASIC METABOLIC PNL TOTAL CA: CPT | Performed by: FAMILY MEDICINE

## 2024-06-13 PROCEDURE — 80048 BASIC METABOLIC PNL TOTAL CA: CPT

## 2024-06-13 PROCEDURE — 83935 ASSAY OF URINE OSMOLALITY: CPT

## 2024-06-13 PROCEDURE — 87449 NOS EACH ORGANISM AG IA: CPT

## 2024-06-13 PROCEDURE — 84300 ASSAY OF URINE SODIUM: CPT

## 2024-06-13 PROCEDURE — 99232 SBSQ HOSP IP/OBS MODERATE 35: CPT | Performed by: FAMILY MEDICINE

## 2024-06-13 RX ORDER — HEPARIN SODIUM 5000 [USP'U]/ML
5000 INJECTION, SOLUTION INTRAVENOUS; SUBCUTANEOUS EVERY 8 HOURS SCHEDULED
Status: DISCONTINUED | OUTPATIENT
Start: 2024-06-13 | End: 2024-06-14 | Stop reason: HOSPADM

## 2024-06-13 RX ORDER — GABAPENTIN 100 MG/1
100 CAPSULE ORAL 3 TIMES DAILY
Status: DISCONTINUED | OUTPATIENT
Start: 2024-06-13 | End: 2024-06-14 | Stop reason: HOSPADM

## 2024-06-13 RX ORDER — ALLOPURINOL 300 MG/1
300 TABLET ORAL DAILY
Status: DISCONTINUED | OUTPATIENT
Start: 2024-06-13 | End: 2024-06-14 | Stop reason: HOSPADM

## 2024-06-13 RX ORDER — SODIUM CHLORIDE 9 MG/ML
75 INJECTION, SOLUTION INTRAVENOUS CONTINUOUS
Status: DISCONTINUED | OUTPATIENT
Start: 2024-06-13 | End: 2024-06-13

## 2024-06-13 RX ORDER — GUAIFENESIN 600 MG/1
600 TABLET, EXTENDED RELEASE ORAL 2 TIMES DAILY
Status: DISCONTINUED | OUTPATIENT
Start: 2024-06-13 | End: 2024-06-14 | Stop reason: HOSPADM

## 2024-06-13 RX ORDER — CEFTRIAXONE 1 G/50ML
1000 INJECTION, SOLUTION INTRAVENOUS EVERY 24 HOURS
Status: DISCONTINUED | OUTPATIENT
Start: 2024-06-13 | End: 2024-06-14 | Stop reason: HOSPADM

## 2024-06-13 RX ORDER — AMITRIPTYLINE HYDROCHLORIDE 10 MG/1
10 TABLET, FILM COATED ORAL
Status: DISCONTINUED | OUTPATIENT
Start: 2024-06-13 | End: 2024-06-14 | Stop reason: HOSPADM

## 2024-06-13 RX ORDER — LORATADINE 10 MG/1
10 TABLET ORAL DAILY
Status: DISCONTINUED | OUTPATIENT
Start: 2024-06-13 | End: 2024-06-14 | Stop reason: HOSPADM

## 2024-06-13 RX ORDER — LOSARTAN POTASSIUM 50 MG/1
100 TABLET ORAL DAILY
Status: DISCONTINUED | OUTPATIENT
Start: 2024-06-13 | End: 2024-06-14 | Stop reason: HOSPADM

## 2024-06-13 RX ORDER — AZITHROMYCIN 250 MG/1
500 TABLET, FILM COATED ORAL EVERY 24 HOURS
Status: DISCONTINUED | OUTPATIENT
Start: 2024-06-14 | End: 2024-06-14 | Stop reason: HOSPADM

## 2024-06-13 RX ADMIN — LORATADINE 10 MG: 10 TABLET ORAL at 09:07

## 2024-06-13 RX ADMIN — Medication 1000 UNITS: at 09:07

## 2024-06-13 RX ADMIN — CEFTRIAXONE 1000 MG: 1 INJECTION, SOLUTION INTRAVENOUS at 21:23

## 2024-06-13 RX ADMIN — HEPARIN SODIUM 5000 UNITS: 5000 INJECTION, SOLUTION INTRAVENOUS; SUBCUTANEOUS at 16:45

## 2024-06-13 RX ADMIN — HEPARIN SODIUM 5000 UNITS: 5000 INJECTION, SOLUTION INTRAVENOUS; SUBCUTANEOUS at 05:17

## 2024-06-13 RX ADMIN — GUAIFENESIN 600 MG: 600 TABLET ORAL at 09:07

## 2024-06-13 RX ADMIN — ALLOPURINOL 300 MG: 300 TABLET ORAL at 09:07

## 2024-06-13 RX ADMIN — HEPARIN SODIUM 5000 UNITS: 5000 INJECTION, SOLUTION INTRAVENOUS; SUBCUTANEOUS at 21:23

## 2024-06-13 RX ADMIN — AZITHROMYCIN MONOHYDRATE 500 MG: 500 INJECTION, POWDER, LYOPHILIZED, FOR SOLUTION INTRAVENOUS at 00:46

## 2024-06-13 RX ADMIN — GABAPENTIN 100 MG: 100 CAPSULE ORAL at 09:10

## 2024-06-13 RX ADMIN — GABAPENTIN 100 MG: 100 CAPSULE ORAL at 16:45

## 2024-06-13 RX ADMIN — SODIUM CHLORIDE 75 ML/HR: 0.9 INJECTION, SOLUTION INTRAVENOUS at 05:16

## 2024-06-13 RX ADMIN — GABAPENTIN 100 MG: 100 CAPSULE ORAL at 00:47

## 2024-06-13 RX ADMIN — AMITRIPTYLINE HYDROCHLORIDE 10 MG: 10 TABLET, FILM COATED ORAL at 21:16

## 2024-06-13 RX ADMIN — LOSARTAN POTASSIUM 100 MG: 50 TABLET, FILM COATED ORAL at 09:07

## 2024-06-13 RX ADMIN — GUAIFENESIN 600 MG: 600 TABLET ORAL at 16:45

## 2024-06-13 RX ADMIN — AMITRIPTYLINE HYDROCHLORIDE 10 MG: 10 TABLET, FILM COATED ORAL at 00:47

## 2024-06-13 RX ADMIN — GABAPENTIN 100 MG: 100 CAPSULE ORAL at 21:16

## 2024-06-13 NOTE — ED NOTES
Called lab and spoke with Patricia to add on procalcitonin and c-reactive protein as ordered; she verbalized they can be added.     Sanaz Fallon RN  06/12/24 [id number]       Sanaz Fallon RN  06/12/24 [id number]

## 2024-06-13 NOTE — PROGRESS NOTES
Pastoral Care Progress Note    2024  Patient: Jasson Carter : 1950  Admission Date & Time: 2024  MRN: [id number] CSN: [id number]  Fr Grace provided communion at request of pt.

## 2024-06-13 NOTE — ASSESSMENT & PLAN NOTE
Presented to ED with left-sided chest pain, shortness of breath and sweating improved no chest pain     CXR consistent with possible left lower lobe infiltrate and multifocal , wbc elevation   Urine strep and legionella ordered- negative , Sputum culture ordered  Blood Cultures pending  Lab Results   Component Value Date    WBC 13.20 (H) 06/12/2024    PROCALCITONI <0.05 06/12/2024     Trend fever curve    Initially given ceftriaxone and azithromycin in the emergency department, will continue and monitor cultures and titrate therapy as indicated

## 2024-06-13 NOTE — PROGRESS NOTES
[facility]  Progress Note  Name: Jasson Carter I  MRN: [id number]  Unit/Bed#: -01 I Date of Admission: 6/12/2024   Date of Service: 6/13/2024 I Hospital Day: 0    Assessment & Plan   * Pneumonia  Assessment & Plan  Presented to ED with left-sided chest pain, shortness of breath and sweating improved no chest pain     CXR consistent with possible left lower lobe infiltrate and multifocal , wbc elevation   Urine strep and legionella ordered- negative , Sputum culture ordered  Blood Cultures pending  Lab Results   Component Value Date    WBC 13.20 (H) 06/12/2024    PROCALCITONI <0.05 06/12/2024     Trend fever curve    Initially given ceftriaxone and azithromycin in the emergency department, will continue and monitor cultures and titrate therapy as indicated    Hyponatremia  Assessment & Plan  Presents to ED with shortness of breath and chest pain  Sodium on admission 127 with hydration improved to 131 but sodium studies reveal SIADH with a urine sodium of 72 patient is eating and drinking currently discontinue IV fluids further and repeat BMP tonight.    Lab Results   Component Value Date    SODIUM 131 (L) 06/13/2024    SODIUM 139 06/13/2024    SODIUM 131 (L) 06/13/2024        Essential hypertension  Assessment & Plan  Continue home meds                 VTE Pharmacologic Prophylaxis: VTE Score: 4 Moderate Risk (Score 3-4) - Pharmacological DVT Prophylaxis Ordered: heparin.    Mobility:   Basic Mobility Inpatient Raw Score: 24  JH-HLM Goal: 8: Walk 250 feet or more  JH-HLM Achieved: 7: Walk 25 feet or more  JH-HLM Goal achieved. Continue to encourage appropriate mobility.    Patient Centered Rounds: I performed bedside rounds with nursing staff today.   Discussions with Specialists or Other Care Team Provider: none    Education and Discussions with Family / Patient: pt will update family     Total Time Spent on Date of Encounter in care of patient: >35 mins. This time was spent  on one or more of the following: performing physical exam; counseling and coordination of care; obtaining or reviewing history; documenting in the medical record; reviewing/ordering tests, medications or procedures; communicating with other healthcare professionals and discussing with patient's family/caregivers.    Current Length of Stay: 0 day(s)  Current Patient Status: Inpatient   Certification Statement: The patient will continue to require additional inpatient hospital stay due to pna  Discharge Plan: Anticipate discharge in 24-48 hrs to home.    Code Status: Level 1 - Full Code    Subjective:   Seen and examined no cp now cough white sputum nos ob    Objective:     Vitals:   Temp (24hrs), Av.6 °F (37 °C), Min:98.2 °F (36.8 °C), Max:99.1 °F (37.3 °C)    Temp:  [98.2 °F (36.8 °C)-99.1 °F (37.3 °C)] 98.2 °F (36.8 °C)  HR:  [76-99] 99  Resp:  [16-18] 18  BP: (106-163)/(63-93) 158/93  SpO2:  [92 %-95 %] 92 %  Body mass index is 22.28 kg/m².     Input and Output Summary (last 24 hours):     Intake/Output Summary (Last 24 hours) at 2024 1355  Last data filed at 2024 1219  Gross per 24 hour   Intake 300 ml   Output --   Net 300 ml       Physical Exam:   Physical Exam  Vitals and nursing note reviewed.   Constitutional:       General: He is not in acute distress.     Appearance: He is well-developed.   HENT:      Head: Normocephalic and atraumatic.   Eyes:      Conjunctiva/sclera: Conjunctivae normal.   Cardiovascular:      Rate and Rhythm: Normal rate and regular rhythm.      Heart sounds: No murmur heard.  Pulmonary:      Effort: Pulmonary effort is normal. No respiratory distress.      Breath sounds: Rales present. No wheezing.   Abdominal:      General: There is no distension.      Palpations: Abdomen is soft.      Tenderness: There is no abdominal tenderness.   Musculoskeletal:         General: No swelling.      Cervical back: Neck supple.   Skin:     General: Skin is warm and dry.      Capillary  Refill: Capillary refill takes less than 2 seconds.   Neurological:      Mental Status: He is alert and oriented to person, place, and time.   Psychiatric:         Mood and Affect: Mood normal.          Additional Data:     Labs:  Results from last 7 days   Lab Units 06/12/24 2002   WBC Thousand/uL 13.20*   HEMOGLOBIN g/dL 13.7   HEMATOCRIT % 40.0   PLATELETS Thousands/uL 187   SEGS PCT % 75   LYMPHO PCT % 12*   MONO PCT % 11   EOS PCT % 1     Results from last 7 days   Lab Units 06/13/24  1005 06/13/24  0314 06/12/24 2002   SODIUM mmol/L 131*   < > 127*   POTASSIUM mmol/L 4.2   < > 4.2   CHLORIDE mmol/L 98   < > 94*   CO2 mmol/L 26   < > 26   BUN mg/dL 9   < > 10   CREATININE mg/dL 0.69   < > 0.63   ANION GAP mmol/L 7   < > 7   CALCIUM mg/dL 9.1   < > 8.8   ALBUMIN g/dL  --   --  4.0   TOTAL BILIRUBIN mg/dL  --   --  1.65*   ALK PHOS U/L  --   --  76   ALT U/L  --   --  14   AST U/L  --   --  21   GLUCOSE RANDOM mg/dL 119   < > 105    < > = values in this interval not displayed.                 Results from last 7 days   Lab Units 06/12/24 2002   PROCALCITONIN ng/ml <0.05       Lines/Drains:  Invasive Devices       Peripheral Intravenous Line  Duration             Peripheral IV 06/12/24 Left Antecubital <1 day                          Imaging: Reviewed radiology reports from this admission including: chest xray    Recent Cultures (last 7 days):   Results from last 7 days   Lab Units 06/12/24 2225 06/12/24 2224   BLOOD CULTURE  Received in Microbiology Lab. Culture in Progress. Received in Microbiology Lab. Culture in Progress.       Last 24 Hours Medication List:   Current Facility-Administered Medications   Medication Dose Route Frequency Provider Last Rate    allopurinol  300 mg Oral Daily Nory Bernardo PA-C      amitriptyline  10 mg Oral HS Nory Bernardo PA-C      [START ON 6/14/2024] azithromycin  500 mg Oral Q24H Nory Bernardo PA-C      cefTRIAXone  1,000 mg Intravenous Q24H Nory Bernardo PA-C       cholecalciferol  1,000 Units Oral Daily Nory Bernardo PA-C      gabapentin  100 mg Oral TID Nory Bernardo PA-C      guaiFENesin  600 mg Oral BID Nory Bernardo PA-C      heparin (porcine)  5,000 Units Subcutaneous Q8H [facility] Nory Bernardo PA-C      loratadine  10 mg Oral Daily Nory Bernardo PA-C      losartan  100 mg Oral Daily Nory Bernardo PA-C          Today, Patient Was Seen By: Krystal Turner MD    **Please Note: This note may have been constructed using a voice recognition system.**

## 2024-06-13 NOTE — ED PROVIDER NOTES
History  Chief Complaint   Patient presents with    Chest Pain     Left sided chest pain x 15 min. Pt given 324 ASA and 2 ntg and pain was relieved.        History provided by:  Patient and spouse    74 year old M. Hx of HTN. Presents with left sided chest pain. Developed the symptoms at rest approximately 30 minutes PTA. Denies associated shortness of breath/diaphoresis.  Denies hx of chest pain. Denies tobacco use. Of note, the patient was evaluated by his PCP earlier in the day for worsening cough. Hx of pericarditis.     Prior to Admission Medications   Prescriptions Last Dose Informant Patient Reported? Taking?   allopurinol (ZYLOPRIM) 300 mg tablet 6/12/2024 Self Yes Yes   Sig: Take 300 mg by mouth daily   amitriptyline (ELAVIL) 10 mg tablet 6/11/2024 Self No Yes   Sig: Take 1 tablet (10 mg total) by mouth daily at bedtime If not effective after 1 week may increase to 2 tablets   amoxicillin (AMOXIL) 500 MG tablet  Self Yes No   Sig: amoxicillin 500 mg tablet   take 4 tablets by mouth 1 hour PRIOR TO DENTAL PROCEDURES   calcium carbonate (OS-BONNIE) 600 MG tablet  Self Yes No   Sig: Take 600 mg by mouth 2 (two) times a day with meals   cholecalciferol (VITAMIN D3) 1,000 units tablet 6/12/2024 Self Yes Yes   Sig: Take 1,000 Units by mouth daily   gabapentin (NEURONTIN) 100 mg capsule 6/12/2024 Self Yes Yes   Sig: Take 100 mg by mouth 3 (three) times a day   indomethacin (INDOCIN) 25 mg capsule  Self No No   Sig: Take 1 capsule (25 mg total) by mouth 3 (three) times a day as needed for mild pain   loratadine (CLARITIN) 10 mg tablet 6/12/2024 Self Yes Yes   Sig: Take 10 mg by mouth daily   losartan (COZAAR) 100 MG tablet 6/12/2024 Self Yes Yes   Sig: Take 100 mg by mouth daily      Facility-Administered Medications: None       Past Medical History:   Diagnosis Date    Allergic     Gout     Hypertension     PTSD (post-traumatic stress disorder)        Past Surgical History:   Procedure Laterality Date    APPENDECTOMY       CHOLECYSTECTOMY      TOTAL KNEE ARTHROPLASTY         Family History   Problem Relation Age of Onset    Lung cancer Father      I have reviewed and agree with the history as documented.    E-Cigarette/Vaping    E-Cigarette Use Never User      E-Cigarette/Vaping Substances     Social History     Tobacco Use    Smoking status: Former     Current packs/day: 0.00     Average packs/day: 0.5 packs/day for 20.0 years (10.0 ttl pk-yrs)     Types: Cigarettes     Start date:      Quit date:      Years since quittin.4    Smokeless tobacco: Never   Vaping Use    Vaping status: Never Used   Substance Use Topics    Alcohol use: Yes    Drug use: Not Currently     Review of Systems   Constitutional:  Negative for activity change, appetite change, chills, fatigue and fever.   Respiratory:  Positive for cough and chest tightness. Negative for shortness of breath and wheezing.    Cardiovascular:  Positive for chest pain. Negative for palpitations and leg swelling.   Gastrointestinal:  Negative for abdominal pain, nausea and vomiting.   Neurological:  Negative for dizziness, syncope, speech difficulty, light-headedness and headaches.   All other systems reviewed and are negative.    Physical Exam  Physical Exam  Vitals and nursing note reviewed.   Constitutional:       General: He is not in acute distress.     Appearance: He is not ill-appearing or toxic-appearing.   HENT:      Head: Normocephalic and atraumatic.   Eyes:      Extraocular Movements: Extraocular movements intact.   Cardiovascular:      Rate and Rhythm: Normal rate and regular rhythm.      Heart sounds: Normal heart sounds. No murmur heard.  Pulmonary:      Effort: Pulmonary effort is normal. No tachypnea, accessory muscle usage or respiratory distress.      Breath sounds: No stridor. Examination of the left-lower field reveals rales. Rales present. No decreased breath sounds, wheezing or rhonchi.   Chest:      Chest wall: No tenderness.   Abdominal:       General: Bowel sounds are normal.      Palpations: Abdomen is soft.      Tenderness: There is no abdominal tenderness. There is no guarding or rebound.   Musculoskeletal:      Cervical back: Neck supple.      Right lower leg: No tenderness. No edema.      Left lower leg: No tenderness. No edema.   Skin:     General: Skin is warm and dry.      Coloration: Skin is not cyanotic.      Findings: No ecchymosis or erythema.   Neurological:      General: No focal deficit present.      Mental Status: He is alert and oriented to person, place, and time.   Psychiatric:         Mood and Affect: Mood normal.         Behavior: Behavior normal.       Vital Signs  ED Triage Vitals [06/12/24 1955]   Temperature Pulse Respirations Blood Pressure SpO2   99.1 °F (37.3 °C) 84 17 137/71 94 %      Temp Source Heart Rate Source Patient Position - Orthostatic VS BP Location FiO2 (%)   Temporal Monitor Lying Right arm --      Pain Score       --           Vitals:    06/12/24 2100 06/12/24 2145 06/12/24 2328 06/12/24 2340   BP: 111/72 106/63 163/93 158/90   Pulse: 76 78 83 89   Patient Position - Orthostatic VS:             Visual Acuity      ED Medications  Medications   azithromycin (ZITHROMAX) 500 mg in sodium chloride 0.9 % 250 mL IVPB (has no administration in time range)   sodium chloride 0.9 % bolus 1,000 mL (1,000 mL Intravenous New Bag 6/12/24 2139)   cefTRIAXone (ROCEPHIN) IVPB (premix in dextrose) 1,000 mg 50 mL (1,000 mg Intravenous New Bag 6/12/24 2305)       Diagnostic Studies  Results Reviewed       Procedure Component Value Units Date/Time    Blood culture #1 [[id number]] Collected: 06/12/24 2224    Lab Status: In process Specimen: Blood from Arm, Right Updated: 06/13/24 0001    HS Troponin I 2hr [[id number]]  (Normal) Collected: 06/12/24 2157    Lab Status: Final result Specimen: Blood from Arm, Left Updated: 06/12/24 2234     hs TnI 2hr 3 ng/L      Delta 2hr hsTnI -1 ng/L     Blood culture #2 [[id number]] Collected: 06/12/24  2225    Lab Status: In process Specimen: Blood from Arm, Left Updated: 06/12/24 2228    C-reactive protein [[id number]]     Lab Status: No result Specimen: Blood     Procalcitonin [[id number]]     Lab Status: No result Specimen: Blood     HS Troponin 0hr (reflex protocol) [[id number]]  (Normal) Collected: 06/12/24 2002    Lab Status: Final result Specimen: Blood from Arm, Left Updated: 06/12/24 2033     hs TnI 0hr 4 ng/L     HS Troponin I 4hr [[id number]]     Lab Status: No result Specimen: Blood     Comprehensive metabolic panel [[id number]]  (Abnormal) Collected: 06/12/24 2002    Lab Status: Final result Specimen: Blood from Arm, Left Updated: 06/12/24 2030     Sodium 127 mmol/L      Potassium 4.2 mmol/L      Chloride 94 mmol/L      CO2 26 mmol/L      ANION GAP 7 mmol/L      BUN 10 mg/dL      Creatinine 0.63 mg/dL      Glucose 105 mg/dL      Calcium 8.8 mg/dL      AST 21 U/L      ALT 14 U/L      Alkaline Phosphatase 76 U/L      Total Protein 6.9 g/dL      Albumin 4.0 g/dL      Total Bilirubin 1.65 mg/dL      eGFR 96 ml/min/1.73sq m     Narrative:      National Kid[employer] Disease Foundation guidelines for Chronic Kidney Disease (CKD):     Stage 1 with normal or high GFR (GFR > 90 mL/min/1.73 square meters)    Stage 2 Mild CKD (GFR = 60-89 mL/min/1.73 square meters)    Stage 3A Moderate CKD (GFR = 45-59 mL/min/1.73 square meters)    Stage 3B Moderate CKD (GFR = 30-44 mL/min/1.73 square meters)    Stage 4 Severe CKD (GFR = 15-29 mL/min/1.73 square meters)    Stage 5 End Stage CKD (GFR <15 mL/min/1.73 square meters)  Note: GFR calculation is accurate only with a steady state creatinine    CBC and differential [[id number]]  (Abnormal) Collected: 06/12/24 2002    Lab Status: Final result Specimen: Blood from Arm, Left Updated: 06/12/24 2007     WBC 13.20 Thousand/uL      RBC 4.10 Million/uL      Hemoglobin 13.7 g/dL      Hematocrit 40.0 %      MCV 98 fL      MCH 33.4 pg      MCHC 34.3 g/dL      RDW 12.3 %      MPV 9.0 fL       Platelets 187 Thousands/uL      nRBC 0 /100 WBCs      Segmented % 75 %      Immature Grans % 1 %      Lymphocytes % 12 %      Monocytes % 11 %      Eosinophils Relative 1 %      Basophils Relative 0 %      Absolute Neutrophils 9.95 Thousands/µL      Absolute Immature Grans 0.06 Thousand/uL      Absolute Lymphocytes 1.60 Thousands/µL      Absolute Monocytes 1.46 Thousand/µL      Eosinophils Absolute 0.09 Thousand/µL      Basophils Absolute 0.04 Thousands/µL                XR chest 1 view portable   ED Interpretation by Homar Guerrero DO (06/12 2153)   Signs of left lower lobe infiltrate.              Procedures  Procedures     ED Course  ED Course as of 06/13/24 0010   Wed Jun 12, 2024 2010 Vital signs reviewed.  Patient expressing left-sided chest pain.  ECG interpreted by me.  Normal sinus rhythm.  Heart rate 82 bpm.  Left axis deviation.  No acute ischemic changes. Will obtain basic labs, CXR. Will require troponin x 2.    2127 Laboratory workup significant for white blood cell count of 13.2; hyponatremia 127.  No other significant findings.  Per chart review, the patient's sodium ranges in the low to mid 130s.  Will administer a bolus of IV fluids.   2153 X-ray interpretation by me.  Possible left lower lobe infiltrate.  Will administer a dose of IV Rocephin/azithromycin and reach out to SLIM.      SBIRT 20yo+      Flowsheet Row Most Recent Value   Initial Alcohol Screen: US AUDIT-C     1. How often do you have a drink containing alcohol? 0 Filed at: 06/12/2024 2006   2. How many drinks containing alcohol do you have on a typical day you are drinking?  0 Filed at: 06/12/2024 2006   3a. Male UNDER 65: How often do you have five or more drinks on one occasion? 0 Filed at: 06/12/2024 2006   3b. FEMALE Any Age, or MALE 65+: How often do you have 4 or more drinks on one occassion? 0 Filed at: 06/12/2024 2006   Audit-C Score 0 Filed at: 06/12/2024 2006   LAUREN: How many times in the past year have you...    Used an  illegal drug or used a prescription medication for non-medical reasons? Never Filed at: 06/12/2024 2006          Medical Decision Making  This patient presents with chest pain, cough.   Diagnostic considerations include ACS, PE, bronchitis, PNA. See ED Course.         Problems Addressed:  Chest pain, unspecified type: acute illness or injury  Hyponatremia: acute illness or injury  Pneumonia of left lower lobe due to infectious organism: acute illness or injury    Amount and/or Complexity of Data Reviewed  External Data Reviewed: notes.     Details: PCP documentation from 6/12 was reviewed.   Labs: ordered. Decision-making details documented in ED Course.  Radiology: ordered and independent interpretation performed. Decision-making details documented in ED Course.  ECG/medicine tests: ordered and independent interpretation performed. Decision-making details documented in ED Course.  Discussion of management or test interpretation with external provider(s): The case and treatment were discussed with hospital medicine.    Risk  Prescription drug management.  Decision regarding hospitalization.      Disposition  Final diagnoses:   Chest pain, unspecified type   Pneumonia of left lower lobe due to infectious organism   Hyponatremia     Time reflects when diagnosis was documented in both MDM as applicable and the Disposition within this note       Time User Action Codes Description Comment    6/12/2024 10:40 PM Homar Guerrero [R07.9] Chest pain, unspecified type     6/12/2024 10:40 PM Homar Guerrero [J18.9] Pneumonia of left lower lobe due to infectious organism     6/12/2024 10:40 PM Homar Guerrero [E87.1] Hyponatremia           ED Disposition       ED Disposition   Admit    Condition   Stable    Date/Time   Wed Jun 12, 2024 4292    Comment   Case was discussed with Nory Bernardo and the patient's admission status was agreed to be Admission Status: observation status to the service of Dr. Turner .                Follow-up Information    None         Current Discharge Medication List        CONTINUE these medications which have NOT CHANGED    Details   allopurinol (ZYLOPRIM) 300 mg tablet Take 300 mg by mouth daily      amitriptyline (ELAVIL) 10 mg tablet Take 1 tablet (10 mg total) by mouth daily at bedtime If not effective after 1 week may increase to 2 tablets  Qty: 60 tablet, Refills: 5    Associated Diagnoses: Neuropathy      cholecalciferol (VITAMIN D3) 1,000 units tablet Take 1,000 Units by mouth daily      gabapentin (NEURONTIN) 100 mg capsule Take 100 mg by mouth 3 (three) times a day      loratadine (CLARITIN) 10 mg tablet Take 10 mg by mouth daily      losartan (COZAAR) 100 MG tablet Take 100 mg by mouth daily      amoxicillin (AMOXIL) 500 MG tablet amoxicillin 500 mg tablet   take 4 tablets by mouth 1 hour PRIOR TO DENTAL PROCEDURES      calcium carbonate (OS-BONNIE) 600 MG tablet Take 600 mg by mouth 2 (two) times a day with meals      indomethacin (INDOCIN) 25 mg capsule Take 1 capsule (25 mg total) by mouth 3 (three) times a day as needed for mild pain  Qty: 30 capsule, Refills: 0    Associated Diagnoses: Gout             No discharge procedures on file.    PDMP Review       None            ED Provider  Electronically Signed by             Homar Guerrero DO  06/13/24 0012

## 2024-06-13 NOTE — PLAN OF CARE
Problem: PAIN - ADULT  Goal: Verbalizes/displays adequate comfort level or baseline comfort level  Description: Interventions:  - Encourage patient to monitor pain and request assistance  - Assess pain using appropriate pain scale  - Administer analgesics based on type and severity of pain and evaluate response  - Implement non-pharmacological measures as appropriate and evaluate response  - Consider cultural and social influences on pain and pain management  - Notify physician/advanced practitioner if interventions unsuccessful or patient reports new pain  Outcome: Progressing     Problem: INFECTION - ADULT  Goal: Absence or prevention of progression during hospitalization  Description: INTERVENTIONS:  - Assess and monitor for signs and symptoms of infection  - Monitor lab/diagnostic results  - Monitor all insertion sites, i.e. indwelling lines, tubes, and drains  - Monitor endotracheal if appropriate and nasal secretions for changes in amount and color  - [place] appropriate cooling/warming therapies per order  - Administer medications as ordered  - Instruct and encourage patient and family to use good hand hygiene technique  - Identify and instruct in appropriate isolation precautions for identified infection/condition  Outcome: Progressing     Problem: SAFETY ADULT  Goal: Patient will remain free of falls  Description: INTERVENTIONS:  - Educate patient/family on patient safety including physical limitations  - Instruct patient to call for assistance with activity   - Consult OT/PT to assist with strengthening/mobility   - Keep Call bell within reach  - Keep bed low and locked with side rails adjusted as appropriate  - Keep care items and personal belongings within reach  - Initiate and maintain comfort rounds  - Make Fall Risk Sign visible to staff    - Apply yellow socks and bracelet for high fall risk patients  - Consider moving patient to room near nurses station  Outcome: Progressing  Goal: Maintain or  return to baseline ADL function  Description: INTERVENTIONS:  -  Assess patient's ability to carry out ADLs; assess patient's baseline for ADL function and identify physical deficits which impact ability to perform ADLs (bathing, care of mouth/teeth, toileting, grooming, dressing, etc.)  - Assess/evaluate cause of self-care deficits   - Assess range of motion  - Assess patient's mobility; develop plan if impaired  - Assess patient's need for assistive devices and provide as appropriate  - Encourage maximum independence but intervene and supervise when necessary  - Involve family in performance of ADLs  - Assess for home care needs following discharge   - Consider OT consult to assist with ADL evaluation and planning for discharge  - Provide patient education as appropriate  Outcome: Progressing  Goal: Maintains/Returns to pre admission functional level  Description: INTERVENTIONS:  - Perform AM-PAC 6 Click Basic Mobility/ Daily Activity assessment daily.  - Set and communicate daily mobility goal to care team and patient/family/caregiver.   - Collaborate with rehabilitation services on mobility goals if consulted    - Out of bed for toileting  - Record patient progress and toleration of activity level   Outcome: Progressing     Problem: DISCHARGE PLANNING  Goal: Discharge to home or other facility with appropriate resources  Description: INTERVENTIONS:  - Identify barriers to discharge w/patient and caregiver  - Arrange for needed discharge resources and transportation as appropriate  - Identify discharge learning needs (meds, wound care, etc.)  - Arrange for interpretive services to assist at discharge as needed  - Refer to Case Management Department for coordinating discharge planning if the patient needs post-hospital services based on physician/advanced practitioner order or complex needs related to functional status, cognitive ability, or social support system  Outcome: Progressing     Problem: Knowledge  Deficit  Goal: Patient/family/caregiver demonstrates understanding of disease process, treatment plan, medications, and discharge instructions  Description: Complete learning assessment and assess knowledge base.  Interventions:  - Provide teaching at level of understanding  - Provide teaching via preferred learning methods  Outcome: Progressing

## 2024-06-13 NOTE — ASSESSMENT & PLAN NOTE
Presents to ED with shortness of breath and chest pain  Sodium on admission 127 with hydration improved to 131 but sodium studies reveal SIADH with a urine sodium of 72 patient is eating and drinking currently discontinue IV fluids further and repeat BMP tonight.    Lab Results   Component Value Date    SODIUM 131 (L) 06/13/2024    SODIUM 139 06/13/2024    SODIUM 131 (L) 06/13/2024

## 2024-06-13 NOTE — ASSESSMENT & PLAN NOTE
Presented to ED with left-sided chest pain, shortness of breath and sweating    CXR consistent with possible left lower lobe infiltrate  Urine strep and legionella ordered, Sputum culture ordered  Blood Cultures pending  Lab Results   Component Value Date    WBC 13.20 (H) 06/12/2024     Trend fever curve    Initially given ceftriaxone and azithromycin in the emergency department, will continue and monitor cultures and titrate therapy as indicated

## 2024-06-14 ENCOUNTER — TRANSITIONAL CARE MANAGEMENT (OUTPATIENT)
Dept: FAMILY MEDICINE CLINIC | Facility: CLINIC | Age: 74
End: 2024-06-14

## 2024-06-14 ENCOUNTER — APPOINTMENT (INPATIENT)
Dept: CT IMAGING | Facility: HOSPITAL | Age: 74
DRG: 643 | End: 2024-06-14
Payer: MEDICARE

## 2024-06-14 VITALS
BODY MASS INDEX: 22.29 KG/M2 | WEIGHT: 183 LBS | SYSTOLIC BLOOD PRESSURE: 152 MMHG | HEIGHT: 76 IN | RESPIRATION RATE: 16 BRPM | DIASTOLIC BLOOD PRESSURE: 95 MMHG | OXYGEN SATURATION: 91 % | TEMPERATURE: 97.7 F | HEART RATE: 92 BPM

## 2024-06-14 LAB
ANION GAP SERPL CALCULATED.3IONS-SCNC: 10 MMOL/L (ref 4–13)
BASOPHILS # BLD AUTO: 0.04 THOUSANDS/ÂΜL (ref 0–0.1)
BASOPHILS NFR BLD AUTO: 1 % (ref 0–1)
BUN SERPL-MCNC: 9 MG/DL (ref 5–25)
CALCIUM SERPL-MCNC: 9 MG/DL (ref 8.4–10.2)
CHLORIDE SERPL-SCNC: 98 MMOL/L (ref 96–108)
CO2 SERPL-SCNC: 25 MMOL/L (ref 21–32)
CREAT SERPL-MCNC: 0.6 MG/DL (ref 0.6–1.3)
EOSINOPHIL # BLD AUTO: 0.01 THOUSAND/ÂΜL (ref 0–0.61)
EOSINOPHIL NFR BLD AUTO: 0 % (ref 0–6)
ERYTHROCYTE [DISTWIDTH] IN BLOOD BY AUTOMATED COUNT: 12.4 % (ref 11.6–15.1)
GFR SERPL CREATININE-BSD FRML MDRD: 98 ML/MIN/1.73SQ M
GLUCOSE SERPL-MCNC: 101 MG/DL (ref 65–140)
HCT VFR BLD AUTO: 42.4 % (ref 36.5–49.3)
HGB BLD-MCNC: 14.2 G/DL (ref 12–17)
IMM GRANULOCYTES # BLD AUTO: 0.04 THOUSAND/UL (ref 0–0.2)
IMM GRANULOCYTES NFR BLD AUTO: 1 % (ref 0–2)
LYMPHOCYTES # BLD AUTO: 1.23 THOUSANDS/ÂΜL (ref 0.6–4.47)
LYMPHOCYTES NFR BLD AUTO: 15 % (ref 14–44)
MCH RBC QN AUTO: 32.6 PG (ref 26.8–34.3)
MCHC RBC AUTO-ENTMCNC: 33.5 G/DL (ref 31.4–37.4)
MCV RBC AUTO: 97 FL (ref 82–98)
MONOCYTES # BLD AUTO: 1.03 THOUSAND/ÂΜL (ref 0.17–1.22)
MONOCYTES NFR BLD AUTO: 12 % (ref 4–12)
NEUTROPHILS # BLD AUTO: 6.03 THOUSANDS/ÂΜL (ref 1.85–7.62)
NEUTS SEG NFR BLD AUTO: 71 % (ref 43–75)
NRBC BLD AUTO-RTO: 0 /100 WBCS
PLATELET # BLD AUTO: 176 THOUSANDS/UL (ref 149–390)
PMV BLD AUTO: 8.6 FL (ref 8.9–12.7)
POTASSIUM SERPL-SCNC: 4 MMOL/L (ref 3.5–5.3)
PROCALCITONIN SERPL-MCNC: 0.07 NG/ML
RBC # BLD AUTO: 4.36 MILLION/UL (ref 3.88–5.62)
SODIUM SERPL-SCNC: 133 MMOL/L (ref 135–147)
WBC # BLD AUTO: 8.38 THOUSAND/UL (ref 4.31–10.16)

## 2024-06-14 PROCEDURE — 85025 COMPLETE CBC W/AUTO DIFF WBC: CPT | Performed by: FAMILY MEDICINE

## 2024-06-14 PROCEDURE — 80048 BASIC METABOLIC PNL TOTAL CA: CPT | Performed by: FAMILY MEDICINE

## 2024-06-14 PROCEDURE — 99239 HOSP IP/OBS DSCHRG MGMT >30: CPT | Performed by: FAMILY MEDICINE

## 2024-06-14 PROCEDURE — 84145 PROCALCITONIN (PCT): CPT

## 2024-06-14 PROCEDURE — 71275 CT ANGIOGRAPHY CHEST: CPT

## 2024-06-14 RX ORDER — CEFDINIR 300 MG/1
300 CAPSULE ORAL EVERY 12 HOURS SCHEDULED
Qty: 10 CAPSULE | Refills: 0 | Status: SHIPPED | OUTPATIENT
Start: 2024-06-14 | End: 2024-06-19

## 2024-06-14 RX ORDER — AZITHROMYCIN 500 MG/1
500 TABLET, FILM COATED ORAL DAILY
Qty: 5 TABLET | Refills: 0 | Status: SHIPPED | OUTPATIENT
Start: 2024-06-14 | End: 2024-06-19

## 2024-06-14 RX ORDER — GUAIFENESIN 600 MG/1
600 TABLET, EXTENDED RELEASE ORAL 2 TIMES DAILY
Qty: 20 TABLET | Refills: 0 | Status: SHIPPED | OUTPATIENT
Start: 2024-06-14 | End: 2024-06-24

## 2024-06-14 RX ADMIN — LOSARTAN POTASSIUM 100 MG: 50 TABLET, FILM COATED ORAL at 08:34

## 2024-06-14 RX ADMIN — GUAIFENESIN 600 MG: 600 TABLET ORAL at 08:34

## 2024-06-14 RX ADMIN — Medication 1000 UNITS: at 08:34

## 2024-06-14 RX ADMIN — AZITHROMYCIN 500 MG: 250 TABLET, FILM COATED ORAL at 06:16

## 2024-06-14 RX ADMIN — ALLOPURINOL 300 MG: 300 TABLET ORAL at 08:34

## 2024-06-14 RX ADMIN — HEPARIN SODIUM 5000 UNITS: 5000 INJECTION, SOLUTION INTRAVENOUS; SUBCUTANEOUS at 06:16

## 2024-06-14 RX ADMIN — LORATADINE 10 MG: 10 TABLET ORAL at 08:34

## 2024-06-14 RX ADMIN — GABAPENTIN 100 MG: 100 CAPSULE ORAL at 08:34

## 2024-06-14 RX ADMIN — IOHEXOL 85 ML: 350 INJECTION, SOLUTION INTRAVENOUS at 10:48

## 2024-06-14 NOTE — ASSESSMENT & PLAN NOTE
Presents to ED with shortness of breath and chest pain  Sodium on admission 127 with hydration improved to 131 but sodium studies reveal SIADH with a urine sodium of 72 patient is eating and drinking currently and fluids stopped sodium improved to 133    Lab Results   Component Value Date    SODIUM 133 (L) 06/14/2024    SODIUM 132 (L) 06/13/2024    SODIUM 131 (L) 06/13/2024

## 2024-06-14 NOTE — PLAN OF CARE
Problem: PAIN - ADULT  Goal: Verbalizes/displays adequate comfort level or baseline comfort level  Description: Interventions:  - Encourage patient to monitor pain and request assistance  - Assess pain using appropriate pain scale  - Administer analgesics based on type and severity of pain and evaluate response  - Implement non-pharmacological measures as appropriate and evaluate response  - Consider cultural and social influences on pain and pain management  - Notify physician/advanced practitioner if interventions unsuccessful or patient reports new pain  Outcome: Progressing     Problem: INFECTION - ADULT  Goal: Absence or prevention of progression during hospitalization  Description: INTERVENTIONS:  - Assess and monitor for signs and symptoms of infection  - Monitor lab/diagnostic results  - Monitor all insertion sites, i.e. indwelling lines, tubes, and drains  - Monitor endotracheal if appropriate and nasal secretions for changes in amount and color  - [place] appropriate cooling/warming therapies per order  - Administer medications as ordered  - Instruct and encourage patient and family to use good hand hygiene technique  - Identify and instruct in appropriate isolation precautions for identified infection/condition  Outcome: Progressing     Problem: SAFETY ADULT  Goal: Patient will remain free of falls  Description: INTERVENTIONS:  - Educate patient/family on patient safety including physical limitations  - Instruct patient to call for assistance with activity   - Consult OT/PT to assist with strengthening/mobility   - Keep Call bell within reach  - Keep bed low and locked with side rails adjusted as appropriate  - Keep care items and personal belongings within reach  - Initiate and maintain comfort rounds  - Make Fall Risk Sign visible to staff  - Obtain necessary fall risk management equipment  - Apply yellow socks and bracelet for high fall risk patients  - Consider moving patient to room near nurses  station  Outcome: Progressing  Goal: Maintain or return to baseline ADL function  Description: INTERVENTIONS:  -  Assess patient's ability to carry out ADLs; assess patient's baseline for ADL function and identify physical deficits which impact ability to perform ADLs (bathing, care of mouth/teeth, toileting, grooming, dressing, etc.)  - Assess/evaluate cause of self-care deficits   - Assess range of motion  - Assess patient's mobility; develop plan if impaired  - Assess patient's need for assistive devices and provide as appropriate  - Encourage maximum independence but intervene and supervise when necessary  - Involve family in performance of ADLs  - Assess for home care needs following discharge   - Consider OT consult to assist with ADL evaluation and planning for discharge  - Provide patient education as appropriate  Outcome: Progressing  Goal: Maintains/Returns to pre admission functional level  Description: INTERVENTIONS:  - Perform AM-PAC 6 Click Basic Mobility/ Daily Activity assessment daily.  - Set and communicate daily mobility goal to care team and patient/family/caregiver.   - Collaborate with rehabilitation services on mobility goals if consulted  - Stand patient 3 times a day  - Ambulate patient 3 times a day  - Out of bed to chair 3 times a day   - Out of bed for meals 3 times a day  - Out of bed for toileting  - Record patient progress and toleration of activity level   Outcome: Progressing     Problem: DISCHARGE PLANNING  Goal: Discharge to home or other facility with appropriate resources  Description: INTERVENTIONS:  - Identify barriers to discharge w/patient and caregiver  - Arrange for needed discharge resources and transportation as appropriate  - Identify discharge learning needs (meds, wound care, etc.)  - Arrange for interpretive services to assist at discharge as needed  - Refer to Case Management Department for coordinating discharge planning if the patient needs post-hospital services  based on physician/advanced practitioner order or complex needs related to functional status, cognitive ability, or social support system  Outcome: Progressing     Problem: Knowledge Deficit  Goal: Patient/family/caregiver demonstrates understanding of disease process, treatment plan, medications, and discharge instructions  Description: Complete learning assessment and assess knowledge base.  Interventions:  - Provide teaching at level of understanding  - Provide teaching via preferred learning methods  Outcome: Progressing     Problem: RESPIRATORY - ADULT  Goal: Achieves optimal ventilation and oxygenation  Description: INTERVENTIONS:  - Assess for changes in respiratory status  - Assess for changes in mentation and behavior  - Position to facilitate oxygenation and minimize respiratory effort  - Oxygen administered by appropriate delivery if ordered  - Initiate smoking cessation education as indicated  - Encourage broncho-pulmonary hygiene including cough, deep breathe, Incentive Spirometry  - Assess the need for suctioning and aspirate as needed  - Assess and instruct to report SOB or any respiratory difficulty  - Respiratory Therapy support as indicated  Outcome: Progressing

## 2024-06-14 NOTE — DISCHARGE SUMMARY
[facility]  Discharge- Jasson Carter 1950, 74 y.o. male MRN: [id number]  Unit/Bed#: [facility] Encounter: [id number]  Primary Care Provider: Ruben Finch MD   Date and time admitted to hospital: 6/12/2024  7:48 PM    * Pneumonia  Assessment & Plan  Presented to ED with left-sided chest pain, shortness of breath and sweating improved no chest pain     CXR consistent with possible left lower lobe infiltrate and multifocal , wbc elevation   Urine strep and legionella ordered- negative , Sputum culture ordered never done   Blood Cultures pending  Lab Results   Component Value Date    WBC 8.38 06/14/2024    PROCALCITONI 0.07 06/14/2024     Trend fever curve    Initially given ceftriaxone and azithromycin in the emergency department, will continue and monitor cultures and titrate therapy as indicated  Cta done -No pulmonary embolus.     Diffuse bronchial wall thickening, multifocal bilateral tree-in-bud nodularity and coalescent nodules, and consolidation in the lingula and left lower lobe compatible with bronchitis/bronchiolitis, and pneumonia.     Trace left pleural effusion.     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  Patient is doing much better ambulatory without shortness of breath no need for any oxygen white count resolved will discharge on cefdinir and Zithromax for 5 more days to complete a total duration of 7 he will need a repeat CT chest to ensure resolution in 6 to 8 weeks discussed and also wrote on the discharge paperwork for his primary care to order    Hyponatremia  Assessment & Plan  Presents to ED with shortness of breath and chest pain  Sodium on admission 127 with hydration improved to 131 but sodium studies reveal SIADH with a urine sodium of 72 patient is eating and drinking currently and fluids stopped sodium improved to 133    Lab Results   Component Value Date    SODIUM 133 (L) 06/14/2024    SODIUM 132 (L)  06/13/2024    SODIUM 131 (L) 06/13/2024        Essential hypertension  Assessment & Plan  Continue home meds          Medical Problems       Resolved Problems  Date Reviewed: 6/14/2024   None       Discharging Physician / Practitioner: Krystal Turner MD  PCP: Ruben Finch MD  Admission Date:   Admission Orders (From admission, onward)       Ordered        06/13/24 0909  INPATIENT ADMISSION  Once            06/12/24 2241  Place in Observation  Once                          Discharge Date: 06/14/24    Consultations During Hospital Stay:  none    Procedures Performed:   none    Significant Findings / Test Results:   Cxr-Multifocal airspace opacities, largest in the left lung base. Findings are suspicious for multifocal pneumonia in the appropriate clinical context. Questionable trace layering left pleural effusion.   cta pe study - No pulmonary embolus.     Diffuse bronchial wall thickening, multifocal bilateral tree-in-bud nodularity and coalescent nodules, and consolidation in the lingula and left lower lobe compatible with bronchitis/bronchiolitis, and pneumonia.     Trace left pleural effusion.     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     Minimal ectasia of the ascending aorta at 4.0 cm. Recommend follow-up with a chest CT with no contrast in 1 year.    Incidental Findings:   See above to be followed by pcp     Test Results Pending at Discharge (will require follow up):   none     Outpatient Tests Requested:  none    Complications:  none    Reason for Admission: Chest pain and shortness of breath    Hospital Course:   Jasson Carter is a 74 y.o. male patient who originally presented to the hospital on 6/12/2024 due to chest pain and shortness of breath with no ischemic evidence left-sided for secondary to pneumonia as evident on the chest x-ray with a white count elevation and cough started on IV antibiotics CTA was done as well which showed same with  "bronchitis pneumonia he has improved was able to ambulate without shortness of breath no more chest pain no need for oxygen white count resolved he will be discharged on antibiotics to complete 5 more days cefdinir Zithromax.  He is cleared to be discharged home            Please see above list of diagnoses and related plan for additional information.     Condition at Discharge: stable    Discharge Day Visit / Exam:   Subjective:  seen and examined no sob or chest pain coughing his legs ambulated without any issues just a little tired  Vitals: Blood Pressure: 152/95 (06/14/24 0738)  Pulse: 92 (06/13/24 2149)  Temperature: 97.7 °F (36.5 °C) (06/14/24 0738)  Temp Source: Temporal (06/12/24 2328)  Respirations: 16 (06/14/24 0738)  Height: 6' 4\" (193 cm) (06/12/24 2341)  Weight - Scale: 83 kg (183 lb) (06/12/24 2341)  SpO2: 91 % (06/13/24 2149)  Exam:   Physical Exam  Vitals and nursing note reviewed.   Constitutional:       General: He is not in acute distress.     Appearance: He is well-developed.   HENT:      Head: Normocephalic and atraumatic.   Eyes:      Conjunctiva/sclera: Conjunctivae normal.   Cardiovascular:      Rate and Rhythm: Normal rate and regular rhythm.      Heart sounds: No murmur heard.  Pulmonary:      Effort: Pulmonary effort is normal. No respiratory distress.      Breath sounds: Rales present. No wheezing.   Abdominal:      General: There is no distension.      Palpations: Abdomen is soft.      Tenderness: There is no abdominal tenderness.   Musculoskeletal:         General: No swelling.      Cervical back: Neck supple.   Skin:     General: Skin is warm and dry.      Capillary Refill: Capillary refill takes less than 2 seconds.   Neurological:      Mental Status: He is alert and oriented to person, place, and time.   Psychiatric:         Mood and Affect: Mood normal.          Discussion with Family: pt    Discharge instructions/Information to patient and family:   See after visit summary for " information provided to patient and family.      Provisions for Follow-Up Care:  See after visit summary for information related to follow-up care and any pertinent home health orders.      Mobility at time of Discharge:   Basic Mobility Inpatient Raw Score: 24  JH-HLM Goal: 8: Walk 250 feet or more  JH-HLM Achieved: 8: Walk 250 feet ot more  HLM Goal achieved. Continue to encourage appropriate mobility.     Disposition:   Home    Planned Readmission: no     Discharge Statement:  I spent >35 minutes discharging the patient. This time was spent on the day of discharge. I had direct contact with the patient on the day of discharge. Greater than 50% of the total time was spent examining patient, answering all patient questions, arranging and discussing plan of care with patient as well as directly providing post-discharge instructions.  Additional time then spent on discharge activities.    Discharge Medications:  See after visit summary for reconciled discharge medications provided to patient and/or family.      **Please Note: This note may have been constructed using a voice recognition system**

## 2024-06-14 NOTE — ASSESSMENT & PLAN NOTE
Presented to ED with left-sided chest pain, shortness of breath and sweating improved no chest pain     CXR consistent with possible left lower lobe infiltrate and multifocal , wbc elevation   Urine strep and legionella ordered- negative , Sputum culture ordered never done   Blood Cultures pending  Lab Results   Component Value Date    WBC 8.38 06/14/2024    PROCALCITONI 0.07 06/14/2024     Trend fever curve    Initially given ceftriaxone and azithromycin in the emergency department, will continue and monitor cultures and titrate therapy as indicated  Cta done -No pulmonary embolus.     Diffuse bronchial wall thickening, multifocal bilateral tree-in-bud nodularity and coalescent nodules, and consolidation in the lingula and left lower lobe compatible with bronchitis/bronchiolitis, and pneumonia.     Trace left pleural effusion.     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  Patient is doing much better ambulatory without shortness of breath no need for any oxygen white count resolved will discharge on cefdinir and Zithromax for 5 more days to complete a total duration of 7 he will need a repeat CT chest to ensure resolution in 6 to 8 weeks discussed and also wrote on the discharge paperwork for his primary care to order

## 2024-06-16 LAB
BACTERIA BLD CULT: NORMAL
BACTERIA BLD CULT: NORMAL

## 2024-06-18 LAB
BACTERIA BLD CULT: NORMAL
BACTERIA BLD CULT: NORMAL

## 2024-06-20 ENCOUNTER — OFFICE VISIT (OUTPATIENT)
Dept: FAMILY MEDICINE CLINIC | Facility: CLINIC | Age: 74
End: 2024-06-20
Payer: MEDICARE

## 2024-06-20 VITALS
DIASTOLIC BLOOD PRESSURE: 72 MMHG | OXYGEN SATURATION: 95 % | BODY MASS INDEX: 21.92 KG/M2 | HEIGHT: 76 IN | WEIGHT: 180 LBS | HEART RATE: 86 BPM | SYSTOLIC BLOOD PRESSURE: 118 MMHG

## 2024-06-20 DIAGNOSIS — J30.1 SEASONAL ALLERGIC RHINITIS DUE TO POLLEN: Chronic | ICD-10-CM

## 2024-06-20 DIAGNOSIS — R79.82 ELEVATED C-REACTIVE PROTEIN (CRP): ICD-10-CM

## 2024-06-20 DIAGNOSIS — G62.9 NEUROPATHY: Chronic | ICD-10-CM

## 2024-06-20 DIAGNOSIS — I10 ESSENTIAL HYPERTENSION: ICD-10-CM

## 2024-06-20 DIAGNOSIS — K21.00 GASTROESOPHAGEAL REFLUX DISEASE WITH ESOPHAGITIS WITHOUT HEMORRHAGE: Chronic | ICD-10-CM

## 2024-06-20 DIAGNOSIS — J18.9 PNEUMONIA OF LEFT LUNG DUE TO INFECTIOUS ORGANISM, UNSPECIFIED PART OF LUNG: Primary | ICD-10-CM

## 2024-06-20 PROCEDURE — 99496 TRANSJ CARE MGMT HIGH F2F 7D: CPT | Performed by: FAMILY MEDICINE

## 2024-06-20 NOTE — PROGRESS NOTES
Transition of Care Visit  Name: Jasson Carter      : 1950      MRN: [id number]  Encounter Provider: Ruben Finch MD  Encounter Date: 2024   Encounter department: [facility]    Assessment & Plan   1. Pneumonia of left lung due to infectious organism, unspecified part of lung  Assessment & Plan:  Reviewed hospital reports and reconciled medication.  Seems comfortable at this time although still does have some cough.  I am concerned about the very high CRP and should repeat this next Wednesday.  [facility]  If develops any problems with fever or trouble with breathing needs to reassess  2. Elevated C-reactive protein (CRP)  -     C-reactive protein; Future  3. Essential hypertension  Assessment & Plan:  Overall stable, continue current regimen  4. Gastroesophageal reflux disease with esophagitis without hemorrhage  Assessment & Plan:  Seems to be doing well, continue current regimen  5. Neuropathy  Assessment & Plan:  Doing well, continue current regimen    6. Seasonal allergic rhinitis due to pollen  Assessment & Plan:  I still feel should continue the Claritin and the saline nasal rinse         History of Present Illness     Transitional Care Management Review:   Jasson Carter is a 74 y.o. male here for TCM follow up.     During the TCM phone call patient stated:  TCM Call       Type Value    Date and time call was made  2024  1:46 PM    Hospital care reviewed  Records reviewed    Patient was hospitialized at  [facility]    Date of Admission  24    Date of discharge  24    Diagnosis  Pneumonia    Disposition  Home    Were the patients medications reviewed and updated  No    Current Symptoms  None          TCM Call       Type Value    Post hospital issues  None    Should patient be enrolled in anticoag monitoring?  No    Scheduled for follow up?  Yes    Did you  obtain your prescribed medications  Yes    Do you need help managing your prescriptions or medications  No    Is transportation to your appointment needed  No    I have advised the patient to call PCP with any new or worsening symptoms  Sanaz Dumont CMA    Living Arrangements  Spouse or Significiant other    Support System  Spouse    The type of support provided  Emotional; Financial    Do you have social support  Yes, as much as I need    Are you recieving any outpatient services  No    Are you recieving home care services  No    Are you using any community resources  No    Current waiver services  No    Have you fallen in the last 12 months  No    Interperter language line needed  No              Patient   Has history of hypertension, gout, reflux esophagitis, hypercholesterolemia, prostatic hypertrophy, allergic rhinitis and degenerative arthritis.    [facility] and [facility] as well as his medication normally.  Also has history of posttraumatic stress disorder for which he follows up with Psychology with the [facility].  Had been seen for problems with some cough and congestion which was felt to relate to allergic rhinitis but developed left-sided chest pain and went to the ER and was given diagnosis of pneumonia.  Was placed on combination of cephalosporin and azithromycin which she is still finishing.  Overall feels well but still does have some cough.  Had very high CRP note does follow-[facility] for [facility] and is due for repeat CAT scan in July      Review of Systems   Constitutional:  Negative for fever.   HENT:  Negative for congestion and trouble swallowing.    Eyes:  Negative for visual disturbance.   Respiratory:  Positive for cough and chest tightness. Negative for shortness of breath and wheezing.    Cardiovascular:  Negative for chest pain, palpitations and leg swelling.   Gastrointestinal:  Negative for abdominal pain, constipation and diarrhea.   Endocrine: Negative  "for polyuria.   Genitourinary:  Negative for difficulty urinating.   Allergic/Immunologic: Positive for environmental allergies.   Neurological:  Negative for dizziness and light-headedness.   Hematological:  Negative for adenopathy.   Psychiatric/Behavioral:  Negative for sleep disturbance. The patient is hyperactive.      Objective     Blood Pressure 118/72 (BP Location: Left arm, Patient Position: Sitting, Cuff Size: Standard)   Pulse 86   Height 6' 4\" (1.93 m)   Weight 81.6 kg (180 lb)   Oxygen Saturation 95%   Body Mass Index 21.91 kg/m²     Physical Exam  Vitals and nursing note reviewed.   Constitutional:       Appearance: Normal appearance.   HENT:      Head: Normocephalic.      Right Ear: Tympanic membrane, ear canal and external ear normal.      Left Ear: Tympanic membrane, ear canal and external ear normal.      Nose: Nose normal.      Mouth/Throat:      Mouth: Mucous membranes are moist.   Eyes:      Extraocular Movements: Extraocular movements intact.      Conjunctiva/sclera: Conjunctivae normal.      Pupils: Pupils are equal, round, and reactive to light.   Neck:      Vascular: No carotid bruit.   Cardiovascular:      Rate and Rhythm: Normal rate and regular rhythm.      Pulses: Normal pulses.      Heart sounds: Normal heart sounds. No murmur (It is 66) heard.  Pulmonary:      Effort: Pulmonary effort is normal.      Breath sounds: Normal breath sounds. No wheezing, rhonchi or rales.   Abdominal:      General: Abdomen is flat. There is no distension.      Palpations: Abdomen is soft. There is no mass.      Tenderness: There is no abdominal tenderness.   Musculoskeletal:         General: No swelling.      Cervical back: Normal range of motion and neck supple. No muscular tenderness.      Right lower leg: No edema.      Left lower leg: No edema.   Lymphadenopathy:      Cervical: No cervical adenopathy.   Skin:     General: Skin is warm and dry.      Findings: No rash.   Neurological:      Mental " Status: He is alert and oriented to person, place, and time.      Cranial Nerves: No cranial nerve deficit.      Motor: No weakness.      Coordination: Coordination normal.      Gait: Gait normal.      Deep Tendon Reflexes: Reflexes abnormal.   Psychiatric:         Mood and Affect: Mood normal.       Medications have been reviewed by provider in current encounter    Administrative Statements

## 2024-06-20 NOTE — PATIENT INSTRUCTIONS
Reviewed the hospital report.  Overall looks very comfortable today although still does have some cough.  It is odd that the CRP was so high.  Will have repeat this next Wednesday.  Is due [facility].  Continue current meds.  Try to hold fluid intake down to 1-1/2 to 2 quarts a day

## 2024-06-21 NOTE — ASSESSMENT & PLAN NOTE
Reviewed hospital reports and reconciled medication.  Seems comfortable at this time although still does have some cough.  I am concerned about the very high CRP and should repeat this next Wednesday.  Is going to be getting [facility].  If develops any problems with fever or trouble with breathing needs to reassess

## 2024-06-26 ENCOUNTER — LAB (OUTPATIENT)
Dept: LAB | Facility: HOSPITAL | Age: 74
End: 2024-06-26
Payer: MEDICARE

## 2024-06-26 DIAGNOSIS — R79.82 ELEVATED C-REACTIVE PROTEIN (CRP): ICD-10-CM

## 2024-06-26 LAB
CRP SERPL QL: 2.3 MG/L
PSA SERPL-MCNC: 2.52 NG/ML (ref 0–4)

## 2024-06-26 PROCEDURE — 86140 C-REACTIVE PROTEIN: CPT

## 2024-06-27 ENCOUNTER — TELEPHONE (OUTPATIENT)
Dept: FAMILY MEDICINE CLINIC | Facility: CLINIC | Age: 74
End: 2024-06-27

## 2024-06-27 NOTE — TELEPHONE ENCOUNTER
----- Message from Ruben Finch MD sent at 6/26/2024 10:00 AM EDT -----  Please call patient and inform of normal labs.  The C-reactive protein was normal       Left message informing of normal results.

## 2024-07-13 PROBLEM — J18.9 PNEUMONIA: Status: RESOLVED | Noted: 2024-06-13 | Resolved: 2024-07-13

## 2024-07-17 ENCOUNTER — OFFICE VISIT (OUTPATIENT)
Dept: FAMILY MEDICINE CLINIC | Facility: CLINIC | Age: 74
End: 2024-07-17
Payer: MEDICARE

## 2024-07-17 VITALS
SYSTOLIC BLOOD PRESSURE: 110 MMHG | OXYGEN SATURATION: 98 % | HEIGHT: 76 IN | HEART RATE: 78 BPM | BODY MASS INDEX: 22.26 KG/M2 | DIASTOLIC BLOOD PRESSURE: 78 MMHG | WEIGHT: 182.8 LBS

## 2024-07-17 DIAGNOSIS — K21.00 GASTROESOPHAGEAL REFLUX DISEASE WITH ESOPHAGITIS WITHOUT HEMORRHAGE: Chronic | ICD-10-CM

## 2024-07-17 DIAGNOSIS — G62.9 NEUROPATHY: Chronic | ICD-10-CM

## 2024-07-17 DIAGNOSIS — N40.0 BENIGN PROSTATIC HYPERPLASIA WITHOUT URINARY OBSTRUCTION: ICD-10-CM

## 2024-07-17 DIAGNOSIS — Z87.39 HISTORY OF GOUT: ICD-10-CM

## 2024-07-17 DIAGNOSIS — E78.00 PURE HYPERCHOLESTEROLEMIA: ICD-10-CM

## 2024-07-17 DIAGNOSIS — I10 ESSENTIAL HYPERTENSION: Primary | ICD-10-CM

## 2024-07-17 PROCEDURE — G2211 COMPLEX E/M VISIT ADD ON: HCPCS | Performed by: FAMILY MEDICINE

## 2024-07-17 PROCEDURE — 99214 OFFICE O/P EST MOD 30 MIN: CPT | Performed by: FAMILY MEDICINE

## 2024-07-17 NOTE — PATIENT INSTRUCTIONS
Overall seems to be doing well.  [facility].  Continue current meds and try to push daily physical activity

## 2024-07-17 NOTE — PROGRESS NOTES
Ambulatory Visit  Name: Jasson Carter      : 1950      MRN: [id number]  Encounter Provider: Ruben Finch MD  Encounter Date: 2024   Encounter department: [facility]IMARY [facility]    Assessment & Plan   1. Essential hypertension  Assessment & Plan:  Overall stable, continue current regimen  2. Pure hypercholesterolemia  Assessment & Plan:  Stable, continue current regimen  3. Neuropathy  Assessment & Plan:  Seems to be doing well, continue current regimen  4. Gastroesophageal reflux disease with esophagitis without hemorrhage  Assessment & Plan:  Doing well, continue current regimen    5. History of gout  Assessment & Plan:  Asymptomatic, continue current regimen  6. Benign prostatic hyperplasia without urinary obstruction  Assessment & Plan:  Only mildly symptomatic, continue current regimen         History of Present Illness       Patient   Has history of hypertension, gout, reflux esophagitis, hypercholesterolemia, prostatic hypertrophy, allergic rhinitis and degenerative arthritis.    [facility] as well as his medication normally.  Also has history of posttraumatic stress disorder for which he follows up with [facility] with [facility]. did have problems with pneumonia but feels fine now      Review of Systems   Constitutional:  Negative for activity change, appetite change, chills, fatigue, fever and unexpected weight change.   HENT:  Negative for congestion, rhinorrhea and trouble swallowing.    Eyes:  Negative for visual disturbance.   Respiratory:  Negative for apnea, cough, chest tightness and shortness of breath.    Cardiovascular:  Negative for chest pain, palpitations and leg swelling.   Gastrointestinal:  Negative for abdominal distention, abdominal pain, constipation and diarrhea.   Endocrine: Negative for polyuria (Nocturia x 1).   Genitourinary:  Negative for difficulty urinating.   Musculoskeletal:  Negative for  arthralgias and myalgias.   Skin:  Negative for rash.   Allergic/Immunologic: Positive for environmental allergies.   Neurological:  Negative for dizziness, weakness, light-headedness, numbness and headaches.   Hematological:  Negative for adenopathy.   Psychiatric/Behavioral:  Negative for agitation and sleep disturbance.      Past Medical History:   Diagnosis Date    Allergic     Gout     Hypertension     PTSD (post-traumatic stress disorder)      Past Surgical History:   Procedure Laterality Date    APPENDECTOMY      CHOLECYSTECTOMY      TOTAL KNEE ARTHROPLASTY       Family History   Problem Relation Age of Onset    Lung cancer Father      Social History     Tobacco Use    Smoking status: Former     Current packs/day: 0.00     Average packs/day: 0.5 packs/day for 20.0 years (10.0 total pack years)     Types: Cigarettes     Start date:      Quit date:      Years since quittin.5    Smokeless tobacco: Never   Vaping Use    Vaping status: Never Used   Substance and Sexual Activity    Alcohol use: Yes    Drug use: Not Currently    Sexual activity: Not on file     Current Outpatient Medications on File Prior to Visit   Medication Sig    allopurinol (ZYLOPRIM) 300 mg tablet Take 300 mg by mouth daily    amitriptyline (ELAVIL) 10 mg tablet Take 1 tablet (10 mg total) by mouth daily at bedtime If not effective after 1 week may increase to 2 tablets    calcium carbonate (OS-BONNIE) 600 MG tablet Take 600 mg by mouth 2 (two) times a day with meals    cholecalciferol (VITAMIN D3) 1,000 units tablet Take 1,000 Units by mouth daily    gabapentin (NEURONTIN) 100 mg capsule Take 100 mg by mouth 3 (three) times a day    loratadine (CLARITIN) 10 mg tablet Take 10 mg by mouth daily    losartan (COZAAR) 100 MG tablet Take 100 mg by mouth daily     Allergies   Allergen Reactions    Ativan [Lorazepam] Delirium    Fluoxetine Diarrhea     Other reaction(s): Diarrhea, Tremor, Sweating, Sweating, Diarrhea, Tremor, Sweating,  "Sweating, Diarrhea, Tremor, Sweating, Sweating    Levaquin [Levofloxacin] Itching    Venlafaxine      Other reaction(s): Tremor, Sweating, Tremor, Sweating     Immunization History   Administered Date(s) Administered    COVID-19 MODERNA VACC 0.5 ML IM 01/14/2021, 02/11/2021, 11/01/2021    COVID-19 Moderna Vac BIVALENT 12 Yr+ IM 0.5 ML 01/19/2023    DTaP,unspecified 10/26/2010    INFLUENZA 10/22/2008, 11/03/2009, 10/20/2010, 10/24/2011, 09/17/2012, 10/21/2013, 10/05/2015, 10/28/2016, 09/01/2020, 10/12/2021, 10/12/2021, 11/01/2022    Influenza Split High Dose Preservative Free IM 10/03/2017    Influenza, high dose seasonal 0.7 mL 09/15/2022, 10/19/2023    Pneumococcal 08/07/2014    Pneumococcal Conjugate 13-Valent 04/22/2016    Tdap 05/14/2020, 04/22/2022    Zoster 10/29/2013    Zoster Vaccine Recombinant 07/14/2022, 09/22/2022    influenza, trivalent, adjuvanted 10/03/2018, 10/03/2019     Objective     Blood Pressure 110/78 (BP Location: Left arm, Patient Position: Sitting, Cuff Size: Standard)   Pulse 78   Height 6' 4\" (1.93 m)   Weight 82.9 kg (182 lb 12.8 oz)   Oxygen Saturation 98%   Body Mass Index 22.25 kg/m²     Physical Exam  Vitals and nursing note reviewed.   Constitutional:       Appearance: Normal appearance.   HENT:      Head: Normocephalic.      Mouth/Throat:      Mouth: Mucous membranes are moist.   Eyes:      Conjunctiva/sclera: Conjunctivae normal.   Neck:      Vascular: No carotid bruit.   Cardiovascular:      Rate and Rhythm: Normal rate and regular rhythm.      Pulses: Normal pulses.      Heart sounds: Normal heart sounds. No murmur (Rate is 66) heard.  Pulmonary:      Effort: Pulmonary effort is normal.      Breath sounds: Normal breath sounds.   Abdominal:      General: Abdomen is flat. There is no distension.      Palpations: Abdomen is soft. There is no mass.      Tenderness: There is no abdominal tenderness.   Musculoskeletal:         General: No swelling.      Cervical back: Normal " range of motion and neck supple. No tenderness. No muscular tenderness.      Right lower leg: No edema.      Left lower leg: No edema.   Lymphadenopathy:      Cervical: No cervical adenopathy.   Skin:     General: Skin is warm and dry.      Findings: No rash.   Neurological:      Mental Status: He is alert and oriented to person, place, and time.      Motor: No weakness.      Coordination: Coordination normal.      Gait: Gait normal.      Deep Tendon Reflexes: Reflexes abnormal.   Psychiatric:         Mood and Affect: Mood normal.

## 2025-01-10 ENCOUNTER — RA CDI HCC (OUTPATIENT)
Dept: OTHER | Facility: HOSPITAL | Age: 75
End: 2025-01-10

## 2025-01-16 ENCOUNTER — TELEPHONE (OUTPATIENT)
Dept: FAMILY MEDICINE CLINIC | Facility: CLINIC | Age: 75
End: 2025-01-16

## 2025-01-16 NOTE — TELEPHONE ENCOUNTER
Called and spoke to patient and wife. Wife having a very important neurologist appointment tomorrow. Will call back next week to schedule the annual medicare wellness appointment.

## 2025-02-25 ENCOUNTER — APPOINTMENT (EMERGENCY)
Dept: RADIOLOGY | Facility: HOSPITAL | Age: 75
End: 2025-02-25
Payer: MEDICARE

## 2025-02-25 ENCOUNTER — HOSPITAL ENCOUNTER (EMERGENCY)
Facility: HOSPITAL | Age: 75
Discharge: HOME | End: 2025-02-25
Attending: EMERGENCY MEDICINE
Payer: MEDICARE

## 2025-02-25 VITALS
DIASTOLIC BLOOD PRESSURE: 113 MMHG | TEMPERATURE: 97.9 F | RESPIRATION RATE: 18 BRPM | HEART RATE: 78 BPM | OXYGEN SATURATION: 96 % | SYSTOLIC BLOOD PRESSURE: 170 MMHG

## 2025-02-25 DIAGNOSIS — S22.32XA CLOSED FRACTURE OF ONE RIB OF LEFT SIDE, INITIAL ENCOUNTER: Primary | ICD-10-CM

## 2025-02-25 PROCEDURE — 99284 EMERGENCY DEPT VISIT MOD MDM: CPT | Performed by: EMERGENCY MEDICINE

## 2025-02-25 PROCEDURE — 99284 EMERGENCY DEPT VISIT MOD MDM: CPT

## 2025-02-25 PROCEDURE — 71101 X-RAY EXAM UNILAT RIBS/CHEST: CPT

## 2025-02-25 RX ORDER — MAGNESIUM OXIDE 400 MG/1
TABLET ORAL
COMMUNITY

## 2025-02-25 RX ORDER — GINSENG 100 MG
CAPSULE ORAL
COMMUNITY

## 2025-02-25 RX ORDER — TITANIUM DIOXIDE, OCTINOXATE, ZINC OXIDE 4.61; 1.6; .78 G/40ML; G/40ML; G/40ML
CREAM TOPICAL
COMMUNITY

## 2025-02-25 RX ORDER — AMPICILLIN TRIHYDRATE 250 MG
CAPSULE ORAL
COMMUNITY

## 2025-02-25 NOTE — ED PROVIDER NOTES
Time reflects when diagnosis was documented in both MDM as applicable and the Disposition within this note       Time User Action Codes Description Comment    2/25/2025 12:56 PM Nicol Vu Add [S22.32XA] Closed fracture of one rib of left side, initial encounter           ED Disposition       ED Disposition   Discharge    Condition   Stable    Date/Time   Tue Feb 25, 2025 12:56 PM    Comment   Jasson Carter discharge to home/self care.                   Assessment & Plan       Medical Decision Making   Patient presents with left rib pain after fall he sustained 3 days ago.  Given history, exam and work-up, patient likely has ankle minimally displaced rib fracture, no pneumothorax.  I have low suspicion for significant lung injury, dislocation, significant ligamentous injury, septic arthritis, gout flare, new autoimmune arthropathy or gonococcal arthropathy.      Problems Addressed:  Closed fracture of one rib of left side, initial encounter: acute illness or injury    Amount and/or Complexity of Data Reviewed  Radiology: ordered and independent interpretation performed. Decision-making details documented in ED Course.    Risk  OTC drugs.             Medications - No data to display    ED Risk Strat Scores                            SBIRT 22yo+      Flowsheet Row Most Recent Value   Initial Alcohol Screen: US AUDIT-C     1. How often do you have a drink containing alcohol? 0 Filed at: 02/25/2025 1050   Audit-C Score 0 Filed at: 02/25/2025 1050                            History of Present Illness       Chief Complaint   Patient presents with    Fall     On Saturday, - HS, -BT, complains of pain to L Ribs       Past Medical History:   Diagnosis Date    Allergic     Gout     Hypertension     PTSD (post-traumatic stress disorder)       Past Surgical History:   Procedure Laterality Date    APPENDECTOMY      CHOLECYSTECTOMY      TOTAL KNEE ARTHROPLASTY        Family History   Problem Relation Age of Onset     Lung cancer Father       Social History     Tobacco Use    Smoking status: Former     Current packs/day: 0.00     Average packs/day: 0.5 packs/day for 20.0 years (10.0 ttl pk-yrs)     Types: Cigarettes     Start date:      Quit date:      Years since quittin.1    Smokeless tobacco: Never   Vaping Use    Vaping status: Never Used   Substance Use Topics    Alcohol use: Yes    Drug use: Not Currently      E-Cigarette/Vaping    E-Cigarette Use Never User       E-Cigarette/Vaping Substances    Nicotine No     THC No     CBD No     Flavoring No     Other No     Unknown No       I have reviewed and agree with the history as documented.     Patient is a 74-year-old male presenting to the emergency department complaining of pain to his left ribs from a fall he sustained on Saturday, states he slipped on the ice falling onto his left side, he denies a head injury or loss of consciousness, denies any pain in his hip or lower extremity, he does not take a blood thinner, reports persistent pain to the left ribs over the past few days which is amenable to ibuprofen, denies any difficulty breathing or shortness of breath, no cough or congestion        Review of Systems   Constitutional: Negative.    HENT: Negative.     Eyes: Negative.    Respiratory: Negative.     Cardiovascular: Negative.    Gastrointestinal: Negative.    Endocrine: Negative.    Genitourinary: Negative.    Musculoskeletal:  Positive for arthralgias.   Skin: Negative.    Allergic/Immunologic: Negative.    Neurological: Negative.    Hematological: Negative.    Psychiatric/Behavioral: Negative.             Objective       ED Triage Vitals [25 1047]   Temperature Pulse Blood Pressure Respirations SpO2 Patient Position - Orthostatic VS   97.9 °F (36.6 °C) 78 (!) 170/113 18 96 % Sitting      Temp Source Heart Rate Source BP Location FiO2 (%) Pain Score    Oral Monitor Right arm -- 6      Vitals      Date and Time Temp Pulse SpO2 Resp BP Pain Score  FACES Pain Rating User   02/25/25 1047 97.9 °F (36.6 °C) 78 96 % 18 170/113 6 -- SP            Physical Exam  Constitutional:       Appearance: He is well-developed.   HENT:      Head: Normocephalic and atraumatic.   Eyes:      Conjunctiva/sclera: Conjunctivae normal.      Pupils: Pupils are equal, round, and reactive to light.   Cardiovascular:      Rate and Rhythm: Normal rate.   Pulmonary:      Effort: Pulmonary effort is normal.   Chest:          Comments: Slight tenderness to palpate in the left anterior lateral ribs, no bony deformity or crepitus, clear breath sounds bilaterally  Abdominal:      Palpations: Abdomen is soft.   Musculoskeletal:         General: Normal range of motion.      Cervical back: Normal range of motion and neck supple.   Skin:     General: Skin is warm and dry.   Neurological:      Mental Status: He is alert and oriented to person, place, and time.         Results Reviewed       None            XR ribs with pa chest min 3 views LEFT   ED Interpretation by Nicol Vu DO (02/25 0956)   Minimally displaced rib fracture          Procedures    ED Medication and Procedure Management   Prior to Admission Medications   Prescriptions Last Dose Informant Patient Reported? Taking?   Cinnamon 500 MG capsule   Yes No   Sig: Take by mouth   Cranberry (Cranberry [employer] Software) 215 MG CAPS   Yes No   Sig: Take by mouth   Glucosamine-Chondroit-Vit C-Mn (Glucosamine Chondroitin Complx) TABS   Yes No   Sig: Take by mouth   Melatonin 1 MG CHEW   Yes No   Sig: Chew   Multiple Vitamin (MULTI VITAMIN PO)   Yes No   Sig: Take by mouth   Zinc 50 MG TABS   Yes No   Sig: Take by mouth   allopurinol (ZYLOPRIM) 300 mg tablet  Self Yes No   Sig: Take 300 mg by mouth daily   amitriptyline (ELAVIL) 10 mg tablet  Self No No   Sig: Take 1 tablet (10 mg total) by mouth daily at bedtime If not effective after 1 week may increase to 2 tablets   calcium carbonate (OS-BONNIE) 600 MG tablet  Self Yes No   Sig: Take 600 mg by  mouth 2 (two) times a day with meals   cholecalciferol (VITAMIN D3) 1,000 units tablet  Self Yes No   Sig: Take 1,000 Units by mouth daily   gabapentin (NEURONTIN) 100 mg capsule  Self Yes No   Sig: Take 100 mg by mouth 3 (three) times a day   loratadine (CLARITIN) 10 mg tablet  Self Yes No   Sig: Take 10 mg by mouth daily   losartan (COZAAR) 100 MG tablet  Self Yes No   Sig: Take 100 mg by mouth daily   magnesium oxide (MAG-OX) 400 mg tablet   Yes No   Sig: Take by mouth      Facility-Administered Medications: None     Patient's Medications   Discharge Prescriptions    No medications on file     No discharge procedures on file.  ED SEPSIS DOCUMENTATION   Time reflects when diagnosis was documented in both MDM as applicable and the Disposition within this note       Time User Action Codes Description Comment    2/25/2025 12:56 PM Nicol Vu Add [S22.32XA] Closed fracture of one rib of left side, initial encounter                  Nicol Vu DO  02/25/25 2279

## 2025-02-26 ENCOUNTER — HOSPITAL ENCOUNTER (EMERGENCY)
Facility: HOSPITAL | Age: 75
Discharge: HOME | End: 2025-02-26
Attending: EMERGENCY MEDICINE
Payer: MEDICARE

## 2025-02-26 VITALS
HEART RATE: 92 BPM | OXYGEN SATURATION: 97 % | SYSTOLIC BLOOD PRESSURE: 174 MMHG | RESPIRATION RATE: 18 BRPM | TEMPERATURE: 97.5 F | DIASTOLIC BLOOD PRESSURE: 107 MMHG

## 2025-02-26 DIAGNOSIS — S22.39XA RIB FRACTURE: Primary | ICD-10-CM

## 2025-02-26 PROCEDURE — 99284 EMERGENCY DEPT VISIT MOD MDM: CPT | Performed by: EMERGENCY MEDICINE

## 2025-02-26 PROCEDURE — 96372 THER/PROPH/DIAG INJ SC/IM: CPT

## 2025-02-26 PROCEDURE — 99283 EMERGENCY DEPT VISIT LOW MDM: CPT

## 2025-02-26 RX ORDER — IBUPROFEN 800 MG/1
800 TABLET, FILM COATED ORAL 3 TIMES DAILY
Qty: 21 TABLET | Refills: 0 | Status: SHIPPED | OUTPATIENT
Start: 2025-02-26 | End: 2025-03-04 | Stop reason: SDUPTHER

## 2025-02-26 RX ORDER — LIDOCAINE 50 MG/G
1 PATCH TOPICAL DAILY
Qty: 5 PATCH | Refills: 0 | Status: SHIPPED | OUTPATIENT
Start: 2025-02-26

## 2025-02-26 RX ORDER — KETOROLAC TROMETHAMINE 30 MG/ML
30 INJECTION, SOLUTION INTRAMUSCULAR; INTRAVENOUS ONCE
Status: COMPLETED | OUTPATIENT
Start: 2025-02-26 | End: 2025-02-26

## 2025-02-26 RX ORDER — OXYCODONE AND ACETAMINOPHEN 5; 325 MG/1; MG/1
1 TABLET ORAL EVERY 4 HOURS PRN
Qty: 9 TABLET | Refills: 0 | Status: SHIPPED | OUTPATIENT
Start: 2025-02-26 | End: 2025-03-08

## 2025-02-26 RX ORDER — OXYCODONE AND ACETAMINOPHEN 5; 325 MG/1; MG/1
1 TABLET ORAL ONCE
Refills: 0 | Status: COMPLETED | OUTPATIENT
Start: 2025-02-26 | End: 2025-02-26

## 2025-02-26 RX ORDER — METHOCARBAMOL 500 MG/1
500 TABLET, FILM COATED ORAL 2 TIMES DAILY
Qty: 20 TABLET | Refills: 0 | Status: SHIPPED | OUTPATIENT
Start: 2025-02-26

## 2025-02-26 RX ADMIN — OXYCODONE HYDROCHLORIDE AND ACETAMINOPHEN 1 TABLET: 5; 325 TABLET ORAL at 11:39

## 2025-02-26 RX ADMIN — KETOROLAC TROMETHAMINE 30 MG: 30 INJECTION, SOLUTION INTRAMUSCULAR; INTRAVENOUS at 11:39

## 2025-02-26 NOTE — ED PROVIDER NOTES
Time reflects when diagnosis was documented in both MDM as applicable and the Disposition within this note       Time User Action Codes Description Comment    2/26/2025 11:34 AM Evangelist Mejia Add [S22.39XA] Rib fracture     2/26/2025 11:35 AM Evangelist Mejia Add [R07.81] Rib pain     2/26/2025 11:35 AM Evangelist Mejia Remove [R07.81] Rib pain           ED Disposition       ED Disposition   Discharge    Condition   Stable    Date/Time   Wed Feb 26, 2025 11:35 AM    Comment   Jasson Carter discharge to home/self care.                   Assessment & Plan       Medical Decision Making  74-year-old male presents to the emergency department with complaint of left rib cage tenderness.  Provided patient with pain medication, he will follow-up with his primary care doctor this week for reassessment.  Strict return precautions given.  Patient understands and agrees with treatment plan.    Risk  Prescription drug management.             Medications   oxyCODONE-acetaminophen (PERCOCET) 5-325 mg per tablet 1 tablet (has no administration in time range)   ketorolac (TORADOL) injection 30 mg (has no administration in time range)       ED Risk Strat Scores                            SBIRT 22yo+      Flowsheet Row Most Recent Value   Initial Alcohol Screen: US AUDIT-C     1. How often do you have a drink containing alcohol? 0 Filed at: 02/26/2025 1128   2. How many drinks containing alcohol do you have on a typical day you are drinking?  0 Filed at: 02/26/2025 1128   3b. FEMALE Any Age, or MALE 65+: How often do you have 4 or more drinks on one occassion? 0 Filed at: 02/26/2025 1128   Audit-C Score 0 Filed at: 02/26/2025 1128   LAUREN: How many times in the past year have you...    Used an illegal drug or used a prescription medication for non-medical reasons? Never Filed at: 02/26/2025 1128                            History of Present Illness       Chief Complaint   Patient presents with    Rib Pain     Pt seen yesterday and dx  with rib fracture- not sent home with pain medication        Past Medical History:   Diagnosis Date    Allergic     Gout     Hypertension     PTSD (post-traumatic stress disorder)       Past Surgical History:   Procedure Laterality Date    APPENDECTOMY      CHOLECYSTECTOMY      TOTAL KNEE ARTHROPLASTY        Family History   Problem Relation Age of Onset    Lung cancer Father       Social History     Tobacco Use    Smoking status: Former     Current packs/day: 0.00     Average packs/day: 0.5 packs/day for 20.0 years (10.0 ttl pk-yrs)     Types: Cigarettes     Start date:      Quit date:      Years since quittin.1    Smokeless tobacco: Never   Vaping Use    Vaping status: Never Used   Substance Use Topics    Alcohol use: Yes    Drug use: Not Currently      E-Cigarette/Vaping    E-Cigarette Use Never User       E-Cigarette/Vaping Substances    Nicotine No     THC No     CBD No     Flavoring No     Other No     Unknown No       I have reviewed and agree with the history as documented.     74-year-old male presents to the emergency department with complaint of left-sided rib cage pain.  Patient was seen here yesterday for a rib fracture, and was discharged home.  Patient was taking the ibuprofen, however did not have much relief.  Patient states that the pain kept him up last night.  He denies any chills, fevers, chest discomfort, patient states that the pain is in his left lower rib cage region.  Patient has a recent fall on ice this week.  He denies any shortness of breath, GI or  complaints.  Patient does have tenderness to palpation on left rib cage.          Review of Systems   Constitutional:  Negative for chills and fever.   HENT:  Negative for ear pain and sore throat.    Eyes:  Negative for pain and visual disturbance.   Respiratory:  Negative for cough and shortness of breath.    Cardiovascular:  Negative for chest pain and palpitations.   Gastrointestinal:  Negative for abdominal pain and  vomiting.   Genitourinary:  Negative for dysuria and hematuria.   Musculoskeletal:  Negative for arthralgias and back pain.   Skin:  Negative for color change and rash.   Neurological:  Negative for seizures and syncope.   All other systems reviewed and are negative.          Objective       ED Triage Vitals [02/26/25 1129]   Temperature Pulse Blood Pressure Respirations SpO2 Patient Position - Orthostatic VS   97.5 °F (36.4 °C) 92 (!) 174/107 18 97 % --      Temp src Heart Rate Source BP Location FiO2 (%) Pain Score    -- Monitor -- -- --      Vitals      Date and Time Temp Pulse SpO2 Resp BP Pain Score FACES Pain Rating User   02/26/25 1129 97.5 °F (36.4 °C) 92 97 % 18 174/107 -- -- SS            Physical Exam  Vitals and nursing note reviewed.   Constitutional:       General: He is not in acute distress.     Appearance: He is well-developed.   HENT:      Head: Normocephalic and atraumatic.   Eyes:      Conjunctiva/sclera: Conjunctivae normal.   Cardiovascular:      Rate and Rhythm: Normal rate and regular rhythm.      Heart sounds: No murmur heard.  Pulmonary:      Effort: Pulmonary effort is normal. No respiratory distress.      Breath sounds: Normal breath sounds.   Abdominal:      Palpations: Abdomen is soft.      Tenderness: There is no abdominal tenderness.   Musculoskeletal:         General: Tenderness present. No swelling.      Cervical back: Neck supple.   Skin:     General: Skin is warm and dry.      Capillary Refill: Capillary refill takes less than 2 seconds.   Neurological:      Mental Status: He is alert.   Psychiatric:         Mood and Affect: Mood normal.         Results Reviewed       None            No orders to display       Procedures    ED Medication and Procedure Management   Prior to Admission Medications   Prescriptions Last Dose Informant Patient Reported? Taking?   Cinnamon 500 MG capsule   Yes No   Sig: Take by mouth   Cranberry (Cranberry [place]) 215 MG CAPS   Yes No   Sig: Take  by mouth   Glucosamine-Chondroit-Vit C-Mn (Glucosamine Chondroitin Complx) TABS   Yes No   Sig: Take by mouth   Melatonin 1 MG CHEW   Yes No   Sig: Chew   Multiple Vitamin (MULTI VITAMIN PO)   Yes No   Sig: Take by mouth   Zinc 50 MG TABS   Yes No   Sig: Take by mouth   allopurinol (ZYLOPRIM) 300 mg tablet  Self Yes No   Sig: Take 300 mg by mouth daily   amitriptyline (ELAVIL) 10 mg tablet  Self No No   Sig: Take 1 tablet (10 mg total) by mouth daily at bedtime If not effective after 1 week may increase to 2 tablets   calcium carbonate (OS-BONNIE) 600 MG tablet  Self Yes No   Sig: Take 600 mg by mouth 2 (two) times a day with meals   cholecalciferol (VITAMIN D3) 1,000 units tablet  Self Yes No   Sig: Take 1,000 Units by mouth daily   gabapentin (NEURONTIN) 100 mg capsule  Self Yes No   Sig: Take 100 mg by mouth 3 (three) times a day   loratadine (CLARITIN) 10 mg tablet  Self Yes No   Sig: Take 10 mg by mouth daily   losartan (COZAAR) 100 MG tablet  Self Yes No   Sig: Take 100 mg by mouth daily   magnesium oxide (MAG-OX) 400 mg tablet   Yes No   Sig: Take by mouth      Facility-Administered Medications: None     Patient's Medications   Discharge Prescriptions    IBUPROFEN (MOTRIN) 800 MG TABLET    Take 1 tablet (800 mg total) by mouth 3 (three) times a day       Start Date: 2/26/2025 End Date: --       Order Dose: 800 mg       Quantity: 21 tablet    Refills: 0    LIDOCAINE (LIDODERM) 5 %    Apply 1 patch topically over 12 hours daily Remove & Discard patch within 12 hours or as directed by MD       Start Date: 2/26/2025 End Date: --       Order Dose: 1 patch       Quantity: 5 patch    Refills: 0    METHOCARBAMOL (ROBAXIN) 500 MG TABLET    Take 1 tablet (500 mg total) by mouth 2 (two) times a day       Start Date: 2/26/2025 End Date: --       Order Dose: 500 mg       Quantity: 20 tablet    Refills: 0    OXYCODONE-ACETAMINOPHEN (PERCOCET) 5-325 MG PER TABLET    Take 1 tablet by mouth every 4 (four) hours as needed for  moderate pain for up to 10 days Max Daily Amount: 6 tablets       Start Date: 2/26/2025 End Date: 3/8/2025       Order Dose: 1 tablet       Quantity: 9 tablet    Refills: 0     No discharge procedures on file.  ED SEPSIS DOCUMENTATION   Time reflects when diagnosis was documented in both MDM as applicable and the Disposition within this note       Time User Action Codes Description Comment    2/26/2025 11:34 AM Evangelist Mejia Add [S22.39XA] Rib fracture     2/26/2025 11:35 AM Evangelist Mejia Add [R07.81] Rib pain     2/26/2025 11:35 AM Evangelist Mejia Remove [R07.81] Rib pain                  Evangelist Mejia,   02/26/25 1137

## 2025-02-26 NOTE — DISCHARGE INSTRUCTIONS
You were seen in the emergency department for rib pain.  We have provided you with pain medication.  Please pick it up at the pharmacy and take it as directed.  Please follow-up with your primary care doctor in 24 to 48 hours for reassessment.  If your symptoms worsen or persist, please return to the emergency department immediately.

## 2025-02-28 ENCOUNTER — OFFICE VISIT (OUTPATIENT)
Dept: FAMILY MEDICINE CLINIC | Facility: CLINIC | Age: 75
End: 2025-02-28
Payer: MEDICARE

## 2025-02-28 VITALS
OXYGEN SATURATION: 98 % | HEIGHT: 76 IN | HEART RATE: 75 BPM | DIASTOLIC BLOOD PRESSURE: 86 MMHG | WEIGHT: 195.6 LBS | SYSTOLIC BLOOD PRESSURE: 124 MMHG | BODY MASS INDEX: 23.82 KG/M2

## 2025-02-28 DIAGNOSIS — I10 ESSENTIAL HYPERTENSION: Primary | ICD-10-CM

## 2025-02-28 DIAGNOSIS — E78.00 PURE HYPERCHOLESTEROLEMIA: ICD-10-CM

## 2025-02-28 DIAGNOSIS — K21.00 GASTROESOPHAGEAL REFLUX DISEASE WITH ESOPHAGITIS WITHOUT HEMORRHAGE: Chronic | ICD-10-CM

## 2025-02-28 DIAGNOSIS — F43.10 POSTTRAUMATIC STRESS DISORDER: Chronic | ICD-10-CM

## 2025-02-28 DIAGNOSIS — G62.9 NEUROPATHY: Chronic | ICD-10-CM

## 2025-02-28 DIAGNOSIS — Z00.00 MEDICARE ANNUAL WELLNESS VISIT, SUBSEQUENT: ICD-10-CM

## 2025-02-28 DIAGNOSIS — M15.0 PRIMARY OSTEOARTHRITIS INVOLVING MULTIPLE JOINTS: Chronic | ICD-10-CM

## 2025-02-28 DIAGNOSIS — N40.0 BENIGN PROSTATIC HYPERPLASIA WITHOUT URINARY OBSTRUCTION: ICD-10-CM

## 2025-02-28 DIAGNOSIS — Z87.39 HISTORY OF GOUT: ICD-10-CM

## 2025-02-28 PROCEDURE — G2211 COMPLEX E/M VISIT ADD ON: HCPCS | Performed by: FAMILY MEDICINE

## 2025-02-28 PROCEDURE — G0439 PPPS, SUBSEQ VISIT: HCPCS | Performed by: FAMILY MEDICINE

## 2025-02-28 PROCEDURE — G0444 DEPRESSION SCREEN ANNUAL: HCPCS | Performed by: FAMILY MEDICINE

## 2025-02-28 PROCEDURE — 99214 OFFICE O/P EST MOD 30 MIN: CPT | Performed by: FAMILY MEDICINE

## 2025-02-28 NOTE — PROGRESS NOTES
Name: Jasson Carter      : 1950      MRN: [id number]  Encounter Provider: Ruben Finch MD  Encounter Date: 2025   Encounter department: [facility] PRIMARY CARE    Assessment & Plan  Essential hypertension  Overall stable, continue current regimen.  [facility]       Neuropathy  Seems to be doing well, continue current regimen with the amitriptyline       Primary osteoarthritis involving multiple joints  Remains bothersome.  Does [facility] with [facility].  Push activity as tolerated       Pure hypercholesterolemia  Stable, continue current regimen.  [facility]       Gastroesophageal reflux disease with esophagitis without hemorrhage  Asymptomatic, continue current regimen       Benign prostatic hyperplasia without urinary obstruction  Mildly symptomatic.  Continue current regimen and follow-up       History of gout  Asymptomatic, continue current regimen       Posttraumatic stress disorder  Seems to be doing well.  [facility], subsequent           Depression Screening and Follow-up Plan: Patient was screened for depression during today's encounter. They screened negative with a PHQ-2 score of 0.      ASCVD Risk Management:  The ASCVD Risk score (Andrei ALLEN, et al., 2019) failed to calculate for the following reasons:    Cannot find a previous HDL lab    Cannot find a previous total cholesterol lab    Patent does not have underlying ASCVD. Their ASCVD Risk is intermediate (7.5 to < 20 %).    Preventive services discussed: diet & exercise.    Preventive health issues were discussed with patient, and age appropriate screening tests were ordered as noted in patient's After Visit Summary. Personalized health advice and appropriate referrals for health education or preventive services given if needed, as noted in patient's After Visit Summary.    History of Present Illness        Patient   Has history of hypertension, gout, reflux esophagitis, hypercholesterolemia, prostatic hypertrophy, allergic rhinitis and degenerative arthritis.    [facility] and [facility] as well as his medication normally.  Also has history of posttraumatic stress disorder for which he follows [facility]. seen today also for Medicare wellness visit       Patient Care Team:  Ruben Finch MD as PCP - General (Family Medicine)    Review of Systems   HENT:  Negative for trouble swallowing.    Cardiovascular:  Positive for chest pain.   Gastrointestinal:  Negative for constipation and diarrhea.   Endocrine: Positive for polyuria.   Genitourinary:  Negative for difficulty urinating.   Musculoskeletal:  Positive for arthralgias.   Allergic/Immunologic: Positive for environmental allergies.   Neurological:  Negative for dizziness and light-headedness.   Psychiatric/Behavioral:  Positive for sleep disturbance.      Medical History Reviewed by provider this encounter:  Tobacco  Allergies  Meds  Problems  Med Hx  Surg Hx  Fam Hx       Annual Wellness Visit Questionnaire   Jasson is here for his Subsequent Wellness visit. Last Medicare Wellness visit information reviewed, patient interviewed and updates made to the record today.      Health Risk Assessment:   Patient rates overall health as very good. Patient feels that their physical health rating is much better. Patient is satisfied with their life. Eyesight was rated as same. Hearing was rated as same. Patient feels that their emotional and mental health rating is much better. Patients states they are never, rarely angry. Patient states they are never, rarely unusually tired/fatigued. Pain experienced in the last 7 days has been some. Patient's pain rating has been 5/10. Patient states that he has experienced no weight loss or gain in last 6 months. Does have his intermittent problems with knee  pain    Depression Screening:   PHQ-2 Score: 0      Fall Risk Screening:   In the past year, patient has experienced: no history of falling in past year      Home Safety:  Patient does not have trouble with stairs inside or outside of their home. Patient has working smoke alarms and has working carbon monoxide detector. Home safety hazards include: none. No issues    Nutrition:   Current diet is Regular and No Added Salt. Watch starch in diet    Medications:   Patient is not currently taking any over-the-counter supplements. Patient is not able to manage medications. No issues    Activities of Daily Living (ADLs)/Instrumental Activities of Daily Living (IADLs):   Walk and transfer into and out of bed and chair?: Yes  Dress and groom yourself?: Yes    Bathe or shower yourself?: Yes    Feed yourself? Yes  Do your laundry/housekeeping?: Yes  Manage your money, pay your bills and track your expenses?: Yes  Make your own meals?: Yes    Do your own shopping?: Yes    ADL comments: Overall functions well around the house.  No issues    Previous Hospitalizations:   Any hospitalizations or ED visits within the last 12 months?: No      Hospitalization Comments: [facility]    Advance Care Planning:   Living will: Yes    Durable POA for healthcare: Yes    Advanced directive: Yes    Advanced directive counseling given: Yes    Five wishes given: No    End of Life Decisions reviewed with patient: No      Comments: No issues    Cognitive Screening:   Provider or family/friend/caregiver concerned regarding cognition?: No    PREVENTIVE SCREENINGS      Cardiovascular Screening:    General: Screening Not Indicated and History Lipid Disorder      Diabetes Screening:     General: Screening Current      Colorectal Cancer Screening:     General: Screening Current      Prostate Cancer Screening:    General: Screening Current      Osteoporosis Screening:    General: Screening Not Indicated       Abdominal Aortic Aneurysm (AAA) Screening:    Risk factors include: age between 65-74 yo and tobacco use        General: Screening Current      Lung Cancer Screening:     General: Screening Not Indicated      Hepatitis C Screening:    General: Screening Not Indicated      Preventive Screening Comments: Continue follow-up with routine eye care and have reports forwarded to office    Screening, Brief Intervention, and Referral to Treatment (SBIRT)     Screening  Typical number of drinks in a day: 0  Typical number of drinks in a week: 3  Interpretation: Low risk drinking behavior.    Single Item Drug Screening:  How often have you used an illegal drug (including marijuana) or a prescription medication for non-medical reasons in the past year? never    Single Item Drug Screen Score: 0  Interpretation: Negative screen for possible drug use disorder    Brief Intervention  Alcohol & drug use screenings were reviewed. No concerns regarding substance use disorder identified.     Annual Depression Screening  Time spent screening and evaluating the patient for depression during today's encounter was 5 minutes.    Social Drivers of Health     Financial Resource Strain: Low Risk  (1/2/2024)    Overall Financial Resource Strain (CARDIA)     Difficulty of Paying Living Expenses: Not hard at all   Food Insecurity: No Food Insecurity (2/28/2025)    Hunger Vital Sign     Worried About Running Out of Food in the Last Year: Never true     Ran Out of Food in the Last Year: Never true   Transportation Needs: No Transportation Needs (2/28/2025)    PRAPARE - Transportation     Lack of Transportation (Medical): No     Lack of Transportation (Non-Medical): No   Housing Stability: Low Risk  (2/28/2025)    Housing Stability Vital Sign     Unable to Pay for Housing in the Last Year: No     Number of Times Moved in the Last Year: 0     Homeless in the Last Year: No   Utilities: Not At Risk (2/28/2025)    [facility] Utilities     Threatened with loss  "of utilities: No     No results found.    Objective   Blood Pressure 124/86 (BP Location: Left arm, Patient Position: Sitting, Cuff Size: Large)   Pulse 75   Height 6' 4\" (1.93 m)   Weight 88.7 kg (195 lb 9.6 oz)   Oxygen Saturation 98%   Body Mass Index 23.81 kg/m²     Physical Exam  Vitals and nursing note reviewed.   Constitutional:       General: He is not in acute distress.     Appearance: Normal appearance. He is well-developed.   HENT:      Head: Normocephalic and atraumatic.      Right Ear: Tympanic membrane, ear canal and external ear normal. Decreased hearing (25 dB hearing screen is off.  No difficulty with conversation with use of hearing aids) noted.      Left Ear: Tympanic membrane, ear canal and external ear normal. Decreased hearing noted.      Mouth/Throat:      Mouth: Mucous membranes are moist.      Dentition: Has dentures.   Eyes:      Extraocular Movements: Extraocular movements intact.      Conjunctiva/sclera: Conjunctivae normal.      Pupils: Pupils are equal, round, and reactive to light.   Neck:      Vascular: No carotid bruit.   Cardiovascular:      Rate and Rhythm: Normal rate and regular rhythm.      Pulses:           Dorsalis pedis pulses are 2+ on the right side and 2+ on the left side.        Posterior tibial pulses are 1+ on the right side and 2+ on the left side.      Heart sounds: Normal heart sounds. No murmur heard.  Pulmonary:      Effort: Pulmonary effort is normal. No respiratory distress.      Breath sounds: Normal breath sounds.   Abdominal:      General: Abdomen is flat. There is no distension.      Palpations: Abdomen is soft. There is no mass.      Tenderness: There is no abdominal tenderness.   Musculoskeletal:         General: No swelling.      Cervical back: Normal range of motion and neck supple. No muscular tenderness.      Right lower leg: No edema.      Left lower leg: No edema.      Right foot: No deformity.      Left foot: No deformity.   Feet:      Right " foot:      Protective Sensation: 8 sites tested.  8 sites sensed.      Skin integrity: Skin integrity normal.      Left foot:      Protective Sensation: 8 sites tested.  8 sites sensed.      Skin integrity: Skin integrity normal.   Lymphadenopathy:      Cervical: No cervical adenopathy.   Skin:     General: Skin is warm and dry.      Capillary Refill: Capillary refill takes less than 2 seconds.      Findings: No rash.   Neurological:      General: No focal deficit present.      Mental Status: He is alert and oriented to person, place, and time.      Cranial Nerves: No cranial nerve deficit.      Sensory: No sensory deficit.      Motor: No weakness.      Coordination: Coordination normal.      Gait: Gait normal.      Deep Tendon Reflexes: Reflexes abnormal.   Psychiatric:         Mood and Affect: Mood normal.

## 2025-03-01 PROBLEM — Z00.00 MEDICARE ANNUAL WELLNESS VISIT, SUBSEQUENT: Status: ACTIVE | Noted: 2025-03-01

## 2025-03-04 DIAGNOSIS — S22.39XA RIB FRACTURE: ICD-10-CM

## 2025-03-04 RX ORDER — IBUPROFEN 800 MG/1
800 TABLET, FILM COATED ORAL 3 TIMES DAILY
Qty: 21 TABLET | Refills: 0 | Status: SHIPPED | OUTPATIENT
Start: 2025-03-04 | End: 2025-03-11 | Stop reason: SDUPTHER

## 2025-03-04 NOTE — TELEPHONE ENCOUNTER
Patient following up on refill request. Made aware script was sent to pharmacy earlier today. Patient to follow up with pharmacy

## 2025-03-04 NOTE — TELEPHONE ENCOUNTER
Reason for call:   [x] Refill   [] Prior Auth  [] Other:     Office:   [x] PCP/Provider - MD Cortney Noel  [] Specialty/Provider -     Medication: Ibuprofen         Last prescribed in ER Dr Evangelist Mejia 2/26/25    Dose/Frequency: 800mg      One Three times a day      Quantity: 21    Pharmacy: [id number] [place] , [address]    Does the patient have enough for 3 days?   [] Yes   [x] No - Send as HP to POD

## 2025-03-10 NOTE — TELEPHONE ENCOUNTER
Patient called refill line for ibuprophin - advised E-Prescribing Status: Receipt confirmed by pharmacy (3/4/2025 12:56 PM EST) at [facility]  He will contact pharmacy for

## 2025-03-11 DIAGNOSIS — S22.39XA RIB FRACTURE: ICD-10-CM

## 2025-03-11 RX ORDER — IBUPROFEN 800 MG/1
800 TABLET, FILM COATED ORAL 3 TIMES DAILY
Qty: 90 TABLET | Refills: 0 | Status: SHIPPED | OUTPATIENT
Start: 2025-03-11

## 2025-03-11 NOTE — TELEPHONE ENCOUNTER
Reason for call:   [x] Refill   [] Prior Auth  [] Other:     Office:   [x] PCP/Provider - Stef  [] Specialty/Provider -     Medication: Ibuprofen 800mg    Dose/Frequency: 1 tab tid    Quantity: 90    Pharmacy: [id number] - [place] - [address] [facility] [phone]     Local Pharmacy   Does the patient have enough for 3 days?   [] Yes   [x] No - 2 tablets left

## 2025-03-23 NOTE — PROGRESS NOTES
Name: Eileen Michael      : 1950      MRN: [id number]  Encounter Provider: Brett Brito MD  Encounter Date: 6/15/2023   Encounter department: [address] [place] [address] PRIMARY CARE    Assessment & Plan     1  Essential hypertension  Assessment & Plan:  Overall stable, continue current regimen      2  Pure hypercholesterolemia  Assessment & Plan:  Doing well, continue current supplement      3  Neuropathy  Assessment & Plan:  Seems to be doing well, continue current regimen      4  Benign prostatic hyperplasia without urinary obstruction  Assessment & Plan:  Symptoms doing well  Continue current regimen and follow-up      5  History of gout  Assessment & Plan:  Asymptomatic, continue current regimen      6  Gastroesophageal reflux disease with esophagitis without hemorrhage  Assessment & Plan:  Asymptomatic, continue current regimen      7  Seasonal allergic rhinitis due to pollen  Assessment & Plan:  Doing well  Continue over-the-counter as needed meds        Depression Screening and Follow-up Plan: Patient was screened for depression during today's encounter  They screened negative with a PHQ-2 score of 2  Subjective      Patient   Has history of hypertension, gout, reflux esophagitis, hypercholesterolemia, prostatic hypertrophy, allergic rhinitis and degenerative arthritis  Has been under a lot of stress due to wife's recent injury  Does follow up with the [address] [place] [address] and gets lab work there as well as his medication normally  Also has history of posttraumatic stress disorder for which he follows up with Psychology with the [address] [place] [address] and is doing well at this time  Had been having trouble with nerve pain in his legs particularly bothersome in the evening  Was given trial of amitriptyline to take at bedtime and this is settling it down for so that he can sleep    Overall has been feeling well    Review of Systems   Constitutional: Negative for activity change, appetite change, chills, Tele box requested.    fatigue, fever and unexpected weight change  HENT: Negative for congestion, rhinorrhea and trouble swallowing  Eyes: Negative for visual disturbance  Respiratory: Negative for apnea, cough, chest tightness and shortness of breath  Cardiovascular: Negative for chest pain, palpitations and leg swelling  Gastrointestinal: Negative for abdominal distention, abdominal pain, constipation and diarrhea  Endocrine: Negative for polyuria (Nocturia x0-1)  Genitourinary: Negative for difficulty urinating  Musculoskeletal: Negative for arthralgias and myalgias  Skin: Negative for rash  Allergic/Immunologic: Positive for environmental allergies  Neurological: Negative for dizziness, weakness, light-headedness, numbness and headaches  Hematological: Negative for adenopathy  Psychiatric/Behavioral: Negative for agitation and sleep disturbance         Current Outpatient Medications on File Prior to Visit   Medication Sig   • allopurinol (ZYLOPRIM) 300 mg tablet Take 300 mg by mouth daily   • amitriptyline (ELAVIL) 10 mg tablet Take 1 tablet (10 mg total) by mouth daily at bedtime If not effective after 1 week may increase to 2 tablets   • aspirin 81 mg chewable tablet Chew 81 mg daily   • calcium carbonate (OS-BONNIE) 600 MG tablet Take 600 mg by mouth 2 (two) times a day with meals   • cholecalciferol (VITAMIN D3) 1,000 units tablet Take 1,000 Units by mouth daily   • Chromium 1000 MCG TABS Take by mouth   • Flaxseed, Linseed, (FLAXSEED OIL PO) Take by mouth   • folic acid (FOLVITE) 545 MCG tablet Take 800 mcg by mouth daily   • gabapentin (NEURONTIN) 100 mg capsule Take 100 mg by mouth 3 (three) times a day   • indomethacin (INDOCIN) 25 mg capsule Take 1 capsule (25 mg total) by mouth 3 (three) times a day as needed for mild pain   • loratadine (CLARITIN) 10 mg tablet Take 10 mg by mouth daily   • losartan (COZAAR) 100 MG tablet Take 100 mg by mouth daily   • magnesium 30 MG tablet Take 30 mg by mouth 2 "(two) times a day   • nitrofurantoin (MACROBID) 100 mg capsule Take 1 capsule (100 mg total) by mouth 2 (two) times a day   • Omega-3 Fatty Acids (FISH OIL) 1,000 mg Take 1,200 mg by mouth daily   • propranolol (INDERAL) 60 mg tablet Take 60 mg by mouth daily after dinner   • SUPER B COMPLEX/C PO Take by mouth   • Turmeric 500 MG TABS Take by mouth   • zinc gluconate 50 mg tablet Take 50 mg by mouth daily   • amoxicillin (AMOXIL) 500 MG tablet amoxicillin 500 mg tablet   take 4 tablets by mouth 1 hour PRIOR TO DENTAL PROCEDURES       Objective     /72 (BP Location: Left arm, Patient Position: Sitting, Cuff Size: Standard)   Pulse 67   Ht 6' 3\" (1 905 m)   Wt 91 6 kg (202 lb)   SpO2 92%   BMI 25 25 kg/m²     Physical Exam  Vitals and nursing note reviewed  Constitutional:       Appearance: Normal appearance  He is well-developed  HENT:      Head: Normocephalic  Nose: Nose normal    Eyes:      Conjunctiva/sclera: Conjunctivae normal    Neck:      Thyroid: No thyromegaly  Vascular: No carotid bruit  Cardiovascular:      Rate and Rhythm: Normal rate and regular rhythm  Heart sounds: Normal heart sounds  No murmur (Rate is 66) heard  Pulmonary:      Effort: Pulmonary effort is normal       Breath sounds: Normal breath sounds  Abdominal:      General: Abdomen is flat  There is no distension  Palpations: Abdomen is soft  There is no mass  Tenderness: There is no abdominal tenderness  Musculoskeletal:         General: Normal range of motion  Cervical back: Normal range of motion and neck supple  No tenderness  Lymphadenopathy:      Cervical: No cervical adenopathy  Skin:     General: Skin is warm and dry  Findings: No rash  Neurological:      Mental Status: He is alert and oriented to person, place, and time  Motor: No weakness        Coordination: Coordination normal       Gait: Gait normal       Deep Tendon Reflexes: Reflexes abnormal (Reflexes " diminished)     Psychiatric:         Mood and Affect: Mood normal        Divine Diana MD

## 2025-03-31 PROBLEM — Z00.00 MEDICARE ANNUAL WELLNESS VISIT, SUBSEQUENT: Status: RESOLVED | Noted: 2025-03-01 | Resolved: 2025-03-31

## 2025-06-11 ENCOUNTER — ANESTHESIA EVENT (INPATIENT)
Dept: PERIOP | Facility: HOSPITAL | Age: 75
End: 2025-06-11
Payer: MEDICARE

## 2025-06-11 ENCOUNTER — HOSPITAL ENCOUNTER (EMERGENCY)
Facility: HOSPITAL | Age: 75
End: 2025-06-11
Attending: EMERGENCY MEDICINE
Payer: MEDICARE

## 2025-06-11 ENCOUNTER — APPOINTMENT (EMERGENCY)
Dept: RADIOLOGY | Facility: HOSPITAL | Age: 75
End: 2025-06-11
Payer: MEDICARE

## 2025-06-11 ENCOUNTER — ANESTHESIA (INPATIENT)
Dept: PERIOP | Facility: HOSPITAL | Age: 75
End: 2025-06-11
Payer: MEDICARE

## 2025-06-11 ENCOUNTER — APPOINTMENT (EMERGENCY)
Dept: RADIOLOGY | Facility: HOSPITAL | Age: 75
DRG: 025 | End: 2025-06-11
Payer: MEDICARE

## 2025-06-11 ENCOUNTER — APPOINTMENT (EMERGENCY)
Dept: CT IMAGING | Facility: HOSPITAL | Age: 75
End: 2025-06-11
Payer: MEDICARE

## 2025-06-11 ENCOUNTER — APPOINTMENT (INPATIENT)
Dept: RADIOLOGY | Facility: HOSPITAL | Age: 75
DRG: 025 | End: 2025-06-11
Payer: MEDICARE

## 2025-06-11 ENCOUNTER — HOSPITAL ENCOUNTER (INPATIENT)
Facility: HOSPITAL | Age: 75
LOS: 16 days | DRG: 025 | End: 2025-06-27
Attending: SURGERY | Admitting: SURGERY
Payer: MEDICARE

## 2025-06-11 VITALS
BODY MASS INDEX: 23.81 KG/M2 | DIASTOLIC BLOOD PRESSURE: 88 MMHG | TEMPERATURE: 96.7 F | OXYGEN SATURATION: 99 % | SYSTOLIC BLOOD PRESSURE: 180 MMHG | HEART RATE: 79 BPM | HEIGHT: 76 IN | RESPIRATION RATE: 18 BRPM

## 2025-06-11 DIAGNOSIS — I48.91 ATRIAL FIBRILLATION, UNSPECIFIED TYPE (HCC): ICD-10-CM

## 2025-06-11 DIAGNOSIS — M25.461 EFFUSION OF RIGHT KNEE: ICD-10-CM

## 2025-06-11 DIAGNOSIS — R56.9 SEIZURE (HCC): ICD-10-CM

## 2025-06-11 DIAGNOSIS — I62.00 SUBDURAL HEMORRHAGE (HCC): ICD-10-CM

## 2025-06-11 DIAGNOSIS — M15.0 PRIMARY OSTEOARTHRITIS INVOLVING MULTIPLE JOINTS: ICD-10-CM

## 2025-06-11 DIAGNOSIS — S06.5XAA SDH (SUBDURAL HEMATOMA) (HCC): Primary | ICD-10-CM

## 2025-06-11 DIAGNOSIS — S76.012A STRAIN OF LEFT HIP, INITIAL ENCOUNTER: Primary | ICD-10-CM

## 2025-06-11 DIAGNOSIS — R41.82 ALTERED MENTAL STATUS: ICD-10-CM
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
## 2025-06-11 LAB
ABO GROUP BLD: NORMAL
ABO GROUP BLD: NORMAL
ALBUMIN SERPL BCG-MCNC: 4.4 G/DL (ref 3.5–5)
ALBUMIN SERPL BCG-MCNC: 4.4 G/DL (ref 3.5–5)
ALP SERPL-CCNC: 83 U/L (ref 34–104)
ALP SERPL-CCNC: 83 U/L (ref 34–104)
ALT SERPL W P-5'-P-CCNC: 14 U/L (ref 7–52)
ALT SERPL W P-5'-P-CCNC: 15 U/L (ref 7–52)
ANION GAP SERPL CALCULATED.3IONS-SCNC: 7 MMOL/L (ref 4–13)
ANION GAP SERPL CALCULATED.3IONS-SCNC: 9 MMOL/L (ref 4–13)
APTT PPP: 23 SECONDS (ref 23–34)
AST SERPL W P-5'-P-CCNC: 26 U/L (ref 13–39)
AST SERPL W P-5'-P-CCNC: 27 U/L (ref 13–39)
ATRIAL RATE: 82 BPM
BASE EXCESS BLDA CALC-SCNC: 5 MMOL/L (ref -2–3)
BASOPHILS # BLD AUTO: 0.03 THOUSANDS/ÂΜL (ref 0–0.1)
BASOPHILS # BLD AUTO: 0.05 THOUSANDS/ÂΜL (ref 0–0.1)
BASOPHILS NFR BLD AUTO: 0 % (ref 0–1)
BASOPHILS NFR BLD AUTO: 1 % (ref 0–1)
BILIRUB SERPL-MCNC: 2 MG/DL (ref 0.2–1)
BILIRUB SERPL-MCNC: 2.04 MG/DL (ref 0.2–1)
BLD GP AB SCN SERPL QL: NEGATIVE
BUN SERPL-MCNC: 12 MG/DL (ref 5–25)
BUN SERPL-MCNC: 13 MG/DL (ref 5–25)
CA-I BLD-SCNC: 0.87 MMOL/L (ref 1.12–1.32)
CALCIUM SERPL-MCNC: 9.4 MG/DL (ref 8.4–10.2)
CALCIUM SERPL-MCNC: 9.6 MG/DL (ref 8.4–10.2)
CHLORIDE SERPL-SCNC: 98 MMOL/L (ref 96–108)
CHLORIDE SERPL-SCNC: 99 MMOL/L (ref 96–108)
CO2 SERPL-SCNC: 26 MMOL/L (ref 21–32)
CO2 SERPL-SCNC: 27 MMOL/L (ref 21–32)
CREAT SERPL-MCNC: 0.66 MG/DL (ref 0.6–1.3)
CREAT SERPL-MCNC: 0.72 MG/DL (ref 0.6–1.3)
EOSINOPHIL # BLD AUTO: 0.04 THOUSAND/ÂΜL (ref 0–0.61)
EOSINOPHIL # BLD AUTO: 0.11 THOUSAND/ÂΜL (ref 0–0.61)
EOSINOPHIL NFR BLD AUTO: 1 % (ref 0–6)
EOSINOPHIL NFR BLD AUTO: 1 % (ref 0–6)
ERYTHROCYTE [DISTWIDTH] IN BLOOD BY AUTOMATED COUNT: 12.9 % (ref 11.6–15.1)
ERYTHROCYTE [DISTWIDTH] IN BLOOD BY AUTOMATED COUNT: 13.2 % (ref 11.6–15.1)
EST. AVERAGE GLUCOSE BLD GHB EST-MCNC: 120 MG/DL
GFR SERPL CREATININE-BSD FRML MDRD: 90 ML/MIN/1.73SQ M
GFR SERPL CREATININE-BSD FRML MDRD: 93 ML/MIN/1.73SQ M
GLUCOSE SERPL-MCNC: 104 MG/DL (ref 65–140)
GLUCOSE SERPL-MCNC: 107 MG/DL (ref 65–140)
GLUCOSE SERPL-MCNC: 125 MG/DL (ref 65–140)
HBA1C MFR BLD: 5.8 %
HCO3 BLDA-SCNC: 26.8 MMOL/L (ref 24–30)
HCT VFR BLD AUTO: 45.2 % (ref 36.5–49.3)
HCT VFR BLD AUTO: 45.7 % (ref 36.5–49.3)
HCT VFR BLD CALC: 46 % (ref 36.5–49.3)
HGB BLD-MCNC: 15 G/DL (ref 12–17)
HGB BLD-MCNC: 15.3 G/DL (ref 12–17)
HGB BLDA-MCNC: 15.6 G/DL (ref 12–17)
IMM GRANULOCYTES # BLD AUTO: 0.03 THOUSAND/UL (ref 0–0.2)
IMM GRANULOCYTES # BLD AUTO: 0.03 THOUSAND/UL (ref 0–0.2)
IMM GRANULOCYTES NFR BLD AUTO: 0 % (ref 0–2)
IMM GRANULOCYTES NFR BLD AUTO: 0 % (ref 0–2)
INR PPP: 1.02 (ref 0.85–1.19)
LYMPHOCYTES # BLD AUTO: 1.05 THOUSANDS/ÂΜL (ref 0.6–4.47)
LYMPHOCYTES # BLD AUTO: 1.06 THOUSANDS/ÂΜL (ref 0.6–4.47)
LYMPHOCYTES NFR BLD AUTO: 12 % (ref 14–44)
LYMPHOCYTES NFR BLD AUTO: 13 % (ref 14–44)
MAGNESIUM SERPL-MCNC: 2 MG/DL (ref 1.9–2.7)
MCH RBC QN AUTO: 32.9 PG (ref 26.8–34.3)
MCH RBC QN AUTO: 33.6 PG (ref 26.8–34.3)
MCHC RBC AUTO-ENTMCNC: 33.2 G/DL (ref 31.4–37.4)
MCHC RBC AUTO-ENTMCNC: 33.5 G/DL (ref 31.4–37.4)
MCV RBC AUTO: 100 FL (ref 82–98)
MCV RBC AUTO: 99 FL (ref 82–98)
MONOCYTES # BLD AUTO: 0.63 THOUSAND/ÂΜL (ref 0.17–1.22)
MONOCYTES # BLD AUTO: 0.65 THOUSAND/ÂΜL (ref 0.17–1.22)
MONOCYTES NFR BLD AUTO: 8 % (ref 4–12)
MONOCYTES NFR BLD AUTO: 8 % (ref 4–12)
NEUTROPHILS # BLD AUTO: 6.03 THOUSANDS/ÂΜL (ref 1.85–7.62)
NEUTROPHILS # BLD AUTO: 6.85 THOUSANDS/ÂΜL (ref 1.85–7.62)
NEUTS SEG NFR BLD AUTO: 77 % (ref 43–75)
NEUTS SEG NFR BLD AUTO: 79 % (ref 43–75)
NRBC BLD AUTO-RTO: 0 /100 WBCS
NRBC BLD AUTO-RTO: 0 /100 WBCS
PCO2 BLD: 28 MMOL/L (ref 21–32)
PCO2 BLD: 31.5 MM HG (ref 42–50)
PH BLD: 7.54 [PH] (ref 7.3–7.4)
PHOSPHATE SERPL-MCNC: 2.9 MG/DL (ref 2.3–4.1)
PLATELET # BLD AUTO: 206 THOUSANDS/UL (ref 149–390)
PLATELET # BLD AUTO: 215 THOUSANDS/UL (ref 149–390)
PMV BLD AUTO: 9.2 FL (ref 8.9–12.7)
PMV BLD AUTO: 9.4 FL (ref 8.9–12.7)
PO2 BLD: 30 MM HG (ref 35–45)
POTASSIUM BLD-SCNC: >8 MMOL/L (ref 3.5–5.3)
POTASSIUM SERPL-SCNC: 4.1 MMOL/L (ref 3.5–5.3)
POTASSIUM SERPL-SCNC: 4.1 MMOL/L (ref 3.5–5.3)
PR INTERVAL: 0 MS
PROT SERPL-MCNC: 7 G/DL (ref 6.4–8.4)
PROT SERPL-MCNC: 7.3 G/DL (ref 6.4–8.4)
PROTHROMBIN TIME: 13.8 SECONDS (ref 12.3–15)
QRS AXIS: -72 DEGREES
QRS AXIS: 6 DEGREES
QRSD INTERVAL: 86 MS
QRSD INTERVAL: 92 MS
QT INTERVAL: 346 MS
QT INTERVAL: 396 MS
QTC INTERVAL: 416 MS
QTC INTERVAL: 452 MS
RBC # BLD AUTO: 4.56 MILLION/UL (ref 3.88–5.62)
RBC # BLD AUTO: 4.56 MILLION/UL (ref 3.88–5.62)
RH BLD: POSITIVE
RH BLD: POSITIVE
SAO2 % BLD FROM PO2: 66 % (ref 60–85)
SODIUM BLD-SCNC: 125 MMOL/L (ref 136–145)
SODIUM SERPL-SCNC: 133 MMOL/L (ref 135–147)
SODIUM SERPL-SCNC: 133 MMOL/L (ref 135–147)
SPECIMEN EXPIRATION DATE: NORMAL
SPECIMEN SOURCE: ABNORMAL
T WAVE AXIS: 31 DEGREES
T WAVE AXIS: 76 DEGREES
TSH SERPL DL<=0.05 MIU/L-ACNC: 2.36 UIU/ML (ref 0.45–4.5)
VENTRICULAR RATE: 78 BPM
VENTRICULAR RATE: 87 BPM
WBC # BLD AUTO: 7.9 THOUSAND/UL (ref 4.31–10.16)
WBC # BLD AUTO: 8.66 THOUSAND/UL (ref 4.31–10.16)

## 2025-06-11 PROCEDURE — 86900 BLOOD TYPING SEROLOGIC ABO: CPT | Performed by: SURGERY

## 2025-06-11 PROCEDURE — C1713 ANCHOR/SCREW BN/BN,TIS/BN: HCPCS | Performed by: NEUROLOGICAL SURGERY

## 2025-06-11 PROCEDURE — 00C40ZZ EXTIRPATION OF MATTER FROM INTRACRANIAL SUBDURAL SPACE, OPEN APPROACH: ICD-10-PCS | Performed by: NEUROLOGICAL SURGERY

## 2025-06-11 PROCEDURE — 99024 POSTOP FOLLOW-UP VISIT: CPT | Performed by: NEUROLOGICAL SURGERY

## 2025-06-11 PROCEDURE — 93010 ELECTROCARDIOGRAM REPORT: CPT | Performed by: INTERNAL MEDICINE

## 2025-06-11 PROCEDURE — 83036 HEMOGLOBIN GLYCOSYLATED A1C: CPT | Performed by: PHYSICIAN ASSISTANT

## 2025-06-11 PROCEDURE — 4A133J1 MONITORING OF ARTERIAL PULSE, PERIPHERAL, PERCUTANEOUS APPROACH: ICD-10-PCS | Performed by: GENERAL PRACTICE

## 2025-06-11 PROCEDURE — 99291 CRITICAL CARE FIRST HOUR: CPT | Performed by: STUDENT IN AN ORGANIZED HEALTH CARE EDUCATION/TRAINING PROGRAM

## 2025-06-11 PROCEDURE — 36415 COLL VENOUS BLD VENIPUNCTURE: CPT | Performed by: EMERGENCY MEDICINE

## 2025-06-11 PROCEDURE — 99223 1ST HOSP IP/OBS HIGH 75: CPT | Performed by: SURGERY

## 2025-06-11 PROCEDURE — 85610 PROTHROMBIN TIME: CPT | Performed by: EMERGENCY MEDICINE

## 2025-06-11 PROCEDURE — 82947 ASSAY GLUCOSE BLOOD QUANT: CPT

## 2025-06-11 PROCEDURE — 70450 CT HEAD/BRAIN W/O DYE: CPT

## 2025-06-11 PROCEDURE — 61154 BURR HOLE W/EVAC&/DRG HMTMA: CPT | Performed by: NEUROLOGICAL SURGERY

## 2025-06-11 PROCEDURE — 90471 IMMUNIZATION ADMIN: CPT

## 2025-06-11 PROCEDURE — 99284 EMERGENCY DEPT VISIT MOD MDM: CPT

## 2025-06-11 PROCEDURE — 80053 COMPREHEN METABOLIC PANEL: CPT

## 2025-06-11 PROCEDURE — 93005 ELECTROCARDIOGRAM TRACING: CPT

## 2025-06-11 PROCEDURE — 83735 ASSAY OF MAGNESIUM: CPT

## 2025-06-11 PROCEDURE — 85014 HEMATOCRIT: CPT

## 2025-06-11 PROCEDURE — 00C43ZZ EXTIRPATION OF MATTER FROM INTRACRANIAL SUBDURAL SPACE, PERCUTANEOUS APPROACH: ICD-10-PCS | Performed by: NEUROLOGICAL SURGERY

## 2025-06-11 PROCEDURE — 90715 TDAP VACCINE 7 YRS/> IM: CPT | Performed by: EMERGENCY MEDICINE

## 2025-06-11 PROCEDURE — 96374 THER/PROPH/DIAG INJ IV PUSH: CPT

## 2025-06-11 PROCEDURE — 99285 EMERGENCY DEPT VISIT HI MDM: CPT

## 2025-06-11 PROCEDURE — 99285 EMERGENCY DEPT VISIT HI MDM: CPT | Performed by: NEUROLOGICAL SURGERY

## 2025-06-11 PROCEDURE — 80053 COMPREHEN METABOLIC PANEL: CPT | Performed by: EMERGENCY MEDICINE

## 2025-06-11 PROCEDURE — 61312 CRNEC/CRNOT STTL XDRL/SDRL: CPT | Performed by: NEUROLOGICAL SURGERY

## 2025-06-11 PROCEDURE — 85025 COMPLETE CBC W/AUTO DIFF WBC: CPT | Performed by: EMERGENCY MEDICINE

## 2025-06-11 PROCEDURE — 86901 BLOOD TYPING SEROLOGIC RH(D): CPT | Performed by: SURGERY

## 2025-06-11 PROCEDURE — 85730 THROMBOPLASTIN TIME PARTIAL: CPT | Performed by: EMERGENCY MEDICINE

## 2025-06-11 PROCEDURE — 70496 CT ANGIOGRAPHY HEAD: CPT

## 2025-06-11 PROCEDURE — 96375 TX/PRO/DX INJ NEW DRUG ADDON: CPT

## 2025-06-11 PROCEDURE — 85025 COMPLETE CBC W/AUTO DIFF WBC: CPT

## 2025-06-11 PROCEDURE — 4A133B1 MONITORING OF ARTERIAL PRESSURE, PERIPHERAL, PERCUTANEOUS APPROACH: ICD-10-PCS | Performed by: GENERAL PRACTICE

## 2025-06-11 PROCEDURE — 86850 RBC ANTIBODY SCREEN: CPT | Performed by: SURGERY

## 2025-06-11 PROCEDURE — 84295 ASSAY OF SERUM SODIUM: CPT

## 2025-06-11 PROCEDURE — 36415 COLL VENOUS BLD VENIPUNCTURE: CPT

## 2025-06-11 PROCEDURE — 03HY32Z INSERTION OF MONITORING DEVICE INTO UPPER ARTERY, PERCUTANEOUS APPROACH: ICD-10-PCS | Performed by: GENERAL PRACTICE

## 2025-06-11 PROCEDURE — 72125 CT NECK SPINE W/O DYE: CPT

## 2025-06-11 PROCEDURE — 99291 CRITICAL CARE FIRST HOUR: CPT | Performed by: EMERGENCY MEDICINE

## 2025-06-11 PROCEDURE — 73502 X-RAY EXAM HIP UNI 2-3 VIEWS: CPT

## 2025-06-11 PROCEDURE — 84132 ASSAY OF SERUM POTASSIUM: CPT

## 2025-06-11 PROCEDURE — 82330 ASSAY OF CALCIUM: CPT

## 2025-06-11 PROCEDURE — 84100 ASSAY OF PHOSPHORUS: CPT

## 2025-06-11 PROCEDURE — 70498 CT ANGIOGRAPHY NECK: CPT

## 2025-06-11 PROCEDURE — 87081 CULTURE SCREEN ONLY: CPT | Performed by: PHYSICIAN ASSISTANT

## 2025-06-11 PROCEDURE — 82803 BLOOD GASES ANY COMBINATION: CPT

## 2025-06-11 PROCEDURE — 84443 ASSAY THYROID STIM HORMONE: CPT

## 2025-06-11 DEVICE — IMPLANTABLE DEVICE
Type: IMPLANTABLE DEVICE | Site: CRANIAL | Status: FUNCTIONAL
Brand: THINFLAP

## 2025-06-11 DEVICE — IMPLANTABLE DEVICE
Type: IMPLANTABLE DEVICE | Site: CRANIAL | Status: FUNCTIONAL
Brand: THINFLAP SYSTEM

## 2025-06-11 RX ORDER — MAGNESIUM HYDROXIDE 1200 MG/15ML
LIQUID ORAL AS NEEDED
Status: DISCONTINUED | OUTPATIENT
Start: 2025-06-11 | End: 2025-06-11 | Stop reason: HOSPADM

## 2025-06-11 RX ORDER — ONDANSETRON 2 MG/ML
INJECTION INTRAMUSCULAR; INTRAVENOUS AS NEEDED
Status: DISCONTINUED | OUTPATIENT
Start: 2025-06-11 | End: 2025-06-11

## 2025-06-11 RX ORDER — LIDOCAINE HYDROCHLORIDE AND EPINEPHRINE 10; 10 MG/ML; UG/ML
INJECTION, SOLUTION INFILTRATION; PERINEURAL AS NEEDED
Status: DISCONTINUED | OUTPATIENT
Start: 2025-06-11 | End: 2025-06-11 | Stop reason: HOSPADM

## 2025-06-11 RX ORDER — LIDOCAINE HYDROCHLORIDE 10 MG/ML
INJECTION, SOLUTION EPIDURAL; INFILTRATION; INTRACAUDAL; PERINEURAL AS NEEDED
Status: DISCONTINUED | OUTPATIENT
Start: 2025-06-11 | End: 2025-06-11

## 2025-06-11 RX ORDER — PHENYLEPHRINE HCL IN 0.9% NACL 1 MG/10 ML
SYRINGE (ML) INTRAVENOUS AS NEEDED
Status: DISCONTINUED | OUTPATIENT
Start: 2025-06-11 | End: 2025-06-11

## 2025-06-11 RX ORDER — FENTANYL CITRATE 50 UG/ML
INJECTION, SOLUTION INTRAMUSCULAR; INTRAVENOUS AS NEEDED
Status: DISCONTINUED | OUTPATIENT
Start: 2025-06-11 | End: 2025-06-11

## 2025-06-11 RX ORDER — HYDRALAZINE HYDROCHLORIDE 20 MG/ML
10 INJECTION INTRAMUSCULAR; INTRAVENOUS EVERY 4 HOURS PRN
Status: DISCONTINUED | OUTPATIENT
Start: 2025-06-11 | End: 2025-06-12

## 2025-06-11 RX ORDER — PROPOFOL 10 MG/ML
INJECTION, EMULSION INTRAVENOUS AS NEEDED
Status: DISCONTINUED | OUTPATIENT
Start: 2025-06-11 | End: 2025-06-11

## 2025-06-11 RX ORDER — FENTANYL CITRATE/PF 50 MCG/ML
25 SYRINGE (ML) INJECTION
Refills: 0 | Status: CANCELLED | OUTPATIENT
Start: 2025-06-11

## 2025-06-11 RX ORDER — METOPROLOL TARTRATE 1 MG/ML
5 INJECTION, SOLUTION INTRAVENOUS ONCE
Status: COMPLETED | OUTPATIENT
Start: 2025-06-11 | End: 2025-06-11

## 2025-06-11 RX ORDER — CHLORHEXIDINE GLUCONATE ORAL RINSE 1.2 MG/ML
15 SOLUTION DENTAL ONCE
Status: CANCELLED | OUTPATIENT
Start: 2025-06-11 | End: 2025-06-11

## 2025-06-11 RX ORDER — CHLORHEXIDINE GLUCONATE ORAL RINSE 1.2 MG/ML
15 SOLUTION DENTAL ONCE
Status: DISCONTINUED | OUTPATIENT
Start: 2025-06-11 | End: 2025-06-12

## 2025-06-11 RX ORDER — ROCURONIUM BROMIDE 10 MG/ML
INJECTION, SOLUTION INTRAVENOUS AS NEEDED
Status: DISCONTINUED | OUTPATIENT
Start: 2025-06-11 | End: 2025-06-11

## 2025-06-11 RX ORDER — CEFAZOLIN SODIUM 2 G/50ML
2000 SOLUTION INTRAVENOUS EVERY 8 HOURS
Status: COMPLETED | OUTPATIENT
Start: 2025-06-11 | End: 2025-06-12

## 2025-06-11 RX ORDER — LABETALOL HYDROCHLORIDE 5 MG/ML
10 INJECTION, SOLUTION INTRAVENOUS EVERY 6 HOURS PRN
Status: DISCONTINUED | OUTPATIENT
Start: 2025-06-11 | End: 2025-06-11

## 2025-06-11 RX ORDER — SODIUM CHLORIDE, SODIUM LACTATE, POTASSIUM CHLORIDE, CALCIUM CHLORIDE 600; 310; 30; 20 MG/100ML; MG/100ML; MG/100ML; MG/100ML
INJECTION, SOLUTION INTRAVENOUS CONTINUOUS PRN
Status: DISCONTINUED | OUTPATIENT
Start: 2025-06-11 | End: 2025-06-11

## 2025-06-11 RX ORDER — ACETAMINOPHEN 325 MG/1
975 TABLET ORAL EVERY 8 HOURS PRN
Status: DISCONTINUED | OUTPATIENT
Start: 2025-06-11 | End: 2025-06-13

## 2025-06-11 RX ORDER — LEVETIRACETAM 500 MG/1
500 TABLET ORAL EVERY 12 HOURS SCHEDULED
Status: DISCONTINUED | OUTPATIENT
Start: 2025-06-11 | End: 2025-06-13

## 2025-06-11 RX ORDER — OXYCODONE HYDROCHLORIDE 5 MG/1
5 TABLET ORAL EVERY 4 HOURS PRN
Refills: 0 | Status: DISCONTINUED | OUTPATIENT
Start: 2025-06-11 | End: 2025-06-12

## 2025-06-11 RX ORDER — SUCCINYLCHOLINE/SOD CL,ISO/PF 100 MG/5ML
SYRINGE (ML) INTRAVENOUS AS NEEDED
Status: DISCONTINUED | OUTPATIENT
Start: 2025-06-11 | End: 2025-06-11

## 2025-06-11 RX ORDER — CEFAZOLIN SODIUM 2 G/50ML
2000 SOLUTION INTRAVENOUS ONCE
Status: COMPLETED | OUTPATIENT
Start: 2025-06-11 | End: 2025-06-11

## 2025-06-11 RX ORDER — LEVETIRACETAM 500 MG/5ML
500 INJECTION, SOLUTION, CONCENTRATE INTRAVENOUS EVERY 12 HOURS SCHEDULED
Status: DISCONTINUED | OUTPATIENT
Start: 2025-06-11 | End: 2025-06-11

## 2025-06-11 RX ORDER — SODIUM CHLORIDE 9 MG/ML
100 INJECTION, SOLUTION INTRAVENOUS CONTINUOUS
Status: CANCELLED | OUTPATIENT
Start: 2025-06-11

## 2025-06-11 RX ORDER — CEFAZOLIN SODIUM 2 G/50ML
2000 SOLUTION INTRAVENOUS ONCE
Status: DISCONTINUED | OUTPATIENT
Start: 2025-06-11 | End: 2025-06-13

## 2025-06-11 RX ORDER — LABETALOL HYDROCHLORIDE 5 MG/ML
10 INJECTION, SOLUTION INTRAVENOUS EVERY 4 HOURS PRN
Status: DISCONTINUED | OUTPATIENT
Start: 2025-06-11 | End: 2025-06-12

## 2025-06-11 RX ORDER — HYDRALAZINE HYDROCHLORIDE 20 MG/ML
10 INJECTION INTRAMUSCULAR; INTRAVENOUS ONCE
Status: COMPLETED | OUTPATIENT
Start: 2025-06-11 | End: 2025-06-11

## 2025-06-11 RX ORDER — ONDANSETRON 2 MG/ML
4 INJECTION INTRAMUSCULAR; INTRAVENOUS ONCE AS NEEDED
Status: CANCELLED | OUTPATIENT
Start: 2025-06-11

## 2025-06-11 RX ORDER — HYDROMORPHONE HCL IN WATER/PF 6 MG/30 ML
0.2 PATIENT CONTROLLED ANALGESIA SYRINGE INTRAVENOUS EVERY 4 HOURS PRN
Status: DISCONTINUED | OUTPATIENT
Start: 2025-06-11 | End: 2025-06-14

## 2025-06-11 RX ORDER — BACITRACIN, NEOMYCIN, POLYMYXIN B 400; 3.5; 5 [USP'U]/G; MG/G; [USP'U]/G
1 OINTMENT TOPICAL ONCE
Status: COMPLETED | OUTPATIENT
Start: 2025-06-11 | End: 2025-06-11

## 2025-06-11 RX ORDER — SODIUM CHLORIDE 9 MG/ML
75 INJECTION, SOLUTION INTRAVENOUS CONTINUOUS
Status: DISCONTINUED | OUTPATIENT
Start: 2025-06-11 | End: 2025-06-11

## 2025-06-11 RX ORDER — LABETALOL HYDROCHLORIDE 5 MG/ML
10 INJECTION, SOLUTION INTRAVENOUS ONCE
Status: COMPLETED | OUTPATIENT
Start: 2025-06-11 | End: 2025-06-11

## 2025-06-11 RX ORDER — GINSENG 100 MG
CAPSULE ORAL AS NEEDED
Status: DISCONTINUED | OUTPATIENT
Start: 2025-06-11 | End: 2025-06-11 | Stop reason: HOSPADM

## 2025-06-11 RX ORDER — HYDRALAZINE HYDROCHLORIDE 20 MG/ML
5 INJECTION INTRAMUSCULAR; INTRAVENOUS EVERY 4 HOURS PRN
Status: DISCONTINUED | OUTPATIENT
Start: 2025-06-11 | End: 2025-06-11

## 2025-06-11 RX ORDER — LEVETIRACETAM 750 MG/1
750 TABLET ORAL EVERY 12 HOURS SCHEDULED
Status: DISCONTINUED | OUTPATIENT
Start: 2025-06-11 | End: 2025-06-11

## 2025-06-11 RX ORDER — DEXAMETHASONE SODIUM PHOSPHATE 10 MG/ML
INJECTION, SOLUTION INTRAMUSCULAR; INTRAVENOUS AS NEEDED
Status: DISCONTINUED | OUTPATIENT
Start: 2025-06-11 | End: 2025-06-11

## 2025-06-11 RX ORDER — HYDROMORPHONE HCL/PF 1 MG/ML
SYRINGE (ML) INJECTION AS NEEDED
Status: DISCONTINUED | OUTPATIENT
Start: 2025-06-11 | End: 2025-06-11

## 2025-06-11 RX ADMIN — CEFAZOLIN SODIUM 2000 MG: 2 SOLUTION INTRAVENOUS at 23:29

## 2025-06-11 RX ADMIN — CEFAZOLIN SODIUM 2000 MG: 2 SOLUTION INTRAVENOUS at 14:35

## 2025-06-11 RX ADMIN — HYDRALAZINE HYDROCHLORIDE 10 MG: 20 INJECTION INTRAMUSCULAR; INTRAVENOUS at 09:46

## 2025-06-11 RX ADMIN — ONDANSETRON 4 MG: 2 INJECTION INTRAMUSCULAR; INTRAVENOUS at 14:35

## 2025-06-11 RX ADMIN — LABETALOL HYDROCHLORIDE 10 MG: 5 INJECTION, SOLUTION INTRAVENOUS at 11:46

## 2025-06-11 RX ADMIN — BACITRACIN ZINC, NEOMYCIN, POLYMYXIN B 1 SMALL APPLICATION: 400; 3.5; 5 OINTMENT TOPICAL at 09:06

## 2025-06-11 RX ADMIN — TETANUS TOXOID, REDUCED DIPHTHERIA TOXOID AND ACELLULAR PERTUSSIS VACCINE, ADSORBED 0.5 ML: 5; 2.5; 8; 8; 2.5 SUSPENSION INTRAMUSCULAR at 09:04

## 2025-06-11 RX ADMIN — FENTANYL CITRATE 50 MCG: 50 INJECTION INTRAMUSCULAR; INTRAVENOUS at 14:33

## 2025-06-11 RX ADMIN — SUGAMMADEX 200 MG: 100 INJECTION, SOLUTION INTRAVENOUS at 16:05

## 2025-06-11 RX ADMIN — HYDROMORPHONE HYDROCHLORIDE 0.5 MG: 1 INJECTION, SOLUTION INTRAMUSCULAR; INTRAVENOUS; SUBCUTANEOUS at 15:45

## 2025-06-11 RX ADMIN — LIDOCAINE HYDROCHLORIDE 100 MG: 10 INJECTION, SOLUTION EPIDURAL; INFILTRATION; INTRACAUDAL; PERINEURAL at 14:33

## 2025-06-11 RX ADMIN — SODIUM CHLORIDE, SODIUM LACTATE, POTASSIUM CHLORIDE, AND CALCIUM CHLORIDE: .6; .31; .03; .02 INJECTION, SOLUTION INTRAVENOUS at 14:03

## 2025-06-11 RX ADMIN — IOHEXOL 65 ML: 350 INJECTION, SOLUTION INTRAVENOUS at 23:47

## 2025-06-11 RX ADMIN — Medication 100 MG: at 14:33

## 2025-06-11 RX ADMIN — LEVETIRACETAM 500 MG: 100 INJECTION, SOLUTION INTRAVENOUS at 14:39

## 2025-06-11 RX ADMIN — PHENYLEPHRINE HYDROCHLORIDE 50 MCG/MIN: 50 INJECTION INTRAVENOUS at 15:23

## 2025-06-11 RX ADMIN — LEVETIRACETAM 500 MG: 100 INJECTION, SOLUTION INTRAVENOUS at 11:15

## 2025-06-11 RX ADMIN — METOROPROLOL TARTRATE 5 MG: 5 INJECTION, SOLUTION INTRAVENOUS at 10:02

## 2025-06-11 RX ADMIN — ROCURONIUM BROMIDE 50 MG: 10 INJECTION, SOLUTION INTRAVENOUS at 14:45

## 2025-06-11 RX ADMIN — HYDROMORPHONE HYDROCHLORIDE 0.5 MG: 1 INJECTION, SOLUTION INTRAMUSCULAR; INTRAVENOUS; SUBCUTANEOUS at 15:03

## 2025-06-11 RX ADMIN — LEVETIRACETAM 500 MG: 500 TABLET, FILM COATED ORAL at 22:30

## 2025-06-11 RX ADMIN — SODIUM CHLORIDE 75 ML/HR: 0.9 INJECTION, SOLUTION INTRAVENOUS at 17:43

## 2025-06-11 RX ADMIN — PROPOFOL 150 MG: 10 INJECTION, EMULSION INTRAVENOUS at 14:33

## 2025-06-11 RX ADMIN — DEXAMETHASONE SODIUM PHOSPHATE 10 MG: 10 INJECTION, SOLUTION INTRAMUSCULAR; INTRAVENOUS at 14:35

## 2025-06-11 RX ADMIN — Medication 100 MCG: at 15:25

## 2025-06-11 NOTE — ED NOTES
1030 p/u [place]     Michelle Thompson RN  06/11/25 0935      Per PACS- potentially air transfer, changed to level 1.     Michelle Thompson RN  06/11/25 0912

## 2025-06-11 NOTE — H&P
H&P - Trauma   Name: Jasson Carter 75 y.o. male I MRN: [id number]  Unit/Bed#: OR POOL I Date of Admission: 6/11/2025   Date of Service: 6/11/2025 I Hospital Day: 0     Subdural hematoma  - Stat consult to neurosurgery in the setting of altered mental status with worsening neurostatus  - GCS currently 13; 1 for confusion and commands  - Will go to the OR emergently and then ICU postoperatively  - Will need to be on Keppra 500 twice daily  - Follow-up in the postoperative setting in the ICU  - Family updated via phone    2. Altered mental status  - Related to above  - Continue to observe    3. Status post fall  - Status post fall with the below noted injuries.  - Fall precautions.  - Geriatric Medicine consultation for evaluation, medication review and recommendations.  - PT and OT evaluation and treatment as indicated.  - Case Management consultation for disposition planning.    4.  Hypertensive  - Utilize as needed antihypertensive medications  - Patient monitor    Disposition: Admit to ICU with stat neurosurgical consult for urgent craniotomy versus craniectomy with bur holes    Trauma Alert: Trauma Transfer  Model of Arrival: Ambulance    Trauma Team: Terrance Mann and LEROY Morrow  Consultants: Stat neurosurgical consult placed and they were present in the ER within 5 minutes of conversation    History of Present Illness   Chief Complaint: Fall  Mechanism:Fall     Jasson Carter is a 75 y.o. male who presents significant past medical history of GERD, hyperlipidemia, hypertension, BPH and peripheral neuropathy for that is seeing a pain for a fall which occurred yesterday.  Patient currently presented to [facility] with a GCS of 13 and acute mental status change from a GCS of 15.  Noted to have expressive aphasia.  Unable to follow commands in the trauma bay.  Stat neurosurgical consult placed after repeat head CT showed worsening of brain bleed.  Patient will go to the ICU postoperatively.   Family updated by trauma attending and neurosurgical team.    Review of Systems   Unable to perform ROS: Acuity of condition     Medical History Review: I have reviewed the patient's PMH, PSH, Social History, Family History, Meds, and Allergies   Immunization History   Administered Date(s) Administered    COVID-19 MODERNA VACC 0.5 ML IM 01/14/2021, 02/11/2021, 11/01/2021    COVID-19 Moderna Vac BIVALENT 12 Yr+ IM 0.5 ML 01/19/2023    DTaP,unspecified 10/26/2010    INFLUENZA 10/22/2008, 11/03/2009, 10/20/2010, 10/24/2011, 09/17/2012, 10/21/2013, 10/05/2015, 10/28/2016, 09/01/2020, 10/12/2021, 10/12/2021, 11/01/2022    Influenza Split High Dose Preservative Free IM 10/03/2017, 10/31/2024    Influenza, high dose seasonal 0.7 mL 09/15/2022, 10/19/2023    Pneumococcal 08/07/2014    Pneumococcal Conjugate 13-Valent 04/22/2016    Respiratory Syncytial Virus Vaccine (Recombinant) 07/18/2024    Tdap 05/14/2020, 04/22/2022, 06/11/2025    Zoster 10/29/2013    Zoster Vaccine Recombinant 07/14/2022, 09/22/2022    influenza, trivalent, adjuvanted 10/03/2018, 10/03/2019     Last Tetanus: Not indicated at this time      ISAR Score: Did you order a geriatric consult if the score was 2 or greater?:  Unable to complete ICR score at this time secondary to patient having altered mental status and unable to answer questions.  Unable to ascertain full chart history with patient.  SPECT that his eye started to be elevated greater than 2 and would require a geriatrics consult.  No data recorded       Objective :  Temp:  [96.7 °F (35.9 °C)-98.3 °F (36.8 °C)] 98.3 °F (36.8 °C)  HR:  [76-97] 80  BP: (146-204)/() 151/93  Resp:  [18-21] 18  SpO2:  [95 %-100 %] 97 %  O2 Device: None (Room air)    Initial Vitals:   Temperature: 98.3 °F (36.8 °C) (06/11/25 1050)  Pulse: 88 (06/11/25 1047)  Respirations: 18 (06/11/25 1047)  Blood Pressure: (!) 181/100 (06/11/25 1047)    Primary Survey:   Airway:        Status: patent;        Pre-hospital  Interventions: none        Hospital Interventions: none  Breathing:        Pre-hospital Interventions: none       Effort: normal       Right breath sounds: normal       Left breath sounds: normal  Circulation:        Rhythm: regular       Rate: regular   Right Pulses Left Pulses    R radial: 2+  R femoral: 2+  R pedal: 2+  R carotid: 2+  R popliteal: 2+ L radial: 2+  L femoral: 2+  L pedal: 2+  L carotid: 2+  L popliteal: 2+   Disability:        GCS: Eye: 4; Verbal: 4 Motor: 5 Total: 13       Right Pupil: round;  reactive         Left Pupil:  round;  reactive      R Motor Strength L Motor Strength    R : 5/5  R dorsiflex: 5/5  R plantarflex: 5/5 L : 5/5  L dorsiflex: 5/5  L plantarflex: 5/5        Sensory:  No sensory deficit  Exposure:       Completed: Yes      Secondary Survey:  Physical Exam  Vitals reviewed.   Constitutional:       Appearance: Normal appearance.   HENT:      Head: Normocephalic and atraumatic.      Right Ear: External ear normal.      Left Ear: External ear normal.      Mouth/Throat:      Pharynx: Oropharynx is clear.     Eyes:      Conjunctiva/sclera: Conjunctivae normal.       Cardiovascular:      Rate and Rhythm: Normal rate and regular rhythm.      Pulses: Normal pulses.      Heart sounds: Normal heart sounds.   Pulmonary:      Effort: Pulmonary effort is normal. No respiratory distress.      Breath sounds: Normal breath sounds.   Chest:      Chest wall: No tenderness.   Abdominal:      General: There is no distension.      Palpations: Abdomen is soft.      Tenderness: There is no abdominal tenderness. There is no guarding.     Musculoskeletal:         General: No swelling or tenderness.      Cervical back: Normal range of motion. No tenderness.     Skin:     General: Skin is warm and dry.     Neurological:      Mental Status: He is alert.      Comments: GCS 13; not following commands.  Confused with expressive aphasia.       Lines/Drains/Airways       Active Status       Name  Placement date Placement time Site Days    ETT  Hi-Lo;Cuffed;Oral;Inflated 8 mm 06/11/25  1434  -- less than 1                    Lab Results: I have reviewed the following results:  Recent Labs     06/11/25  0904 06/11/25  1052 06/11/25  1110   WBC 7.90  --  8.66   HGB 15.3 15.6 15.0   HCT 45.7 46 45.2     --  215   SODIUM 133*  --  133*   K 4.1  --  4.1   CL 99  --  98   CO2 27 28 26   BUN 13  --  12   CREATININE 0.72  --  0.66   GLUC 125  --  107   CAIONIZED  --  0.87*  --    MG  --   --  2.0   PHOS  --   --  2.9   AST 27  --  26   ALT 15  --  14   ALB 4.4  --  4.4   TBILI 2.00*  --  2.04*   ALKPHOS 83  --  83   PTT 23  --   --    INR 1.02  --   --        Imaging Results: I have personally reviewed pertinent images saved in PACS. CT scan findings (and other pertinent positive findings on images) were discussed with radiology. My interpretation of the images/reports are as follows:  Chest Xray(s): negative for acute findings   FAST exam(s): negative for acute findings   CT Scan(s): negative for acute findings   Additional Xray(s): negative for acute findings     Other Studies: Other Study Results Review: No additional pertinent studies reviewed.

## 2025-06-11 NOTE — ED NOTES
Wife requesting transport to another facility at this time per family member request via phone, discussed with wife patient is currently being placed in the helicopter at this time to be transferred to \[facility]\"" as previously discussed.     Michelle Thompson RN  06/11/25 1018

## 2025-06-11 NOTE — EMTALA/ACUTE CARE TRANSFER
[facility] EMERGENCY DEPARTMENT  [address] [address]  Dept: [phone]      EMTALA TRANSFER CONSENT    NAME Jasson NOBLE 1950                              MRN [id number]    I have been informed of my rights regarding examination, treatment, and transfer   by Dr. Timoteo Brand MD    Benefits: Specialized equipment and/or services available at the receiving facility (Include comment)________________________    Risks: Potential for delay in receiving treatment, Potential deterioration of medical condition, Loss of IV, Increased discomfort during transfer, Possible worsening of condition or death during transfer      Consent for Transfer:  I acknowledge that my medical condition has been evaluated and explained to me by the emergency department physician or other qualified medical person and/or my attending physician, who has recommended that I be transferred to the service of  Accepting Physician: Johnathan at Accepting Facility Name, City & State : [facility]. The above potential benefits of such transfer, the potential risks associated with such transfer, and the probable risks of not being transferred have been explained to me, and I fully understand them.  The doctor has explained that, in my case, the benefits of transfer outweigh the risks.  I agree to be transferred.    I authorize the performance of emergency medical procedures and treatments upon me in both transit and upon arrival at the receiving facility.  Additionally, I authorize the release of any and all medical records to the receiving facility and request they be transported with me, if possible.  I understand that the safest mode of transportation during a medical emergency is an ambulance and that the Hospital advocates the use of this mode of transport. Risks of traveling to the receiving facility by car, including absence of medical control, life sustaining  equipment, such as oxygen, and medical personnel has been explained to me and I fully understand them.    (JULISA CORRECT BOX BELOW)  [  ]  I consent to the stated transfer and to be transported by ambulance/helicopter.  [  ]  I consent to the stated transfer, but refuse transportation by ambulance and accept full responsibility for my transportation by car.  I understand the risks of non-ambulance transfers and I exonerate the Hospital and its staff from any deterioration in my condition that results from this refusal.    X___________________________________________    DATE  25  TIME________  Signature of patient or legally responsible individual signing on patient behalf           RELATIONSHIP TO PATIENT_________________________          Provider Certification    NAME Jasson Carter                                         1950                              MRN [id number]    A medical screening exam was performed on the above named patient.  Based on the examination:    Condition Necessitating Transfer The primary encounter diagnosis was Closed head injury, initial encounter. A diagnosis of Strain of left hip, initial encounter was also pertinent to this visit.    Patient Condition: The patient has been stabilized such that within reasonable medical probability, no material deterioration of the patient condition or the condition of the unborn child(kiki) is likely to result from the transfer    Reason for Transfer: Level of Care needed not available at this facility    Transfer Requirements: Facility B   Space available and qualified personnel available for treatment as acknowledged by Katt at [facility]  Agreed to accept transfer and to provide appropriate medical treatment as acknowledged by       Johnathan  Appropriate medical records of the examination and treatment of the patient are provided at the time of transfer   STAFF INITIAL WHEN COMPLETED _______  Transfer will be performed by qualified  personnel from    and appropriate transfer equipment as required, including the use of necessary and appropriate life support measures.    Provider Certification: I have examined the patient and explained the following risks and benefits of being transferred/refusing transfer to the patient/family:  General risk, such as traffic hazards, adverse weather conditions, rough terrain or turbulence, possible failure of equipment (including vehicle or aircraft), or consequences of actions of persons outside the control of the transport personnel, Unanticipated needs of medical equipment and personnel during transport, Risk of worsening condition, The possibility of a transport vehicle being unavailable      Based on these reasonable risks and benefits to the patient and/or the unborn child(kiki), and based upon the information available at the time of the patient’s examination, I certify that the medical benefits reasonably to be expected from the provision of appropriate medical treatments at another medical facility outweigh the increasing risks, if any, to the individual’s medical condition, and in the case of labor to the unborn child, from effecting the transfer.    X____________________________________________ DATE 06/11/25        TIME_______      ORIGINAL - SEND TO MEDICAL RECORDS   COPY - SEND WITH PATIENT DURING TRANSFER

## 2025-06-11 NOTE — PROGRESS NOTES
Progress Note - Neurosurgery   Name: Jasson Carter 75 y.o. male I MRN: [id number]  Unit/Bed#: ICU 08 I Date of Admission: 6/11/2025   Date of Service: 6/11/2025 I Hospital Day: 0       Patient seen and examined in ICU bed 8.  POD 0 left craniotomy and right bur hole drainage of subdural hematoma.  Appears to be at neurological baseline.  Moving all 4 limbs.  Face is symmetric.  No apparent distress.  Continues to have expressive and receptive aphasia.  Both drains draining blood-tinged CSF.  Discussed with surgical critical care resident at the bedside.

## 2025-06-11 NOTE — ANESTHESIA PROCEDURE NOTES
Arterial Line Insertion    Performed by: Ruben Chong MD  Authorized by: Ruben Chong MD  Preparation: Patient was prepped and draped in the usual sterile fashion.  Indications: hemodynamic monitoring  Orientation:  Right  Location: radial artery  Procedure Details:      Needle gauge: 20  Placement technique:  Anatomical landmarks  Number of attempts: 1    Post-procedure:  Post-procedure: dressing applied  Waveform: good waveform  Post-procedure CNS: unchanged  Patient tolerance: Patient tolerated the procedure well with no immediate complications

## 2025-06-11 NOTE — ED NOTES
Patient noted to now be confused, GCS 14. Provider to bedside to re-eval. Clear speech but not making sense.       Michelle Thompson RN  06/11/25 8172

## 2025-06-11 NOTE — OP NOTE
OPERATIVE REPORT  PATIENT NAME: Jasson Carter    :  1950  MRN: [id number]  Pt Location: BE OR ROOM 04    SURGERY DATE: 2025    Surgeons and Role:     * Satish De La Torre MD - Primary  No assistant.  No qualified residents available.    Preop Diagnosis:  SDH (subdural hematoma) (HCC) [S06.5XAA]  Brain compression    Post-Op Diagnosis Codes:     * SDH (subdural hematoma) (HCC) [S06.5XAA]  Brain compression    Procedure(s):  Left frontal parietal craniotomy for evacuation of subacute on chronic subdural hematoma.  Right frontal karyn hole for evacuation of chronic subdural hematoma.  Insertion of bilateral subdural drains.    Specimen(s):  * No specimens in log *    Estimated Blood Loss:   25 mL    Drains:  Ventriculostomy/Subdural Subdural drainage catheter Right Parietal region (Active)   Drain Status Open/CSF collection chamber 25 0009   Number of days: 0       Ventriculostomy/Subdural Subdural drainage catheter Left Parietal region (Active)   Drain Status Open/CSF collection chamber 25 000   Number of days: 0       Anesthesia Type:   General    Operative Indications:  SDH (subdural hematoma) (HCC) [S06.5XAA]  Brain compression    Operative Findings:  Left subacute on chronic subdural hematoma under pressure with mature membranes.  Right chronic subdural hematoma without obvious membrane under pressure.     Complications:   None    Procedure and Technique:  Clinical Note    The goals and alternatives to the above procedure were discussed with family. The risks of surgery were discussed in detail.     1. Risk of general anesthetic with possible respiratory and cardiac complications. Risk of infection and bleeding were described.  2. Risk of neurological injury with new pain, weakness or numbness in the arms and legs, difficulties with speech, language and vision. The risk of seizures was described. Risk of hemorrhage requiring reoperation was described.  3. Risk of reoperation.    Once all  questions were answered to their satisfaction, they asked to proceed with surgery.    Operating Room Note    The patient was brought to the operating room and transferred onto the OR table. After being placed under general anesthetic, and all appropriate lines were in position care was taken to insure that all pressure points were padded. The head was placed horseshoe head self.  Both sides of the head was shaved. A curved incision was planned along the left frontoparietal area just below the insertion point of the temporalis muscle.  A right frontoparietal incision was planned just above the insertion of the temporalis muscle at the coronal suture. The skin was then prepped and draped in usual sterile fashion.    A full surgical timeout was undertaken identifying the site, side and type of surgery. It was confirmed that the patient received preoperative antibiotics. All incisions were infiltrated with 1% lidocaine with 100,000 of epinephrine.    15 blade was used to incise the scalp. Monopolar cautery was used to dissect the outer table of the skull. With the scalp retracted, a matchstick bur was used to drill bur holes.  Bone wax was used hemostasis.  The dura was cauterized with bipolar cautery.  A craniotome was used to to connect the karyn holes and create a bone flap on the left. The dura was stripped from the inner table of the skull and the bone flap was elevated with a joker.    The dura was cauterized with bipolar cautery and bone wax was used for hemostasis of the bony edges. A 15 blade was used to incise the dura and a pair of dural scissors was used to further open the dura.  There was immediate egress of chronic subdural hematoma under pressure.  I could then identify a thickened membrane which was cauterized with bipolar cautery and opened with a nerve hook.  There was an egress of more organized hematoma.  The subdural space was irrigated copiously with antibiotic irrigation.  The underlying  cerebral cortex was then identified.  The subdural space was further irrigated copiously with antibiotic irrigation.    The left frontal karyn hole was then incised with a 15 blade.  Bipolar cautery was used for stenosis of the leaflets.  There was immediate egress of chronic CSF under pressure.  I could not appreciate the underlying cerebral cortex.    The underlying cortex was pulsating nicely bilaterally.  Nurolon was used to approximate the dura on the left.  Bilateral subdural drains were placed towards the parietal lobe.  The subdural spaces were further irrigated.  Both drains were tunneled posteriorly to the incisions.  Gelfoam was placed in the epidural space on the left and at the bur hole on the right.  The left frontoparietal bone flap was then secured with Biomet plates.  The surgical site was irrigated with antibiotic irrigation.    Inverted Vicryl suture was used to approximate the galea and staples were used to approximate the skin edges.  Bacitracin and and a Telfa dressing was applied to all incisions.  Both drains were secured with Vicryl drainage stitches.    The count was correct at the end of the case and there were no complications. The patient was extubated. They will be transferred to ICU for ongoing observation.  The results of surgery were discussed with the patient's family and with the trauma team.    I was present for the entire procedure.    Patient Disposition:  Critical Care Unit     SIGNATURE: Satish De La Torre MD  DATE: June 11, 2025  TIME: 4:19 PM

## 2025-06-11 NOTE — CONSULTS
Consultation - Critical Care/ICU   Name: Jasson Carter 75 y.o. male I MRN: [id number]  Unit/Bed#: OR POOL I Date of Admission: 6/11/2025   Date of Service: 6/11/2025 I Hospital Day: 0   Consults  Physician Requesting Evaluation: Dane Mann,*   Reason for Evaluation / Principal Problem: Subdural Hematoma  { ?Quick Links I Problem List I PORCH I Billing Tip:95388}  {MDM/Admin Statements (Optional):45215}    Assessment & Plan   Active Hospital Problems    Diagnosis Date Noted POA    SDH (subdural hematoma) (HCC) 06/11/2025 Yes      Resolved Hospital Problems   No resolved problems to display.       Neuro/Psych:  Diagnoses: Acute encephalopathy, AMS  6/11 repeat CTH: Stable mixed density left convexity subdural hematoma containing hyperdense acute blood products, with stable approximately 5 mm of left-to-right midline shift. Stable hypodense right convexity subdural hematoma  6/11 CTH: 14 mm mixed density acute left cerebral convexity subdural hematoma with associated 5 mm rightward midline shift. Low-density 7 mm right cerebral convexity subdural hematoma, age indeterminate.  6/11 CT c-spine: No cervical spine fracture or traumatic malalignment  Plan:  Neuro checks q1h  Maintain SBP <160 and MAP >65  Neurosurgery to OR for left craniotomy and right karyn hole placement for subdural hematoma  Repeat head CT post-op  Continue Keppra for seizure ppx  Multimodal analgesia     CV:  Diagnoses: Hx of HTN  Plan:  Continuous cardiopulmonary monitoring. Maintain MAP >65.    Pulm:  Diagnoses: no active issues  Plan:      GI:  Diagnoses: Hx of GERD  Last BM: prior to admission  Plan:  Bowel regimen: ***  GI prophylaxis: ***    :  Diagnoses: Hx of BPH  Creatinine trend: ***  Baseline creatinine: 0.72  Plan:  ***  Monitor I/Os.    F/E/N:  Diagnoses: no active issues   Plan:   F: Fluid resuscitation prn.  E: Monitor and replete electrolytes for Mg >2, Phos >3, K >4.  N: NPO    Heme/Onc:  Diagnoses: Acute on  "chronic intracranial bleeding  Plan:  Correct any coagulopathy  Goal INR <1.4, plt >100  VTE prophylaxis: Mechanical, hold chem until cleared by neurosurgery    Endo:   Diagnoses: no active issues  Plan:   Monitor blood glucose.    ID:  Diagnoses: no active issues  Plan:  Ancef for surgical ppx  Monitor fever curve and WBC.    MSK/Skin:  Diagnoses: Hx of osteoarthritis  Plan:  PT/OT when appropriate. Encourage OOB and ambulation when appropriate. Local wound care prn.      LDAs:  Lines - 2x PIV (R antecubital, L antecubital)  Drains - ***  Airways -  ***  Disposition: Critical care    History of Present Illness   Jasson Carter is a 75 y.o. with PMHx of GERD, HTN, HLD, BPH, osteoarthritis, and peripheral neuropathy who presented to the [facility] ER with complaints of headache and left hip pain. Patient states that he had a fall yesterday in the grass. Upon further evaluation, CTH revealed bilateral subdural hematomas, left side with acute blood and larger in size with midline shift. Patient was originally GCS 15 while in the ER, but upon his arrival to [facility] was noted to have a decline in mental status with receptive aphasia. Patient was alert on exam, but unable to participate in most questioning due to aphasia. He was emergently taken to the OR by neurosurgery.    History obtained from chart review.  {Review of Systems:[id number]::\"See HPI for Review of Systems\"}    Historical Information   Past Medical History:  No date: Allergic  No date: Gout  No date: Hypertension  No date: PTSD (post-traumatic stress disorder) Past Surgical History:  No date: APPENDECTOMY  No date: CHOLECYSTECTOMY  No date: TOTAL KNEE ARTHROPLASTY   Current Outpatient Medications   Medication Instructions    allopurinol (ZYLOPRIM) 300 mg, Daily    amitriptyline (ELAVIL) 10 mg, Oral, Daily at bedtime, If not effective after 1 week may increase to 2 tablets    calcium carbonate (OS-BONNIE) 600 mg, 2 times daily with meals    cholecalciferol (VITAMIN " D3) 1,000 Units, Daily    Cinnamon 500 MG capsule Take by mouth    Cranberry (Cranberry Modustri) 215 MG CAPS Take by mouth    gabapentin (NEURONTIN) 100 mg, 3 times daily    Glucosamine-Chondroit-Vit C-Mn (Glucosamine Chondroitin Complx) TABS Take by mouth    ibuprofen (MOTRIN) 800 mg, Oral, 3 times daily    lidocaine (Lidoderm) 5 % 1 patch, Topical, Daily, Remove & Discard patch within 12 hours or as directed by MD    loratadine (CLARITIN) 10 mg, Daily    losartan (COZAAR) 100 mg, Daily    magnesium oxide (MAG-OX) 400 mg tablet Take by mouth    Melatonin 1 MG CHEW Chew    methocarbamol (ROBAXIN) 500 mg, Oral, 2 times daily    Multiple Vitamin (MULTI VITAMIN PO) Take by mouth    Zinc 50 MG TABS Take by mouth    Allergies[1]   Social History[2] Family History[3]       Objective :                   Vitals I/O      Most Recent Min/Max in 24hrs   Temp 98.3 °F (36.8 °C) Temp  Min: 96.7 °F (35.9 °C)  Max: 98.3 °F (36.8 °C)   Pulse 80 Pulse  Min: 76  Max: 97   Resp 18 Resp  Min: 18  Max: 21   /93 BP  Min: 146/101  Max: 204/105   O2 Sat 97 % SpO2  Min: 95 %  Max: 100 %      Intake/Output Summary (Last 24 hours) at 6/11/2025 1421  Last data filed at 6/11/2025 1335  Gross per 24 hour   Intake --   Output 700 ml   Net -700 ml       Diet NPO; Sips with meds    Invasive Monitoring   {Invasive Device (Optional):66274}        Physical Exam   Critical Care Physical Exam *** (fill out SmartBlock)     Diagnostic Studies    {IDisappearing Data Review I RadiologyI Cardiology:21394}  {?Quick Links I Lab Review I Micro Results I Radiology I Cardiology:54098}  Lab Results: I have reviewed the following results:     Medications:  Scheduled PRN   ceFAZolin, 2,000 mg, Once  [Transfer Hold] cefazolin, 2,000 mg, Once  [Transfer Hold] chlorhexidine, 15 mL, Once  [Transfer Hold] levETIRAcetam, 500 mg, Q12H SHANEKA      [Transfer Hold] hydrALAZINE, 5 mg, Q4H PRN  [Transfer Hold] labetalol, 10 mg, Q4H PRN       Continuous          Labs: {  I Disappearing Data Review I Results Review I Micro :36325}  CBC    Recent Labs     25  0904 25  1052 25  1110   WBC 7.90  --  8.66   HGB 15.3 15.6 15.0   HCT 45.7 46 45.2     --  215     BMP    Recent Labs     25  0904 25  1052 25  1110   SODIUM 133*  --  133*   K 4.1  --  4.1   CL 99  --  98   CO2 27 28 26   AGAP 7  --  9   BUN 13  --  12   CREATININE 0.72  --  0.66   CALCIUM 9.6  --  9.4       Coags    Recent Labs     25  0904   INR 1.02   PTT 23        Additional Electrolytes  Recent Labs     25  1052 25  1110   MG  --  2.0   PHOS  --  2.9   CAIONIZED 0.87*  --           Blood Gas    No recent results  No recent results LFTs  Recent Labs     25  0904 25  1110   ALT 15 14   AST 27 26   ALKPHOS 83 83   ALB 4.4 4.4   TBILI 2.00* 2.04*       Infectious  No recent results  Glucose  Recent Labs     25  0904 25  1110   GLUC 125 107               [1]   Allergies  Allergen Reactions    Bee Venom Shortness Of Breath    Ativan [Lorazepam] Delirium    Fluoxetine Diarrhea     Other reaction(s): Diarrhea, Tremor, Sweating, Sweating, Diarrhea, Tremor, Sweating, Sweating, Diarrhea, Tremor, Sweating, Sweating    Levaquin [Levofloxacin] Itching    Venlafaxine      Other reaction(s): Tremor, Sweating, Tremor, Sweating   [2]   Social History  Tobacco Use    Smoking status: Former     Current packs/day: 0.00     Average packs/day: 0.5 packs/day for 20.0 years (10.0 ttl pk-yrs)     Types: Cigarettes     Start date:      Quit date:      Years since quittin.4    Smokeless tobacco: Never   Vaping Use    Vaping status: Never Used   Substance Use Topics    Alcohol use: Yes    Drug use: Not Currently   [3]   Family History  Problem Relation Name Age of Onset    Lung cancer Father        Detail Level: Detailed Hide Additional Notes?: No

## 2025-06-11 NOTE — CONSULTS
Consultation - Critical Care/ICU   Name: Jasson Carter 75 y.o. male I MRN: [id number]  Unit/Bed#: ICU 08 I Date of Admission: 6/11/2025   Date of Service: 6/11/2025 I Hospital Day: 0   Consults  Physician Requesting Evaluation: Dane Mann,*   Reason for Evaluation / Principal Problem: Subdural hematoma    I have discussed the above management plan in detail with the primary service.     Assessment & Plan   Active Hospital Problems    Diagnosis Date Noted POA    SDH (subdural hematoma) (HCC) 06/11/2025 Yes    Altered mental status 06/11/2025 Unknown    Fall 06/11/2025 Unknown      Resolved Hospital Problems   No resolved problems to display.       Neuro/Psych:  Diagnoses: Acute encephalopathy, AMS  6/11 repeat CTH: Stable mixed density left convexity subdural hematoma containing hyperdense acute blood products, with stable approximately 5 mm of left-to-right midline shift. Stable hypodense right convexity subdural hematoma  6/11 CTH: 14 mm mixed density acute left cerebral convexity subdural hematoma with associated 5 mm rightward midline shift. Low-density 7 mm right cerebral convexity subdural hematoma, age indeterminate.  6/11 CT c-spine: No cervical spine fracture or traumatic malalignment  6/11 OR with neurosurgery for left craniotomy and right karyn hole placement for subdural hematomas  AED: Keppra 500mg BID  Plan:  Neuro checks q1h  Maintain SBP <160 and MAP >65  Labetalol 10 mg IV every 4 hours as needed SBP greater than 160  Second line hydralazine 10 mg IV every 4 hours as needed for SBP greater than 160  CTA head and neck with and without contrast in a.m.  Continue Keppra for seizure ppx  Multimodal analgesia.  Tylenol, oxycodone, Dilaudid     CV:  Diagnoses: Atrial fibrillation, Hx of HTN  New onset atrial fibrillation in OR.  Hemodynamically stable.  No RVR  Plan:  No anticoagulation per neurosurgery.  Continue to monitor.  TSH  Continuous cardiopulmonary monitoring. Maintain MAP  >65.     Pulm:  Diagnoses: no active issues  Plan:   Monitor O2. Encourage pulmonary hygiene     GI:  Diagnoses: Hx of GERD  Plan:  NPO sips with meds  Nursing dysphagia screening     :  Diagnoses: Hx of BPH  Creatinine trend: 0.66 from 0.72  Baseline creatinine: 0.72  Plan:  Monitor urine  Monitor I/Os.     F/E/N:  Diagnoses: no active issues   Plan:   F: NS at 75 cc/h. fluid resuscitation prn.  E: Monitor and replete electrolytes for Mg >2, Phos >3, K >4.  N: NPO     Heme/Onc:  Diagnoses: Acute on chronic intracranial bleeding  Plan:  Correct any coagulopathy  Goal INR <1.4, plt >100  VTE prophylaxis: Mechanical, hold chem until cleared by neurosurgery     Endo:   Diagnoses: no active issues  Plan:   Monitor blood glucose.     ID:  Diagnoses: no active issues  Plan:  Ancef for surgical ppx  Monitor fever curve and WBC.     MSK/Skin:  Diagnoses: Hx of osteoarthritis  Plan:  PT/OT when appropriate. Encourage OOB and ambulation when appropriate. Local wound care prn.    LDAs:  Lines -peripheral IV x 2, right arterial line  Drains -none  Airways -bilateral subdural    Disposition: Critical care    History of Present Illness   Jasson Carter is a 75 y.o. with PMHx of GERD, HTN, HLD, BPH, osteoarthritis, and peripheral neuropathy who presented to the [facility] ER with complaints of headache and left hip pain. Patient states that he had a fall yesterday in the grass. Upon further evaluation, CTH revealed bilateral subdural hematomas, left side with acute blood and larger in size with midline shift. Patient was originally GCS 15 while in the ER, but upon his arrival to [facility] was noted to have a decline in mental status with expressive receptive aphasia. Patient was alert on exam, but unable to participate in most questioning due to aphasia. He was emergently taken to the OR by neurosurgery.     History obtained from chart review.  Review of Systems: Review of Systems not obtainable due to Altered mental  status    Historical Information   Past Medical History:  No date: Allergic  No date: Gout  No date: Hypertension  No date: PTSD (post-traumatic stress disorder) Past Surgical History:  No date: APPENDECTOMY  No date: CHOLECYSTECTOMY  No date: TOTAL KNEE ARTHROPLASTY   Current Outpatient Medications   Medication Instructions    allopurinol (ZYLOPRIM) 300 mg, Daily    amitriptyline (ELAVIL) 10 mg, Oral, Daily at bedtime, If not effective after 1 week may increase to 2 tablets    calcium carbonate (OS-BONNIE) 600 mg, 2 times daily with meals    cholecalciferol (VITAMIN D3) 1,000 Units, Daily    Cinnamon 500 MG capsule Take by mouth    Cranberry (Cranberry Able Imaging) 215 MG CAPS Take by mouth    gabapentin (NEURONTIN) 100 mg, 3 times daily    Glucosamine-Chondroit-Vit C-Mn (Glucosamine Chondroitin Complx) TABS Take by mouth    ibuprofen (MOTRIN) 800 mg, Oral, 3 times daily    lidocaine (Lidoderm) 5 % 1 patch, Topical, Daily, Remove & Discard patch within 12 hours or as directed by MD    loratadine (CLARITIN) 10 mg, Daily    losartan (COZAAR) 100 mg, Daily    magnesium oxide (MAG-OX) 400 mg tablet Take by mouth    Melatonin 1 MG CHEW Chew    methocarbamol (ROBAXIN) 500 mg, Oral, 2 times daily    Multiple Vitamin (MULTI VITAMIN PO) Take by mouth    Zinc 50 MG TABS Take by mouth    Allergies[1]   Social History[2] Family History[3]       Objective :                   Vitals I/O      Most Recent Min/Max in 24hrs   Temp 98.3 °F (36.8 °C) Temp  Min: 96.7 °F (35.9 °C)  Max: 98.3 °F (36.8 °C)   Pulse 70 Pulse  Min: 70  Max: 97   Resp (!) 8 Resp  Min: 8  Max: 21   /83 BP  Min: 146/101  Max: 204/105   O2 Sat 94 % SpO2  Min: 94 %  Max: 100 %      Intake/Output Summary (Last 24 hours) at 6/11/2025 1800  Last data filed at 6/11/2025 1719  Gross per 24 hour   Intake 600 ml   Output 1335 ml   Net -735 ml       Diet NPO; Sips with meds    Invasive Monitoring   Arterial Line  Francheska /70  Arterial Line BP  Min: 160/70  Max:  170/80   MAP 98 mmHg  Arterial Line MAP (mmHg)  Min: 98 mmHg  Max: 108 mmHg           Physical Exam   Physical Exam  Vitals and nursing note reviewed.   Eyes:      Extraocular Movements: Extraocular movements intact.      Right eye: Normal extraocular motion and no nystagmus.      Left eye: Normal extraocular motion and no nystagmus.      Pupils: Pupils are equal.   Skin:     General: Skin is warm and dry.   HENT:      Head: Normocephalic and atraumatic. No right periorbital erythema or left periorbital erythema.      Comments: Right and left subdural drains draining sanguinous fluid     Mouth/Throat:      Mouth: Mucous membranes are moist.   Cardiovascular:      Rate and Rhythm: Normal rate. Rhythm irregularly irregular.      Pulses: No decreased pulses.           Carotid pulses are 2+ on the right side and 2+ on the left side.       Posterior tibial pulses are 2+ on the right side and 2+ on the left side.      Heart sounds: Normal heart sounds.   Musculoskeletal:      Right lower leg: No edema.      Left lower leg: No edema.   Abdominal:      Palpations: Abdomen is soft.      Tenderness: There is no abdominal tenderness.   Constitutional:       General: He is not in acute distress.     Appearance: He is not ill-appearing.      Interventions: He is not sedated and not intubated.     Comments: Right radial A-line   Pulmonary:      Effort: No tachypnea, bradypnea, accessory muscle usage, respiratory distress, retractions or accessory muscle usage. He is not intubated.      Breath sounds: No stridor or decreased air movement. No decreased breath sounds, wheezing, rhonchi or rales.   Neurological:      Mental Status: He is alert.      Comments: Moving bilateral upper and lower extremities.  Symmetric face.  Expressive and receptive aphasia.  Oriented x 0   Genitourinary/Anorectal:     Comments: Bills catheter         Diagnostic Studies        Lab Results: I have reviewed the following results:      Medications:  Scheduled PRN   cefazolin, 2,000 mg, Once  cefazolin, 2,000 mg, Q8H  chlorhexidine, 15 mL, Once  levETIRAcetam, 500 mg, Q12H SHANEKA      acetaminophen, 975 mg, Q8H PRN  hydrALAZINE, 10 mg, Q4H PRN  HYDROmorphone, 0.2 mg, Q4H PRN  labetalol, 10 mg, Q4H PRN  oxyCODONE, 2.5 mg, Q6H PRN   Or  oxyCODONE, 5 mg, Q4H PRN       Continuous            Labs:   CBC    Recent Labs     25  0925  1052 25  1110   WBC 7.90  --  8.66   HGB 15.3 15.6 15.0   HCT 45.7 46 45.2     --  215     BMP    Recent Labs     25  0904 25  1052 25  1110   SODIUM 133*  --  133*   K 4.1  --  4.1   CL 99  --  98   CO2 27 28 26   AGAP 7  --  9   BUN 13  --  12   CREATININE 0.72  --  0.66   CALCIUM 9.6  --  9.4       Coags    Recent Labs     25  0904   INR 1.02   PTT 23        Additional Electrolytes  Recent Labs     25  1052 25  1110   MG  --  2.0   PHOS  --  2.9   CAIONIZED 0.87*  --           Blood Gas    No recent results  No recent results LFTs  Recent Labs     25  0904 25  1110   ALT 15 14   AST 27 26   ALKPHOS 83 83   ALB 4.4 4.4   TBILI 2.00* 2.04*       Infectious  No recent results  Glucose  Recent Labs     25  0904 25  1110   GLUC 125 107               [1]   Allergies  Allergen Reactions    Bee Venom Shortness Of Breath    Ativan [Lorazepam] Delirium    Fluoxetine Diarrhea     Other reaction(s): Diarrhea, Tremor, Sweating, Sweating, Diarrhea, Tremor, Sweating, Sweating, Diarrhea, Tremor, Sweating, Sweating    Levaquin [Levofloxacin] Itching    Venlafaxine      Other reaction(s): Tremor, Sweating, Tremor, Sweating   [2]   Social History  Tobacco Use    Smoking status: Former     Current packs/day: 0.00     Average packs/day: 0.5 packs/day for 20.0 years (10.0 ttl pk-yrs)     Types: Cigarettes     Start date:      Quit date:      Years since quittin.4    Smokeless tobacco: Never   Vaping Use    Vaping status: Never Used   Substance  Use Topics    Alcohol use: Yes    Drug use: Not Currently   [3]   Family History  Problem Relation Name Age of Onset    Lung cancer Father

## 2025-06-11 NOTE — ED PROVIDER NOTES
Time reflects when diagnosis was documented in both MDM as applicable and the Disposition within this note       Time User Action Codes Description Comment    6/11/2025  8:47 AM Timoteo Brand Add [S09.90XA] Closed head injury, initial encounter     6/11/2025  8:47 AM Timoteo Brand Add [S76.012A] Strain of left hip, initial encounter     6/11/2025  9:02 AM Timoteo Brand Modify [S76.012A] Strain of left hip, initial encounter     6/11/2025  9:02 AM Timoteo Brand Remove [S09.90XA] Closed head injury, initial encounter     6/11/2025  9:02 AM Timoteo Brand Add [I62.00] Subdural hemorrhage (HCC)           ED Disposition       ED Disposition   Transfer to Another Facility-In Network    Condition   --    Date/Time   Wed Jun 11, 2025  8:57 AM    Comment   Jasson Carter should be transferred out to [facility].               Assessment & Plan       Medical Decision Making  Amount and/or Complexity of Data Reviewed  Labs: ordered.  Radiology: ordered and independent interpretation performed. Decision-making details documented in ED Course.  Discussion of management or test interpretation with external provider(s): At risk for banality to closed head injury, subarachnoid hemorrhage, subdural hemorrhage, hip fracture, hip dislocation    Risk  OTC drugs.  Prescription drug management.        ED Course as of 06/11/25 0942   Wed Jun 11, 2025   0857 Discussed with patient about need for transfer for subdural hemorrhage.  Given the option of [facility] or [facility].  Requests [facility].   0858 Critical Care Time Statement: Upon my evaluation, this patient had a high probability of imminent or life-threatening deterioration due to trauma, which required my direct attention, intervention, and personal management.  I spent a total of 35 minutes directly providing critical care services, including evaluating for the presence of life-threatening injuries or illnesses. This time is exclusive of procedures, teaching,  treating other patients, family meetings, and any prior time recorded by providers other than myself.      0902 Accepted at [facility] by Dr. Gilbert trauma service   0905 Patient seen.  Awake alert and oriented.  GCS 15.  Nonfocal neurologic exam.  Patient again asked if he takes any aspirin even a baby aspirin.  Again denies.  Denies any other blood thinner use.   0909 Radiology called.  Made aware of need for final CAT scan readings.   0940 Patient with stuttering speech.  This is baseline per wife.   0941 Did not take his blood pressure medications today.       Medications   hydrALAZINE (APRESOLINE) injection 10 mg (has no administration in time range)   tetanus-diphtheria-acellular pertussis (BOOSTRIX) IM injection 0.5 mL (0.5 mL Intramuscular Given 6/11/25 0904)   neomycin-bacitracin-polymyxin b (NEOSPORIN) ointment 1 small application (1 small application Topical Given 6/11/25 0906)       ED Risk Strat Scores                    No data recorded        SBIRT 20yo+      Flowsheet Row Most Recent Value   Initial Alcohol Screen: US AUDIT-C     1. How often do you have a drink containing alcohol? 0 Filed at: 06/11/2025 0839   2. How many drinks containing alcohol do you have on a typical day you are drinking?  0 Filed at: 06/11/2025 0839   3b. FEMALE Any Age, or MALE 65+: How often do you have 4 or more drinks on one occassion? 0 Filed at: 06/11/2025 0839   Audit-C Score 0 Filed at: 06/11/2025 0839   LAUREN: How many times in the past year have you...    Used an illegal drug or used a prescription medication for non-medical reasons? Never Filed at: 06/11/2025 0839                            History of Present Illness       Chief Complaint   Patient presents with    Fall     Pt slid on grass and fell to bottom, reports pain in left hip and headache- denies HS/ LOC/ BT- has a few skin tears        Past Medical History[1]   Past Surgical History[2]   Family History[3]   Social History[4]   E-Cigarette/Vaping     E-Cigarette Use Never User       E-Cigarette/Vaping Substances    Nicotine No     THC No     CBD No     Flavoring No     Other No     Unknown No       I have reviewed and agree with the history as documented.     Last night the patient fell and landed on the grass.  Complains of left hip pain.  Did hit his head.  No loss conscious.  No blood thinners.  No aspirin use.  No nausea or vomiting.  No change in mental status.      History provided by:  Patient   used: No    Fall  Mechanism of injury: fall    Injury location: Head and left hip.  Time since incident:  1 day  Arrived directly from scene: yes    Fall:     Fall occurred:  Standing    Impact surface: Grass.    Entrapped after fall: no    Protective equipment: none    Suspicion of alcohol use: no    Suspicion of drug use: no    Prior to arrival data:     Bystander interventions:  None    Patient ambulatory at scene: no      Blood loss:  None    Responsiveness at scene:  Alert    Orientation at scene:  Person, place, situation and time    Loss of consciousness: no      Amnesic to event: no      Airway interventions:  None    Breathing interventions:  None    IV access status:  None    IO access:  None    Fluids administered:  None    Cardiac interventions:  None    Medications administered:  None    Immobilization:  None    Airway condition since incident:  Stable    Breathing condition since incident:  Stable    Circulation condition since incident:  Stable    Mental status condition since incident:  Stable  Associated symptoms: headaches    Associated symptoms: no abdominal pain, no back pain, no chest pain, no neck pain and no vomiting        Review of Systems   Cardiovascular:  Negative for chest pain.   Gastrointestinal:  Negative for abdominal pain and vomiting.   Musculoskeletal:  Positive for arthralgias. Negative for back pain and neck pain.   Neurological:  Positive for headaches.           Objective       ED Triage Vitals  [06/11/25 0838]   Temperature Pulse Blood Pressure Respirations SpO2 Patient Position - Orthostatic VS   (!) 96.7 °F (35.9 °C) 91 (!) 204/105 18 95 % Sitting      Temp Source Heart Rate Source BP Location FiO2 (%) Pain Score    Temporal Monitor Right arm -- 3      Vitals      Date and Time Temp Pulse SpO2 Resp BP Pain Score FACES Pain Rating User   06/11/25 0930 -- 97 100 % 21 199/122 3 --    06/11/25 0915 -- 87 96 % 19 181/99 3 -- KK   06/11/25 0839 -- -- -- -- 204/105 -- --    06/11/25 0838 96.7 °F (35.9 °C) -- -- 18 -- -- --    06/11/25 0838 -- 91 Simultaneous filing. User may not have seen previous data. 95 % Simultaneous filing. User may not have seen previous data. -- 204/105 3 --             Physical Exam  Vitals and nursing note reviewed.   Constitutional:       General: He is not in acute distress.     Appearance: He is well-developed.   HENT:      Head: Normocephalic and atraumatic.      Right Ear: External ear normal.      Left Ear: External ear normal.     Eyes:      General: No scleral icterus.        Right eye: No discharge.         Left eye: No discharge.      Extraocular Movements: Extraocular movements intact.      Conjunctiva/sclera: Conjunctivae normal.       Cardiovascular:      Rate and Rhythm: Normal rate and regular rhythm.      Heart sounds: Normal heart sounds. No murmur heard.  Pulmonary:      Effort: Pulmonary effort is normal.      Breath sounds: Normal breath sounds. No wheezing or rales.   Abdominal:      General: Bowel sounds are normal. There is no distension.      Palpations: Abdomen is soft.      Tenderness: There is no abdominal tenderness. There is no guarding or rebound.     Musculoskeletal:         General: No deformity. Normal range of motion.      Cervical back: Normal range of motion and neck supple.      Comments: Tender along the greater trochanter of the left hip.  Full range of motion.  Ambulating without any difficulty.    Left knee with an abrasion.  Full range  of motion.     Skin:     General: Skin is warm and dry.      Findings: No rash.     Neurological:      General: No focal deficit present.      Mental Status: He is alert and oriented to person, place, and time.      Cranial Nerves: No cranial nerve deficit.     Psychiatric:         Mood and Affect: Mood normal.         Behavior: Behavior normal.         Thought Content: Thought content normal.         Judgment: Judgment normal.         Results Reviewed       Procedure Component Value Units Date/Time    Comprehensive metabolic panel [[id number]]  (Abnormal) Collected: 06/11/25 0904    Lab Status: Final result Specimen: Blood from Arm, Right Updated: 06/11/25 0932     Sodium 133 mmol/L      Potassium 4.1 mmol/L      Chloride 99 mmol/L      CO2 27 mmol/L      ANION GAP 7 mmol/L      BUN 13 mg/dL      Creatinine 0.72 mg/dL      Glucose 125 mg/dL      Calcium 9.6 mg/dL      AST 27 U/L      ALT 15 U/L      Alkaline Phosphatase 83 U/L      Total Protein 7.3 g/dL      Albumin 4.4 g/dL      Total Bilirubin 2.00 mg/dL      eGFR 90 ml/min/1.73sq m     Narrative:      National Kidney Disease Foundation guidelines for Chronic Kidney Disease (CKD):     Stage 1 with normal or high GFR (GFR > 90 mL/min/1.73 square meters)    Stage 2 Mild CKD (GFR = 60-89 mL/min/1.73 square meters)    Stage 3A Moderate CKD (GFR = 45-59 mL/min/1.73 square meters)    Stage 3B Moderate CKD (GFR = 30-44 mL/min/1.73 square meters)    Stage 4 Severe CKD (GFR = 15-29 mL/min/1.73 square meters)    Stage 5 End Stage CKD (GFR <15 mL/min/1.73 square meters)  Note: GFR calculation is accurate only with a steady state creatinine    Protime-INR [[id number]]  (Normal) Collected: 06/11/25 0904    Lab Status: Final result Specimen: Blood from Arm, Right Updated: 06/11/25 0928     Protime 13.8 seconds      INR 1.02    Narrative:      INR Therapeutic Range    Indication                                             INR Range      Atrial Fibrillation                                                2.0-3.0  Hypercoagulable State                                    2.0.2.3  Left Ventricular Asist Device                            2.0-3.0  Mechanical Heart Valve                                  -    Aortic(with afib, MI, embolism, HF, LA enlargement,    and/or coagulopathy)                                     2.0-3.0 (2.5-3.5)     Mitral                                                             2.5-3.5  Prosthetic/Bioprosthetic Heart Valve               2.0-3.0  Venous thromboembolism (VTE: VT, PE        2.0-3.0    APTT [[id number]]  (Normal) Collected: 06/11/25 0904    Lab Status: Final result Specimen: Blood from Arm, Right Updated: 06/11/25 0928     PTT 23 seconds     CBC and differential [[id number]]  (Abnormal) Collected: 06/11/25 0904    Lab Status: Final result Specimen: Blood from Arm, Right Updated: 06/11/25 0915     WBC 7.90 Thousand/uL      RBC 4.56 Million/uL      Hemoglobin 15.3 g/dL      Hematocrit 45.7 %       fL      MCH 33.6 pg      MCHC 33.5 g/dL      RDW 12.9 %      MPV 9.2 fL      Platelets 206 Thousands/uL      nRBC 0 /100 WBCs      Segmented % 77 %      Immature Grans % 0 %      Lymphocytes % 13 %      Monocytes % 8 %      Eosinophils Relative 1 %      Basophils Relative 1 %      Absolute Neutrophils 6.03 Thousands/µL      Absolute Immature Grans 0.03 Thousand/uL      Absolute Lymphocytes 1.05 Thousands/µL      Absolute Monocytes 0.63 Thousand/µL      Eosinophils Absolute 0.11 Thousand/µL      Basophils Absolute 0.05 Thousands/µL             XR hip/pelv 2-3 vws left if performed   ED Interpretation by Timoteo Brand MD (06/11 0900)   No fracture      CT head without contrast   Final Interpretation by Almas Desir MD (06/11 0925)      1.  14 mm mixed density acute left cerebral convexity subdural hematoma with associated 5 mm rightward midline shift.   2.  Low-density 7 mm right cerebral convexity subdural hematoma, age indeterminate.   3.  No cervical spine  fracture or traumatic malalignment.      Findings were discussed with Dr. Brand on 6/11/25 at 9:23 AM.      Workstation performed: [id number]         CT cervical spine without contrast   Final Interpretation by Almas Desir MD (06/11 0925)      1.  14 mm mixed density acute left cerebral convexity subdural hematoma with associated 5 mm rightward midline shift.   2.  Low-density 7 mm right cerebral convexity subdural hematoma, age indeterminate.   3.  No cervical spine fracture or traumatic malalignment.      Findings were discussed with Dr. Brand on 6/11/25 at 9:23 AM.      Workstation performed: [id number]             ECG 12 Lead Documentation Only    Date/Time: 6/11/2025 9:20 AM    Performed by: Timoteo Brand MD  Authorized by: Timoteo Brand MD    ECG reviewed by me, the ED Provider: yes    Patient location:  ED  Rate:     ECG rate:  90  Rhythm:     Rhythm: sinus rhythm    Ectopy:     Ectopy: none    QRS:     QRS axis:  Normal      ED Medication and Procedure Management   Prior to Admission Medications   Prescriptions Last Dose Informant Patient Reported? Taking?   Cinnamon 500 MG capsule   Yes No   Sig: Take by mouth   Cranberry (Cranberry Narvii) 215 MG CAPS   Yes No   Sig: Take by mouth   Glucosamine-Chondroit-Vit C-Mn (Glucosamine Chondroitin Complx) TABS   Yes No   Sig: Take by mouth   Melatonin 1 MG CHEW   Yes No   Sig: Chew   Multiple Vitamin (MULTI VITAMIN PO)   Yes No   Sig: Take by mouth   Zinc 50 MG TABS   Yes No   Sig: Take by mouth   allopurinol (ZYLOPRIM) 300 mg tablet  Self Yes No   Sig: Take 300 mg by mouth daily   amitriptyline (ELAVIL) 10 mg tablet  Self No No   Sig: Take 1 tablet (10 mg total) by mouth daily at bedtime If not effective after 1 week may increase to 2 tablets   calcium carbonate (OS-BONNIE) 600 MG tablet  Self Yes No   Sig: Take 600 mg by mouth 2 (two) times a day with meals   cholecalciferol (VITAMIN D3) 1,000 units tablet  Self Yes No   Sig: Take 1,000 Units by  mouth daily   gabapentin (NEURONTIN) 100 mg capsule  Self Yes No   Sig: Take 100 mg by mouth 3 (three) times a day   ibuprofen (MOTRIN) 800 mg tablet   No No   Sig: Take 1 tablet (800 mg total) by mouth 3 (three) times a day   lidocaine (Lidoderm) 5 %   No No   Sig: Apply 1 patch topically over 12 hours daily Remove & Discard patch within 12 hours or as directed by MD   loratadine (CLARITIN) 10 mg tablet  Self Yes No   Sig: Take 10 mg by mouth daily   losartan (COZAAR) 100 MG tablet  Self Yes No   Sig: Take 100 mg by mouth daily   magnesium oxide (MAG-OX) 400 mg tablet   Yes No   Sig: Take by mouth   methocarbamol (ROBAXIN) 500 mg tablet   No No   Sig: Take 1 tablet (500 mg total) by mouth 2 (two) times a day      Facility-Administered Medications: None     Patient's Medications   Discharge Prescriptions    No medications on file     No discharge procedures on file.  ED SEPSIS DOCUMENTATION   Time reflects when diagnosis was documented in both MDM as applicable and the Disposition within this note       Time User Action Codes Description Comment    6/11/2025  8:47 AM Timoteo Brand Add [S09.90XA] Closed head injury, initial encounter     6/11/2025  8:47 AM Timoteo Brand Add [S76.012A] Strain of left hip, initial encounter     6/11/2025  9:02 AM Timoteo Brand Modify [S76.012A] Strain of left hip, initial encounter     6/11/2025  9:02 AM Timoteo Brand Remove [S09.90XA] Closed head injury, initial encounter     6/11/2025  9:02 AM Timoteo Brand Add [I62.00] Subdural hemorrhage (HCC)                      Timoteo Brand MD  06/11/25 0932       [1]   Past Medical History:  Diagnosis Date    Allergic     Gout     Hypertension     PTSD (post-traumatic stress disorder)    [2]   Past Surgical History:  Procedure Laterality Date    APPENDECTOMY      CHOLECYSTECTOMY      TOTAL KNEE ARTHROPLASTY     [3]   Family History  Problem Relation Name Age of Onset    Lung cancer Father     [4]   Social  History  Tobacco Use    Smoking status: Former     Current packs/day: 0.00     Average packs/day: 0.5 packs/day for 20.0 years (10.0 ttl pk-yrs)     Types: Cigarettes     Start date:      Quit date:      Years since quittin.4    Smokeless tobacco: Never   Vaping Use    Vaping status: Never Used   Substance Use Topics    Alcohol use: Yes    Drug use: Not Currently        Timoteo Brand MD  25 0987

## 2025-06-11 NOTE — ANESTHESIA POSTPROCEDURE EVALUATION
Post-Op Assessment Note    CV Status:  Stable  Pain Score: 0    Pain management: satisfactory to patient       Mental Status:  Awake and sleepy   Hydration Status:  Euvolemic   PONV Controlled:  Controlled   Airway Patency:  Patent  Two or more mitigation strategies used for obstructive sleep apnea   Post Op Vitals Reviewed: Yes    No anethesia notable event occurred.    Staff: CRNA           Last Filed PACU Vitals:  Vitals Value Taken Time   Temp 97.2    Pulse 72 06/11/25 16:33   /78    Resp 28 06/11/25 16:33   SpO2 96 % 06/11/25 16:33   Vitals shown include unfiled device data.     Transported patient to ICU without incident on 8L O2 facemask and monitor, vital signs stable throughout. Report given to ICU team at bedside.

## 2025-06-11 NOTE — ANESTHESIA PREPROCEDURE EVALUATION
Procedure:  Left sided craniotomy/possible craniectomy and right sided karyn holes for evacuation of subdural hematoma (Bilateral: Head)    Relevant Problems   CARDIO   (+) Essential hypertension   (+) Pure hypercholesterolemia      /RENAL   (+) Benign prostatic hyperplasia without urinary obstruction      MUSCULOSKELETAL   (+) Primary osteoarthritis involving multiple joints      NEURO/PSYCH   (+) Posttraumatic stress disorder   (+) SDH (subdural hematoma) (HCC)        Physical Exam    Airway     Mallampati score: II  TM Distance: >3 FB  Neck ROM: full      Cardiovascular      Dental       Pulmonary      Neurological      Other Findings        Anesthesia Plan  ASA Score- 3 Emergent    Anesthesia Type- general with ASA Monitors.         Additional Monitors: arterial line.    Airway Plan: Oral ETT.           Plan Factors-Exercise tolerance (METS): >4 METS.    Chart reviewed.                      Induction- intravenous and rapid sequence.    Postoperative Plan- .   Monitoring Plan - Monitoring plan - standard ASA monitoring and arterial line kit      Perioperative Resuscitation Plan - Level 1 - Full Code.       Informed Consent- Anesthetic plan and risks discussed with patient.  I personally reviewed this patient with the CRNA. Discussed and agreed on the Anesthesia Plan with the CRNA..      NPO Status:  Vitals Value Taken Time   Date of last liquid 06/11/25 06/11/25 13:43   Time of last liquid 0000 06/11/25 13:43   Date of last solid 06/11/25 06/11/25 13:43   Time of last solid 0000 06/11/25 13:43

## 2025-06-11 NOTE — ASSESSMENT & PLAN NOTE
L sided acute on chronic SDH   R chronic SDH   Arrived at [facility] ER on  with complaints of headache and left-sided hip pain after reported fall in the grass the day prior to arrival  Upon further questioning, patient has had some slurred speech for couple days per his wife.  [facility], patient has significant expressive aphasia and confusion.    Imagin/11 repeat CTH: Stable mixed density left convexity subdural hematoma containing hyperdense acute blood products, with stable approximately 5 mm of left-to-right midline shift. Stable hypodense right convexity subdural hematoma   CTH: 14 mm mixed density acute left cerebral convexity subdural hematoma with associated 5 mm rightward midline shift. Low-density 7 mm right cerebral convexity subdural hematoma, age indeterminate.   CT c-spine: No cervical spine fracture or traumatic malalignment     Plan:   Continue to closely monitor neuro exam   Frequent neuro checks per primary team   Repeat STAT CTH with any acute decline in GCS > 2pts or more in 1hr   Maintain normotensive BP goals, SBP < 160, MAP > 65   Plan for OR today w/ Dr. De La Torre for L craniotomy and R karyn holes for evac of radha SDH, L>R.   Maintain NPO status   Pre-op labs/orders in place   Coags wnl   Will plan for repeat CTH post-operatively   Continue keppra for seizure ppx   Hold all AC/AP meds   No reported use at home   Correct any coagulopathy   Maintain goal INR < 1.4, plt > 100   DVT ppx: SCDs, hold chem dvt ppx until cleared by nsgy   Medical management per primary team   Pain control per primary team   PT/OT   Social work following for assistance with dispo once medically cleared     Neurosurgery will continue to follow. Please reach out with any further questions or concerns.

## 2025-06-11 NOTE — RESPIRATORY THERAPY NOTE
RT Protocol Note  Jasson Carter 75 y.o. male MRN: [id number]  Unit/Bed#: ICU 08 Encounter: [id number]    Assessment    Active Problems:    SDH (subdural hematoma) (HCC)    Altered mental status    Fall      Home Pulmonary Medications:         Past Medical History[1]  Social History[2]    Subjective         Objective    Physical Exam:   Assessment Type: Assess only  General Appearance: Sleeping  Respiratory Pattern: Normal  Chest Assessment: Chest expansion symmetrical  Bilateral Breath Sounds: Clear    Vitals:  Blood pressure 155/83, pulse 70, temperature 98.3 °F (36.8 °C), temperature source Tympanic, resp. rate (!) 8, weight 87.2 kg (192 lb 3.9 oz), SpO2 94%.          Imaging and other studies: Results Review Statement: No pertinent imaging studies reviewed.          Plan    Respiratory Plan: Discontinue Protocol        Resp Comments: (P) Pt evaluated for resp protocol, pt in hospital following fall and subural hematoma. Pt has no pulm history and does not take pulm meds at home, pt sleeping at this time. Pt on RA, pt not in resp distress bs clear. Will dc resp protocol.        [1]   Past Medical History:  Diagnosis Date    Allergic     Gout     Hypertension     PTSD (post-traumatic stress disorder)    [2]   Social History  Socioeconomic History    Marital status: /Civil Union   Tobacco Use    Smoking status: Former     Current packs/day: 0.00     Average packs/day: 0.5 packs/day for 20.0 years (10.0 ttl pk-yrs)     Types: Cigarettes     Start date:      Quit date:      Years since quittin.4    Smokeless tobacco: Never   Vaping Use    Vaping status: Never Used   Substance and Sexual Activity    Alcohol use: Yes    Drug use: Not Currently     Social Drivers of Health     Financial Resource Strain: Low Risk  (2024)    Overall Financial Resource Strain (CARDIA)     Difficulty of Paying Living Expenses: Not hard at all   Food Insecurity: No Food Insecurity (2025)    Nursing -  Inadequate Food Risk Classification     Worried About Running Out of Food in the Last Year: Never true     Ran Out of Food in the Last Year: Never true   Transportation Needs: No Transportation Needs (2/28/2025)    PRAPARE - Transportation     Lack of Transportation (Medical): No     Lack of Transportation (Non-Medical): No    Received from [employer]    Social [employer]   Housing Stability: Low Risk  (2/28/2025)    Housing Stability Vital Sign     Unable to Pay for Housing in the Last Year: No     Number of Times Moved in the Last Year: 0     Homeless in the Last Year: No

## 2025-06-11 NOTE — CONSULTS
Consultation - Neurosurgery   Name: Jasson Carter 75 y.o. male I MRN: [id number]  Unit/Bed#: ED 26 I Date of Admission: 2025   Date of Service: 2025 I Hospital Day: 0   Inpatient consult to Neurosurgery  Consult performed by: Noelle Becerra PA-C  Consult ordered by: DYLAN Stevens        Physician Requesting Evaluation: No att. providers found   Reason for Evaluation / Principal Problem: SDH     Assessment & Plan  SDH (subdural hematoma) (HCC)  L sided acute on chronic SDH   R chronic SDH   Arrived at [facility] ER on  with complaints of headache and left-sided hip pain after reported fall in the grass the day prior to arrival  Upon further questioning, patient has had some slurred speech for couple days per his wife.  Upon arrival to [facility], patient has significant expressive aphasia and confusion.    Imagin/11 repeat CTH: Stable mixed density left convexity subdural hematoma containing hyperdense acute blood products, with stable approximately 5 mm of left-to-right midline shift. Stable hypodense right convexity subdural hematoma   CTH: 14 mm mixed density acute left cerebral convexity subdural hematoma with associated 5 mm rightward midline shift. Low-density 7 mm right cerebral convexity subdural hematoma, age indeterminate.   CT c-spine: No cervical spine fracture or traumatic malalignment     Plan:   Continue to closely monitor neuro exam   Frequent neuro checks per primary team   Repeat STAT CTH with any acute decline in GCS > 2pts or more in 1hr   Maintain normotensive BP goals, SBP < 160, MAP > 65   Plan for OR today w/ Dr. De La Torre for L craniotomy and R karyn holes for evac of radha SDH, L>R.   Maintain NPO status   Pre-op labs/orders in place   Coags wnl   Will plan for repeat CTH post-operatively   Continue keppra for seizure ppx   Hold all AC/AP meds   No reported use at home   Correct any coagulopathy   Maintain goal INR < 1.4, plt > 100   DVT ppx: SCDs, hold chem  dvt ppx until cleared by nsgy   Medical management per primary team   Pain control per primary team   PT/OT   Social work following for assistance with dispo once medically cleared     Neurosurgery will continue to follow. Please reach out with any further questions or concerns.     Please contact the SecureChat role for the Neurosurgery service with any questions/concerns.    History of Present Illness   HPI: Jasson Carter is a 75 y.o. year old male with a PMH significant for GERD, HLD, HTN, BPH, osteoarthritis, and peripheral neuropathy who presented to the [facility] ER today with complaints of HA and L hip pain. Upon further questioning, pt had a fall yesterday in the grass.  Patient underwent trauma evaluation with CT head and CT C-spine which revealed bilateral subdural hematomas, left with some acute blood and larger in size with subtle midline shift.  Per report, patient was a GCS 15 while in the ER.  Case was reviewed with trauma attending down at [facility] and patient was ultimately flown via helicopter to [facility] for admission to the trauma service.  Upon his arrival, patient was noted to have had decline with receptive aphasia.  Neurosurgery was consulted emergently.  Patient is keenly awake and alert on my exam.  He has expressive aphasia and is unable to participate in most questioning.  Patient is able to tell me his name.  He does admit to a headache.  He has difficulty following commands though.    Patient's wife and stepdaughter were updated via the telephone by Dr. De La Torre.  They are in agreement for surgery this admission.    Review of Systems   Unable to perform ROS: Mental status change     Medical History Review: I have reviewed the patient's PMH, PSH, Social History, Family History, Meds, and Allergies     Objective :  Temp:  [96.7 °F (35.9 °C)-98.3 °F (36.8 °C)] 98.3 °F (36.8 °C)  HR:  [79-97] 88  BP: (178-204)/() 178/99  Resp:  [18-21] 18  SpO2:  [95 %-100 %] 99 %  O2 Device: None  (Room air)    Physical Exam Neurological Exam  General appearance: alert, pleasant appearing, elderly male appears stated age, no distress.  Difficulty participating in exam due to aphasia/severity of diagnosis  Head: Normocephalic, without obvious abnormality, atraumatic  Eyes: EOMI, PERRL  Neck: supple, symmetrical, trachea midline and NT  Back: no kyphosis present, no tenderness to percussion or palpation  Lungs: non labored breathing, no respiratory distress on room air  Heart: regular heart rate  Neurologic:   Mental status: Alert, oriented x1  - Patient is keenly awake and alert, eyes open spontaneously  -Tracking appropriately  -Patient is able to state his name but otherwise is confused and unable to follow commands or answer any other questions  -Noted receptive aphasia  Cranial nerves: Face appears symmetric  Sensory: Intact to painful stimuli throughout  Motor: moving all extremities without focal weakness  - pt unable to follow commands.  He is able to mimic.  He is moving all 4 extremities equally  GCS 13, confused with expressive aphasia, not consistently following commands but purposeful and localizing to pain in all 4 extremities      Lab Results: I have reviewed the following results:  Recent Labs     06/11/25  0904   WBC 7.90   HGB 15.3   HCT 45.7      SODIUM 133*   K 4.1   CL 99   CO2 27   BUN 13   CREATININE 0.72   GLUC 125   AST 27   ALT 15   ALB 4.4   TBILI 2.00*   ALKPHOS 83   PTT 23   INR 1.02     VTE Pharmacologic Prophylaxis: Sequential compression device (Venodyne)

## 2025-06-11 NOTE — CASE MANAGEMENT
Case Management Discharge Planning Note    Patient name Jasson Carter  Location OR POOL/OR POOL MRN [id number]  : 1950 Date 2025       Current Admission Date: 2025  Current Admission Diagnosis:SDH (subdural hematoma) (HCC)   Patient Active Problem List    Diagnosis Date Noted    SDH (subdural hematoma) (HCC) 2025    Altered mental status 2025    Fall 2025    Hyponatremia 2024    Neuropathy 2021    Reflux esophagitis 2020    Seasonal allergic rhinitis due to pollen 2020    Primary osteoarthritis involving multiple joints 2020    Posttraumatic stress disorder 2020    History of gout 2019    Pure hypercholesterolemia 2017    Essential hypertension 2017    Benign prostatic hyperplasia without urinary obstruction 2011      LOS (days): 0  Geometric Mean LOS (GMLOS) (days):   Days to GMLOS:     OBJECTIVE:  Risk of Unplanned Readmission Score: 10.17         Current admission status: Inpatient   Preferred Pharmacy:   [facility]/pharmacy #[id number] - [place] - [address] [facility]  [address] [place] [address]  Phone: [phone] Fax: [phone]    Primary Care Provider: Ruben Finch MD    Primary Insurance: MEDICARE  Secondary Insurance: [facility]    DISCHARGE DETAILS:    Cm spoke to pt's dtr Shasha May [phone]  Pt's wife, has a known TBI and has memory loss at time, and word finding difficulty  Pt's dtr is able to provide consents, updates etc when needed

## 2025-06-12 ENCOUNTER — ANESTHESIA (INPATIENT)
Dept: RADIOLOGY | Facility: HOSPITAL | Age: 75
End: 2025-06-12
Payer: MEDICARE

## 2025-06-12 ENCOUNTER — APPOINTMENT (INPATIENT)
Dept: NEUROLOGY | Facility: CLINIC | Age: 75
DRG: 025 | End: 2025-06-12
Attending: PHYSICIAN ASSISTANT
Payer: MEDICARE

## 2025-06-12 ENCOUNTER — APPOINTMENT (INPATIENT)
Dept: RADIOLOGY | Facility: HOSPITAL | Age: 75
DRG: 025 | End: 2025-06-12
Payer: MEDICARE

## 2025-06-12 ENCOUNTER — APPOINTMENT (INPATIENT)
Dept: RADIOLOGY | Facility: HOSPITAL | Age: 75
DRG: 025 | End: 2025-06-12
Attending: RADIOLOGY
Payer: MEDICARE

## 2025-06-12 ENCOUNTER — ANESTHESIA EVENT (INPATIENT)
Dept: RADIOLOGY | Facility: HOSPITAL | Age: 75
End: 2025-06-12
Payer: MEDICARE

## 2025-06-12 PROBLEM — I48.91 ATRIAL FIBRILLATION (HCC): Status: ACTIVE | Noted: 2025-06-12

## 2025-06-12 LAB
ALBUMIN SERPL BCG-MCNC: 4.2 G/DL (ref 3.5–5)
ALP SERPL-CCNC: 87 U/L (ref 34–104)
ALT SERPL W P-5'-P-CCNC: 12 U/L (ref 7–52)
ANION GAP SERPL CALCULATED.3IONS-SCNC: 10 MMOL/L (ref 4–13)
APTT PPP: 26 SECONDS (ref 23–34)
AST SERPL W P-5'-P-CCNC: 23 U/L (ref 13–39)
BILIRUB SERPL-MCNC: 1.49 MG/DL (ref 0.2–1)
BUN SERPL-MCNC: 14 MG/DL (ref 5–25)
CALCIUM SERPL-MCNC: 9 MG/DL (ref 8.4–10.2)
CHLORIDE SERPL-SCNC: 99 MMOL/L (ref 96–108)
CO2 SERPL-SCNC: 25 MMOL/L (ref 21–32)
CREAT SERPL-MCNC: 0.56 MG/DL (ref 0.6–1.3)
ERYTHROCYTE [DISTWIDTH] IN BLOOD BY AUTOMATED COUNT: 13.2 % (ref 11.6–15.1)
GFR SERPL CREATININE-BSD FRML MDRD: 100 ML/MIN/1.73SQ M
GLUCOSE SERPL-MCNC: 147 MG/DL (ref 65–140)
GLUCOSE SERPL-MCNC: 163 MG/DL (ref 65–140)
GLUCOSE SERPL-MCNC: 181 MG/DL (ref 65–140)
HCT VFR BLD AUTO: 43.1 % (ref 36.5–49.3)
HGB BLD-MCNC: 14.8 G/DL (ref 12–17)
INR PPP: 1.01 (ref 0.85–1.19)
MAGNESIUM SERPL-MCNC: 2 MG/DL (ref 1.9–2.7)
MCH RBC QN AUTO: 33.3 PG (ref 26.8–34.3)
MCHC RBC AUTO-ENTMCNC: 34.3 G/DL (ref 31.4–37.4)
MCV RBC AUTO: 97 FL (ref 82–98)
MRSA NOSE QL CULT: NORMAL
PHOSPHATE SERPL-MCNC: 2.6 MG/DL (ref 2.3–4.1)
PLATELET # BLD AUTO: 219 THOUSANDS/UL (ref 149–390)
PMV BLD AUTO: 9.2 FL (ref 8.9–12.7)
POTASSIUM SERPL-SCNC: 4 MMOL/L (ref 3.5–5.3)
PROT SERPL-MCNC: 6.9 G/DL (ref 6.4–8.4)
PROTHROMBIN TIME: 13.6 SECONDS (ref 12.3–15)
RBC # BLD AUTO: 4.44 MILLION/UL (ref 3.88–5.62)
SODIUM SERPL-SCNC: 134 MMOL/L (ref 135–147)
WBC # BLD AUTO: 13.61 THOUSAND/UL (ref 4.31–10.16)

## 2025-06-12 PROCEDURE — 61624 TCAT PERM OCCLS/EMBOLJ CNS: CPT | Performed by: RADIOLOGY

## 2025-06-12 PROCEDURE — 70496 CT ANGIOGRAPHY HEAD: CPT

## 2025-06-12 PROCEDURE — C1769 GUIDE WIRE: HCPCS

## 2025-06-12 PROCEDURE — 36228 PLACE CATH INTRACRANIAL ART: CPT | Performed by: RADIOLOGY

## 2025-06-12 PROCEDURE — 85610 PROTHROMBIN TIME: CPT | Performed by: PHYSICIAN ASSISTANT

## 2025-06-12 PROCEDURE — 61624 TCAT PERM OCCLS/EMBOLJ CNS: CPT

## 2025-06-12 PROCEDURE — 95700 EEG CONT REC W/VID EEG TECH: CPT

## 2025-06-12 PROCEDURE — B3181ZZ FLUOROSCOPY OF BILATERAL INTERNAL CAROTID ARTERIES USING LOW OSMOLAR CONTRAST: ICD-10-PCS | Performed by: RADIOLOGY

## 2025-06-12 PROCEDURE — C1887 CATHETER, GUIDING: HCPCS

## 2025-06-12 PROCEDURE — 99024 POSTOP FOLLOW-UP VISIT: CPT | Performed by: RADIOLOGY

## 2025-06-12 PROCEDURE — 70498 CT ANGIOGRAPHY NECK: CPT

## 2025-06-12 PROCEDURE — 03VG3DZ RESTRICTION OF INTRACRANIAL ARTERY WITH INTRALUMINAL DEVICE, PERCUTANEOUS APPROACH: ICD-10-PCS | Performed by: RADIOLOGY

## 2025-06-12 PROCEDURE — 84100 ASSAY OF PHOSPHORUS: CPT | Performed by: PHYSICIAN ASSISTANT

## 2025-06-12 PROCEDURE — 70551 MRI BRAIN STEM W/O DYE: CPT

## 2025-06-12 PROCEDURE — NC001 PR NO CHARGE: Performed by: RADIOLOGY

## 2025-06-12 PROCEDURE — 99291 CRITICAL CARE FIRST HOUR: CPT | Performed by: STUDENT IN AN ORGANIZED HEALTH CARE EDUCATION/TRAINING PROGRAM

## 2025-06-12 PROCEDURE — 85347 COAGULATION TIME ACTIVATED: CPT

## 2025-06-12 PROCEDURE — 80053 COMPREHEN METABOLIC PANEL: CPT | Performed by: PHYSICIAN ASSISTANT

## 2025-06-12 PROCEDURE — 75894 X-RAYS TRANSCATH THERAPY: CPT | Performed by: RADIOLOGY

## 2025-06-12 PROCEDURE — 83735 ASSAY OF MAGNESIUM: CPT | Performed by: PHYSICIAN ASSISTANT

## 2025-06-12 PROCEDURE — 03HY32Z INSERTION OF MONITORING DEVICE INTO UPPER ARTERY, PERCUTANEOUS APPROACH: ICD-10-PCS | Performed by: NURSE ANESTHETIST, CERTIFIED REGISTERED

## 2025-06-12 PROCEDURE — 76377 3D RENDER W/INTRP POSTPROCES: CPT | Performed by: RADIOLOGY

## 2025-06-12 PROCEDURE — 85027 COMPLETE CBC AUTOMATED: CPT | Performed by: PHYSICIAN ASSISTANT

## 2025-06-12 PROCEDURE — 75898 FOLLOW-UP ANGIOGRAPHY: CPT | Performed by: RADIOLOGY

## 2025-06-12 PROCEDURE — 82948 REAGENT STRIP/BLOOD GLUCOSE: CPT

## 2025-06-12 PROCEDURE — C1894 INTRO/SHEATH, NON-LASER: HCPCS

## 2025-06-12 PROCEDURE — 36224 PLACE CATH CAROTD ART: CPT | Performed by: RADIOLOGY

## 2025-06-12 PROCEDURE — 95715 VEEG EA 12-26HR INTMT MNTR: CPT

## 2025-06-12 PROCEDURE — 76937 US GUIDE VASCULAR ACCESS: CPT | Performed by: RADIOLOGY

## 2025-06-12 PROCEDURE — B31R1ZZ FLUOROSCOPY OF INTRACRANIAL ARTERIES USING LOW OSMOLAR CONTRAST: ICD-10-PCS | Performed by: RADIOLOGY

## 2025-06-12 PROCEDURE — 76937 US GUIDE VASCULAR ACCESS: CPT

## 2025-06-12 PROCEDURE — 85730 THROMBOPLASTIN TIME PARTIAL: CPT | Performed by: PHYSICIAN ASSISTANT

## 2025-06-12 PROCEDURE — 36227 PLACE CATH XTRNL CAROTID: CPT | Performed by: RADIOLOGY

## 2025-06-12 RX ORDER — HEPARIN SODIUM 1000 [USP'U]/ML
INJECTION, SOLUTION INTRAVENOUS; SUBCUTANEOUS AS NEEDED
Status: COMPLETED | OUTPATIENT
Start: 2025-06-12 | End: 2025-06-12

## 2025-06-12 RX ORDER — HALOPERIDOL 5 MG/ML
2 INJECTION INTRAMUSCULAR ONCE
Status: COMPLETED | OUTPATIENT
Start: 2025-06-12 | End: 2025-06-12

## 2025-06-12 RX ORDER — FENTANYL CITRATE 50 UG/ML
INJECTION, SOLUTION INTRAMUSCULAR; INTRAVENOUS AS NEEDED
Status: DISCONTINUED | OUTPATIENT
Start: 2025-06-12 | End: 2025-06-12

## 2025-06-12 RX ORDER — LIDOCAINE HYDROCHLORIDE 10 MG/ML
INJECTION, SOLUTION EPIDURAL; INFILTRATION; INTRACAUDAL; PERINEURAL AS NEEDED
Status: COMPLETED | OUTPATIENT
Start: 2025-06-12 | End: 2025-06-12

## 2025-06-12 RX ORDER — LORAZEPAM 2 MG/ML
1 INJECTION INTRAMUSCULAR ONCE
Status: DISCONTINUED | OUTPATIENT
Start: 2025-06-12 | End: 2025-06-12

## 2025-06-12 RX ORDER — INSULIN LISPRO 100 [IU]/ML
1-6 INJECTION, SOLUTION INTRAVENOUS; SUBCUTANEOUS EVERY 6 HOURS SCHEDULED
Status: DISCONTINUED | OUTPATIENT
Start: 2025-06-12 | End: 2025-06-21

## 2025-06-12 RX ORDER — DEXAMETHASONE SODIUM PHOSPHATE 10 MG/ML
INJECTION, SOLUTION INTRAMUSCULAR; INTRAVENOUS AS NEEDED
Status: DISCONTINUED | OUTPATIENT
Start: 2025-06-12 | End: 2025-06-12

## 2025-06-12 RX ORDER — WATER 10 ML/10ML
INJECTION INTRAMUSCULAR; INTRAVENOUS; SUBCUTANEOUS
Status: DISPENSED
Start: 2025-06-12 | End: 2025-06-13

## 2025-06-12 RX ORDER — OLANZAPINE 10 MG/2ML
INJECTION, POWDER, FOR SOLUTION INTRAMUSCULAR
Status: COMPLETED
Start: 2025-06-12 | End: 2025-06-12

## 2025-06-12 RX ORDER — FENTANYL CITRATE/PF 50 MCG/ML
25 SYRINGE (ML) INJECTION
Status: DISCONTINUED | OUTPATIENT
Start: 2025-06-12 | End: 2025-06-12

## 2025-06-12 RX ORDER — ONDANSETRON 2 MG/ML
INJECTION INTRAMUSCULAR; INTRAVENOUS AS NEEDED
Status: DISCONTINUED | OUTPATIENT
Start: 2025-06-12 | End: 2025-06-12

## 2025-06-12 RX ORDER — PROPOFOL 10 MG/ML
INJECTION, EMULSION INTRAVENOUS AS NEEDED
Status: DISCONTINUED | OUTPATIENT
Start: 2025-06-12 | End: 2025-06-12

## 2025-06-12 RX ORDER — NITROGLYCERIN 20 MG/100ML
INJECTION INTRAVENOUS AS NEEDED
Status: COMPLETED | OUTPATIENT
Start: 2025-06-12 | End: 2025-06-12

## 2025-06-12 RX ORDER — LIDOCAINE HYDROCHLORIDE 10 MG/ML
INJECTION, SOLUTION EPIDURAL; INFILTRATION; INTRACAUDAL; PERINEURAL AS NEEDED
Status: DISCONTINUED | OUTPATIENT
Start: 2025-06-12 | End: 2025-06-12

## 2025-06-12 RX ORDER — VERAPAMIL HYDROCHLORIDE 2.5 MG/ML
INJECTION INTRAVENOUS AS NEEDED
Status: COMPLETED | OUTPATIENT
Start: 2025-06-12 | End: 2025-06-12

## 2025-06-12 RX ORDER — SODIUM CHLORIDE 9 MG/ML
75 INJECTION, SOLUTION INTRAVENOUS CONTINUOUS
Status: DISCONTINUED | OUTPATIENT
Start: 2025-06-12 | End: 2025-06-13

## 2025-06-12 RX ORDER — OXYCODONE HYDROCHLORIDE 5 MG/1
5 TABLET ORAL EVERY 4 HOURS PRN
Refills: 0 | Status: DISCONTINUED | OUTPATIENT
Start: 2025-06-12 | End: 2025-06-14

## 2025-06-12 RX ORDER — OLANZAPINE 10 MG/2ML
5 INJECTION, POWDER, FOR SOLUTION INTRAMUSCULAR ONCE
Status: COMPLETED | OUTPATIENT
Start: 2025-06-12 | End: 2025-06-12

## 2025-06-12 RX ORDER — HEPARIN SODIUM 1000 [USP'U]/ML
INJECTION, SOLUTION INTRAVENOUS; SUBCUTANEOUS AS NEEDED
Status: DISCONTINUED | OUTPATIENT
Start: 2025-06-12 | End: 2025-06-12

## 2025-06-12 RX ORDER — SODIUM CHLORIDE 9 MG/ML
INJECTION, SOLUTION INTRAVENOUS CONTINUOUS PRN
Status: DISCONTINUED | OUTPATIENT
Start: 2025-06-12 | End: 2025-06-12

## 2025-06-12 RX ORDER — HYDRALAZINE HYDROCHLORIDE 20 MG/ML
10 INJECTION INTRAMUSCULAR; INTRAVENOUS EVERY 4 HOURS PRN
Status: DISCONTINUED | OUTPATIENT
Start: 2025-06-12 | End: 2025-06-14

## 2025-06-12 RX ORDER — AMITRIPTYLINE HYDROCHLORIDE 10 MG/1
10 TABLET ORAL
Status: DISCONTINUED | OUTPATIENT
Start: 2025-06-12 | End: 2025-06-20

## 2025-06-12 RX ORDER — CHLORHEXIDINE GLUCONATE ORAL RINSE 1.2 MG/ML
15 SOLUTION DENTAL EVERY 12 HOURS SCHEDULED
Status: CANCELLED | OUTPATIENT
Start: 2025-06-12

## 2025-06-12 RX ORDER — LABETALOL HYDROCHLORIDE 5 MG/ML
10 INJECTION, SOLUTION INTRAVENOUS EVERY 4 HOURS PRN
Status: DISCONTINUED | OUTPATIENT
Start: 2025-06-12 | End: 2025-06-17

## 2025-06-12 RX ORDER — ROCURONIUM BROMIDE 10 MG/ML
INJECTION, SOLUTION INTRAVENOUS AS NEEDED
Status: DISCONTINUED | OUTPATIENT
Start: 2025-06-12 | End: 2025-06-12

## 2025-06-12 RX ORDER — ONDANSETRON 2 MG/ML
4 INJECTION INTRAMUSCULAR; INTRAVENOUS ONCE AS NEEDED
Status: DISCONTINUED | OUTPATIENT
Start: 2025-06-12 | End: 2025-06-17

## 2025-06-12 RX ORDER — IODIXANOL 320 MG/ML
600 INJECTION, SOLUTION INTRAVASCULAR
Status: COMPLETED | OUTPATIENT
Start: 2025-06-12 | End: 2025-06-12

## 2025-06-12 RX ORDER — DEXMEDETOMIDINE HYDROCHLORIDE 4 UG/ML
.1-1 INJECTION, SOLUTION INTRAVENOUS
Status: DISCONTINUED | OUTPATIENT
Start: 2025-06-12 | End: 2025-06-14

## 2025-06-12 RX ORDER — CHLORHEXIDINE GLUCONATE ORAL RINSE 1.2 MG/ML
15 SOLUTION DENTAL EVERY 12 HOURS SCHEDULED
Status: DISCONTINUED | OUTPATIENT
Start: 2025-06-12 | End: 2025-06-17

## 2025-06-12 RX ORDER — PHENYLEPHRINE HCL IN 0.9% NACL 1 MG/10 ML
SYRINGE (ML) INTRAVENOUS AS NEEDED
Status: DISCONTINUED | OUTPATIENT
Start: 2025-06-12 | End: 2025-06-12

## 2025-06-12 RX ADMIN — DEXMEDETOMIDINE HYDROCHLORIDE 0.2 MCG/KG/HR: 4 INJECTION, SOLUTION INTRAVENOUS at 18:00

## 2025-06-12 RX ADMIN — HYDRALAZINE HYDROCHLORIDE 10 MG: 20 INJECTION, SOLUTION INTRAMUSCULAR; INTRAVENOUS at 02:13

## 2025-06-12 RX ADMIN — Medication 50 MCG: at 09:32

## 2025-06-12 RX ADMIN — LIDOCAINE HYDROCHLORIDE 3 ML: 10 INJECTION, SOLUTION EPIDURAL; INFILTRATION; INTRACAUDAL; PERINEURAL at 09:40

## 2025-06-12 RX ADMIN — ROCURONIUM 20 MG: 50 INJECTION, SOLUTION INTRAVENOUS at 10:09

## 2025-06-12 RX ADMIN — HEPARIN SODIUM 2000 UNITS: 1000 INJECTION, SOLUTION INTRAVENOUS; SUBCUTANEOUS at 09:44

## 2025-06-12 RX ADMIN — SODIUM CHLORIDE: 0.9 INJECTION, SOLUTION INTRAVENOUS at 08:53

## 2025-06-12 RX ADMIN — Medication 100 MCG: at 11:18

## 2025-06-12 RX ADMIN — PHENYLEPHRINE HYDROCHLORIDE 50 MCG/MIN: 50 INJECTION INTRAVENOUS at 09:48

## 2025-06-12 RX ADMIN — ROCURONIUM 20 MG: 50 INJECTION, SOLUTION INTRAVENOUS at 11:10

## 2025-06-12 RX ADMIN — VERAPAMIL HYDROCHLORIDE 5 MG: 2.5 INJECTION INTRAVENOUS at 09:44

## 2025-06-12 RX ADMIN — PROPOFOL 50 MG: 10 INJECTION, EMULSION INTRAVENOUS at 12:06

## 2025-06-12 RX ADMIN — Medication 100 MCG: at 09:48

## 2025-06-12 RX ADMIN — OLANZAPINE 5 MG: 10 INJECTION, POWDER, FOR SOLUTION INTRAMUSCULAR at 19:04

## 2025-06-12 RX ADMIN — LIDOCAINE HYDROCHLORIDE 1 ML: 10 INJECTION, SOLUTION EPIDURAL; INFILTRATION; INTRACAUDAL; PERINEURAL at 09:44

## 2025-06-12 RX ADMIN — DEXMEDETOMIDINE HYDROCHLORIDE 8 MCG: 100 INJECTION, SOLUTION INTRAVENOUS at 12:50

## 2025-06-12 RX ADMIN — ROCURONIUM 15 MG: 50 INJECTION, SOLUTION INTRAVENOUS at 09:47

## 2025-06-12 RX ADMIN — NICARDIPINE HYDROCHLORIDE 300 MCG: 2.5 INJECTION, SOLUTION INTRAVENOUS at 12:41

## 2025-06-12 RX ADMIN — ONDANSETRON 4 MG: 2 INJECTION INTRAMUSCULAR; INTRAVENOUS at 09:06

## 2025-06-12 RX ADMIN — OXYCODONE HYDROCHLORIDE 5 MG: 5 TABLET ORAL at 02:13

## 2025-06-12 RX ADMIN — IODIXANOL 240 ML: 320 INJECTION, SOLUTION INTRAVASCULAR at 14:46

## 2025-06-12 RX ADMIN — OLANZAPINE 5 MG: 10 INJECTION, POWDER, LYOPHILIZED, FOR SOLUTION INTRAMUSCULAR at 19:04

## 2025-06-12 RX ADMIN — SODIUM CHLORIDE 75 ML/HR: 0.9 INJECTION, SOLUTION INTRAVENOUS at 15:05

## 2025-06-12 RX ADMIN — NICARDIPINE HYDROCHLORIDE 100 MCG: 2.5 INJECTION, SOLUTION INTRAVENOUS at 12:04

## 2025-06-12 RX ADMIN — SUGAMMADEX 200 MG: 100 INJECTION, SOLUTION INTRAVENOUS at 12:31

## 2025-06-12 RX ADMIN — LEVETIRACETAM 500 MG: 500 TABLET, FILM COATED ORAL at 08:08

## 2025-06-12 RX ADMIN — ROCURONIUM 30 MG: 50 INJECTION, SOLUTION INTRAVENOUS at 11:41

## 2025-06-12 RX ADMIN — FENTANYL CITRATE 100 MCG: 50 INJECTION INTRAMUSCULAR; INTRAVENOUS at 09:05

## 2025-06-12 RX ADMIN — HALOPERIDOL LACTATE 2 MG: 5 INJECTION, SOLUTION INTRAMUSCULAR at 23:05

## 2025-06-12 RX ADMIN — PROPOFOL 20 MG: 10 INJECTION, EMULSION INTRAVENOUS at 12:48

## 2025-06-12 RX ADMIN — DEXAMETHASONE SODIUM PHOSPHATE 5 MG: 10 INJECTION, SOLUTION INTRAMUSCULAR; INTRAVENOUS at 09:06

## 2025-06-12 RX ADMIN — FENTANYL CITRATE 50 MCG: 50 INJECTION INTRAMUSCULAR; INTRAVENOUS at 13:04

## 2025-06-12 RX ADMIN — LABETALOL HYDROCHLORIDE 10 MG: 5 INJECTION, SOLUTION INTRAVENOUS at 16:33

## 2025-06-12 RX ADMIN — Medication 600 MCG: at 09:40

## 2025-06-12 RX ADMIN — Medication 50 MCG: at 09:56

## 2025-06-12 RX ADMIN — PROPOFOL 150 MG: 10 INJECTION, EMULSION INTRAVENOUS at 09:05

## 2025-06-12 RX ADMIN — CEFAZOLIN SODIUM 2000 MG: 2 SOLUTION INTRAVENOUS at 07:06

## 2025-06-12 RX ADMIN — DEXMEDETOMIDINE HYDROCHLORIDE 8 MCG: 100 INJECTION, SOLUTION INTRAVENOUS at 12:48

## 2025-06-12 RX ADMIN — Medication 100 MCG: at 12:14

## 2025-06-12 RX ADMIN — FENTANYL CITRATE 50 MCG: 50 INJECTION INTRAMUSCULAR; INTRAVENOUS at 13:22

## 2025-06-12 RX ADMIN — HEPARIN SODIUM 2500 UNITS: 1000 INJECTION, SOLUTION INTRAVENOUS; SUBCUTANEOUS at 11:41

## 2025-06-12 RX ADMIN — HYDRALAZINE HYDROCHLORIDE 10 MG: 20 INJECTION, SOLUTION INTRAMUSCULAR; INTRAVENOUS at 17:41

## 2025-06-12 RX ADMIN — Medication 100 MCG: at 11:14

## 2025-06-12 RX ADMIN — CEFAZOLIN SODIUM 2000 MG: 2 SOLUTION INTRAVENOUS at 16:33

## 2025-06-12 RX ADMIN — IOHEXOL 85 ML: 350 INJECTION, SOLUTION INTRAVENOUS at 15:03

## 2025-06-12 RX ADMIN — ROCURONIUM 50 MG: 50 INJECTION, SOLUTION INTRAVENOUS at 09:06

## 2025-06-12 RX ADMIN — NICARDIPINE HYDROCHLORIDE 500 MCG: 2.5 INJECTION, SOLUTION INTRAVENOUS at 12:05

## 2025-06-12 RX ADMIN — PROPOFOL 50 MG: 10 INJECTION, EMULSION INTRAVENOUS at 11:10

## 2025-06-12 RX ADMIN — Medication 400 MCG: at 09:44

## 2025-06-12 NOTE — DISCHARGE INSTR - LAB
Today, you underwent a diagnostic cerebral angiogram under the care of  *** for evaluation of ***  ?  The following instructions will help you care for yourself, or be cared for upon your return home today. These are guidelines for your care right after your surgery only.   ?  Notify Your Doctor or Nurse if you have any of the following:  ?  SYMPTOMS OF WOUND INFECTION--   Increased pain in or around the incision   Swelling around the incision  Any drainage from the incision  Incision separates or opens up  Warmth in the tissues around the incision  Redness or tenderness on the skin near the incision   Fever (temperature greater than 101 degrees F)   ?  NEUROLOGICAL CHANGES--  Change in alertness  Increased sleepiness   Nausea and vomiting   New onset of numbness or weakness in arms or legs   New problems with your bowels or bladder  New or worse problems with balance or walking  Seizures, new or worsening  ?  UNRELIEVED HEADACHE PAIN--  New or increased pain unrelieved with pain medications   Pain associated with nausea and vomiting   Pain associated with other symptoms  ?  QUESTIONS OR PROBLEMS--  Any questions or problems that you are unsure about  Wound Care:  Keep Incision Clean and Dry   You may shower daily, but do not soak incision. Pat dry after showering.   No tub baths, soaking, swimming for 1 week after angiogram.   You do not need to cover the incision. Mild to moderate bruising and tenderness to the site is expected and may last up to 1-2 weeks after your procedure.   ?  A closure device was placed at the catheter insertion site. This is MRI compatible. Remove the dressing 24 hours after your procedure.   If your groin site is bleeding, apply firm pressure for 10 minutes. Reinforce dressing rather than removing and checking frequently. If continues to bleed through the dressing after 1 hour, contact your neurosurgeon's office.   Anticipatory Education:  ?  PAIN MED W/ Acetaminophen (Tylenol)  --IF a  prescription for pain medicine has been sent home with you:  --Narcotic pain medication may cause constipation. Be sure to take stool softeners or laxatives while you are on narcotic pain medication.   --Do not drive after taking prescription pain medicine.   ?  If this medicine is too strong, or no longer necessary, or we did NOT recommend/prescribe oral narcotics, you may take:   - Tylenol Extra-strength/Acetaminophen, 2 tablets every 4-6 hours as needed for mild pain. DO NOT TAKE MORE THAN 4000MG PER DAY from combined sources. NOTE: Remember to eat when taking pain medicines in order to avoid nausea. Watch for constipation. Eat plenty of fruits, vegetables, juices, and drink 6-8 glasses of water each day.   Constipation: Stay active and drink at least 6-8 cups of fluid each day to prevent constipation. If you need a laxative or stool softener follow the package directions or consult with your local pharmacists if you have questions.  ?  After anesthesia, rest for 24 hours. Do not drive, drink alcohol beverages or make any important decisions during this time. General anesthesia may cause sore throat, jaw discomfort or muscle aches. These symptoms can last for one or two days.  Activity: Please follow these instructions:  Advance your activity as you can tolerate. You may do light house work; nothing strenuous   You may walk all you want. You may go up and down the steps. Use the railing for support  Do not do excessive bending, straining or heavy lifting for 48 hours after your procedure  Do not drive or return to work until you are instructed   It is normal for your energy level and sleep patterns to change after surgery.   Get extra sleep at night and take naps during the day to help you feel less tired.   Take rest periods during the day.   Complete recovery may take several weeks.  ?  You may resume driving after 24-48 hours recovery.   You may return to work after 48 hours of recovery.   ?  Diet:  Your  doctor has recommended that you follow these diet instructions at home. Refer to the patient education materials you received during your hospital stay. If you would like more nutrition counseling, ask your doctor about making an appointment with an outpatient dietitian.  Resume your home diet  ?  Medications:  Please resume your home medications as instructed.   ?  Home Supplies and Equipment:  none  Additional Contacts:  ?  CONTACTS FOR NEUROSURGERY: You may call your neurosurgeon’s office if you have questions between 8:30 am and 4:30 pm. You may request to speak to the nurse practitioner who is available Monday through Friday.   ?  For off hours or the weekend you may call your neurosurgeon's office to leave a message.

## 2025-06-12 NOTE — ANESTHESIA POSTPROCEDURE EVALUATION
Post-Op Assessment Note    CV Status:  Stable    Pain management: adequate       Mental Status:  Agitated   Hydration Status:  Stable   PONV Controlled:  Controlled   Airway Patency:  Patent     Post Op Vitals Reviewed: Yes    No anethesia notable event occurred.    Staff: Anesthesiologist, CRNA           Last Filed PACU Vitals:  Vitals Value Taken Time   Temp 98    Pulse 79 06/12/25 13:27   /84    Resp 15 06/12/25 13:27   SpO2 94 % 06/12/25 13:27   Vitals shown include unfiled device data.

## 2025-06-12 NOTE — CASE MANAGEMENT
Case Management Discharge Planning Note    Patient name Jasson Carter  Location ICU 08/ICU 08 MRN [id number]  : 1950 Date 2025       Current Admission Date: 2025  Current Admission Diagnosis:SDH (subdural hematoma) (HCC)   Patient Active Problem List    Diagnosis Date Noted    Atrial fibrillation (HCC) 2025    SDH (subdural hematoma) (HCC) 2025    Altered mental status 2025    Fall 2025    Hyponatremia 2024    Neuropathy 2021    Reflux esophagitis 2020    Seasonal allergic rhinitis due to pollen 2020    Primary osteoarthritis involving multiple joints 2020    Posttraumatic stress disorder 2020    History of gout 2019    Pure hypercholesterolemia 2017    Essential hypertension 2017    Benign prostatic hyperplasia without urinary obstruction 2011      LOS (days): 1  Geometric Mean LOS (GMLOS) (days): 6.5  Days to GMLOS:5.3     OBJECTIVE:  Risk of Unplanned Readmission Score: 13.74         Current admission status: Inpatient   Preferred Pharmacy:   [id number] [place] [facility] [address]  [address]  [facility] [address]  Phone: [phone] Fax: [phone]    Primary Care Provider: Ruben Finch MD    Primary Insurance: MEDICARE  Secondary Insurance: [facility]    DISCHARGE DETAILS:    Pt in IR for MMA Embolization   CM will follow up with pt to discuss the role of CM

## 2025-06-12 NOTE — NURSING NOTE
Patient found to with Left subdural drain lying on his pillow. Patient is restless with arms moving, no neurostatus change. Mitts were on patient only. Secured the right EVD with bioocclusive on the head and on the neck. Right restraint placed since his arm was moving up to head. Medical team notified, neurosurgery aware via Fellow Dr Annette Palladino. Cat Scan to occur. Report # [id number]

## 2025-06-12 NOTE — BRIEF OP NOTE (RAD/CATH)
IR CEREBRAL ANGIOGRAPHY / INTERVENTION Procedure Note    PATIENT NAME: Jasson Carter  : 1950  MRN: [id number]    Pre-op Diagnosis:   1. SDH (subdural hematoma) (HCC)      Post-op Diagnosis:   1. SDH (subdural hematoma) (HCC)        Surgeon:   Fitz Yoon MD  Assistants:     No qualified resident was available, Resident is only observing    Estimated Blood Loss: 150 cc  Findings:   Cribiform plate DAVF supplied by the left greater than right ethmoidal arteries. Successful partial embolization via the left ophthalmic artery. Mild residual remains from the right ophthalmic artery.     Right radial sheath and femoral sheath removed, closure devices deployed.     Specimens: none     Complications:  none    Anesthesia: general    Fitz Yoon MD     Date: 2025  Time: 12:40 PM

## 2025-06-12 NOTE — ASSESSMENT & PLAN NOTE
POD#1 - s/p L craniotomy and R karyn holes for evac of radha SDH ( )   L sided acute on chronic SDH   R chronic SDH   Arrived at [facility] ER on  with complaints of headache and left-sided hip pain after reported fall in the grass the day prior to arrival  Upon further questioning, patient has had some slurred speech for couple days per his wife.  Upon arrival to [facility], patient has significant expressive aphasia and confusion.  Pt with significant improvement post-op, nearly resolved receptive aphasia.     Imagin/11 post-op CTA head/neck: Postsurgical change from partial evacuation of left hemispheric subdural hematoma. Residual hemorrhage measures up to 9 mm in thickness. Evacuation of right hemispheric subdural collection. Surgical drain and gas over the right convexity. Reduced left to right midline shift, 3 mm. No hemodynamically significant stenosis, dissection or occlusion of the carotid or vertebral arteries or major vessels of the [place].   repeat CTH: Stable mixed density left convexity subdural hematoma containing hyperdense acute blood products, with stable approximately 5 mm of left-to-right midline shift. Stable hypodense right convexity subdural hematoma   CTH: 14 mm mixed density acute left cerebral convexity subdural hematoma with associated 5 mm rightward midline shift. Low-density 7 mm right cerebral convexity subdural hematoma, age indeterminate.   CT c-spine: No cervical spine fracture or traumatic malalignment     Plan:   Continue to closely monitor neuro exam   Frequent neuro checks per primary team   Repeat STAT CTH with any acute decline in GCS > 2pts or more in 1hr   Maintain normotensive BP goals, SBP < 160, MAP > 65   Plan for radha MMA embo today w/ Dr. Yoon   Maintain NPO status   Pre-procedure labs in place    - L SDD removed by pt. Site closed w/ 1 stay suture   1 R SDD in place   10cc/24hrs, serosanguinous drainage   Will maintain in place at this time    Continue keppra for seizure ppx x 14 days   Hold all AC/AP meds   No reported use at home   Correct any coagulopathy   Maintain goal INR < 1.4, plt > 100   DVT ppx: SCDs, hold chem dvt ppx until cleared by nsgy   Medical management per primary team   Pain control per primary team   PT/OT   Social work following for assistance with dispo once medically cleared     Neurosurgery will continue to follow. Please reach out with any further questions or concerns.

## 2025-06-12 NOTE — SPEECH THERAPY NOTE
Patient NPO for angiogram today. Patient must remain flat afterwards for 2 hours. Will f/u for dysphagia evaluation when able.

## 2025-06-12 NOTE — ANESTHESIA PROCEDURE NOTES
Arterial Line Insertion    Performed by: Yaz Betts CRNA  Authorized by: Nitza Morataya MD  Consent: The procedure was performed in an emergent situation  Risks and benefits: risks, benefits and alternatives were discussed  Patient understanding: patient states understanding of the procedure being performed  Patient consent: the patient's understanding of the procedure matches consent given  Procedure consent: procedure consent matches procedure scheduled  Relevant documents: relevant documents present and verified  Test results: test results available and properly labeled  Site marked: the operative site was marked  Radiology Images: Radiology Images displayed and confirmed. If images not available, report reviewed  Required items: required blood products, implants, devices, and special equipment available  Patient identity confirmed: verbally with patient, arm band and provided demographic data  Preparation: Patient was prepped and draped in the usual sterile fashion.  Indications: hemodynamic monitoring  Orientation:  Left  Location: radial artery  Sedation:  Patient sedated: yes  Sedatives: see MAR for details  Vitals: Vital signs were monitored during sedation.    Procedure Details:      Line Type: Arterial Line  Needle gauge: 20  Placement technique:  Anatomical landmarks  Number of attempts: 1    Post-procedure:  Post-procedure: dressing applied  Waveform: good waveform  Patient tolerance: Patient tolerated the procedure well with no immediate complications and patient tolerated the procedure well with no immediate complications

## 2025-06-12 NOTE — SEDATION DOCUMENTATION
Cerebral angiogram with intervention performed by Dr. Yoon under general anesthesia. Wrist access site closed with TR band band and groin closed with  MYNX. Knee immobilized applied. Will recover in PACU.

## 2025-06-12 NOTE — QUICK NOTE
S: Jasson Carter is a 75 y.o. male who returns to the ICU s/p partial embolization of DAVF via the left opthalmic arterty. EBL 150mLcc.     O:    Vitals:    06/12/25 1530   BP: 141/78   Pulse: 82   Resp: 15   Temp:    SpO2: 96%       General: Resting comfortably, nad, non-toxic appearing  HEENT: R SD drain in place, perrla, neck supple, no tracheal deviation  Cardiac: irregular rhythm, rate 80's, 2+ radial and DP pulses b/l  Pulm: lungs cta, no w/r/r  Abd: soft, non-tender, non-distended  : pedersen in place, pale yellow urine  MSK: equal arom of ue and le. Right TR band in place with adequate capillary refill distally. Right groin puncture site soft, non-tender, no ecchymosis, DP 2+ bilaterally.   Neuro: gcs 14 [id number], + receptive and expressive aphasia, unable to answer orientation questions, face symmetric, pupils equal and reactive, closed eyes to command, not following any other commands, strength 5/5 ue and le, sensation appears intact however difficult to assess 2/2 aphasia.   Skin: lines c/d/i    A: 76 yo male with SDH, post-op angiogram with embolization of DAVF via left opthalmic artery    P:   q1 neurochecks with visual acuity  Post neurovascular checks R radial, R groin  STAT MRI/ MRA brain   Strict SBP <140  NPO midnight for further treatment of DAVF  VEEG ordered for waxing/waning exam  Continue Keppra x 7 days      Razia Posada PA-C

## 2025-06-12 NOTE — QUICK NOTE
PPD#0 - s/p cerebral angiogram w/ noted cribriform DAVF. Successful partial embolization via the left ophthalmic artery. Mild residual remains from the right ophthalmic artery (Dr. Yoon 6/12)     Post-procedure, pt was examined by myself in the PACU and noted to be more confused and unable to follow commands. Unable to assess VF due to pt's inability to cooperate in exam. He is moving all 4 extremities but seems to be more brisk on the R side. Unclear if these symptoms are anesthesia related.  - plan for STAT CTA head/neck now       Plan:   Case and imaging reviewed at length with Dr. Yoon   When pt was taken down for MMA embo, he was noted to actually have a cribriform plate DAVF that is responsible for his SDH. He underwent partial embo via the L ophthalmic artery.   Pt will need additional treatments for this DAVF --> timing to be determined   Will maintain NPO status at midnight   Pt to be readmitted to the surgical ICU for ongoing close neuromonitoring, particularly of his vision and eye   Continue to closely monitor neuro exam --> pt needs frequent VF checks and eye monitoring   Frequent neuro checks per primary team   Repeat STAT CTH with any acute decline in GCS > 2pts or more in 1hr   Maintain normotensive BP goals, SBP goal 100-140   Will obtain CT venogram and MRA wo today for further evaluation of this DAVF  6/11 - s/p removal of L SDD   1 R SDD in place   10cc/24hrs, serosanguinous drainage   Will maintain in place at this time   Continue keppra for seizure ppx x 14 days --> consider vEEG monitoring should pt have no improvement in his exam   Hold all AC/AP meds   DVT ppx: SCDs, hold chem dvt ppx until cleared by nsgy team   Medical management per primary team   Pain control per primary team   PT/OT   Social work following for assistance with dispo once medically cleared     Neurosurgery will continue to closely follow. Please reach out with any further questions or concerns.

## 2025-06-12 NOTE — ANESTHESIA POSTPROCEDURE EVALUATION
Post-Op Assessment Note    CV Status:  Stable    Pain management: adequate       Hydration Status:  Stable   Airway Patency:  Patent     Post Op Vitals Reviewed: Yes    No anethesia notable event occurred.    Staff: Anesthesiologist           Last Filed PACU Vitals:  Vitals Value Taken Time   Temp 97.8 °F (36.6 °C) 06/12/25 05:20   Pulse 86 06/12/25 07:06   /77 06/12/25 07:00   Resp 22 06/12/25 07:06   SpO2 97 % 06/12/25 07:06   Vitals shown include unfiled device data.

## 2025-06-12 NOTE — OCCUPATIONAL THERAPY NOTE
OT CANCEL NOTE    OT orders received. Chart reviewed. Pt is off the floor in IR. Will hold initial OT evaluation. Will continue to follow pt on caseload and see pt when medically stable and as clinically appropriate.       06/12/25 0938   OT Last Visit   OT Visit Date 06/12/25   Note Type   Note type Evaluation;Cancelled Session   Cancel Reasons Patient off floor/test       Dori Saxena MS, OTR/L

## 2025-06-12 NOTE — PROGRESS NOTES
Progress Note - Neurosurgery   Name: Jasson Carter 75 y.o. male I MRN: [id number]  Unit/Bed#: ICU 08 I Date of Admission: 2025   Date of Service: 2025 I Hospital Day: 1    Assessment & Plan  SDH (subdural hematoma) (HCC)  POD#1 - s/p L craniotomy and R karyn holes for evac of radha SDH ( )   L sided acute on chronic SDH   R chronic SDH   Arrived at [facility] ER on  with complaints of headache and left-sided hip pain after reported fall in the grass the day prior to arrival  Upon further questioning, patient has had some slurred speech for couple days per his wife.  Upon arrival to [facility]", patient has significant expressive aphasia and confusion.  Pt with significant improvement post-op, nearly resolved receptive aphasia.     Imagin/11 post-op CTA head/neck: Postsurgical change from partial evacuation of left hemispheric subdural hematoma. Residual hemorrhage measures up to 9 mm in thickness. Evacuation of right hemispheric subdural collection. Surgical drain and gas over the right convexity. Reduced left to right midline shift, 3 mm. No hemodynamically significant stenosis, dissection or occlusion of the carotid or vertebral arteries or major vessels of the [place].   repeat CTH: Stable mixed density left convexity subdural hematoma containing hyperdense acute blood products, with stable approximately 5 mm of left-to-right midline shift. Stable hypodense right convexity subdural hematoma   CTH: 14 mm mixed density acute left cerebral convexity subdural hematoma with associated 5 mm rightward midline shift. Low-density 7 mm right cerebral convexity subdural hematoma, age indeterminate.   CT c-spine: No cervical spine fracture or traumatic malalignment     Plan:   Continue to closely monitor neuro exam   Frequent neuro checks per primary team   Repeat STAT CTH with any acute decline in GCS > 2pts or more in 1hr   Maintain normotensive BP goals, SBP < 160, MAP > 65   Plan for  radha MMA embo today w/ Dr. Yoon   Maintain NPO status   Pre-procedure labs in place   6/11 - L SDD removed by pt. Site closed w/ 1 stay suture   1 R SDD in place   10cc/24hrs, serosanguinous drainage   Will maintain in place at this time   Continue keppra for seizure ppx x 14 days   Hold all AC/AP meds   No reported use at home   Correct any coagulopathy   Maintain goal INR < 1.4, plt > 100   DVT ppx: SCDs, hold chem dvt ppx until cleared by nsgy   Medical management per primary team   Pain control per primary team   PT/OT   Social work following for assistance with dispo once medically cleared     Neurosurgery will continue to follow. Please reach out with any further questions or concerns.     Please contact the SecureChat role for the Neurosurgery service with any questions/concerns.    Subjective   Patient seen and examined this a.m. on rounds.  Patient was admitted to ICU postoperatively.  Overnight, patient had removed his left subdural drain.  CT head was completed at that time which revealed expected postop changes.  This morning, patient has significant improvement in his exam and aphasia.  He is oriented and answering questions appropriately.  He offers no complaints.  He denies any headaches.    Objective :  Temp:  [97 °F (36.1 °C)-98.3 °F (36.8 °C)] 97.8 °F (36.6 °C)  HR:  [68-97] 82  BP: (134-199)/() 134/77  Resp:  [8-22] 13  SpO2:  [94 %-100 %] 97 %  O2 Device: None (Room air)    I/O         06/10 0701  06/11 0700 06/11 0701  06/12 0700 06/12 0701  06/13 0700    I.V. (mL/kg)  600 (6.9)     IV Piggyback  50     Total Intake(mL/kg)  650 (7.5)     Urine (mL/kg/hr)  2560     Drains  10     Blood  25     Total Output  2595     Net  -1945                  Physical Exam Neurological Exam  General appearance: alert, appears stated age, cooperative and no distress  Head:   - s/p L craniotomy.  Incision is clean, dry, intact with staples.  Drain site closed with 1 stay suture.  - s/p R karyn holes.   Dressing is in place and intact.  1 SDD in place with serosanguineous drainage  Eyes: EOMI, PERRL  Neck: supple, symmetrical, trachea midline and NT  Back: no kyphosis present, no tenderness to percussion or palpation  Lungs: non labored breathing, no resp distress on room air   Heart: regular heart rate  Neurologic:   Mental status: Alert, oriented x2  - pt is awake and alert   - able to state his name, year, and place. He knows he has surgery   - some mild confusion to the details of the events   Cranial nerves: grossly intact (Cranial nerves II-XII)  Sensory: normal to LT radha throughout   Motor: moving all extremities without focal weakness   Coordination: finger to nose normal bilaterally, no drift appreciated       Lab Results: I have reviewed the following results:  Recent Labs     06/11/25  1052 06/11/25  1110 06/12/25  0522   WBC  --    < > 13.61*   HGB 15.6   < > 14.8   HCT 46   < > 43.1   PLT  --    < > 219   SODIUM  --    < > 134*   K  --    < > 4.0   CL  --    < > 99   CO2 28   < > 25   BUN  --    < > 14   CREATININE  --    < > 0.56*   GLUC  --    < > 181*   CAIONIZED 0.87*  --   --    MG  --    < > 2.0   PHOS  --    < > 2.6   AST  --    < > 23   ALT  --    < > 12   ALB  --    < > 4.2   TBILI  --    < > 1.49*   ALKPHOS  --    < > 87   PTT  --   --  26   INR  --   --  1.01    < > = values in this interval not displayed.     VTE Pharmacologic Prophylaxis: Sequential compression device (Venodyne)

## 2025-06-12 NOTE — PROGRESS NOTES
Progress Note - Critical Care/ICU   Name: Jasson Carter 75 y.o. male I MRN: [id number]  Unit/Bed#: ICU 08 I Date of Admission: 6/11/2025   Date of Service: 6/12/2025 I Hospital Day: 1      Assessment & Plan   Active Hospital Problems    Diagnosis Date Noted POA    Atrial fibrillation (HCC) 06/12/2025 Unknown    SDH (subdural hematoma) (HCC) 06/11/2025 Yes    Altered mental status 06/11/2025 Unknown    Fall 06/11/2025 Unknown      Resolved Hospital Problems   No resolved problems to display.       Neuro:   Diagnosis: R chronic SDH & left acute on chronic SDH s/p left craniotomy and right sided karyn hole for SDH evacuation (6/11)    Plan:   q1 hour neuro checks  SBP <160  Keppra 500mg BID x 7 days   Left subdural drain removed over night by patient, monitor site for leak   Plan for bilateral MMA this morning   Maintain right subdural drain open   Melatonin at night to promote normal sleep-wake cycle  Tylenol, oxycodone PRN pain   CT head overnight shows partial evacuation of hemorrhage. Repeat CTH for neuro change.     CV:   Diagnosis: New onset atrial fibrillation, hx of HTN    Plan:   SBP <160, MAP >65  PRN labetalol for SBP >160  Consider low dose beta blocker if tachycardic, currently HR 80-90  Hold all AC/AP   Telemetry   Cardiology consult when transferred out of ICU for outpatient follow-up of afib    Pulm:  Diagnosis: No active issues     Plan:   Encourage IS, monitor O2  O2 goal >93%    GI:   Diagnosis: NPO, hx of GERD     Plan:   Nursing bedside swallow evaluation post-procedure   Advance diet   Bowel regimen    :   Diagnosis: History of BPH      Plan:   Void trial post-procedure  Confirm outpatient meds for retention with family  Creatinine 0.56  Monitor I&O    F/E/N:   Diagnosis: No activen issues   Plan:   F- no IVF indicated at this time  E- phosphorus repletion this morning. K>4, MG >2  N- NPO for procedure    Heme/Onc:   Diagnosis: No active issues    Plan:   Mechanical VTE prophylaxis, hold  chemical prophylaxis until cleared by neurosurgery  Goal INR <1.4, PLT >100    Endo:   Diagnosis: Pre-diabetic  Plan:   A1C 6.6%, glu 181  Start insulin sliding scale  Goal glucose 140-180  TSH WNL    ID:   Diagnosis: No active issues     Plan:   Hailee-op Ancef  Monitor fever curve and WBC    MSK/Skin:   Diagnosis: Hx of OA  Plan:   Encourage OOB when appropriate  PT/OT consult    Disposition: Stepdown Level 1    ICU Core Measures     Vented Patient  VAP Bundle  VAP bundle ordered     A: Assess, Prevent, and Manage Pain Has pain been assessed? Yes  Need for changes to pain regimen? No   B: Both Spontaneous Awakening Trials (SATs) and Spontaneous Breathing Trials (SBTs) N/A, patient non mechanically ventilated   C: Choice of Sedation N/A   D: Delirium CAM-ICU: Negative   E: Early Mobility  Plan for early mobility? Yes   F: Family Engagement Plan for family engagement today? Yes       Antibiotic Review: Post op requirements     Review of Invasive Devices:    Bills Plan: Voiding trial after improvement in ambulation     [place] Plan: Keep arterial line for hemodynamic monitoring    Prophylaxis:  VTE VTE covered by:    None    Facility-Administered Medications Ordered in Other Encounters (Includes Only Anticoagulants)  heparin (porcine), Intravenous, 2,500 Units at 06/12/25 1141       Stress Ulcer  not ordered         24 Hour Events : Patient admitted to ICU post-operatively. New onset atrial fibrillation in the OR. Overnight, he became restless and pulled the left subdural drain. CT head stable, exam continues to improve. He is now in soft restraints.     Subjective   Review of Systems: Review of Systems   Constitutional:  Negative for chills, diaphoresis and fever.   Eyes:  Negative for visual disturbance.   Respiratory:  Negative for cough, chest tightness and shortness of breath.    Cardiovascular:  Negative for chest pain and palpitations.   Gastrointestinal:  Negative for abdominal pain and nausea.   Musculoskeletal:   Negative for myalgias and neck pain.   Neurological:  Positive for speech difficulty and headaches. Negative for dizziness, weakness, light-headedness and numbness.       Objective :                   Vitals I/O      Most Recent Min/Max in 24hrs   Temp  (patient in IR) Temp  Min: 97 °F (36.1 °C)  Max: 97.8 °F (36.6 °C)   Pulse 74 Pulse  Min: 68  Max: 84   Resp 12 Resp  Min: 8  Max: 22   /78 BP  Min: 134/77  Max: 155/83   O2 Sat 96 % SpO2  Min: 94 %  Max: 97 %      Intake/Output Summary (Last 24 hours) at 6/12/2025 1256  Last data filed at 6/12/2025 1119  Gross per 24 hour   Intake 650 ml   Output 2815 ml   Net -2165 ml       Diet NPO; Sips with meds    Invasive Monitoring           Physical Exam   Physical Exam  Eyes:      General: Vision grossly intact.      Extraocular Movements: Extraocular movements intact.      Pupils: Pupils are equal, round, and reactive to light.   Skin:     General: Skin is warm.      Coloration: Skin is not jaundiced.   HENT:      Head:      Comments: Right subdural drain sutured in place C/D/I    Cardiovascular:      Rate and Rhythm: Normal rate. Rhythm irregular.      Pulses: Normal pulses.   Musculoskeletal:         General: Normal range of motion.   Abdominal:      Palpations: Abdomen is soft.      Tenderness: There is no abdominal tenderness.   Constitutional:       General: He is not in acute distress.     Appearance: He is not ill-appearing.   Pulmonary:      Effort: Pulmonary effort is normal.      Breath sounds: Normal breath sounds.   Neurological:      Comments: Alert, oriented to person only. Mild word finding difficulty. Able to follow multi-step commands. No dysarthria. Visual fields full. PERRL. EOMi. Face symmetric. Moving all extremities 5/5. Sensation grossly intact to light touch.           Diagnostic Studies        Lab Results: I have reviewed the following results:     Medications:  Scheduled PRN   cefazolin, 2,000 mg, Once  cefazolin, 2,000 mg,  Q8H  chlorhexidine, 15 mL, Once  insulin lispro, 1-6 Units, Q6H SHANEKA  levETIRAcetam, 500 mg, Q12H SHANEKA  melatonin, 3 mg, HS  sodium phosphate, 12 mmol, Once      acetaminophen, 975 mg, Q8H PRN  hydrALAZINE, 10 mg, Q4H PRN  HYDROmorphone, 0.2 mg, Q4H PRN  labetalol, 10 mg, Q4H PRN  oxyCODONE, 2.5 mg, Q6H PRN   Or  oxyCODONE, 5 mg, Q4H PRN       Continuous          Labs:   CBC    Recent Labs     06/11/25  1110 06/12/25  0522   WBC 8.66 13.61*   HGB 15.0 14.8   HCT 45.2 43.1    219     BMP    Recent Labs     06/11/25  1110 06/12/25  0522   SODIUM 133* 134*   K 4.1 4.0   CL 98 99   CO2 26 25   AGAP 9 10   BUN 12 14   CREATININE 0.66 0.56*   CALCIUM 9.4 9.0       Coags    Recent Labs     06/11/25  0904 06/12/25  0522   INR 1.02 1.01   PTT 23 26        Additional Electrolytes  Recent Labs     06/11/25  1052 06/11/25  1110 06/12/25  0522   MG  --  2.0 2.0   PHOS  --  2.9 2.6   CAIONIZED 0.87*  --   --           Blood Gas    No recent results  No recent results LFTs  Recent Labs     06/11/25  1110 06/12/25  0522   ALT 14 12   AST 26 23   ALKPHOS 83 87   ALB 4.4 4.2   TBILI 2.04* 1.49*       Infectious  No recent results  Glucose  Recent Labs     06/11/25  0904 06/11/25  1110 06/12/25  0522   GLUC 125 107 181*

## 2025-06-12 NOTE — NURSING NOTE
Updated daughter via phone call, she called for update. She will be in tomorrow afternoon. Aware of Cat Scan in am.

## 2025-06-12 NOTE — PHYSICAL THERAPY NOTE
Physical Therapy Cancellation Note      06/12/25 1030   PT Last Visit   PT Visit Date 06/12/25   Note Type   Note type Evaluation;Cancelled Session   Cancel Reasons Patient off floor/test                                       Pt off floor in IR- PT will continue to follow and complete eval as appropriate

## 2025-06-12 NOTE — ANESTHESIA PREPROCEDURE EVALUATION
Procedure:  IR CEREBRAL ANGIOGRAPHY / INTERVENTION    Relevant Problems   ANESTHESIA (within normal limits)      CARDIO   (+) Atrial fibrillation (HCC)   (+) Essential hypertension   (+) Pure hypercholesterolemia      ENDO (within normal limits)      GI/HEPATIC  Confirmed NPO appropriate      /RENAL   (+) Benign prostatic hyperplasia without urinary obstruction      MUSCULOSKELETAL   (+) Primary osteoarthritis involving multiple joints      NEURO/PSYCH   (+) Posttraumatic stress disorder   (+) SDH (subdural hematoma) (HCC)      PULMONARY (within normal limits)   (-) URI (upper respiratory infection)      Behavioral Health   (+) Altered mental status      FEN/Gastrointestinal   (+) Reflux esophagitis        Physical Exam    Airway     Mallampati score: II  TM Distance: >3 FB    Mouth opening: >= 4 cm      Cardiovascular  Rhythm: irregular, Rate: normal    Dental   No notable dental hx     Pulmonary   Breath sounds clear to auscultation    Neurological    He appears awake, alert and confused.      Other Findings        Anesthesia Plan  ASA Score- 3     Anesthesia Type- general with ASA Monitors.         Additional Monitors:     Airway Plan: Oral ETT.    Comment: Pre-existing arterial line.       Plan Factors-    Chart reviewed. EKG reviewed.  Existing labs reviewed.     Patient is not a current smoker.              Induction- intravenous.    Postoperative Plan- .   Monitoring Plan - Monitoring plan - standard ASA monitoring and arterial line kit  Post Operative Pain Plan - multimodal analgesia    Perioperative Resuscitation Plan - Level 1 - Full Code.       Informed Consent- Anesthetic plan and risks discussed with spouse and daughter (Wife with hx of TBI, daughter in law Shasha May provided consent).  I personally reviewed this patient with the CRNA. Discussed and agreed on the Anesthesia Plan with the CRNA..      NPO Status:  Vitals Value Taken Time   Date of last liquid 06/11/25 06/11/25 13:43   Time of last  liquid 0000 06/11/25 13:43   Date of last solid 06/11/25 06/11/25 13:43   Time of last solid 0000 06/11/25 13:43       Lab Results   Component Value Date    WBC 13.61 (H) 06/12/2025    HGB 14.8 06/12/2025    HCT 43.1 06/12/2025    MCV 97 06/12/2025     06/12/2025     Lab Results   Component Value Date    SODIUM 134 (L) 06/12/2025    K 4.0 06/12/2025    CL 99 06/12/2025    CO2 25 06/12/2025    BUN 14 06/12/2025    CREATININE 0.56 (L) 06/12/2025    GLUC 181 (H) 06/12/2025    CALCIUM 9.0 06/12/2025     Lab Results   Component Value Date    ALT 12 06/12/2025    AST 23 06/12/2025    ALKPHOS 87 06/12/2025     Lab Results   Component Value Date    INR 1.01 06/12/2025    INR 1.02 06/11/2025    INR 1.0 07/08/2020    PROTIME 13.6 06/12/2025    PROTIME 13.8 06/11/2025     Lab Results   Component Value Date    HGBA1C 5.8 (H) 06/11/2025

## 2025-06-13 ENCOUNTER — APPOINTMENT (INPATIENT)
Dept: RADIOLOGY | Facility: HOSPITAL | Age: 75
DRG: 025 | End: 2025-06-13
Payer: MEDICARE

## 2025-06-13 LAB
ANION GAP SERPL CALCULATED.3IONS-SCNC: 7 MMOL/L (ref 4–13)
BUN SERPL-MCNC: 13 MG/DL (ref 5–25)
CA-I BLD-SCNC: 1.09 MMOL/L (ref 1.12–1.32)
CALCIUM SERPL-MCNC: 8.6 MG/DL (ref 8.4–10.2)
CHLORIDE SERPL-SCNC: 106 MMOL/L (ref 96–108)
CO2 SERPL-SCNC: 26 MMOL/L (ref 21–32)
CREAT SERPL-MCNC: 0.58 MG/DL (ref 0.6–1.3)
ERYTHROCYTE [DISTWIDTH] IN BLOOD BY AUTOMATED COUNT: 13.7 % (ref 11.6–15.1)
GFR SERPL CREATININE-BSD FRML MDRD: 99 ML/MIN/1.73SQ M
GLUCOSE SERPL-MCNC: 113 MG/DL (ref 65–140)
GLUCOSE SERPL-MCNC: 116 MG/DL (ref 65–140)
GLUCOSE SERPL-MCNC: 116 MG/DL (ref 65–140)
GLUCOSE SERPL-MCNC: 128 MG/DL (ref 65–140)
GLUCOSE SERPL-MCNC: 98 MG/DL (ref 65–140)
HCT VFR BLD AUTO: 38.8 % (ref 36.5–49.3)
HGB BLD-MCNC: 13 G/DL (ref 12–17)
KCT BLD-ACNC: 136 SEC (ref 89–137)
KCT BLD-ACNC: 154 SEC (ref 89–137)
MAGNESIUM SERPL-MCNC: 2.2 MG/DL (ref 1.9–2.7)
MCH RBC QN AUTO: 33.2 PG (ref 26.8–34.3)
MCHC RBC AUTO-ENTMCNC: 33.5 G/DL (ref 31.4–37.4)
MCV RBC AUTO: 99 FL (ref 82–98)
PHOSPHATE SERPL-MCNC: 2.8 MG/DL (ref 2.3–4.1)
PLATELET # BLD AUTO: 177 THOUSANDS/UL (ref 149–390)
PMV BLD AUTO: 9 FL (ref 8.9–12.7)
POTASSIUM SERPL-SCNC: 4 MMOL/L (ref 3.5–5.3)
RBC # BLD AUTO: 3.92 MILLION/UL (ref 3.88–5.62)
SODIUM SERPL-SCNC: 139 MMOL/L (ref 135–147)
SPECIMEN SOURCE: ABNORMAL
SPECIMEN SOURCE: NORMAL
WBC # BLD AUTO: 10.43 THOUSAND/UL (ref 4.31–10.16)

## 2025-06-13 PROCEDURE — 85027 COMPLETE CBC AUTOMATED: CPT | Performed by: PHYSICIAN ASSISTANT

## 2025-06-13 PROCEDURE — 82948 REAGENT STRIP/BLOOD GLUCOSE: CPT

## 2025-06-13 PROCEDURE — 92610 EVALUATE SWALLOWING FUNCTION: CPT

## 2025-06-13 PROCEDURE — 99291 CRITICAL CARE FIRST HOUR: CPT | Performed by: STUDENT IN AN ORGANIZED HEALTH CARE EDUCATION/TRAINING PROGRAM

## 2025-06-13 PROCEDURE — 99024 POSTOP FOLLOW-UP VISIT: CPT | Performed by: NEUROLOGICAL SURGERY

## 2025-06-13 PROCEDURE — 83735 ASSAY OF MAGNESIUM: CPT | Performed by: PHYSICIAN ASSISTANT

## 2025-06-13 PROCEDURE — 84100 ASSAY OF PHOSPHORUS: CPT | Performed by: PHYSICIAN ASSISTANT

## 2025-06-13 PROCEDURE — 92526 ORAL FUNCTION THERAPY: CPT

## 2025-06-13 PROCEDURE — 95720 EEG PHY/QHP EA INCR W/VEEG: CPT | Performed by: PSYCHIATRY & NEUROLOGY

## 2025-06-13 PROCEDURE — 80048 BASIC METABOLIC PNL TOTAL CA: CPT | Performed by: PHYSICIAN ASSISTANT

## 2025-06-13 PROCEDURE — 70496 CT ANGIOGRAPHY HEAD: CPT

## 2025-06-13 PROCEDURE — 82330 ASSAY OF CALCIUM: CPT | Performed by: PHYSICIAN ASSISTANT

## 2025-06-13 PROCEDURE — 99223 1ST HOSP IP/OBS HIGH 75: CPT | Performed by: NURSE PRACTITIONER

## 2025-06-13 RX ORDER — LEVETIRACETAM 500 MG/5ML
500 INJECTION, SOLUTION, CONCENTRATE INTRAVENOUS EVERY 12 HOURS SCHEDULED
Status: DISCONTINUED | OUTPATIENT
Start: 2025-06-13 | End: 2025-06-14

## 2025-06-13 RX ORDER — POLYETHYLENE GLYCOL 3350 17 G/17G
17 POWDER, FOR SOLUTION ORAL DAILY
Status: DISCONTINUED | OUTPATIENT
Start: 2025-06-13 | End: 2025-06-27 | Stop reason: HOSPADM

## 2025-06-13 RX ORDER — ACETAMINOPHEN 10 MG/ML
1000 INJECTION, SOLUTION INTRAVENOUS ONCE
Status: COMPLETED | OUTPATIENT
Start: 2025-06-13 | End: 2025-06-13

## 2025-06-13 RX ORDER — OLANZAPINE 10 MG/2ML
5 INJECTION, POWDER, FOR SOLUTION INTRAMUSCULAR ONCE
Status: COMPLETED | OUTPATIENT
Start: 2025-06-13 | End: 2025-06-13

## 2025-06-13 RX ORDER — SODIUM CHLORIDE, SODIUM GLUCONATE, SODIUM ACETATE, POTASSIUM CHLORIDE, MAGNESIUM CHLORIDE, SODIUM PHOSPHATE, DIBASIC, AND POTASSIUM PHOSPHATE .53; .5; .37; .037; .03; .012; .00082 G/100ML; G/100ML; G/100ML; G/100ML; G/100ML; G/100ML; G/100ML
75 INJECTION, SOLUTION INTRAVENOUS CONTINUOUS
Status: DISCONTINUED | OUTPATIENT
Start: 2025-06-13 | End: 2025-06-17

## 2025-06-13 RX ORDER — QUETIAPINE FUMARATE 25 MG/1
50 TABLET, FILM COATED ORAL 2 TIMES DAILY
Status: DISCONTINUED | OUTPATIENT
Start: 2025-06-13 | End: 2025-06-26

## 2025-06-13 RX ORDER — QUETIAPINE FUMARATE 25 MG/1
75 TABLET, FILM COATED ORAL
Status: DISCONTINUED | OUTPATIENT
Start: 2025-06-13 | End: 2025-06-19

## 2025-06-13 RX ORDER — SENNOSIDES 8.6 MG
1 TABLET ORAL
Status: DISCONTINUED | OUTPATIENT
Start: 2025-06-13 | End: 2025-06-16

## 2025-06-13 RX ORDER — WATER 10 ML/10ML
INJECTION INTRAMUSCULAR; INTRAVENOUS; SUBCUTANEOUS
Status: COMPLETED
Start: 2025-06-13 | End: 2025-06-13

## 2025-06-13 RX ORDER — LEVETIRACETAM 500 MG/5ML
500 INJECTION, SOLUTION, CONCENTRATE INTRAVENOUS ONCE
Status: COMPLETED | OUTPATIENT
Start: 2025-06-13 | End: 2025-06-13

## 2025-06-13 RX ORDER — QUETIAPINE FUMARATE 25 MG/1
25 TABLET, FILM COATED ORAL ONCE
Status: DISCONTINUED | OUTPATIENT
Start: 2025-06-13 | End: 2025-06-14

## 2025-06-13 RX ORDER — PANTOPRAZOLE SODIUM 40 MG/10ML
40 INJECTION, POWDER, LYOPHILIZED, FOR SOLUTION INTRAVENOUS
Status: DISCONTINUED | OUTPATIENT
Start: 2025-06-13 | End: 2025-06-27 | Stop reason: HOSPADM

## 2025-06-13 RX ORDER — QUETIAPINE FUMARATE 25 MG/1
25 TABLET, FILM COATED ORAL 2 TIMES DAILY
Status: DISCONTINUED | OUTPATIENT
Start: 2025-06-13 | End: 2025-06-13

## 2025-06-13 RX ORDER — WATER 10 ML/10ML
INJECTION INTRAMUSCULAR; INTRAVENOUS; SUBCUTANEOUS
Status: DISPENSED
Start: 2025-06-13 | End: 2025-06-13

## 2025-06-13 RX ORDER — FENTANYL CITRATE 50 UG/ML
50 INJECTION, SOLUTION INTRAMUSCULAR; INTRAVENOUS ONCE
Refills: 0 | Status: DISCONTINUED | OUTPATIENT
Start: 2025-06-13 | End: 2025-06-14

## 2025-06-13 RX ADMIN — AMITRIPTYLINE HYDROCHLORIDE 10 MG: 10 TABLET ORAL at 22:16

## 2025-06-13 RX ADMIN — LEVETIRACETAM 500 MG: 100 INJECTION INTRAVENOUS at 01:02

## 2025-06-13 RX ADMIN — ACETAMINOPHEN 1000 MG: 10 INJECTION INTRAVENOUS at 12:11

## 2025-06-13 RX ADMIN — HYDROMORPHONE HYDROCHLORIDE 0.2 MG: 0.2 INJECTION, SOLUTION INTRAMUSCULAR; INTRAVENOUS; SUBCUTANEOUS at 11:25

## 2025-06-13 RX ADMIN — QUETIAPINE 75 MG: 25 TABLET ORAL at 22:16

## 2025-06-13 RX ADMIN — WATER 10 ML: 1 INJECTION INTRAMUSCULAR; INTRAVENOUS; SUBCUTANEOUS at 14:15

## 2025-06-13 RX ADMIN — IOHEXOL 85 ML: 350 INJECTION, SOLUTION INTRAVENOUS at 16:10

## 2025-06-13 RX ADMIN — CHLORHEXIDINE GLUCONATE 15 ML: 1.2 SOLUTION ORAL at 09:49

## 2025-06-13 RX ADMIN — DEXMEDETOMIDINE HYDROCHLORIDE 1.2 MCG/KG/HR: 4 INJECTION, SOLUTION INTRAVENOUS at 00:33

## 2025-06-13 RX ADMIN — PANTOPRAZOLE SODIUM 40 MG: 40 INJECTION, POWDER, FOR SOLUTION INTRAVENOUS at 05:23

## 2025-06-13 RX ADMIN — Medication 3 MG: at 22:16

## 2025-06-13 RX ADMIN — CHLORHEXIDINE GLUCONATE 15 ML: 1.2 SOLUTION ORAL at 22:00

## 2025-06-13 RX ADMIN — DEXMEDETOMIDINE HYDROCHLORIDE 1 MCG/KG/HR: 4 INJECTION, SOLUTION INTRAVENOUS at 15:45

## 2025-06-13 RX ADMIN — LEVETIRACETAM 500 MG: 100 INJECTION, SOLUTION INTRAVENOUS at 22:00

## 2025-06-13 RX ADMIN — SENNOSIDES 8.6 MG: 8.6 TABLET, FILM COATED ORAL at 22:16

## 2025-06-13 RX ADMIN — QUETIAPINE FUMARATE 25 MG: 25 TABLET ORAL at 10:16

## 2025-06-13 RX ADMIN — OLANZAPINE 5 MG: 10 INJECTION, POWDER, LYOPHILIZED, FOR SOLUTION INTRAMUSCULAR at 14:14

## 2025-06-13 RX ADMIN — HYDRALAZINE HYDROCHLORIDE 10 MG: 20 INJECTION, SOLUTION INTRAMUSCULAR; INTRAVENOUS at 12:11

## 2025-06-13 RX ADMIN — QUETIAPINE 50 MG: 25 TABLET ORAL at 18:18

## 2025-06-13 RX ADMIN — DEXMEDETOMIDINE HYDROCHLORIDE 0.7 MCG/KG/HR: 4 INJECTION, SOLUTION INTRAVENOUS at 04:37

## 2025-06-13 RX ADMIN — SODIUM CHLORIDE, SODIUM GLUCONATE, SODIUM ACETATE, POTASSIUM CHLORIDE, MAGNESIUM CHLORIDE, SODIUM PHOSPHATE, DIBASIC, AND POTASSIUM PHOSPHATE 75 ML/HR: .53; .5; .37; .037; .03; .012; .00082 INJECTION, SOLUTION INTRAVENOUS at 09:21

## 2025-06-13 RX ADMIN — OLANZAPINE 5 MG: 10 INJECTION, POWDER, LYOPHILIZED, FOR SOLUTION INTRAMUSCULAR at 09:40

## 2025-06-13 RX ADMIN — LEVETIRACETAM 500 MG: 100 INJECTION, SOLUTION INTRAVENOUS at 09:49

## 2025-06-13 RX ADMIN — DEXMEDETOMIDINE HYDROCHLORIDE 1 MCG/KG/HR: 4 INJECTION, SOLUTION INTRAVENOUS at 21:48

## 2025-06-13 NOTE — PROGRESS NOTES
Progress Note - Neurosurgery   Name: Jasson Carter 75 y.o. male I MRN: [id number]  Unit/Bed#: ICU 05 I Date of Admission: 2025   Date of Service: 2025 I Hospital Day: 2    Assessment & Plan  SDH (subdural hematoma) (HCC)  PPD 1 s/p Successful partial embolization of cribriform plate DAVF via the left ophthalmic artery- mild residual remains from the right ophthalmic artery (Dr. Yoon 25).   POD#2 - s/p L craniotomy and R karyn holes for evac of radha SDH ( 25)   L sided acute on chronic SDH   R chronic SDH   Arrived at [facility] ER on 25 with complaints of headache and left-sided hip pain after reported fall in the grass the day prior to arrival  Upon further questioning, patient has had some slurred speech for couple days per his wife.  Upon arrival to [facility], patient has significant expressive aphasia and confusion.  On 25 after partial embolization of the DAVF, pt     Imagin/11 post-op CTA head/neck: Postsurgical change from partial evacuation of left hemispheric subdural hematoma. Residual hemorrhage measures up to 9 mm in thickness. Evacuation of right hemispheric subdural collection. Surgical drain and gas over the right convexity. Reduced left to right midline shift, 3 mm. No hemodynamically significant stenosis, dissection or occlusion of the carotid or vertebral arteries or major vessels of the [place] of [place].   repeat CTH: Stable mixed density left convexity subdural hematoma containing hyperdense acute blood products, with stable approximately 5 mm of left-to-right midline shift. Stable hypodense right convexity subdural hematoma   CTH: 14 mm mixed density acute left cerebral convexity subdural hematoma with associated 5 mm rightward midline shift. Low-density 7 mm right cerebral convexity subdural hematoma, age indeterminate.   CT c-spine: No cervical spine fracture or traumatic malalignment     Plan:   Continue to closely monitor neuro exam --> pt needs  frequent VF checks and eye monitoring   Frequent neuro checks per primary team   Repeat STAT CTH with any acute decline in GCS > 2pts or more in 1hr   Maintain normotensive BP goals, SBP goal 100-140   6/11 - s/p removal of L SDD   1 R SDD in place, 0 cc/ 24 hrs. Will likely d/c today when pt is a bit more calm.   Continue keppra for seizure ppx x 14 days --> consider vEEG monitoring should pt have no improvement in his exam   Hold all AC/AP meds   DVT ppx: SCDs, hold chem dvt ppx until cleared by nsgy team   Medical management per primary team   Pain control per primary team   PT/OT   Pt to  obtain CT head and  CT venogram today. Pt to obtain an MRA head wo for further evaluation when pt able to tolerate. (MRI brain/MRA was attempted last night but pt unable to tolerate due to agitation).   No embolization anticipated today/at this time due to patient's altered mental status.  Dr. Yoon discussed with pt's daughter and son that patient to remain in hospital till they have additional treatment of the DAVF. Ongoing discussion about timing for the treatment, possibly the week of 06/23/25 by Dr. Yoon, possibly sooner by Dr. Barbour.  Pt's family agreeable to the plan of care.   Neurosurgery will continue to follow. Please reach out with any further questions or concerns.   Atrial fibrillation (HCC)      Please contact the SecureChat role for the Neurosurgery service with any questions/concerns.    Subjective   Per report pt remains confused, delirious and agitated requiring soft wrist restraints.     Objective :  Temp:  [97 °F (36.1 °C)-98.8 °F (37.1 °C)] 98.8 °F (37.1 °C)  HR:  [] 52  BP: (102-154)/(65-93) 125/75  Resp:  [12-37] 13  SpO2:  [93 %-97 %] 97 %  O2 Device: None (Room air)    I/O         06/10 0701  06/11 0700 06/11 0701 06/12 0700 06/12 0701 06/13 0700    I.V. (mL/kg)  600 (6.9)     IV Piggyback  50     Total Intake(mL/kg)  650 (7.5)     Urine (mL/kg/hr)  2560     Drains  10     Blood  25      Total Output  2595     Net  -1945                  Physical Exam Neurological Exam  General appearance: alert, appears stated age, cooperative and no distress  Head: vEEG in place.   - s/p L craniotomy.  Incision is clean, dry, intact with staples.   - s/p R karyn holes.  Dressing is in place and intact.  1 SDD in place.  Eyes: EOMI, PERRL  Neck: supple, symmetrical, trachea midline and NT  Back: no kyphosis present, no tenderness to percussion or palpation  Lungs: non labored breathing, no resp distress on room air   Heart: regular heart rate  Neurologic: awake, agitated/confused  Cranial nerves: grossly intact (Cranial nerves II-XII)  Motor: spontaneously THAKKAR.       Lab Results: I have reviewed the following results:  Recent Labs     06/12/25  0522 06/13/25  0540 06/13/25  0639   WBC 13.61* 10.43*  --    HGB 14.8 13.0  --    HCT 43.1 38.8  --     177  --    SODIUM 134* 139  --    K 4.0 4.0  --    CL 99 106  --    CO2 25 26  --    BUN 14 13  --    CREATININE 0.56* 0.58*  --    GLUC 181* 116  --    CAIONIZED  --   --  1.09*   MG 2.0 2.2  --    PHOS 2.6 2.8  --    AST 23  --   --    ALT 12  --   --    ALB 4.2  --   --    TBILI 1.49*  --   --    ALKPHOS 87  --   --    PTT 26  --   --    INR 1.01  --   --      VTE Pharmacologic Prophylaxis: Sequential compression device (Venodyne)

## 2025-06-13 NOTE — CASE MANAGEMENT
Case Management Assessment & Discharge Planning Note    Patient name Jasson Carter  Location ICU 05/ICU 05 MRN [id number]  : 1950 Date 2025       Current Admission Date: 2025  Current Admission Diagnosis:SDH (subdural hematoma) (HCC)   Patient Active Problem List    Diagnosis Date Noted    Atrial fibrillation (HCC) 2025    SDH (subdural hematoma) (HCC) 2025    Altered mental status 2025    Fall 2025    Hyponatremia 2024    Neuropathy 2021    Reflux esophagitis 2020    Seasonal allergic rhinitis due to pollen 2020    Primary osteoarthritis involving multiple joints 2020    Posttraumatic stress disorder 2020    History of gout 2019    Pure hypercholesterolemia 2017    Essential hypertension 2017    Benign prostatic hyperplasia without urinary obstruction 2011      LOS (days): 2  Geometric Mean LOS (GMLOS) (days): 6.5  Days to GMLOS:4.5     OBJECTIVE:    Risk of Unplanned Readmission Score: 16.82         Current admission status: Inpatient       Preferred Pharmacy:   [id number] - [place], [place] - [address] [facility]  [address] [facility] [address]  Phone: [phone] Fax: [phone]    Primary Care Provider: Ruben Finch MD    Primary Insurance: MEDICARE  Secondary Insurance: [facility]    ASSESSMENT:  Active Health Care Proxies    There are no active Health Care Proxies on file.       Advance Directives  Does patient have a Health Care POA?: Yes  Was patient offered paperwork?: Yes  Does patient currently have a Health Care decision maker?: Yes, please see Health Care Proxy section  Does patient have Advance Directives?: Yes  Advance Directives: Living will, Power of  for health care  Was patient offered paperwork?: Yes  Primary Contact: Shasha May (Daughter) [phone]    Readmission Root Cause  30 Day Readmission: No    Patient Information  Admitted from::  Home  Mental Status: Alert  During Assessment patient was accompanied by: Not accompanied during assessment  Assessment information provided by:: Patient  Primary Caregiver: Self  Support Systems: Self, Spouse/significant other  County [place] Residence: [facility]  [place] [facility] do you live in?: [place]  Home entry access options. Select all that apply.: Stairs  Number of steps to enter home.: 1  Do the steps have railings?: No  Type of Current Residence: Bi-level  Upon entering residence, is there a bedroom on the main floor (no further steps)?: No  A bedroom is located on the following floor levels of residence (select all that apply):: 2nd Floor  Upon entering residence, is there a bathroom on the main floor (no further steps)?: Yes  Number of steps to 2nd floor from main floor: One Flight  Living Arrangements: Lives w/ Spouse/significant other  Is patient a ?: Yes  [facility] [employer]: Yes  Is patient service connected?: No    Activities of Daily Living Prior to Admission  Functional Status: Independent  Completes ADLs independently?: Yes  Ambulates independently?: Yes  Does patient use assisted devices?: No  Does patient currently own DME?: No  Does patient have a history of Outpatient Therapy (PT/OT)?: No  Does the patient have a history of Short-Term Rehab?: No  Does patient have a history of HHC?: No  Does patient currently have HHC?: No    Patient Information Continued  Income Source: Pension/group home  Does patient have prescription coverage?: Yes  Can the patient afford their medications and any related supplies (such as glucometers or test strips)?: Yes  Does patient receive dialysis treatments?: No  Does patient have a history of substance abuse?: No  Does patient have a history of Mental Health Diagnosis?: No    Means of Transportation  Means of Transport to Appts:: Drives Self    DISCHARGE DETAILS:    Discharge planning discussed with:: Shasha May (Daughter)  [phone]  Freedom of Choice: Yes     CM contacted family/caregiver?: Yes  Were Treatment Team discharge recommendations reviewed with patient/caregiver?: Yes  Did patient/caregiver verbalize understanding of patient care needs?: N/A- going to facility  Were patient/caregiver advised of the risks associated with not following Treatment Team discharge recommendations?: Yes    Contacts  Patient Contacts: Shasha May (Daughter) [phone]  Relationship to Patient:: Family  Contact Method: Phone  Phone Number: [phone]  Reason/Outcome: Continuity of Care, Emergency Contact, Discharge Planning    Requested Home Health Care         Is the patient interested in HHC at discharge?: No    DME Referral Provided  Referral made for DME?: No    CM spoke to pt's dtr Marge, to discuss the role of CM.  Pt lives with his wife in a split level home which has 1STE. Pt has a 1/2 bath on that main level but their bedroom and bathroom (tub/shower with standard toilet) are on 2nd floor.  Pt drives. Pt is retired. Pt is independent for all ADL/iADLs. Pt's had 3 falls. Pt owns no DME. Pt enjoys going for hikes, swimming and landscaping.   [facility] [place] Pt has no hx of mental health or substance abuse. Pt has a living will and dtr will email CM    CM then met with pt's son Abraham, Dtr Marge Galan, and wife Edelmira; to review d/c planning, potential options, minor goals of care, and answer all questions

## 2025-06-13 NOTE — PLAN OF CARE
Problem: Nutrition/Hydration-ADULT  Goal: Nutrient/Hydration intake appropriate for improving, restoring or maintaining nutritional needs  Description: Monitor and assess patient's nutrition/hydration status for malnutrition. Collaborate with interdisciplinary team and initiate plan and interventions as ordered.  Monitor patient's weight and dietary intake as ordered or per policy. Utilize nutrition screening tool and intervene as necessary. Determine patient's food preferences and provide high-protein, high-caloric foods as appropriate.     INTERVENTIONS:  - Monitor oral intake, urinary output, labs, and treatment plans  - Assess nutrition and hydration status and recommend course of action  - Evaluate amount of meals eaten  - Assist patient with eating if necessary   - Allow adequate time for meals  - Recommend/ encourage appropriate diets, oral nutritional supplements, and vitamin/mineral supplements  - Order, calculate, and assess calorie counts as needed  - Recommend, monitor, and adjust tube feedings and TPN/PPN based on assessed needs  - Assess need for intravenous fluids  - Provide specific nutrition/hydration education as appropriate  - Include patient/family/caregiver in decisions related to nutrition  6/13/2025 0510 by Pool Kaur RN  Outcome: Progressing  6/13/2025 0509 by Pool Kaur RN  Outcome: Progressing     Problem: PAIN - ADULT  Goal: Verbalizes/displays adequate comfort level or baseline comfort level  Description: Interventions:  - Encourage patient to monitor pain and request assistance  - Assess pain using appropriate pain scale  - Administer analgesics as ordered based on type and severity of pain and evaluate response  - Implement non-pharmacological measures as appropriate and evaluate response  - Consider cultural and social influences on pain and pain management  - Notify physician/advanced practitioner if interventions unsuccessful or patient reports new pain  - Educate  patient/family on pain management process including their role and importance of  reporting pain   - Provide non-pharmacologic/complimentary pain relief interventions  6/13/2025 0510 by Pool Kaur RN  Outcome: Progressing  6/13/2025 0509 by Pool Kaur RN  Outcome: Progressing     Problem: INFECTION - ADULT  Goal: Absence or prevention of progression during hospitalization  Description: INTERVENTIONS:  - Assess and monitor for signs and symptoms of infection  - Monitor lab/diagnostic results  - Monitor all insertion sites, i.e. indwelling lines, tubes, and drains  - Monitor endotracheal if appropriate and nasal secretions for changes in amount and color  - [place] appropriate cooling/warming therapies per order  - Administer medications as ordered  - Instruct and encourage patient and family to use good hand hygiene technique  - Identify and instruct in appropriate isolation precautions for identified infection/condition  6/13/2025 0510 by Pool Kaur RN  Outcome: Progressing  6/13/2025 0509 by Pool Kaur RN  Outcome: Progressing  Goal: Absence of fever/infection during neutropenic period  Description: INTERVENTIONS:  - Monitor WBC  - Perform strict hand hygiene  - Limit to healthy visitors only  - No plants, dried, fresh or silk flowers with winters in patient room  6/13/2025 0510 by Pool Kaur RN  Outcome: Progressing  6/13/2025 0509 by Pool Kaur RN  Outcome: Progressing     Problem: SAFETY ADULT  Goal: Patient will remain free of falls  Description: INTERVENTIONS:  - Educate patient/family on patient safety including physical limitations  - Instruct patient to call for assistance with activity   - Consider consulting OT/PT to assist with strengthening/mobility based on AM PAC & JH-HLM score  - Consult OT/PT to assist with strengthening/mobility   - Keep Call bell within reach  - Keep bed low and locked with side rails adjusted as appropriate  - Keep care items and personal  belongings within reach  - Initiate and maintain comfort rounds  - Make Fall Risk Sign visible to staff  - Offer Toileting every 2 Hours, in advance of need  - Initiate/Maintain alarm  - Obtain necessary fall risk management equipment: - Apply yellow socks and bracelet for high fall risk patients  - Consider moving patient to room near nurses station  6/13/2025 0510 by Pool Kaur RN  Outcome: Progressing  6/13/2025 0509 by Pool Kaur RN  Outcome: Progressing  Goal: Maintain or return to baseline ADL function  Description: INTERVENTIONS:  -  Assess patient's ability to carry out ADLs; assess patient's baseline for ADL function and identify physical deficits which impact ability to perform ADLs (bathing, care of mouth/teeth, toileting, grooming, dressing, etc.)  - Assess/evaluate cause of self-care deficits   - Assess range of motion  - Assess patient's mobility; develop plan if impaired  - Assess patient's need for assistive devices and provide as appropriate  - Encourage maximum independence but intervene and supervise when necessary  - Involve family in performance of ADLs  - Assess for home care needs following discharge   - Consider OT consult to assist with ADL evaluation and planning for discharge  - Provide patient education as appropriate  - Monitor functional capacity and physical performance, use of AM PAC & JH-HLM   - Monitor gait, balance and fatigue with ambulation    6/13/2025 0510 by Pool Kaur RN  Outcome: Progressing  6/13/2025 0509 by Pool Kaur RN  Outcome: Progressing  Goal: Maintains/Returns to pre admission functional level  Description: INTERVENTIONS:  - Perform AM-PAC 6 Click Basic Mobility/ Daily Activity assessment daily.  - Set and communicate daily mobility goal to care team and patient/family/caregiver.   - Collaborate with rehabilitation services on mobility goals if consulted  - Perform Range of Motion 3 times a day.  - Reposition patient every 2 hours.  -  Dangle patient  times a day  - Stand patient  times a day  - Ambulate patient  times a day  - Out of bed to chair  times a day   - Out of bed for meals  times a day  - Out of bed for toileting  - Record patient progress and toleration of activity level   6/13/2025 0510 by Pool Kaur RN  Outcome: Progressing  6/13/2025 0509 by Pool Kaur RN  Outcome: Progressing     Problem: DISCHARGE PLANNING  Goal: Discharge to home or other facility with appropriate resources  Description: INTERVENTIONS:  - Identify barriers to discharge w/patient and caregiver  - Arrange for needed discharge resources and transportation as appropriate  - Identify discharge learning needs (meds, wound care, etc.)  - Arrange for interpretive services to assist at discharge as needed  - Refer to Case Management Department for coordinating discharge planning if the patient needs post-hospital services based on physician/advanced practitioner order or complex needs related to functional status, cognitive ability, or social support system  6/13/2025 0510 by Pool Kaur RN  Outcome: Progressing  6/13/2025 0509 by Pool Kaur RN  Outcome: Progressing     Problem: Knowledge Deficit  Goal: Patient/family/caregiver demonstrates understanding of disease process, treatment plan, medications, and discharge instructions  Description: Complete learning assessment and assess knowledge base.  Interventions:  - Provide teaching at level of understanding  - Provide teaching via preferred learning methods  6/13/2025 0510 by Pool Kaur RN  Outcome: Progressing  6/13/2025 0509 by Pool Kaur RN  Outcome: Progressing     Problem: NEUROSENSORY - ADULT  Goal: Achieves stable or improved neurological status  Description: INTERVENTIONS  - Monitor and report changes in neurological status  - Monitor vital signs such as temperature, blood pressure, glucose, and any other labs ordered   - Initiate measures to prevent increased  intracranial pressure  - Monitor for seizure activity and implement precautions if appropriate      6/13/2025 0510 by Pool Kaur RN  Outcome: Progressing  6/13/2025 0509 by Pool Kaur RN  Outcome: Progressing  Goal: Remains free of injury related to seizures activity  Description: INTERVENTIONS  - Maintain airway, patient safety  and administer oxygen as ordered  - Monitor patient for seizure activity, document and report duration and description of seizure to physician/advanced practitioner  - If seizure occurs,  ensure patient safety during seizure  - Reorient patient post seizure  - Seizure pads on all 4 side rails  - Instruct patient/family to notify RN of any seizure activity including if an aura is experienced  - Instruct patient/family to call for assistance with activity based on nursing assessment  - Administer anti-seizure medications if ordered    6/13/2025 0510 by Pool Kaur RN  Outcome: Progressing  6/13/2025 0509 by Pool Kaur RN  Outcome: Progressing  Goal: Achieves maximal functionality and self care  Description: INTERVENTIONS  - Monitor swallowing and airway patency with patient fatigue and changes in neurological status  - Encourage and assist patient to increase activity and self care.   - Encourage visually impaired, hearing impaired and aphasic patients to use assistive/communication devices  6/13/2025 0510 by Pool Kaur RN  Outcome: Progressing  6/13/2025 0509 by Pool Kaur RN  Outcome: Progressing     Problem: CARDIOVASCULAR - ADULT  Goal: Maintains optimal cardiac output and hemodynamic stability  Description: INTERVENTIONS:  - Monitor I/O, vital signs and rhythm  - Monitor for S/S and trends of decreased cardiac output  - Administer and titrate ordered vasoactive medications to optimize hemodynamic stability  - Assess quality of pulses, skin color and temperature  - Assess for signs of decreased coronary artery perfusion  - Instruct patient to  report change in severity of symptoms  6/13/2025 0510 by Pool Kaur RN  Outcome: Progressing  6/13/2025 0509 by Pool Kaur RN  Outcome: Progressing  Goal: Absence of cardiac dysrhythmias or at baseline rhythm  Description: INTERVENTIONS:  - Continuous cardiac monitoring, vital signs, obtain 12 lead EKG if ordered  - Administer antiarrhythmic and heart rate control medications as ordered  - Monitor electrolytes and administer replacement therapy as ordered  6/13/2025 0510 by Pool Kaur RN  Outcome: Progressing  6/13/2025 0509 by Pool Kaur RN  Outcome: Progressing     Problem: RESPIRATORY - ADULT  Goal: Achieves optimal ventilation and oxygenation  Description: INTERVENTIONS:  - Assess for changes in respiratory status  - Assess for changes in mentation and behavior  - Position to facilitate oxygenation and minimize respiratory effort  - Oxygen administered by appropriate delivery if ordered  - Initiate smoking cessation education as indicated  - Encourage broncho-pulmonary hygiene including cough, deep breathe, Incentive Spirometry  - Assess the need for suctioning and aspirate as needed  - Assess and instruct to report SOB or any respiratory difficulty  - Respiratory Therapy support as indicated  6/13/2025 0510 by Pool Kaur RN  Outcome: Progressing  6/13/2025 0509 by Pool Kaur RN  Outcome: Progressing     Problem: GASTROINTESTINAL - ADULT  Goal: Minimal or absence of nausea and/or vomiting  Description: INTERVENTIONS:  - Administer IV fluids if ordered to ensure adequate hydration  - Maintain NPO status until nausea and vomiting are resolved  - Nasogastric tube if ordered  - Administer ordered antiemetic medications as needed  - Provide nonpharmacologic comfort measures as appropriate  - Advance diet as tolerated, if ordered  - Consider nutrition services referral to assist patient with adequate nutrition and appropriate food choices  6/13/2025 0510 by Pool Kaur  RN  Outcome: Progressing  6/13/2025 0509 by Pool Kaur RN  Outcome: Progressing  Goal: Maintains or returns to baseline bowel function  Description: INTERVENTIONS:  - Assess bowel function  - Encourage oral fluids to ensure adequate hydration  - Administer IV fluids if ordered to ensure adequate hydration  - Administer ordered medications as needed  - Encourage mobilization and activity  - Consider nutritional services referral to assist patient with adequate nutrition and appropriate food choices  6/13/2025 0510 by Pool Kaur RN  Outcome: Progressing  6/13/2025 0509 by Pool Kaur RN  Outcome: Progressing  Goal: Maintains adequate nutritional intake  Description: INTERVENTIONS:  - Monitor percentage of each meal consumed  - Identify factors contributing to decreased intake, treat as appropriate  - Assist with meals as needed  - Monitor I&O, weight, and lab values if indicated  - Obtain nutrition services referral as needed  6/13/2025 0510 by Pool Kaur RN  Outcome: Progressing  6/13/2025 0509 by Pool Kaur RN  Outcome: Progressing  Goal: Establish and maintain optimal ostomy function  Description: INTERVENTIONS:  - Assess bowel function  - Encourage oral fluids to ensure adequate hydration  - Administer IV fluids if ordered to ensure adequate hydration   - Administer ordered medications as needed  - Encourage mobilization and activity  - Nutrition services referral to assist patient with appropriate food choices  - Assess stoma site  - Consider wound care consult   6/13/2025 0510 by Pool Kaur RN  Outcome: Progressing  6/13/2025 0509 by Pool Kaur RN  Outcome: Progressing  Goal: Oral mucous membranes remain intact  Description: INTERVENTIONS  - Assess oral mucosa and hygiene practices  - Implement preventative oral hygiene regimen  - Implement oral medicated treatments as ordered  - Initiate Nutrition services referral as needed  6/13/2025 0510 by Pool Kaur  RN  Outcome: Progressing  6/13/2025 0509 by Pool Kaur RN  Outcome: Progressing     Problem: GENITOURINARY - ADULT  Goal: Maintains or returns to baseline urinary function  Description: INTERVENTIONS:  - Assess urinary function  - Encourage oral fluids to ensure adequate hydration if ordered  - Administer IV fluids as ordered to ensure adequate hydration  - Administer ordered medications as needed  - Offer frequent toileting  - Follow urinary retention protocol if ordered  6/13/2025 0510 by Pool Kaur RN  Outcome: Progressing  6/13/2025 0509 by Pool Kaur RN  Outcome: Progressing  Goal: Absence of urinary retention  Description: INTERVENTIONS:  - Assess patient’s ability to void and empty bladder  - Monitor I/O  - Bladder scan as needed  - Discuss with physician/AP medications to alleviate retention as needed  - Discuss catheterization for long term situations as appropriate  6/13/2025 0510 by Pool Kaur RN  Outcome: Progressing  6/13/2025 0509 by Pool Kaur RN  Outcome: Progressing  Goal: Urinary catheter remains patent  Description: INTERVENTIONS:  - Assess patency of urinary catheter  - If patient has a chronic pedersen, consider changing catheter if non-functioning  - Follow guidelines for intermittent irrigation of non-functioning urinary catheter  6/13/2025 0510 by Pool Kaur RN  Outcome: Progressing  6/13/2025 0509 by Pool Kaur RN  Outcome: Progressing     Problem: METABOLIC, FLUID AND ELECTROLYTES - ADULT  Goal: Electrolytes maintained within normal limits  Description: INTERVENTIONS:  - Monitor labs and assess patient for signs and symptoms of electrolyte imbalances  - Administer electrolyte replacement as ordered  - Monitor response to electrolyte replacements, including repeat lab results as appropriate  - Instruct patient on fluid and nutrition as appropriate  6/13/2025 0510 by Pool Kaur RN  Outcome: Progressing  6/13/2025 0509 by Pool Kaur  RN  Outcome: Progressing  Goal: Fluid balance maintained  Description: INTERVENTIONS:  - Monitor labs   - Monitor I/O and WT  - Instruct patient on fluid and nutrition as appropriate  - Assess for signs & symptoms of volume excess or deficit  6/13/2025 0510 by Pool Kaur RN  Outcome: Progressing  6/13/2025 0509 by Pool Kaur RN  Outcome: Progressing  Goal: Glucose maintained within target range  Description: INTERVENTIONS:  - Monitor Blood Glucose as ordered  - Assess for signs and symptoms of hyperglycemia and hypoglycemia  - Administer ordered medications to maintain glucose within target range  - Assess nutritional intake and initiate nutrition service referral as needed  6/13/2025 0510 by Pool Kaur RN  Outcome: Progressing  6/13/2025 0509 by Pool Kaur RN  Outcome: Progressing     Problem: SKIN/TISSUE INTEGRITY - ADULT  Goal: Skin Integrity remains intact(Skin Breakdown Prevention)  Description: Assess:  -Perform Jake assessment   -Clean and moisturize skin   -Inspect skin when repositioning, toileting, and assisting with ADLS  -Assess under medical devices   -Assess extremities for adequate circulation and sensation     Bed Management:  -Have minimal linens on bed & keep smooth, unwrinkled  -Change linens as needed when moist or perspiring  -Avoid sitting or lying in one position for more than *** hours while in bed  -Keep HOB at ***degrees     Toileting:  -Offer bedside commode  -Assess for incontinence every ***  -Use incontinent care products after each incontinent episode such as ***    Activity:  -Mobilize patient *** times a day  -Encourage activity and walks on unit  -Encourage or provide ROM exercises   -Turn and reposition patient every *** Hours  -Use appropriate equipment to lift or move patient in bed  -Instruct/ Assist with weight shifting every *** when out of bed in chair  -Consider limitation of chair time *** hour intervals    Skin Care:  -Avoid use of baby  powder, tape, friction and shearing, hot water or constrictive clothing  -Relieve pressure over bony prominences using ***  -Do not massage red bony areas    Next Steps:  -Teach patient strategies to minimize risks such as ***   -Consider consults to  interdisciplinary teams such as ***  6/13/2025 0510 by Pool Kaur RN  Outcome: Progressing  6/13/2025 0509 by Pool Kaur RN  Outcome: Progressing  Goal: Incision(s), wounds(s) or drain site(s) healing without S/S of infection  Description: INTERVENTIONS  - Assess and document dressing, incision, wound bed, drain sites and surrounding tissue  - Provide patient and family education  - Perform skin care/dressing changes every ***  6/13/2025 0510 by Pool Kaur RN  Outcome: Progressing  6/13/2025 0509 by Pool Kaur RN  Outcome: Progressing  Goal: Pressure injury heals and does not worsen  Description: Interventions:  - Implement low air loss mattress or specialty surface (Criteria met)  - Apply silicone foam dressing  - Instruct/assist with weight shifting every *** minutes when in chair   - Limit chair time to *** hour intervals  - Use special pressure reducing interventions such as *** when in chair   - Apply fecal or urinary incontinence containment device   - Perform passive or active ROM every ***  - Turn and reposition patient & offload bony prominences every *** hours   - Utilize friction reducing device or surface for transfers   - Consider consults to  interdisciplinary teams such as ***  - Use incontinent care products after each incontinent episode such as ***  - Consider nutrition services referral as needed  6/13/2025 0510 by Pool Kaur RN  Outcome: Progressing  6/13/2025 0509 by Pool Kaur RN  Outcome: Progressing     Problem: HEMATOLOGIC - ADULT  Goal: Maintains hematologic stability  Description: INTERVENTIONS  - Assess for signs and symptoms of bleeding or hemorrhage  - Monitor labs  - Administer supportive blood  products/factors as ordered and appropriate  6/13/2025 0510 by Pool Kaur RN  Outcome: Progressing  6/13/2025 0509 by Pool Kaur RN  Outcome: Progressing     Problem: MUSCULOSKELETAL - ADULT  Goal: Maintain or return mobility to safest level of function  Description: INTERVENTIONS:  - Assess patient's ability to carry out ADLs; assess patient's baseline for ADL function and identify physical deficits which impact ability to perform ADLs (bathing, care of mouth/teeth, toileting, grooming, dressing, etc.)  - Assess/evaluate cause of self-care deficits   - Assess range of motion  - Assess patient's mobility  - Assess patient's need for assistive devices and provide as appropriate  - Encourage maximum independence but intervene and supervise when necessary  - Involve family in performance of ADLs  - Assess for home care needs following discharge   - Consider OT consult to assist with ADL evaluation and planning for discharge  - Provide patient education as appropriate  6/13/2025 0510 by Pool Kaur RN  Outcome: Progressing  6/13/2025 0509 by Pool Kaur RN  Outcome: Progressing  Goal: Maintain proper alignment of affected body part  Description: INTERVENTIONS:  - Support, maintain and protect limb and body alignment  - Provide patient/ family with appropriate education  6/13/2025 0510 by Pool Kaur RN  Outcome: Progressing  6/13/2025 0509 by Pool Kaur RN  Outcome: Progressing      independent

## 2025-06-13 NOTE — PLAN OF CARE
Problem: Nutrition/Hydration-ADULT  Goal: Nutrient/Hydration intake appropriate for improving, restoring or maintaining nutritional needs  Description: Monitor and assess patient's nutrition/hydration status for malnutrition. Collaborate with interdisciplinary team and initiate plan and interventions as ordered.  Monitor patient's weight and dietary intake as ordered or per policy. Utilize nutrition screening tool and intervene as necessary. Determine patient's food preferences and provide high-protein, high-caloric foods as appropriate.     INTERVENTIONS:  - Monitor oral intake, urinary output, labs, and treatment plans  - Assess nutrition and hydration status and recommend course of action  - Evaluate amount of meals eaten  - Assist patient with eating if necessary   - Allow adequate time for meals  - Recommend/ encourage appropriate diets, oral nutritional supplements, and vitamin/mineral supplements  - Order, calculate, and assess calorie counts as needed  - Recommend, monitor, and adjust tube feedings and TPN/PPN based on assessed needs  - Assess need for intravenous fluids  - Provide specific nutrition/hydration education as appropriate  - Include patient/family/caregiver in decisions related to nutrition  Outcome: Progressing     Problem: PAIN - ADULT  Goal: Verbalizes/displays adequate comfort level or baseline comfort level  Description: Interventions:  - Encourage patient to monitor pain and request assistance  - Assess pain using appropriate pain scale  - Administer analgesics as ordered based on type and severity of pain and evaluate response  - Implement non-pharmacological measures as appropriate and evaluate response  - Consider cultural and social influences on pain and pain management  - Notify physician/advanced practitioner if interventions unsuccessful or patient reports new pain  - Educate patient/family on pain management process including their role and importance of  reporting pain   -  Provide non-pharmacologic/complimentary pain relief interventions  Outcome: Progressing     Problem: INFECTION - ADULT  Goal: Absence or prevention of progression during hospitalization  Description: INTERVENTIONS:  - Assess and monitor for signs and symptoms of infection  - Monitor lab/diagnostic results  - Monitor all insertion sites, i.e. indwelling lines, tubes, and drains  - Monitor endotracheal if appropriate and nasal secretions for changes in amount and color  - [place] appropriate cooling/warming therapies per order  - Administer medications as ordered  - Instruct and encourage patient and family to use good hand hygiene technique  - Identify and instruct in appropriate isolation precautions for identified infection/condition  Outcome: Progressing  Goal: Absence of fever/infection during neutropenic period  Description: INTERVENTIONS:  - Monitor WBC  - Perform strict hand hygiene  - Limit to healthy visitors only  - No plants, dried, fresh or silk flowers with winters in patient room  Outcome: Progressing     Problem: SAFETY ADULT  Goal: Patient will remain free of falls  Description: INTERVENTIONS:  - Educate patient/family on patient safety including physical limitations  - Instruct patient to call for assistance with activity   - Consider consulting OT/PT to assist with strengthening/mobility based on AM PAC & JH-HLM score  - Consult OT/PT to assist with strengthening/mobility   - Keep Call bell within reach  - Keep bed low and locked with side rails adjusted as appropriate  - Keep care items and personal belongings within reach  - Initiate and maintain comfort rounds  - Make Fall Risk Sign visible to staff  - Offer Toileting every  Hours, in advance of need  - Initiate/Maintain alarm  - Obtain necessary fall risk management equipment:   - Apply yellow socks and bracelet for high fall risk patients  - Consider moving patient to room near nurses station  Outcome: Progressing  Goal: Maintain or return to  baseline ADL function  Description: INTERVENTIONS:  -  Assess patient's ability to carry out ADLs; assess patient's baseline for ADL function and identify physical deficits which impact ability to perform ADLs (bathing, care of mouth/teeth, toileting, grooming, dressing, etc.)  - Assess/evaluate cause of self-care deficits   - Assess range of motion  - Assess patient's mobility; develop plan if impaired  - Assess patient's need for assistive devices and provide as appropriate  - Encourage maximum independence but intervene and supervise when necessary  - Involve family in performance of ADLs  - Assess for home care needs following discharge   - Consider OT consult to assist with ADL evaluation and planning for discharge  - Provide patient education as appropriate  - Monitor functional capacity and physical performance, use of AM PAC & -HLM   - Monitor gait, balance and fatigue with ambulation    Outcome: Progressing  Goal: Maintains/Returns to pre admission functional level  Description: INTERVENTIONS:  - Perform AM-PAC 6 Click Basic Mobility/ Daily Activity assessment daily.  - Set and communicate daily mobility goal to care team and patient/family/caregiver.   - Collaborate with rehabilitation services on mobility goals if consulted  - Perform Range of Motion times a day.  - Reposition patient every  hours.  - Dangle patient  times a day  - Stand patient  times a day  - Ambulate patient  times a day  - Out of bed to chair  times a day   - Out of bed for meals  times a day  - Out of bed for toileting  - Record patient progress and toleration of activity level   Outcome: Progressing

## 2025-06-13 NOTE — ASSESSMENT & PLAN NOTE
PPD 1 s/p Successful partial embolization of cribriform plate DAVF via the left ophthalmic artery- mild residual remains from the right ophthalmic artery (Dr. Yoon 25).   POD#2 - s/p L craniotomy and R karyn holes for evac of radha SDH ( 25)   L sided acute on chronic SDH   R chronic SDH   Arrived at [facility] ER on 25 with complaints of headache and left-sided hip pain after reported fall in the grass the day prior to arrival  Upon further questioning, patient has had some slurred speech for couple days per his wife.  [facility], patient has significant expressive aphasia and confusion.  On 25 after partial embolization of the DAVF, pt     Imagin/11 post-op CTA head/neck: Postsurgical change from partial evacuation of left hemispheric subdural hematoma. Residual hemorrhage measures up to 9 mm in thickness. Evacuation of right hemispheric subdural collection. Surgical drain and gas over the right convexity. Reduced left to right midline shift, 3 mm. No hemodynamically significant stenosis, dissection or occlusion of the carotid or vertebral arteries or major vessels of the [place].   repeat CTH: Stable mixed density left convexity subdural hematoma containing hyperdense acute blood products, with stable approximately 5 mm of left-to-right midline shift. Stable hypodense right convexity subdural hematoma   CTH: 14 mm mixed density acute left cerebral convexity subdural hematoma with associated 5 mm rightward midline shift. Low-density 7 mm right cerebral convexity subdural hematoma, age indeterminate.   CT c-spine: No cervical spine fracture or traumatic malalignment     Plan:   Continue to closely monitor neuro exam --> pt needs frequent VF checks and eye monitoring   Frequent neuro checks per primary team   Repeat STAT CTH with any acute decline in GCS > 2pts or more in 1hr   Maintain normotensive BP goals, SBP goal 100-140    - s/p removal of L SDD   1 R SDD  in place, 0 cc/ 24 hrs. Will likely d/c today when pt is a bit more calm.   Continue keppra for seizure ppx x 14 days --> consider vEEG monitoring should pt have no improvement in his exam   Hold all AC/AP meds   DVT ppx: SCDs, hold chem dvt ppx until cleared by nsgy team   Medical management per primary team   Pain control per primary team   PT/OT   Pt to  obtain CT head and  CT venogram today. Pt to obtain an MRA head wo for further evaluation when pt able to tolerate. (MRI brain/MRA was attempted last night but pt unable to tolerate due to agitation).   No embolization anticipated today/at this time due to patient's altered mental status.  Dr. Yoon discussed with pt's daughter and son that patient to remain in hospital till they have additional treatment of the DAVF. Ongoing discussion about timing for the treatment, possibly the week of 06/23/25 by Dr. Yoon, possibly sooner by Dr. Barbour.  Pt's family agreeable to the plan of care.   Neurosurgery will continue to follow. Please reach out with any further questions or concerns.

## 2025-06-13 NOTE — OCCUPATIONAL THERAPY NOTE
OT CANCEL NOTE    OT orders received. Chart reviewed. Per RN, requested to hold OT evaluation this AM 2/2 agitation.  Will continue to follow pt on caseload and see pt when medically stable and as clinically appropriate.       06/13/25 1039   OT Last Visit   OT Visit Date 06/13/25   Note Type   Note type Evaluation;Cancelled Session   Cancel Reasons Medical status       Dori Saxena MS, OTR/L

## 2025-06-13 NOTE — SPEECH THERAPY NOTE
"Speech Language/Pathology  Speech-Language Pathology Progress Note      Patient Name: Jasson Carter    Today's Date: 6/13/2025      Subjective:  Pt was slightly less agitated, eyes closed.  Answers somewhat more readily yet still with aphasia. He was sitting upright in bed. Patient stated \"I need to move \" which was the clearest phrase he stated    Objective:  Pt was seen today for dysphagia therapy. Current diet is NPO, pt less agitated, called by nsg to re attempt swallow eval to avoid ng and increased agitation. Pt was on room air. Oral care had already been completed. Focus of today's session was to maximize PO intake safety and attempt at least some crushed meds. Textures offered today included puree and thin liquid via straw and administered by clinician via piped straw.   Swallow function:   The pt is distracted, cannot clearly answer and does not make sense.  He has brief pauses and does state \"ok\" to the water.  Noted biting straw and did not take any sips.  Piped in 5 straw sips from SLP- pt able to contain and transfer w/ swallow initiated.  The pt is distracted, talking while material is in the oral cavity and during transfer and swallows.  He did open for several tsps puree- applesauce and 2 pudding.  The pt took crushed seroquel in the pudding.  Mult swallows noted, no immed coughing.      Assessment:  Pt continues w/ distraction, agitation but is less than seen earlier. T he swallow is present when he is calm and attending.  Will address if po can be initiated once medications have been given and his mentation improves.    Plan:  Allow meds to work  Will re assess for any po as able- small sips and at least puree for now  Oral care when able   "

## 2025-06-13 NOTE — SPEECH THERAPY NOTE
Speech Language/Pathology  Speech-Language Pathology Bedside Swallow Evaluation      Patient Name: Jasson Carter    Today's Date: 6/13/2025     Problem List  Active Problems:    SDH (subdural hematoma) (HCC)    Altered mental status    Fall    Atrial fibrillation (HCC)      Past Medical History  Past Medical History[1]    Past Surgical History  Past Surgical History[2]    Summary   Pt presented agitation and max distraction with inability to participate well with full assessment.  Agreed to water and took a large sip then refused any further trials.  He is quite aphasic, suspected expressive and receptive.  The swallow function is present however his mentation is not allowing for any meaningful participation.  Nsg to adjust meds and will call speech for ongoing trials as he improves.   Risk/s for Aspiration: new neuro, mentation.      Recommended Diet: NPO and Continue frequent oral care   Recommended Form of Meds: non-oral if possible   Aspiration precautions and swallowing strategies: tbd, will follow as able   Other Recommendations: Continue frequent oral care, will follow        Current Medical Status  Pt is a 75 y.o. male who presented to [facility] 6/11/25 after sliding on the grass and falling.  Pt c/o L hip pain and ha. Prior to transfer to [facility] pt became confused.  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  Underwent L crani, R bur hole.     Pt with expressive aphasia, yesterday on 6/12/25  was in IR for angiogram for embolization.    Current Precautions:   Fall  Aspiration      Allergies:  No known food allergies    Past medical history:  Please see H&P for details    Special Studies:  CT Brain: No acute infarct or parenchymal hemorrhage.  Postoperative changes status post left craniotomy for hematoma evacuation. Stable residual hematoma and mass effect.  Stable right frontal karyn hole with subdural drain without significant residual right subdural collection.. Decreasing postoperative pneumocephaly      CT Angiography: No significant stenosis of the cervical carotid or vertebral arteries  No significant intracranial stenosis, large vessel occlusion or aneurysm.  Residual filling of the cribriform plate dural AV fistula status post embolization of the left anterior ethmoidal artery.    Social/Education/Vocational Hx:  Pt lives with family    Swallow Information   Current Risks for Dysphagia & Aspiration: CVA, recent surgery, and recent intubation  Current Symptoms/Concerns: aphasia, agitation  Current Diet: NPO   Baseline Diet: regular diet and thin liquids      Baseline Assessment   Behavior/Cognition: alert and agitated, hitting and kicking.  Periodically has periods of calm.    Speech/Language Status: not able to to follow commands and aphasia   Patient Positioning: upright in bed, restrained  Pain Status/Interventions/Response to Interventions:   No report of or nonverbal indications of pain.       Swallow Mechanism Exam  Facial: ?able weakness on the R, pt moving around.   Labial: unable to test 2/2 limited command following  Lingual: unable to test 2/2 limited command following  Velum: unable to visualize  Mandible: unable to test 2/2 limited command following  Dentition: adequate  Vocal quality:mild hoarse, improved with the water   Volitional Cough: unable to initiate volitional cough   Respiratory Status: on RA     Consistencies Assessed and Performance   Consistencies Administered: thin liquids  Oral Stage:   Thrashing, does not retrieve, only took the one sip of thin despite mult attempts with family present.    Pharyngeal Stage:   Swallow Mechanics:  Swallowing initiation appeared prompt.  Mult swallows.  Pt slid in bed, one cough with talking.     Esophageal Concerns: none reported    Strategies and Efficacy: cannot follow    Summary and Recommendations (see above)    Results Reviewed with: patient, RN, and MD     Treatment Recommended: yes       Frequency of treatment: as able     Patient Stated  Goal:    Dysphagia LTG  -Patient will demonstrate safe and effective oral intake (without overt s/s significant oral/pharyngeal dysphagia including s/s penetration or aspiration) for the highest appropriate diet level.     Short Term Goals:  -Patient will tolerate trials of upgraded food and/or liquid texture with no significant s/s of oral or pharyngeal dysphagia including aspiration across 1-3 diagnostic sessions     -Patient will comply with a Video/Modified Barium Swallow study for more complete assessment of swallowing anatomy/physiology/aspiration risk and to assess efficacy of treatment techniques so as to best guide treatment plan    Speech Therapy Prognosis   Prognosis: fair    Prognosis Considerations: medical status and cognitive status           [1]   Past Medical History:  Diagnosis Date    Allergic     Gout     Hypertension     PTSD (post-traumatic stress disorder)    [2]   Past Surgical History:  Procedure Laterality Date    APPENDECTOMY      CHOLECYSTECTOMY      CRANIOTOMY Bilateral 6/11/2025    Procedure: Left sided craniotomy and right sided karyn holes for evacuation of subdural hematoma;  Surgeon: Satish De La Torre MD;  Location: [facility];  Service: Neurosurgery    TOTAL KNEE ARTHROPLASTY

## 2025-06-13 NOTE — ASSESSMENT & PLAN NOTE
Traumatic brain injury after falling.  Bilateral subdural hemorrhage left > right with rightward shift and brain compression.  S/P emergent left craniotomy and right sided karyn hold craniotomy for subdural evacuation on 6/11  Cribiform plate DAVF on angiogram on 6/12 s/p partial embolization via the left ophthalmic artery  Seroquel 50 mg BID and 75 mg HS, IM zyprexa, precedex started for agitation   PT/OT/SLP when medically stable.   The patient may require dedicated brain injury inpatient rehabilitation once medically cleared. Will follow at this time.

## 2025-06-13 NOTE — PROGRESS NOTES
Progress Note - Critical Care/ICU   Name: Jasson Carter 75 y.o. male I MRN: [id number]  Unit/Bed#: ICU 05 I Date of Admission: 2025   Date of Service: 2025 I Hospital Day: 2      Assessment & Plan   Active Hospital Problems    Diagnosis Date Noted POA    Atrial fibrillation (HCC) 2025 Unknown    SDH (subdural hematoma) (HCC) 2025 Yes    Altered mental status 2025 Unknown    Fall 2025 Unknown      Resolved Hospital Problems   No resolved problems to display.     Neuro/Psych:  Diagnoses: Acute TBI, B SDH L > R w rightward shift and brain compression, cribiform plate DAVF, encephalopathy   S/p left craniotomy and right sided bur hole craniotomy for subdural evacuation ()   s/p cerebral angiogram w/ noted cribriform DAVF. Successful partial embolization via the left ophthalmic artery. Mild residual remains from the right ophthalmic artery (Dr. Yoon )     Imagin/12 CTA H/N w wo: No acute infarct or parenchymal hemorrhage.  Postoperative changes s/p L craniotomy for hematoma evacuation.  Stable residual hematoma and mass effect.  Stable R frontal karyn hole with subdural drain.  Decreasing postoperative pneumocephaly.  No significant stenosis of cervical carotid or vertebral arteries, no LVO or aneurysm.  Residual filling of cribriform plate dural AV fistula s/p embolization of the L anterior ethmoidal artery.     Plan:  Neuro checks every hour  CT venogram brain  Follow-up on MRI/MRA read  Video EEG to rule out seizure  Seizure prophylaxis: Keppra 500 mg twice daily x 7 days  Seizure precautions   SBP < 140  Appreciating neurosurgery postop wound/drain care and management recommendations  Stat CT head for GCS decline in > 2  Sedation: Precedex  Analgesia: Multimodal. PRN: Tylenol, dilaudid, and oxycodone    Diagnosis: At risk ICU delirium  Plan  Delirium precautions  Melatonin at night to promote normal sleep-wake cycles    Diagnosis: Agitation   Plan:   1:1  continued observation     Diagnosis: Neuropathy  Plan:   Continue home medication amitriptyline       CV:  Diagnoses: New onset A fib, hx of HTN  Plan:  SBP <160, MAP > 65  Labetalol prn   Monitor right radial and femoral sheath sites for hemorrhage/hematoma  Hold all AC/AP  Cardiology consult for outpatient follow-up of A-fib  Continuous cardiopulmonary monitoring. Maintain MAP >65.    Pulm:  Diagnoses: No active issues  Plan:  Encourage incentive spirometer  Continuous pulse oximetry. Maintain O2 sat >92%.  Supplemental oxygen as needed    GI:  Diagnoses: History of GERD  Plan:  Bowel regimen: Senna and MiraLAX  GI prophylaxis: Protonix    :  Diagnoses: History of BPH  Plan:  Confirm BPH medications with family  Monitor I/Os.    F/E/N:  Plan:   F: NS 75 mL/hr  E: Monitor and replete electrolytes   N: NPO   Advance diet as tolerated    Heme/Onc:  Diagnoses: Not active issues   Plan:  Goal INR < 1.4, platelets >100  VTE prophylaxis: SCDs, holding Pharm PPx until cleared by neurosurgery    Endo:  Diagnoses: Prediabetic  Plan:   A1c 6.6  Insulin: Sliding scale. Monitor blood glucose.    ID:  Diagnoses: Leukocytosis  Culture data: MRSA negative  Labs: WBC 13.61  Temperature: Afebrile  Plan:  Monitor fever curve and WBC.    MSK/Skin:  Diagnoses: Ambulatory dysfunction, hx of OA  Plan:  PT/OT when appropriate. Encourage OOB and ambulation when appropriate. Local wound care prn.    LDAs:  Lines - 3 PIVs, R A line  Drains - R subdural drain, pedersen    Disposition: Critical care    ICU Core Measures     A: Assess, Prevent, and Manage Pain Has pain been assessed? Yes  Need for changes to pain regimen? No   B: Both SAT/SAT  N/A   C: Choice of Sedation RASS Goal: 0 Alert and Calm  Need for changes to sedation or analgesia regimen? No   D: Delirium CAM-ICU: Unable to perform secondary to Acute cognitive dysfunction   E: Early Mobility  Plan for early mobility? Yes   F: Family Engagement Plan for family engagement today? Yes        Antibiotic Review: Post op requirements     Review of Invasive Devices:    Negar Plan: Continue for accurate I/O monitoring for 48 hours    Francheska Plan: Keep arterial line for hemodynamic monitoring    Prophylaxis:  VTE Contraindicated secondary to: SDH   Stress Ulcer  not ordered         24 Hour Events :   Pt becoming agitated and restless, started on precedex gtt and 1:1 observation    Subjective   Review of Systems: Review of Systems not obtainable due to Altered mental status    Objective :                   Vitals I/O      Most Recent Min/Max in 24hrs   Temp 98.8 °F (37.1 °C) Temp  Min: 97 °F (36.1 °C)  Max: 98.8 °F (37.1 °C)   Pulse 72 Pulse  Min: 72  Max: 130   Resp 20 Resp  Min: 12  Max: 37   /71 BP  Min: 102/80  Max: 151/93   O2 Sat 94 % SpO2  Min: 93 %  Max: 97 %      Intake/Output Summary (Last 24 hours) at 6/13/2025 0336  Last data filed at 6/13/2025 0101  Gross per 24 hour   Intake 1022.75 ml   Output 2370 ml   Net -1347.25 ml       Diet NPO; Sips with meds    Invasive Monitoring   Arterial Line  Francheska /82  Arterial Line BP  Min: 132/66  Max: 170/72   MAP 94 mmHg  Arterial Line MAP (mmHg)  Min: 84 mmHg  Max: 106 mmHg           Physical Exam   Physical Exam  Eyes:      General: Vision grossly intact.      Extraocular Movements: Extraocular movements intact.      Pupils: Pupils are equal, round, and reactive to light.   Skin:     General: Skin is warm.      Coloration: Skin is not jaundiced.   HENT:      Head:      Comments: Right subdural drain sutured in place C/D/I    Cardiovascular:      Rate and Rhythm: Normal rate. Rhythm irregular.      Pulses: Normal pulses.   Musculoskeletal:         General: Normal range of motion.   Abdominal:      Palpations: Abdomen is soft.      Tenderness: There is no abdominal tenderness.   Constitutional:       General: He is not in acute distress.     Appearance: He is not ill-appearing.   Pulmonary:      Effort: Pulmonary effort is normal.      Breath  sounds: Normal breath sounds.   Neurological:      Comments: Alert, oriented to person only. Mild word finding difficulty. Able to follow multi-step commands. No dysarthria. Visual fields full. PERRL. EOMi. Face symmetric. Moving all extremities 5/5. Sensation grossly intact to light touch.           Diagnostic Studies        Lab Results: I have reviewed the following results:     Medications:  Scheduled PRN   amitriptyline, 10 mg, HS  cefazolin, 2,000 mg, Once  chlorhexidine, 15 mL, Q12H SHANEKA  insulin lispro, 1-6 Units, Q6H SHANEKA  levETIRAcetam, 500 mg, Q12H SHANEKA  melatonin, 3 mg, HS  sodium phosphate, 12 mmol, Once  sterile water, ,       acetaminophen, 975 mg, Q8H PRN  hydrALAZINE, 10 mg, Q4H PRN  HYDROmorphone, 0.2 mg, Q4H PRN  labetalol, 10 mg, Q4H PRN  ondansetron, 4 mg, Once PRN  oxyCODONE, 2.5 mg, Q6H PRN   Or  oxyCODONE, 5 mg, Q4H PRN  sterile water, ,        Continuous    dexmedetomidine, 0.1-1.4 mcg/kg/hr, Last Rate: 1.2 mcg/kg/hr (06/13/25 0033)  sodium chloride, 75 mL/hr, Last Rate: 75 mL/hr (06/12/25 1505)         Labs:   CBC    Recent Labs     06/11/25  1110 06/12/25  0522   WBC 8.66 13.61*   HGB 15.0 14.8   HCT 45.2 43.1    219     BMP    Recent Labs     06/11/25  1110 06/12/25  0522   SODIUM 133* 134*   K 4.1 4.0   CL 98 99   CO2 26 25   AGAP 9 10   BUN 12 14   CREATININE 0.66 0.56*   CALCIUM 9.4 9.0       Coags    Recent Labs     06/11/25  0904 06/12/25  0522   INR 1.02 1.01   PTT 23 26        Additional Electrolytes  Recent Labs     06/11/25  1052 06/11/25  1110 06/12/25  0522   MG  --  2.0 2.0   PHOS  --  2.9 2.6   CAIONIZED 0.87*  --   --           Blood Gas    No recent results  No recent results LFTs  Recent Labs     06/11/25  1110 06/12/25  0522   ALT 14 12   AST 26 23   ALKPHOS 83 87   ALB 4.4 4.2   TBILI 2.04* 1.49*       Infectious  No recent results  Glucose  Recent Labs     06/11/25  0904 06/11/25  1110 06/12/25  0522   GLUC 125 107 181*

## 2025-06-13 NOTE — CONSULTS
PHYSICAL MEDICINE AND REHABILITATION CONSULT NOTE  Jasson Carter 75 y.o. male MRN: [id number]  Unit/Bed#: ICU 05 Encounter: [id number]    Requested by (Physician/Service): Dane Mann,*  Reason for Consultation:  Assessment of rehabilitation needs    Assessment & Plan  SDH (subdural hematoma) (HCC)  Traumatic brain injury after falling.  Bilateral subdural hemorrhage left > right with rightward shift and brain compression.  S/P emergent left craniotomy and right sided karyn hold craniotomy for subdural evacuation on 6/11  Cribiform plate DAVF on angiogram on 6/12 s/p partial embolization via the left ophthalmic artery  Seroquel 50 mg BID and 75 mg HS, IM zyprexa, precedex started for agitation   PT/OT/SLP when medically stable.   The patient may require dedicated brain injury inpatient rehabilitation once medically cleared. Will follow at this time.   Altered mental status    Fall    Atrial fibrillation (HCC)        Thank you for this consultation.  Do not hesitate to contact service with further questions.      DYLAN Obrien  PM&R    I have spent a total time of 30 minutes on 06/13/25 in caring for this patient including Patient and family education, Counseling / Coordination of care, Documenting in the medical record, Reviewing/placing orders in the medical record (including tests, medications, and/or procedures), Obtaining or reviewing history  , and Communicating with other healthcare professionals .    History of Present Illness:  Jasson Carter is a 75 y.o. male with a PMH of  GERD, HLD, HTN, BPH and peripheral neuropathy who presented to the [facility] on 6/11/2025 with headache and left hip pain after falling in the grass. CTH showed bilateral subdural hematomas, left side with acute blood and larger in size with midline shift. He was transferred to [place] and on arrival he had a decline in mental status with expressive/receptive aphasia. He  was taken emergently to the OR and underwent left craniotomy and right karyn holes for evacuation of bilateral SDH on 6/11/2025. On 6/12 he underwent successful partial embolization of cribriform plate DAVF via the left ophthalmic artery-mid residual remains from the right ophthalmic artery. He will need to undergo CT venogram on 6/13 and MRI for further evaluation. PM&R are consulted for rehabilitation recommendations.    The patient was seen in his room. He is agitated and requiring restraints as well as 1 to 1 observation. He has expressive/receptive aphasia.     Review of Systems: 10 point ROS unobtainable due to mental status     Function:  Prior level of function and living situation: The patient lives in a bi-level home with his spouse and 0 GE. He was independent.       Current level of function:  Physical Therapy: pending   Occupational Therapy: pending   Speech Therapy: NPO      Physical Exam:  BP (!) 178/92 (BP Location: Left arm)   Pulse 86   Temp 98.1 °F (36.7 °C) (Oral)   Resp (!) 36   Wt 87.2 kg (192 lb 3.9 oz)   SpO2 98%   BMI 23.40 kg/m²        Intake/Output Summary (Last 24 hours) at 6/13/2025 1315  Last data filed at 6/13/2025 1200  Gross per 24 hour   Intake 1468.49 ml   Output 2850 ml   Net -1381.51 ml       Body mass index is 23.4 kg/m².      Physical Exam  Constitutional:       Comments: Agitated    HENT:      Head:      Comments: Bilateral karyn holes   Pulmonary:      Effort: No respiratory distress.   Abdominal:      General: There is no distension.     Musculoskeletal:      Comments: Moving all extremities, not following commands      Neurological:      Mental Status: He is alert. He is disoriented.      Comments: Expressive/receptive aphasia    Psychiatric:         Behavior: Behavior is uncooperative and agitated.         Cognition and Memory: Cognition is impaired.         Judgment: Judgment is impulsive.          Social History:    Social History[1]     Family History:    Family  History[2]      Medications:   Current Medications[3]    Past Medical History:     Past Medical History[4]     Past Surgical History:     Past Surgical History[5]      Allergies:     Allergies   Allergen Reactions    Bee Venom Shortness Of Breath    Ativan [Lorazepam] Delirium    Fluoxetine Diarrhea     Other reaction(s): Diarrhea, Tremor, Sweating, Sweating, Diarrhea, Tremor, Sweating, Sweating, Diarrhea, Tremor, Sweating, Sweating    Levaquin [Levofloxacin] Itching    Venlafaxine      Other reaction(s): Tremor, Sweating, Tremor, Sweating           LABORATORY RESULTS:      Lab Results   Component Value Date    HGB 13.0 06/13/2025    HCT 38.8 06/13/2025    WBC 10.43 (H) 06/13/2025     Lab Results   Component Value Date    BUN 13 06/13/2025    K 4.0 06/13/2025     06/13/2025    GLUCOSE 104 06/11/2025    CREATININE 0.58 (L) 06/13/2025     Lab Results   Component Value Date    PROTIME 13.6 06/12/2025    INR 1.01 06/12/2025    INR 1.0 07/08/2020        DIAGNOSTIC STUDIES: Reviewed  CTA head and neck w wo contrast  Result Date: 6/12/2025  Impression: CT Brain: No acute infarct or parenchymal hemorrhage. Postoperative changes status post left craniotomy for hematoma evacuation. Stable residual hematoma and mass effect. Stable right frontal karyn hole with subdural drain without significant residual right subdural collection.. Decreasing postoperative pneumocephaly CT Angiography: No significant stenosis of the cervical carotid or vertebral arteries No significant intracranial stenosis, large vessel occlusion or aneurysm. Residual filling of the cribriform plate dural AV fistula status post embolization of the left anterior ethmoidal artery. Workstation performed: [id number]     CTA head and neck w wo contrast  Result Date: 6/12/2025  Impression: 1. Postsurgical change from partial evacuation of left hemispheric subdural hematoma. Residual hemorrhage measures up to 9 mm in thickness. 2. Evacuation of right hemispheric  subdural collection. Surgical drain and gas over the right convexity. 3. Reduced left to right midline shift, 3 mm. 4. No hemodynamically significant stenosis, dissection or occlusion of the carotid or vertebral arteries or major vessels of the [place]. Workstation performed: [id number]     XR hip/pelv 2-3 vws left if performed  Result Date: 6/11/2025  Impression: No acute osseous abnormality. Computerized Assisted Algorithm (CAA) may have been used to analyze all applicable images. Workstation performed: [id number]     CT head wo contrast  Result Date: 6/11/2025  Impression: Stable mixed density left convexity subdural hematoma containing hyperdense acute blood products, with stable approximately 5 mm of left-to-right midline shift. Stable hypodense right convexity subdural hematoma. Workstation performed: [id number]     CT head without contrast  Result Date: 6/11/2025  Impression: 1.  14 mm mixed density acute left cerebral convexity subdural hematoma with associated 5 mm rightward midline shift. 2.  Low-density 7 mm right cerebral convexity subdural hematoma, age indeterminate. 3.  No cervical spine fracture or traumatic malalignment. Findings were discussed with Dr. Brand on 6/11/25 at 9:23 AM. Workstation performed: [id number]     CT cervical spine without contrast  Result Date: 6/11/2025  Impression: 1.  14 mm mixed density acute left cerebral convexity subdural hematoma with associated 5 mm rightward midline shift. 2.  Low-density 7 mm right cerebral convexity subdural hematoma, age indeterminate. 3.  No cervical spine fracture or traumatic malalignment. Findings were discussed with Dr. Brand on 6/11/25 at 9:23 AM. Workstation performed: [id number]          [1]   Social History  Socioeconomic History    Marital status: /Civil Union   Tobacco Use    Smoking status: Former     Current packs/day: 0.00     Average packs/day: 0.5 packs/day for 20.0 years (10.0 ttl pk-yrs)     Types: Cigarettes      Start date:      Quit date:      Years since quittin.4    Smokeless tobacco: Never   Vaping Use    Vaping status: Never Used   Substance and Sexual Activity    Alcohol use: Yes    Drug use: Not Currently     Social Drivers of Health     Financial Resource Strain: Low Risk  (2024)    Overall Financial Resource Strain (CARDIA)     Difficulty of Paying Living Expenses: Not hard at all   Food Insecurity: No Food Insecurity (2025)    Nursing - Inadequate Food Risk Classification     Worried About Running Out of Food in the Last Year: Never true     Ran Out of Food in the Last Year: Never true   Transportation Needs: No Transportation Needs (2025)    PRAPARE - Transportation     Lack of Transportation (Medical): No     Lack of Transportation (Non-Medical): No    Received from [employer]   Housing Stability: Low Risk  (2025)    Housing Stability Vital Sign     Unable to Pay for Housing in the Last Year: No     Number of Times Moved in the Last Year: 0     Homeless in the Last Year: No   [2]   Family History  Problem Relation Name Age of Onset    Lung cancer Father     [3]   Current Facility-Administered Medications:     amitriptyline (ELAVIL) tablet 10 mg, 10 mg, Oral, HS, Razia Posada PA-C    chlorhexidine (PERIDEX) 0.12 % oral rinse 15 mL, 15 mL, Swish & Spit, Q12H SHANEKA, Razia Posada PA-C, 15 mL at 25 0949    dexmedeTOMIDine (Precedex) 400 mcg in sodium chloride 0.9% 100 mL, 0.1-1 mcg/kg/hr, Intravenous, Titrated, DYLAN Sepulveda, Last Rate: 15.3 mL/hr at 25 1242, 0.7 mcg/kg/hr at 25 1242    hydrALAZINE (APRESOLINE) injection 10 mg, 10 mg, Intravenous, Q4H PRN, Razia Posada PA-C, 10 mg at 25 1211    HYDROmorphone HCl (DILAUDID) injection 0.2 mg, 0.2 mg, Intravenous, Q4H PRN, Angelito Agrawal DO, 0.2 mg at 25 1125    insulin lispro (HumALOG/ADMELOG) 100 units/mL subcutaneous injection 1-6 Units, 1-6 Units,  Subcutaneous, Q6H SHANEKA **AND** Fingerstick Glucose (POCT), , , Q6H, Razia Posada PA-C    labetalol (NORMODYNE) injection 10 mg, 10 mg, Intravenous, Q4H PRN, Razia Posada PA-C, 10 mg at 06/12/25 1633    levETIRAcetam (KEPPRA) injection 500 mg, 500 mg, Intravenous, Q12H SHANEKA, DYLAN Sepulveda, 500 mg at 06/13/25 0949    melatonin tablet 3 mg, 3 mg, Oral, HS, Razia Posada PA-C    multi-electrolyte (Plasmalyte-A/Isolyte-S PH 7.4/Normosol-R) IV solution, 75 mL/hr, Intravenous, Continuous, DYLAN Sepulveda, Last Rate: 75 mL/hr at 06/13/25 0921, 75 mL/hr at 06/13/25 0921    OLANZapine (ZyPREXA) IM injection 5 mg, 5 mg, Intramuscular, Once, Roselyn Manley PA-C    ondansetron (ZOFRAN) injection 4 mg, 4 mg, Intravenous, Once PRN, Yaz Betts CRNA    oxyCODONE (ROXICODONE) split tablet 2.5 mg, 2.5 mg, Oral, Q6H PRN **OR** oxyCODONE (ROXICODONE) IR tablet 5 mg, 5 mg, Oral, Q4H PRN, Angelito Agrawal DO    pantoprazole (PROTONIX) injection 40 mg, 40 mg, Intravenous, Q24H SHANEKA, Angelia Marcos PA-C, 40 mg at 06/13/25 0523    polyethylene glycol (MIRALAX) packet 17 g, 17 g, Oral, Daily, Angelia Marcos PA-C    QUEtiapine (SEROquel) tablet 25 mg, 25 mg, Oral, Once, Razia Posada PA-C    QUEtiapine (SEROquel) tablet 50 mg, 50 mg, Oral, BID, Razia Posada PA-C    QUEtiapine (SEROquel) tablet 75 mg, 75 mg, Oral, HS, Razia Posada PA-C    senna (SENOKOT) tablet 8.6 mg, 1 tablet, Oral, HS, Angelia Marcos PA-C    sodium phosphate 12 mmol in sodium chloride 0.9 % 100 mL Infusion, 12 mmol, Intravenous, Once, Razia Posada PA-C    sterile water injection **ADS Override Pull**, , , ,   [4]   Past Medical History:  Diagnosis Date    Allergic     Gout     Hypertension     PTSD (post-traumatic stress disorder)    [5]   Past Surgical History:  Procedure Laterality Date    APPENDECTOMY      CHOLECYSTECTOMY      CRANIOTOMY Bilateral 6/11/2025    Procedure: Left sided craniotomy and right sided  karyn holes for evacuation of subdural hematoma;  Surgeon: Satish De La Torre MD;  Location: [facility];  Service: Neurosurgery    TOTAL KNEE ARTHROPLASTY

## 2025-06-13 NOTE — PHYSICAL THERAPY NOTE
Physical Therapy Cancellation Note    PT orders received chart review completed. Pt is currently with increased agitation and not appropriate to participate in skilled PT at this time. PT will follow and eval as medically appropriate.     06/13/25 1040   Note Type   Note type Cancelled Session;Evaluation   Cancel Reasons Medical status       Kecia Rosas, PT

## 2025-06-13 NOTE — ANESTHESIA POSTPROCEDURE EVALUATION
Post-Op Assessment Note    CV Status:  Stable    Pain management: adequate       Mental Status:  Confused and awake   Hydration Status:  Stable   PONV Controlled:  None   Airway Patency:  Patent     Post Op Vitals Reviewed: Yes    No anethesia notable event occurred.    Staff: Anesthesiologist           Last Filed PACU Vitals:  Vitals Value Taken Time   Temp 97 °F (36.1 °C) 06/12/25 14:18   Pulse 82 06/12/25 15:00   /81 06/12/25 15:00   Resp 22 06/12/25 15:00   SpO2 97 % 06/12/25 15:00       Modified Esther:     Vitals Value Taken Time   Activity 2 06/12/25 14:15   Respiration 2 06/12/25 14:15   Circulation 2 06/12/25 14:15   Consciousness 2 06/12/25 14:15   Oxygen Saturation 2 06/12/25 14:15     Modified Esther Score: 10

## 2025-06-13 NOTE — SPEECH THERAPY NOTE
Speech Language/Pathology  SLP to follow up tomorrow for recs as able.  Pt continues to not be safe for po 2* mentation.  Continue oral care as able.

## 2025-06-14 ENCOUNTER — APPOINTMENT (INPATIENT)
Dept: RADIOLOGY | Facility: HOSPITAL | Age: 75
DRG: 025 | End: 2025-06-14
Payer: MEDICARE

## 2025-06-14 ENCOUNTER — APPOINTMENT (INPATIENT)
Dept: NEUROLOGY | Facility: CLINIC | Age: 75
DRG: 025 | End: 2025-06-14
Payer: MEDICARE

## 2025-06-14 LAB
ANION GAP SERPL CALCULATED.3IONS-SCNC: 11 MMOL/L (ref 4–13)
BASOPHILS # BLD AUTO: 0.04 THOUSANDS/ÂΜL (ref 0–0.1)
BASOPHILS NFR BLD AUTO: 0 % (ref 0–1)
BUN SERPL-MCNC: 14 MG/DL (ref 5–25)
CALCIUM SERPL-MCNC: 8.7 MG/DL (ref 8.4–10.2)
CHLORIDE SERPL-SCNC: 105 MMOL/L (ref 96–108)
CO2 SERPL-SCNC: 25 MMOL/L (ref 21–32)
CREAT SERPL-MCNC: 0.5 MG/DL (ref 0.6–1.3)
EOSINOPHIL # BLD AUTO: 0.02 THOUSAND/ÂΜL (ref 0–0.61)
EOSINOPHIL NFR BLD AUTO: 0 % (ref 0–6)
ERYTHROCYTE [DISTWIDTH] IN BLOOD BY AUTOMATED COUNT: 13.5 % (ref 11.6–15.1)
GFR SERPL CREATININE-BSD FRML MDRD: 105 ML/MIN/1.73SQ M
GLUCOSE SERPL-MCNC: 102 MG/DL (ref 65–140)
GLUCOSE SERPL-MCNC: 104 MG/DL (ref 65–140)
GLUCOSE SERPL-MCNC: 107 MG/DL (ref 65–140)
GLUCOSE SERPL-MCNC: 111 MG/DL (ref 65–140)
GLUCOSE SERPL-MCNC: 114 MG/DL (ref 65–140)
HCT VFR BLD AUTO: 41.1 % (ref 36.5–49.3)
HGB BLD-MCNC: 13.5 G/DL (ref 12–17)
IMM GRANULOCYTES # BLD AUTO: 0.07 THOUSAND/UL (ref 0–0.2)
IMM GRANULOCYTES NFR BLD AUTO: 1 % (ref 0–2)
LYMPHOCYTES # BLD AUTO: 1.07 THOUSANDS/ÂΜL (ref 0.6–4.47)
LYMPHOCYTES NFR BLD AUTO: 10 % (ref 14–44)
MAGNESIUM SERPL-MCNC: 2.2 MG/DL (ref 1.9–2.7)
MCH RBC QN AUTO: 33 PG (ref 26.8–34.3)
MCHC RBC AUTO-ENTMCNC: 32.8 G/DL (ref 31.4–37.4)
MCV RBC AUTO: 101 FL (ref 82–98)
MONOCYTES # BLD AUTO: 0.78 THOUSAND/ÂΜL (ref 0.17–1.22)
MONOCYTES NFR BLD AUTO: 7 % (ref 4–12)
NEUTROPHILS # BLD AUTO: 8.7 THOUSANDS/ÂΜL (ref 1.85–7.62)
NEUTS SEG NFR BLD AUTO: 82 % (ref 43–75)
NRBC BLD AUTO-RTO: 0 /100 WBCS
PHOSPHATE SERPL-MCNC: 2.5 MG/DL (ref 2.3–4.1)
PLATELET # BLD AUTO: 182 THOUSANDS/UL (ref 149–390)
PMV BLD AUTO: 9.8 FL (ref 8.9–12.7)
POTASSIUM SERPL-SCNC: 3.7 MMOL/L (ref 3.5–5.3)
RBC # BLD AUTO: 4.09 MILLION/UL (ref 3.88–5.62)
SODIUM SERPL-SCNC: 141 MMOL/L (ref 135–147)
WBC # BLD AUTO: 10.68 THOUSAND/UL (ref 4.31–10.16)

## 2025-06-14 PROCEDURE — 70450 CT HEAD/BRAIN W/O DYE: CPT

## 2025-06-14 PROCEDURE — 99291 CRITICAL CARE FIRST HOUR: CPT | Performed by: STUDENT IN AN ORGANIZED HEALTH CARE EDUCATION/TRAINING PROGRAM

## 2025-06-14 PROCEDURE — 95700 EEG CONT REC W/VID EEG TECH: CPT

## 2025-06-14 PROCEDURE — 70551 MRI BRAIN STEM W/O DYE: CPT

## 2025-06-14 PROCEDURE — 93005 ELECTROCARDIOGRAM TRACING: CPT

## 2025-06-14 PROCEDURE — 99024 POSTOP FOLLOW-UP VISIT: CPT | Performed by: STUDENT IN AN ORGANIZED HEALTH CARE EDUCATION/TRAINING PROGRAM

## 2025-06-14 PROCEDURE — 95715 VEEG EA 12-26HR INTMT MNTR: CPT

## 2025-06-14 PROCEDURE — 71045 X-RAY EXAM CHEST 1 VIEW: CPT

## 2025-06-14 PROCEDURE — 0BH17EZ INSERTION OF ENDOTRACHEAL AIRWAY INTO TRACHEA, VIA NATURAL OR ARTIFICIAL OPENING: ICD-10-PCS | Performed by: SURGERY

## 2025-06-14 PROCEDURE — 85025 COMPLETE CBC W/AUTO DIFF WBC: CPT

## 2025-06-14 PROCEDURE — 5A1945Z RESPIRATORY VENTILATION, 24-96 CONSECUTIVE HOURS: ICD-10-PCS | Performed by: SURGERY

## 2025-06-14 PROCEDURE — 80048 BASIC METABOLIC PNL TOTAL CA: CPT

## 2025-06-14 PROCEDURE — 31500 INSERT EMERGENCY AIRWAY: CPT

## 2025-06-14 PROCEDURE — 92526 ORAL FUNCTION THERAPY: CPT

## 2025-06-14 PROCEDURE — 84100 ASSAY OF PHOSPHORUS: CPT

## 2025-06-14 PROCEDURE — 70544 MR ANGIOGRAPHY HEAD W/O DYE: CPT

## 2025-06-14 PROCEDURE — 83735 ASSAY OF MAGNESIUM: CPT

## 2025-06-14 PROCEDURE — 31500 INSERT EMERGENCY AIRWAY: CPT | Performed by: SURGERY

## 2025-06-14 PROCEDURE — 82948 REAGENT STRIP/BLOOD GLUCOSE: CPT

## 2025-06-14 PROCEDURE — 94002 VENT MGMT INPAT INIT DAY: CPT

## 2025-06-14 PROCEDURE — 94760 N-INVAS EAR/PLS OXIMETRY 1: CPT

## 2025-06-14 RX ORDER — POTASSIUM CHLORIDE 14.9 MG/ML
20 INJECTION INTRAVENOUS ONCE
Status: COMPLETED | OUTPATIENT
Start: 2025-06-14 | End: 2025-06-14

## 2025-06-14 RX ORDER — LORAZEPAM 2 MG/ML
INJECTION INTRAMUSCULAR
Status: COMPLETED
Start: 2025-06-14 | End: 2025-06-14

## 2025-06-14 RX ORDER — DEXMEDETOMIDINE HYDROCHLORIDE 4 UG/ML
.1-1.2 INJECTION, SOLUTION INTRAVENOUS
Status: DISCONTINUED | OUTPATIENT
Start: 2025-06-14 | End: 2025-06-16

## 2025-06-14 RX ORDER — LEVETIRACETAM 500 MG/5ML
1000 INJECTION, SOLUTION, CONCENTRATE INTRAVENOUS ONCE
Status: COMPLETED | OUTPATIENT
Start: 2025-06-14 | End: 2025-06-14

## 2025-06-14 RX ORDER — METOPROLOL TARTRATE 1 MG/ML
INJECTION, SOLUTION INTRAVENOUS
Status: COMPLETED
Start: 2025-06-14 | End: 2025-06-14

## 2025-06-14 RX ORDER — LEVETIRACETAM 500 MG/5ML
1000 INJECTION, SOLUTION, CONCENTRATE INTRAVENOUS EVERY 12 HOURS SCHEDULED
Status: DISCONTINUED | OUTPATIENT
Start: 2025-06-14 | End: 2025-06-17

## 2025-06-14 RX ORDER — METOPROLOL TARTRATE 1 MG/ML
2.5 INJECTION, SOLUTION INTRAVENOUS ONCE
Status: COMPLETED | OUTPATIENT
Start: 2025-06-14 | End: 2025-06-14

## 2025-06-14 RX ORDER — ACETAMINOPHEN 10 MG/ML
1000 INJECTION, SOLUTION INTRAVENOUS EVERY 6 HOURS PRN
Status: DISCONTINUED | OUTPATIENT
Start: 2025-06-14 | End: 2025-06-16

## 2025-06-14 RX ADMIN — CHLORHEXIDINE GLUCONATE 15 ML: 1.2 SOLUTION ORAL at 09:09

## 2025-06-14 RX ADMIN — LORAZEPAM 4 MG: 2 INJECTION INTRAMUSCULAR; INTRAVENOUS at 19:00

## 2025-06-14 RX ADMIN — PANTOPRAZOLE SODIUM 40 MG: 40 INJECTION, POWDER, FOR SOLUTION INTRAVENOUS at 06:35

## 2025-06-14 RX ADMIN — CHLORHEXIDINE GLUCONATE 15 ML: 1.2 SOLUTION ORAL at 21:08

## 2025-06-14 RX ADMIN — POTASSIUM CHLORIDE 20 MEQ: 14.9 INJECTION, SOLUTION INTRAVENOUS at 08:45

## 2025-06-14 RX ADMIN — QUETIAPINE 50 MG: 25 TABLET ORAL at 18:06

## 2025-06-14 RX ADMIN — LORAZEPAM 2 MG: 2 INJECTION INTRAMUSCULAR; INTRAVENOUS at 19:15

## 2025-06-14 RX ADMIN — DEXMEDETOMIDINE HYDROCHLORIDE 1 MCG/KG/HR: 4 INJECTION, SOLUTION INTRAVENOUS at 06:27

## 2025-06-14 RX ADMIN — HYDRALAZINE HYDROCHLORIDE 10 MG: 20 INJECTION, SOLUTION INTRAMUSCULAR; INTRAVENOUS at 04:36

## 2025-06-14 RX ADMIN — SENNOSIDES 8.6 MG: 8.6 TABLET, FILM COATED ORAL at 21:10

## 2025-06-14 RX ADMIN — LEVETIRACETAM 1000 MG: 100 INJECTION, SOLUTION INTRAVENOUS at 19:05

## 2025-06-14 RX ADMIN — METOPROLOL TARTRATE 2.5 MG: 1 INJECTION, SOLUTION INTRAVENOUS at 19:37

## 2025-06-14 RX ADMIN — METOROPROLOL TARTRATE 2.5 MG: 5 INJECTION, SOLUTION INTRAVENOUS at 19:37

## 2025-06-14 RX ADMIN — LEVETIRACETAM 500 MG: 100 INJECTION, SOLUTION INTRAVENOUS at 09:09

## 2025-06-14 RX ADMIN — AMITRIPTYLINE HYDROCHLORIDE 10 MG: 10 TABLET ORAL at 21:10

## 2025-06-14 RX ADMIN — QUETIAPINE 75 MG: 25 TABLET ORAL at 21:08

## 2025-06-14 RX ADMIN — LABETALOL HYDROCHLORIDE 10 MG: 5 INJECTION, SOLUTION INTRAVENOUS at 14:26

## 2025-06-14 RX ADMIN — POTASSIUM CHLORIDE 20 MEQ: 14.9 INJECTION, SOLUTION INTRAVENOUS at 11:27

## 2025-06-14 RX ADMIN — Medication 3 MG: at 21:10

## 2025-06-14 RX ADMIN — LABETALOL HYDROCHLORIDE 10 MG: 5 INJECTION, SOLUTION INTRAVENOUS at 03:32

## 2025-06-14 RX ADMIN — LEVETIRACETAM 1000 MG: 100 INJECTION, SOLUTION INTRAVENOUS at 21:09

## 2025-06-14 RX ADMIN — DEXMEDETOMIDINE HYDROCHLORIDE 1 MCG/KG/HR: 4 INJECTION, SOLUTION INTRAVENOUS at 04:09

## 2025-06-14 RX ADMIN — DEXMEDETOMIDINE HYDROCHLORIDE 0.4 MCG/KG/HR: 400 INJECTION INTRAVENOUS at 23:30

## 2025-06-14 NOTE — ASSESSMENT & PLAN NOTE
PPD 2 s/p Successful partial embolization of cribriform plate DAVF via the left ophthalmic artery- mild residual remains from the right ophthalmic artery (Dr. Yoon 25).   POD#3 - s/p L craniotomy and R karyn holes for evac of radha SDH ( 25)   L sided acute on chronic SDH   R chronic SDH   Arrived at [facility] on 25 with complaints of headache and left-sided hip pain after reported fall in the grass the day prior to arrival  Upon further questioning, patient has had some slurred speech for couple days per his wife.  Upon arrival to [facility], patient has significant expressive aphasia and confusion.  On 25 after partial embolization of the DAVF, pt     Imagin/11 post-op CTA head/neck: Postsurgical change from partial evacuation of left hemispheric subdural hematoma. Residual hemorrhage measures up to 9 mm in thickness. Evacuation of right hemispheric subdural collection. Surgical drain and gas over the right convexity. Reduced left to right midline shift, 3 mm. No hemodynamically significant stenosis, dissection or occlusion of the carotid or vertebral arteries or major vessels of the [place].   repeat CTH: Stable mixed density left convexity subdural hematoma containing hyperdense acute blood products, with stable approximately 5 mm of left-to-right midline shift. Stable hypodense right convexity subdural hematoma   CTH: 14 mm mixed density acute left cerebral convexity subdural hematoma with associated 5 mm rightward midline shift. Low-density 7 mm right cerebral convexity subdural hematoma, age indeterminate.   CT c-spine: No cervical spine fracture or traumatic malalignment    CT venogram: Stable posttreatment changes status post partial embolization of cribriform plate dural AV fistula. Persistent visualization of small draining veins extending superiorly from the cribriform plate.     Plan:   Continue to closely monitor neuro exam --> pt needs frequent VF checks and  eye monitoring   Frequent neuro checks per primary team   Repeat STAT CTH with any acute decline in GCS > 2pts or more in 1hr   Maintain normotensive BP goals, SBP goal 100-140   6/11 removal of L SDD, 6/12 right drain removed  Continue keppra for seizure ppx x 14 days --> consider vEEG monitoring should pt have no improvement in his exam   Hold all AC/AP meds   DVT ppx: SCDs, hold chem dvt ppx until cleared by nsgy team   Medical management per primary team   Pain control per primary team   PT/OT   No embolization anticipated today/at this time due to patient's altered mental status.  Dr. Yoon discussed with pt's daughter and son that patient to remain in hospital till they have additional treatment of the DAVF. Ongoing discussion about timing for the treatment, possibly the week of 06/23/25 by Dr. Yoon, possibly sooner by Dr. Barbour.  Pt's family agreeable to the plan of care.   Neurosurgery will continue to follow. Please reach out with any further questions or concerns.

## 2025-06-14 NOTE — QUICK NOTE
Called to patients bedside for tonic clonic seizure activity lasting about 30 seconds. Patient received 4 of ativan and 1000 keppra ordered. Patient with post ictal state and concern for further seizure episodes given left upper extremity tremors fixed upward gaze. Decision was made to intubate patient. 80 propofol used as well as 80 rocuronium. Patient was intubated with 8.0 ETT. 2 more of ativan given for persistent tachycardia and residual left upper extremity tremors. Patient started on propofol.     EMU was called for stat EEG  Stat CTH ordered    Patients daughter Shasha May was called and updated about her father. She states she will call to let other siblings know. Patietns wife is with Ms. May and is aware of patients current status. She is aware that he was intubated for airway protection as he was seizing. She is aware that we will be bringing him to CT scan and setting up on EEG. All questions and concerns answered. Patient stable at this time.

## 2025-06-14 NOTE — PROGRESS NOTES
Progress Note - Trauma   Name: Jasson Carter 75 y.o. male I MRN: [id number]  Unit/Bed#: ICU 05 I Date of Admission: 2025   Date of Service: 2025 I Hospital Day: 3       Assessment & Plan   Active Hospital Problems    Diagnosis Date Noted POA    Atrial fibrillation (HCC) 2025 Unknown    SDH (subdural hematoma) (HCC) 2025 Yes    Altered mental status 2025 Unknown    Fall 2025 Unknown      Resolved Hospital Problems   No resolved problems to display.       Neuro:   Diagnosis: R chronic SDH & left acute on chronic SDH s/p left craniotomy and right sided karyn hole for SDH evacuation-     Imagin/11 CTH: 14 mm mixed density acute left cerebral convexity subdural hematoma with associated 5 mm rightward midline shift. Low-density 7 mm right cerebral convexity subdural hematoma, age indeterminate.   CT c-spine: No cervical spine fracture or traumatic malalignment    repeat CTH: Stable mixed density left convexity subdural hematoma containing hyperdense acute blood products, with stable approximately 5 mm of         left-to-right midline shift. Stable hypodense right convexity subdural hematoma   post-op CTA head/neck: Postsurgical change from partial evacuation of left hemispheric subdural hematoma. Residual hemorrhage measures up to 9 mm in thickness. Evacuation of right hemispheric subdural collection. Surgical drain and gas over the right convexity. Reduced left to right midline shift, 3 mm. No hemodynamically significant stenosis, dissection or occlusion of the carotid or vertebral arteries or major vessels of the [place].   CT Venogram Brain: Venous sinuses and deep cerebral veins are patent.  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 Persistent visualization of small draining veins extending superiorly from the cribriform plate. Stable residual left subdural hematoma..    Plan:   Q2 hour  neuro checks and visual acuity checks  Maintain SBP <140 per Neurosurgery recommendation  Maintain right subdural drain open   Appreciate neurosurgery post/op wound/drain care and management recommendations  Continue to titrate precedex as tolerated  Continue delirium precautions  Melatonin 3mg QHS to regulate sleep-wake cycles  Continue seroquel 50 mg BID, 75mg QHS  Continue amitriptyline 10 mg QHS  Keppra 500mg BID x 7 days for seizure prophylaxis  Analgesia: Multimodal pain regimen with Tylenol 1000 mg IV, Fentanyl 50 mcg IV, Dilaudid 0.2 mg IV Q4H PRN for breakthrough pain, oxycodone 2.5 mg Q6H PRN for moderate pain, Oxycodone IR 5mg Q4H PRN for severe pain      CV:   Diagnosis: A-fib, HTN, Hypercholesterolemia    Plan:   Continue telemetry and hemodynamics monitoring  Maintain SBP <140  Initiate prn Hydralazine 10 mg if SBP >140  Maintain MAP goal >65 mmHg  Labetalol 20 mg Q4H PRN  Home losartan held    Pulm:  Diagnosis: No active issues    Plan:   Encourage bedside incentive spirometry  Maintain SPO2 >92%    GI:   Diagnosis: Hx of GERD    Plan:   Continue  protonix 40 mg IV QD  Miralax and Senna for bowel regimen    :   Diagnosis: BPH without urinary rentention    Plan:   Monitor urine output  Continue to monitor BUN/Cr    F/E/N:   F: Continuous Plasmalyte-A/Isolyte-S running at 75ml/hr  E: Continue to monitor electrolytes and replete to maintain goal: Mg> 2, K>4, Phos >3  N: NPO diet; sips with meds    Heme/Onc:   Diagnosis: No active issues             Plan:  Mechanical pumps for DVT prophylaxis  Hold chemo DVT prophylaxis until cleared by NSGY team        Endo:   Diagnosis: No active issues    Plan:   Maintain glucose 140-180  Initiate SSI when appropriate    ID:   Diagnosis: No active issues    Plan:   Monitor fever curve and monitor WBCs    MSK/Skin:   Diagnosis: Primary osteoarthritis involving multiple joints, Gout    Plan:   Home Allopurinol held  Q2H turning  PT/OT eval and treat    Lines: 1 L  peripheral, 2 R peripherals, Folely catheter  Drains: Subdural drains  Airway: None    Disposition: Critical care    ICU Core Measures     A: Assess, Prevent, and Manage Pain Has pain been assessed? Yes  Need for changes to pain regimen? No   B: Both SAT/SAT  N/A   C: Choice of Sedation RASS Goal: -1 Drowsy  Need for changes to sedation or analgesia regimen? No   D: Delirium CAM-ICU: Unable to perform secondary to Acute cognitive dysfunction   E: Early Mobility  Plan for early mobility? Yes   F: Family Engagement Plan for family engagement today? Yes       Review of Invasive Devices:    Negar Plan: Continue for accurate I/O monitoring for 48 hours        Prophylaxis:  VTE Mechanical foot pumps   Stress Ulcer  covered by pantoprazole (PROTONIX) injection 40 mg [[id number]]         24 Hour Events : Patient had some moments of agitation and was give 75 mg of seroquel.   Subjective   Review of Systems: Review of Systems not obtainable due to Clinical Condition, Altered mental status    Objective :                   Vitals I/O      Most Recent Min/Max in 24hrs   Temp 97.9 °F (36.6 °C) Temp  Min: 97.8 °F (36.6 °C)  Max: 98.1 °F (36.7 °C)   Pulse 66 Pulse  Min: 46  Max: 90   Resp 22 Resp  Min: 12  Max: 36   BP (!) 154/105 BP  Min: 107/68  Max: 178/92   O2 Sat 95 % SpO2  Min: 93 %  Max: 98 %      Intake/Output Summary (Last 24 hours) at 6/14/2025 0628  Last data filed at 6/14/2025 0000  Gross per 24 hour   Intake 1242.2 ml   Output 1575 ml   Net -332.8 ml       Diet NPO; Sips with meds    Invasive Monitoring             Physical Exam   Physical Exam  Vitals reviewed.   Eyes:      Conjunctiva/sclera: Conjunctivae normal.      Pupils: Pupils are equal, round, and reactive to light.   Skin:     General: Skin is warm.   HENT:      Head: Normocephalic.      Comments: Staples from surgical site intact on left side of scalp. No signs of erythema or dehiscence. Right scalp noted for subdural drain covered with patch, draining  serosanguinous fluid.      Right Ear: No drainage.      Left Ear: No drainage.      Mouth/Throat:      Mouth: Mucous membranes are dry.   Cardiovascular:      Rate and Rhythm: Normal rate and regular rhythm.      Pulses: Normal pulses.   Musculoskeletal:      Comments: Skin tear noted on left knee   Pulmonary:      Effort: Pulmonary effort is normal.   Psychiatric:         Speech: Speech is receptive aphasia and expressive aphasia.   Neurological:      Mental Status: Mental status is at baseline. He is disoriented to person, disoriented to place, disoriented to time and disoriented to situation.   Genitourinary/Anorectal:  Bills present.        Diagnostic Studies        Lab Results: I have reviewed the following results:     Medications:  Scheduled PRN   amitriptyline, 10 mg, HS  chlorhexidine, 15 mL, Q12H SHANEKA  fentaNYL, 50 mcg, Once  insulin lispro, 1-6 Units, Q6H SHANEKA  levETIRAcetam, 500 mg, Q12H SHANEKA  melatonin, 3 mg, HS  pantoprazole, 40 mg, Q24H SHANEKA  polyethylene glycol, 17 g, Daily  QUEtiapine, 25 mg, Once  QUEtiapine, 50 mg, BID  QUEtiapine, 75 mg, HS  senna, 1 tablet, HS  sodium phosphate, 12 mmol, Once      hydrALAZINE, 10 mg, Q4H PRN  HYDROmorphone, 0.2 mg, Q4H PRN  labetalol, 10 mg, Q4H PRN  ondansetron, 4 mg, Once PRN  oxyCODONE, 2.5 mg, Q6H PRN   Or  oxyCODONE, 5 mg, Q4H PRN       Continuous    dexmedetomidine, 0.1-1 mcg/kg/hr, Last Rate: 1 mcg/kg/hr (06/14/25 0627)  multi-electrolyte, 75 mL/hr, Last Rate: 75 mL/hr (06/13/25 0921)         Labs:   CBC    Recent Labs     06/13/25  0540   WBC 10.43*   HGB 13.0   HCT 38.8        BMP    Recent Labs     06/13/25  0540   SODIUM 139   K 4.0      CO2 26   AGAP 7   BUN 13   CREATININE 0.58*   CALCIUM 8.6       Coags    No recent results     Additional Electrolytes  Recent Labs     06/13/25  0540 06/13/25  0639   MG 2.2  --    PHOS 2.8  --    CAIONIZED  --  1.09*          Blood Gas    No recent results  No recent results LFTs  No recent results     Infectious  No recent results  Glucose  Recent Labs     06/13/25  0540   GLUC 116

## 2025-06-14 NOTE — SPEECH THERAPY NOTE
Speech-Language Pathology Progress Note      Patient Name: Jasson Carter    Today's Date: 6/14/2025    Subjective:  Pt was awake, calm and cooperative but unable to follow directions. He was sitting upright in bed. Family present at bedside.    Objective:  Pt was seen today for dysphagia therapy. He is currently NPO without means. Pt was on room air. Oral care was completed with use of oral care kits/ suction toothbrush. Focus of today's session was to re assess PO trials. Textures offered today included ice chips via tsp, thin liquid via tsp, and honey thick liquid via tsp.  Swallow function:   Bolus retrieval was mildly reduced. Suspect decreased oral control and manipulation. Pharyngeal swallow initiation appeared significantly delayed, but improved some with progression of trials. Hyolaryngeal excursion was reduced. Multiple swallows noted per bolus suspicious for incomplete pharyngeal clearance. No overt coughing with trials however pt was unable to cough or phonate on command. Occasional weak phonation was moist, increased wetness in breath sounds with PO trials.    Assessment:  Concern for aspiration persists, as well as need for nutrition source to meet calorie needs. Discussed with team and family consider NGT with ST to re assess, MBS when appropriate.     Plan:  NPO with NGT now. Continue ST follow up. Subsequent sessions to focus on maximizing PO intake safety and PO trials, MBS when appropriate.

## 2025-06-14 NOTE — PLAN OF CARE
Problem: Nutrition/Hydration-ADULT  Goal: Nutrient/Hydration intake appropriate for improving, restoring or maintaining nutritional needs  Description: Monitor and assess patient's nutrition/hydration status for malnutrition. Collaborate with interdisciplinary team and initiate plan and interventions as ordered.  Monitor patient's weight and dietary intake as ordered or per policy. Utilize nutrition screening tool and intervene as necessary. Determine patient's food preferences and provide high-protein, high-caloric foods as appropriate.     INTERVENTIONS:  - Monitor oral intake, urinary output, labs, and treatment plans  - Assess nutrition and hydration status and recommend course of action  - Evaluate amount of meals eaten  - Assist patient with eating if necessary   - Allow adequate time for meals  - Recommend/ encourage appropriate diets, oral nutritional supplements, and vitamin/mineral supplements  - Order, calculate, and assess calorie counts as needed  - Recommend, monitor, and adjust tube feedings and TPN/PPN based on assessed needs  - Assess need for intravenous fluids  - Provide specific nutrition/hydration education as appropriate  - Include patient/family/caregiver in decisions related to nutrition  Outcome: Progressing     Problem: PAIN - ADULT  Goal: Verbalizes/displays adequate comfort level or baseline comfort level  Description: Interventions:  - Encourage patient to monitor pain and request assistance  - Assess pain using appropriate pain scale  - Administer analgesics as ordered based on type and severity of pain and evaluate response  - Implement non-pharmacological measures as appropriate and evaluate response  - Consider cultural and social influences on pain and pain management  - Notify physician/advanced practitioner if interventions unsuccessful or patient reports new pain  - Educate patient/family on pain management process including their role and importance of  reporting pain   -  Provide non-pharmacologic/complimentary pain relief interventions  Outcome: Progressing     Problem: INFECTION - ADULT  Goal: Absence or prevention of progression during hospitalization  Description: INTERVENTIONS:  - Assess and monitor for signs and symptoms of infection  - Monitor lab/diagnostic results  - Monitor all insertion sites, i.e. indwelling lines, tubes, and drains  - Monitor endotracheal if appropriate and nasal secretions for changes in amount and color  - [place] appropriate cooling/warming therapies per order  - Administer medications as ordered  - Instruct and encourage patient and family to use good hand hygiene technique  - Identify and instruct in appropriate isolation precautions for identified infection/condition  Outcome: Progressing  Goal: Absence of fever/infection during neutropenic period  Description: INTERVENTIONS:  - Monitor WBC  - Perform strict hand hygiene  - Limit to healthy visitors only  - No plants, dried, fresh or silk flowers with winters in patient room  Outcome: Progressing     Problem: SAFETY ADULT  Goal: Patient will remain free of falls  Description: INTERVENTIONS:  - Educate patient/family on patient safety including physical limitations  - Instruct patient to call for assistance with activity   - Consider consulting OT/PT to assist with strengthening/mobility based on AM PAC & JH-HLM score  - Consult OT/PT to assist with strengthening/mobility   - Keep Call bell within reach  - Keep bed low and locked with side rails adjusted as appropriate  - Keep care items and personal belongings within reach  - Initiate and maintain comfort rounds  - Make Fall Risk Sign visible to staff  - Offer Toileting every  Hours, in advance of need  - Initiate/Maintain alarm  - Obtain necessary fall risk management equipment:   - Apply yellow socks and bracelet for high fall risk patients  - Consider moving patient to room near nurses station  Outcome: Progressing  Goal: Maintain or return to  baseline ADL function  Description: INTERVENTIONS:  -  Assess patient's ability to carry out ADLs; assess patient's baseline for ADL function and identify physical deficits which impact ability to perform ADLs (bathing, care of mouth/teeth, toileting, grooming, dressing, etc.)  - Assess/evaluate cause of self-care deficits   - Assess range of motion  - Assess patient's mobility; develop plan if impaired  - Assess patient's need for assistive devices and provide as appropriate  - Encourage maximum independence but intervene and supervise when necessary  - Involve family in performance of ADLs  - Assess for home care needs following discharge   - Consider OT consult to assist with ADL evaluation and planning for discharge  - Provide patient education as appropriate  - Monitor functional capacity and physical performance, use of AM PAC & JH-HLM   - Monitor gait, balance and fatigue with ambulation    Outcome: Progressing  Goal: Maintains/Returns to pre admission functional level  Description: INTERVENTIONS:  - Perform AM-PAC 6 Click Basic Mobility/ Daily Activity assessment daily.  - Set and communicate daily mobility goal to care team and patient/family/caregiver.   - Collaborate with rehabilitation services on mobility goals if consulted  - Perform Range of Motion  times a day.  - Reposition patient every  hours.  - Dangle patient  times a day  - Stand patient  times a day  - Ambulate patient  times a day  - Out of bed to chair  times a day   - Out of bed for meals  times a day  - Out of bed for toileting  - Record patient progress and toleration of activity level   Outcome: Progressing     Problem: DISCHARGE PLANNING  Goal: Discharge to home or other facility with appropriate resources  Description: INTERVENTIONS:  - Identify barriers to discharge w/patient and caregiver  - Arrange for needed discharge resources and transportation as appropriate  - Identify discharge learning needs (meds, wound care, etc.)  -  Arrange for interpretive services to assist at discharge as needed  - Refer to Case Management Department for coordinating discharge planning if the patient needs post-hospital services based on physician/advanced practitioner order or complex needs related to functional status, cognitive ability, or social support system  Outcome: Progressing     Problem: Knowledge Deficit  Goal: Patient/family/caregiver demonstrates understanding of disease process, treatment plan, medications, and discharge instructions  Description: Complete learning assessment and assess knowledge base.  Interventions:  - Provide teaching at level of understanding  - Provide teaching via preferred learning methods  Outcome: Progressing     Problem: NEUROSENSORY - ADULT  Goal: Achieves stable or improved neurological status  Description: INTERVENTIONS  - Monitor and report changes in neurological status  - Monitor vital signs such as temperature, blood pressure, glucose, and any other labs ordered   - Initiate measures to prevent increased intracranial pressure  - Monitor for seizure activity and implement precautions if appropriate      Outcome: Progressing  Goal: Remains free of injury related to seizures activity  Description: INTERVENTIONS  - Maintain airway, patient safety  and administer oxygen as ordered  - Monitor patient for seizure activity, document and report duration and description of seizure to physician/advanced practitioner  - If seizure occurs,  ensure patient safety during seizure  - Reorient patient post seizure  - Seizure pads on all 4 side rails  - Instruct patient/family to notify RN of any seizure activity including if an aura is experienced  - Instruct patient/family to call for assistance with activity based on nursing assessment  - Administer anti-seizure medications if ordered    Outcome: Progressing  Goal: Achieves maximal functionality and self care  Description: INTERVENTIONS  - Monitor swallowing and airway  patency with patient fatigue and changes in neurological status  - Encourage and assist patient to increase activity and self care.   - Encourage visually impaired, hearing impaired and aphasic patients to use assistive/communication devices  Outcome: Progressing     Problem: CARDIOVASCULAR - ADULT  Goal: Maintains optimal cardiac output and hemodynamic stability  Description: INTERVENTIONS:  - Monitor I/O, vital signs and rhythm  - Monitor for S/S and trends of decreased cardiac output  - Administer and titrate ordered vasoactive medications to optimize hemodynamic stability  - Assess quality of pulses, skin color and temperature  - Assess for signs of decreased coronary artery perfusion  - Instruct patient to report change in severity of symptoms  Outcome: Progressing  Goal: Absence of cardiac dysrhythmias or at baseline rhythm  Description: INTERVENTIONS:  - Continuous cardiac monitoring, vital signs, obtain 12 lead EKG if ordered  - Administer antiarrhythmic and heart rate control medications as ordered  - Monitor electrolytes and administer replacement therapy as ordered  Outcome: Progressing     Problem: RESPIRATORY - ADULT  Goal: Achieves optimal ventilation and oxygenation  Description: INTERVENTIONS:  - Assess for changes in respiratory status  - Assess for changes in mentation and behavior  - Position to facilitate oxygenation and minimize respiratory effort  - Oxygen administered by appropriate delivery if ordered  - Initiate smoking cessation education as indicated  - Encourage broncho-pulmonary hygiene including cough, deep breathe, Incentive Spirometry  - Assess the need for suctioning and aspirate as needed  - Assess and instruct to report SOB or any respiratory difficulty  - Respiratory Therapy support as indicated  Outcome: Progressing     Problem: GASTROINTESTINAL - ADULT  Goal: Minimal or absence of nausea and/or vomiting  Description: INTERVENTIONS:  - Administer IV fluids if ordered to ensure  adequate hydration  - Maintain NPO status until nausea and vomiting are resolved  - Nasogastric tube if ordered  - Administer ordered antiemetic medications as needed  - Provide nonpharmacologic comfort measures as appropriate  - Advance diet as tolerated, if ordered  - Consider nutrition services referral to assist patient with adequate nutrition and appropriate food choices  Outcome: Progressing  Goal: Maintains or returns to baseline bowel function  Description: INTERVENTIONS:  - Assess bowel function  - Encourage oral fluids to ensure adequate hydration  - Administer IV fluids if ordered to ensure adequate hydration  - Administer ordered medications as needed  - Encourage mobilization and activity  - Consider nutritional services referral to assist patient with adequate nutrition and appropriate food choices  Outcome: Progressing  Goal: Maintains adequate nutritional intake  Description: INTERVENTIONS:  - Monitor percentage of each meal consumed  - Identify factors contributing to decreased intake, treat as appropriate  - Assist with meals as needed  - Monitor I&O, weight, and lab values if indicated  - Obtain nutrition services referral as needed  Outcome: Progressing  Goal: Establish and maintain optimal ostomy function  Description: INTERVENTIONS:  - Assess bowel function  - Encourage oral fluids to ensure adequate hydration  - Administer IV fluids if ordered to ensure adequate hydration   - Administer ordered medications as needed  - Encourage mobilization and activity  - Nutrition services referral to assist patient with appropriate food choices  - Assess stoma site  - Consider wound care consult   Outcome: Progressing  Goal: Oral mucous membranes remain intact  Description: INTERVENTIONS  - Assess oral mucosa and hygiene practices  - Implement preventative oral hygiene regimen  - Implement oral medicated treatments as ordered  - Initiate Nutrition services referral as needed  Outcome: Progressing      Problem: GENITOURINARY - ADULT  Goal: Maintains or returns to baseline urinary function  Description: INTERVENTIONS:  - Assess urinary function  - Encourage oral fluids to ensure adequate hydration if ordered  - Administer IV fluids as ordered to ensure adequate hydration  - Administer ordered medications as needed  - Offer frequent toileting  - Follow urinary retention protocol if ordered  Outcome: Progressing  Goal: Absence of urinary retention  Description: INTERVENTIONS:  - Assess patient’s ability to void and empty bladder  - Monitor I/O  - Bladder scan as needed  - Discuss with physician/AP medications to alleviate retention as needed  - Discuss catheterization for long term situations as appropriate  Outcome: Progressing  Goal: Urinary catheter remains patent  Description: INTERVENTIONS:  - Assess patency of urinary catheter  - If patient has a chronic pedersen, consider changing catheter if non-functioning  - Follow guidelines for intermittent irrigation of non-functioning urinary catheter  Outcome: Progressing     Problem: METABOLIC, FLUID AND ELECTROLYTES - ADULT  Goal: Electrolytes maintained within normal limits  Description: INTERVENTIONS:  - Monitor labs and assess patient for signs and symptoms of electrolyte imbalances  - Administer electrolyte replacement as ordered  - Monitor response to electrolyte replacements, including repeat lab results as appropriate  - Instruct patient on fluid and nutrition as appropriate  Outcome: Progressing  Goal: Fluid balance maintained  Description: INTERVENTIONS:  - Monitor labs   - Monitor I/O and WT  - Instruct patient on fluid and nutrition as appropriate  - Assess for signs & symptoms of volume excess or deficit  Outcome: Progressing  Goal: Glucose maintained within target range  Description: INTERVENTIONS:  - Monitor Blood Glucose as ordered  - Assess for signs and symptoms of hyperglycemia and hypoglycemia  - Administer ordered medications to maintain glucose  within target range  - Assess nutritional intake and initiate nutrition service referral as needed  Outcome: Progressing     Problem: SKIN/TISSUE INTEGRITY - ADULT  Goal: Skin Integrity remains intact(Skin Breakdown Prevention)  Description: Assess:  -Perform Jake assessment every   -Clean and moisturize skin every   -Inspect skin when repositioning, toileting, and assisting with ADLS  -Assess under medical devices such as  every   -Assess extremities for adequate circulation and sensation     Bed Management:  -Have minimal linens on bed & keep smooth, unwrinkled  -Change linens as needed when moist or perspiring  -Avoid sitting or lying in one position for more than  hours while in bed  -Keep HOB at degrees     Toileting:  -Offer bedside commode  -Assess for incontinence every   -Use incontinent care products after each incontinent episode such as     Activity:  -Mobilize patient  times a day  -Encourage activity and walks on unit  -Encourage or provide ROM exercises   -Turn and reposition patient every  Hours  -Use appropriate equipment to lift or move patient in bed  -Instruct/ Assist with weight shifting every  when out of bed in chair  -Consider limitation of chair time hour intervals    Skin Care:  -Avoid use of baby powder, tape, friction and shearing, hot water or constrictive clothing  -Relieve pressure over bony prominences using   -Do not massage red bony areas    Next Steps:  -Teach patient strategies to minimize risks such as    -Consider consults to  interdisciplinary teams such as   Outcome: Progressing  Goal: Incision(s), wounds(s) or drain site(s) healing without S/S of infection  Description: INTERVENTIONS  - Assess and document dressing, incision, wound bed, drain sites and surrounding tissue  - Provide patient and family education  - Perform skin care/dressing changes every   Outcome: Progressing  Goal: Pressure injury heals and does not worsen  Description: Interventions:  - Implement low air  loss mattress or specialty surface (Criteria met)  - Apply silicone foam dressing  - Instruct/assist with weight shifting every  minutes when in chair   - Limit chair time to  hour intervals  - Use special pressure reducing interventions such as  when in chair   - Apply fecal or urinary incontinence containment device   - Perform passive or active ROM every   - Turn and reposition patient & offload bony prominences every  hours   - Utilize friction reducing device or surface for transfers   - Consider consults to  interdisciplinary teams such as   - Use incontinent care products after each incontinent episode such as   - Consider nutrition services referral as needed  Outcome: Progressing     Problem: HEMATOLOGIC - ADULT  Goal: Maintains hematologic stability  Description: INTERVENTIONS  - Assess for signs and symptoms of bleeding or hemorrhage  - Monitor labs  - Administer supportive blood products/factors as ordered and appropriate  Outcome: Progressing     Problem: MUSCULOSKELETAL - ADULT  Goal: Maintain or return mobility to safest level of function  Description: INTERVENTIONS:  - Assess patient's ability to carry out ADLs; assess patient's baseline for ADL function and identify physical deficits which impact ability to perform ADLs (bathing, care of mouth/teeth, toileting, grooming, dressing, etc.)  - Assess/evaluate cause of self-care deficits   - Assess range of motion  - Assess patient's mobility  - Assess patient's need for assistive devices and provide as appropriate  - Encourage maximum independence but intervene and supervise when necessary  - Involve family in performance of ADLs  - Assess for home care needs following discharge   - Consider OT consult to assist with ADL evaluation and planning for discharge  - Provide patient education as appropriate  Outcome: Progressing  Goal: Maintain proper alignment of affected body part  Description: INTERVENTIONS:  - Support, maintain and protect limb and body  alignment  - Provide patient/ family with appropriate education  Outcome: Progressing     Problem: Prexisting or High Potential for Compromised Skin Integrity  Goal: Skin integrity is maintained or improved  Description: INTERVENTIONS:  - Identify patients at risk for skin breakdown  - Assess and monitor skin integrity including under and around medical devices   - Assess and monitor nutrition and hydration status  - Monitor labs  - Assess for incontinence   - Turn and reposition patient  - Assist with mobility/ambulation  - Relieve pressure over paolo prominences   - Avoid friction and shearing  - Provide appropriate hygiene as needed including keeping skin clean and dry  - Evaluate need for skin moisturizer/barrier cream  - Collaborate with interdisciplinary team  - Patient/family teaching  - Consider wound care consult    Assess:  - Review Jake scale daily  - Clean and moisturize skin every   - Inspect skin when repositioning, toileting, and assisting with ADLS  - Assess under medical devices such as  every   - Assess extremities for adequate circulation and sensation     Bed Management:  - Have minimal linens on bed & keep smooth, unwrinkled  - Change linens as needed when moist or perspiring  - Avoid sitting or lying in one position for more than  hours while in bed?Keep HOB at degrees   - Toileting:  - Offer bedside commode  - Assess for incontinence every   - Use incontinent care products after each incontinent episode such as     Activity:  - Mobilize patient  times a day  - Encourage activity and walks on unit  - Encourage or provide ROM exercises   - Turn and reposition patient every  Hours  - Use appropriate equipment to lift or move patient in bed  - Instruct/ Assist with weight shifting every  when out of bed in chair  - Consider limitation of chair time  hour intervals    Skin Care:  - Avoid use of baby powder, tape, friction and shearing, hot water or constrictive clothing  - Relieve pressure over  bony prominences using   - Do not massage red bony areas    Next Steps:  - Teach patient strategies to minimize risks such as   - Consider consults to  interdisciplinary teams such as   Outcome: Progressing

## 2025-06-14 NOTE — RESPIRATORY THERAPY NOTE
RT Protocol Note  Jasson Carter 75 y.o. male MRN: [id number]  Unit/Bed#: ICU 05 Encounter: [id number]    Assessment    Active Problems:    SDH (subdural hematoma) (HCC)    Altered mental status    Fall    Atrial fibrillation (HCC)      Home Pulmonary Medications:  none       Past Medical History[1]  Social History[2]    Subjective         Objective    Physical Exam:   Assessment Type: Assess only  General Appearance: Sedated  Respiratory Pattern: Assisted  Chest Assessment: Chest expansion symmetrical  Bilateral Breath Sounds: Clear, Diminished  O2 Device: G5 vent    Vitals:  Blood pressure 112/76, pulse 96, temperature 99 °F (37.2 °C), temperature source Axillary, resp. rate 16, weight 87.2 kg (192 lb 3.9 oz), SpO2 99%.          Imaging and other studies: Results Review Statement: No pertinent imaging studies reviewed.    O2 Device: G5 vent     Plan    Respiratory Plan: Vent/NIV/HFNC        Resp Comments: Pt intubated for airway protection due to seizures. Pt preoxygenated, intubated with 8.0 by Dr. Palladino, ETT secured 25@lip with commercial tube self, +BBS, +color change, CXR pending, pt placed on vent 14/430/80%/+6. Will place pt on RCP for vent mangement.        [1]   Past Medical History:  Diagnosis Date    Allergic     Gout     Hypertension     PTSD (post-traumatic stress disorder)    [2]   Social History  Socioeconomic History    Marital status: /Civil Union   Tobacco Use    Smoking status: Former     Current packs/day: 0.00     Average packs/day: 0.5 packs/day for 20.0 years (10.0 ttl pk-yrs)     Types: Cigarettes     Start date:      Quit date:      Years since quittin.4    Smokeless tobacco: Never   Vaping Use    Vaping status: Never Used   Substance and Sexual Activity    Alcohol use: Yes    Drug use: Not Currently     Social Drivers of Health     Financial Resource Strain: Low Risk  (2024)    Overall Financial Resource Strain (CARDIA)     Difficulty of Paying Living  Expenses: Not hard at all   Food Insecurity: No Food Insecurity (2/28/2025)    Nursing - Inadequate Food Risk Classification     Worried About Running Out of Food in the Last Year: Never true     Ran Out of Food in the Last Year: Never true   Transportation Needs: No Transportation Needs (2/28/2025)    PRAPARE - Transportation     Lack of Transportation (Medical): No     Lack of Transportation (Non-Medical): No    Received from [facility]    Social [employer]   Housing Stability: Low Risk  (2/28/2025)    Housing Stability Vital Sign     Unable to Pay for Housing in the Last Year: No     Number of Times Moved in the Last Year: 0     Homeless in the Last Year: No

## 2025-06-14 NOTE — PROGRESS NOTES
Progress Note - Neurosurgery   Name: Jasson Carter 75 y.o. male I MRN: [id number]  Unit/Bed#: ICU 05 I Date of Admission: 2025   Date of Service: 2025 I Hospital Day: 3     Assessment & Plan  SDH (subdural hematoma) (HCC)  PPD 2 s/p Successful partial embolization of cribriform plate DAVF via the left ophthalmic artery- mild residual remains from the right ophthalmic artery (Dr. Yoon 25).   POD#3 - s/p L craniotomy and R kayrn holes for evac of radha SDH ( 25)   L sided acute on chronic SDH   R chronic SDH   Arrived at [facility] ER on 25 with complaints of headache and left-sided hip pain after reported fall in the grass the day prior to arrival  Upon further questioning, patient has had some slurred speech for couple days per his wife.  Upon arrival to [facility], patient has significant expressive aphasia and confusion.  On 25 after partial embolization of the DAVF, pt     Imagin/11 post-op CTA head/neck: Postsurgical change from partial evacuation of left hemispheric subdural hematoma. Residual hemorrhage measures up to 9 mm in thickness. Evacuation of right hemispheric subdural collection. Surgical drain and gas over the right convexity. Reduced left to right midline shift, 3 mm. No hemodynamically significant stenosis, dissection or occlusion of the carotid or vertebral arteries or major vessels of the [place].   repeat CTH: Stable mixed density left convexity subdural hematoma containing hyperdense acute blood products, with stable approximately 5 mm of left-to-right midline shift. Stable hypodense right convexity subdural hematoma   CTH: 14 mm mixed density acute left cerebral convexity subdural hematoma with associated 5 mm rightward midline shift. Low-density 7 mm right cerebral convexity subdural hematoma, age indeterminate.   CT c-spine: No cervical spine fracture or traumatic malalignment    CT venogram: Stable posttreatment changes status post  partial embolization of cribriform plate dural AV fistula. Persistent visualization of small draining veins extending superiorly from the cribriform plate.     Plan:   Continue to closely monitor neuro exam --> pt needs frequent VF checks and eye monitoring   Frequent neuro checks per primary team   Repeat STAT CTH with any acute decline in GCS > 2pts or more in 1hr   Maintain normotensive BP goals, SBP goal 100-140   6/11 removal of L SDD, 6/12 right drain removed  Continue keppra for seizure ppx x 14 days --> consider vEEG monitoring should pt have no improvement in his exam   Hold all AC/AP meds   DVT ppx: SCDs, hold chem dvt ppx until cleared by nsgy team   Medical management per primary team   Pain control per primary team   PT/OT   No embolization anticipated today/at this time due to patient's altered mental status.  Dr. Yoon discussed with pt's daughter and son that patient to remain in hospital till they have additional treatment of the DAVF. Ongoing discussion about timing for the treatment, possibly the week of 06/23/25 by Dr. Yoon, possibly sooner by Dr. Barbour.  Pt's family agreeable to the plan of care.   Neurosurgery will continue to follow. Please reach out with any further questions or concerns.   Atrial fibrillation (HCC)          Subjective   The patient was agitated overnight.     Objective :  Temp:  [97.4 °F (36.3 °C)-98.1 °F (36.7 °C)] 97.4 °F (36.3 °C)  HR:  [56-90] 60  BP: (104-178)/() 136/84  Resp:  [13-36] 16  SpO2:  [93 %-98 %] 96 %  O2 Device: None (Room air)    I/O         06/12 0701  06/13 0700 06/13 0701  06/14 0700 06/14 0701  06/15 0700    I.V. (mL/kg) 1252.1 (14.4) 1916.6 (22)     IV Piggyback  100     Total Intake(mL/kg) 1252.1 (14.4) 2016.6 (23.1)     Urine (mL/kg/hr) 2495 (1.2) 2300 (1.1)     Drains 0 0     Blood 25      Total Output 2520 2300     Net -1267.9 -283.4                  Physical Exam Neurological Exam  Currently sleeping.   Per nursing, he has not  followed commands however he has been moving all extremities with full strength.   I deferred his exam given that he has not had any sleep over the past 4 days due to his persistent agitation      Lab Results: I have reviewed the following results:  Recent Labs     06/12/25  0522 06/13/25  0540 06/13/25  0639 06/14/25  0634   WBC 13.61*   < >  --  10.68*   HGB 14.8   < >  --  13.5   HCT 43.1   < >  --  41.1      < >  --  182   SODIUM 134*   < >  --  141   K 4.0   < >  --  3.7   CL 99   < >  --  105   CO2 25   < >  --  25   BUN 14   < >  --  14   CREATININE 0.56*   < >  --  0.50*   GLUC 181*   < >  --  104   CAIONIZED  --   --  1.09*  --    MG 2.0   < >  --  2.2   PHOS 2.6   < >  --  2.5   AST 23  --   --   --    ALT 12  --   --   --    ALB 4.2  --   --   --    TBILI 1.49*  --   --   --    ALKPHOS 87  --   --   --    PTT 26  --   --   --    INR 1.01  --   --   --     < > = values in this interval not displayed.             VTE Pharmacologic Prophylaxis: Sequential compression device (Venodyne)     Administrative Statements   I have spent a total time of 10 minutes in caring for this patient on the day of the visit/encounter including Diagnostic results, Instructions for management, Impressions, Counseling / Coordination of care, Documenting in the medical record, Reviewing/placing orders in the medical record (including tests, medications, and/or procedures), Obtaining or reviewing history  , and Communicating with other healthcare professionals .

## 2025-06-14 NOTE — PROCEDURES
Intubation    Date/Time: 6/14/2025 7:27 PM    Performed by: Annette Maria Palladino, DO  Authorized by: Annette Maria Palladino, DO    Patient location:  Bedside  Consent:     Consent obtained:  Emergent situation  Pre-procedure details:     Patient status:  Altered mental status    Pretreatment medications:  Propofol    Paralytics:  Rocuronium  Indications:     Indications for intubation: airway protection    Procedure details:     Preoxygenation:  Bag valve mask    CPR in progress: no      Intubation method:  Oral    Oral intubation technique:  Fiber optic    Laryngoscope blade:  Mac 3    Tube size (mm):  8.0    Tube type:  Cuffed    Number of attempts:  1    Ventilation between attempts: no      Cricoid pressure: no      Tube visualized through cords: yes    Placement assessment:     ETT to lip:  25    Tube secured with:  ETT self    Breath sounds:  Equal    Placement verification: chest rise, condensation, CXR verification, direct visualization, ETCO2 detector and tube exhalation      CXR findings:  ETT in proper place  Post-procedure details:     Patient tolerance of procedure:  Tolerated well, no immediate complications

## 2025-06-15 ENCOUNTER — APPOINTMENT (INPATIENT)
Dept: RADIOLOGY | Facility: HOSPITAL | Age: 75
DRG: 025 | End: 2025-06-15
Payer: MEDICARE

## 2025-06-15 ENCOUNTER — APPOINTMENT (INPATIENT)
Dept: NEUROLOGY | Facility: CLINIC | Age: 75
DRG: 025 | End: 2025-06-15
Attending: PSYCHIATRY & NEUROLOGY
Payer: MEDICARE

## 2025-06-15 LAB
ANION GAP SERPL CALCULATED.3IONS-SCNC: 11 MMOL/L (ref 4–13)
BASOPHILS # BLD AUTO: 0.03 THOUSANDS/ÂΜL (ref 0–0.1)
BASOPHILS NFR BLD AUTO: 0 % (ref 0–1)
BUN SERPL-MCNC: 20 MG/DL (ref 5–25)
CA-I BLD-SCNC: 1.11 MMOL/L (ref 1.12–1.32)
CALCIUM SERPL-MCNC: 8.8 MG/DL (ref 8.4–10.2)
CHLORIDE SERPL-SCNC: 105 MMOL/L (ref 96–108)
CO2 SERPL-SCNC: 26 MMOL/L (ref 21–32)
CREAT SERPL-MCNC: 0.65 MG/DL (ref 0.6–1.3)
EOSINOPHIL # BLD AUTO: 0.03 THOUSAND/ÂΜL (ref 0–0.61)
EOSINOPHIL NFR BLD AUTO: 0 % (ref 0–6)
ERYTHROCYTE [DISTWIDTH] IN BLOOD BY AUTOMATED COUNT: 13.7 % (ref 11.6–15.1)
GFR SERPL CREATININE-BSD FRML MDRD: 94 ML/MIN/1.73SQ M
GLUCOSE SERPL-MCNC: 102 MG/DL (ref 65–140)
GLUCOSE SERPL-MCNC: 106 MG/DL (ref 65–140)
GLUCOSE SERPL-MCNC: 111 MG/DL (ref 65–140)
GLUCOSE SERPL-MCNC: 118 MG/DL (ref 65–140)
GLUCOSE SERPL-MCNC: 126 MG/DL (ref 65–140)
HCT VFR BLD AUTO: 42.2 % (ref 36.5–49.3)
HGB BLD-MCNC: 13.6 G/DL (ref 12–17)
IMM GRANULOCYTES # BLD AUTO: 0.03 THOUSAND/UL (ref 0–0.2)
IMM GRANULOCYTES NFR BLD AUTO: 0 % (ref 0–2)
LYMPHOCYTES # BLD AUTO: 1.17 THOUSANDS/ÂΜL (ref 0.6–4.47)
LYMPHOCYTES NFR BLD AUTO: 13 % (ref 14–44)
MAGNESIUM SERPL-MCNC: 2.4 MG/DL (ref 1.9–2.7)
MCH RBC QN AUTO: 32.6 PG (ref 26.8–34.3)
MCHC RBC AUTO-ENTMCNC: 32.2 G/DL (ref 31.4–37.4)
MCV RBC AUTO: 101 FL (ref 82–98)
MONOCYTES # BLD AUTO: 0.88 THOUSAND/ÂΜL (ref 0.17–1.22)
MONOCYTES NFR BLD AUTO: 10 % (ref 4–12)
NEUTROPHILS # BLD AUTO: 6.68 THOUSANDS/ÂΜL (ref 1.85–7.62)
NEUTS SEG NFR BLD AUTO: 77 % (ref 43–75)
NRBC BLD AUTO-RTO: 0 /100 WBCS
PHOSPHATE SERPL-MCNC: 3.4 MG/DL (ref 2.3–4.1)
PLATELET # BLD AUTO: 188 THOUSANDS/UL (ref 149–390)
PMV BLD AUTO: 9.5 FL (ref 8.9–12.7)
POTASSIUM SERPL-SCNC: 3.9 MMOL/L (ref 3.5–5.3)
RBC # BLD AUTO: 4.17 MILLION/UL (ref 3.88–5.62)
SODIUM SERPL-SCNC: 142 MMOL/L (ref 135–147)
WBC # BLD AUTO: 8.82 THOUSAND/UL (ref 4.31–10.16)

## 2025-06-15 PROCEDURE — 85025 COMPLETE CBC W/AUTO DIFF WBC: CPT

## 2025-06-15 PROCEDURE — 94760 N-INVAS EAR/PLS OXIMETRY 1: CPT

## 2025-06-15 PROCEDURE — 73560 X-RAY EXAM OF KNEE 1 OR 2: CPT

## 2025-06-15 PROCEDURE — 82330 ASSAY OF CALCIUM: CPT

## 2025-06-15 PROCEDURE — 99291 CRITICAL CARE FIRST HOUR: CPT | Performed by: STUDENT IN AN ORGANIZED HEALTH CARE EDUCATION/TRAINING PROGRAM

## 2025-06-15 PROCEDURE — 73130 X-RAY EXAM OF HAND: CPT

## 2025-06-15 PROCEDURE — 94003 VENT MGMT INPAT SUBQ DAY: CPT

## 2025-06-15 PROCEDURE — 80048 BASIC METABOLIC PNL TOTAL CA: CPT

## 2025-06-15 PROCEDURE — 82948 REAGENT STRIP/BLOOD GLUCOSE: CPT

## 2025-06-15 PROCEDURE — 99024 POSTOP FOLLOW-UP VISIT: CPT | Performed by: STUDENT IN AN ORGANIZED HEALTH CARE EDUCATION/TRAINING PROGRAM

## 2025-06-15 PROCEDURE — 93005 ELECTROCARDIOGRAM TRACING: CPT

## 2025-06-15 PROCEDURE — 95720 EEG PHY/QHP EA INCR W/VEEG: CPT | Performed by: PSYCHIATRY & NEUROLOGY

## 2025-06-15 PROCEDURE — 95712 VEEG 2-12 HR INTMT MNTR: CPT

## 2025-06-15 PROCEDURE — 83735 ASSAY OF MAGNESIUM: CPT

## 2025-06-15 PROCEDURE — 84100 ASSAY OF PHOSPHORUS: CPT

## 2025-06-15 RX ORDER — FENTANYL CITRATE 50 UG/ML
INJECTION, SOLUTION INTRAMUSCULAR; INTRAVENOUS
Status: COMPLETED
Start: 2025-06-15 | End: 2025-06-15

## 2025-06-15 RX ORDER — SENNOSIDES 8.6 MG
1 TABLET ORAL 2 TIMES DAILY
Status: DISCONTINUED | OUTPATIENT
Start: 2025-06-15 | End: 2025-06-27 | Stop reason: HOSPADM

## 2025-06-15 RX ORDER — FENTANYL CITRATE 50 UG/ML
50 INJECTION, SOLUTION INTRAMUSCULAR; INTRAVENOUS ONCE
Refills: 0 | Status: COMPLETED | OUTPATIENT
Start: 2025-06-15 | End: 2025-06-15

## 2025-06-15 RX ORDER — FENTANYL CITRATE 50 UG/ML
50 INJECTION, SOLUTION INTRAMUSCULAR; INTRAVENOUS EVERY 2 HOUR PRN
Refills: 0 | Status: DISCONTINUED | OUTPATIENT
Start: 2025-06-15 | End: 2025-06-16

## 2025-06-15 RX ADMIN — DEXMEDETOMIDINE HYDROCHLORIDE 0.7 MCG/KG/HR: 400 INJECTION INTRAVENOUS at 07:27

## 2025-06-15 RX ADMIN — LABETALOL HYDROCHLORIDE 10 MG: 5 INJECTION, SOLUTION INTRAVENOUS at 19:54

## 2025-06-15 RX ADMIN — FENTANYL CITRATE 50 MCG: 50 INJECTION, SOLUTION INTRAMUSCULAR; INTRAVENOUS at 02:45

## 2025-06-15 RX ADMIN — FENTANYL CITRATE 50 MCG: 50 INJECTION INTRAMUSCULAR; INTRAVENOUS at 02:45

## 2025-06-15 RX ADMIN — FENTANYL CITRATE 50 MCG: 50 INJECTION INTRAMUSCULAR; INTRAVENOUS at 19:34

## 2025-06-15 RX ADMIN — QUETIAPINE 50 MG: 25 TABLET ORAL at 08:27

## 2025-06-15 RX ADMIN — CHLORHEXIDINE GLUCONATE 15 ML: 1.2 SOLUTION ORAL at 08:27

## 2025-06-15 RX ADMIN — DEXMEDETOMIDINE HYDROCHLORIDE 0.3 MCG/KG/HR: 400 INJECTION INTRAVENOUS at 18:55

## 2025-06-15 RX ADMIN — POLYETHYLENE GLYCOL 3350 17 G: 17 POWDER, FOR SOLUTION ORAL at 08:27

## 2025-06-15 RX ADMIN — DEXMEDETOMIDINE HYDROCHLORIDE 0.9 MCG/KG/HR: 400 INJECTION INTRAVENOUS at 21:21

## 2025-06-15 RX ADMIN — QUETIAPINE 50 MG: 25 TABLET ORAL at 17:17

## 2025-06-15 RX ADMIN — LEVETIRACETAM 1000 MG: 100 INJECTION, SOLUTION INTRAVENOUS at 08:27

## 2025-06-15 RX ADMIN — SENNOSIDES 8.6 MG: 8.6 TABLET, FILM COATED ORAL at 11:52

## 2025-06-15 RX ADMIN — LEVETIRACETAM 1000 MG: 100 INJECTION, SOLUTION INTRAVENOUS at 21:21

## 2025-06-15 RX ADMIN — SENNOSIDES 8.6 MG: 8.6 TABLET, FILM COATED ORAL at 21:22

## 2025-06-15 RX ADMIN — QUETIAPINE 75 MG: 25 TABLET ORAL at 21:21

## 2025-06-15 RX ADMIN — SENNOSIDES 8.6 MG: 8.6 TABLET, FILM COATED ORAL at 17:18

## 2025-06-15 RX ADMIN — SODIUM CHLORIDE, SODIUM GLUCONATE, SODIUM ACETATE, POTASSIUM CHLORIDE, MAGNESIUM CHLORIDE, SODIUM PHOSPHATE, DIBASIC, AND POTASSIUM PHOSPHATE 75 ML/HR: .53; .5; .37; .037; .03; .012; .00082 INJECTION, SOLUTION INTRAVENOUS at 16:36

## 2025-06-15 RX ADMIN — AMITRIPTYLINE HYDROCHLORIDE 10 MG: 10 TABLET ORAL at 21:22

## 2025-06-15 RX ADMIN — CHLORHEXIDINE GLUCONATE 15 ML: 1.2 SOLUTION ORAL at 21:21

## 2025-06-15 RX ADMIN — FENTANYL CITRATE 50 MCG: 50 INJECTION INTRAMUSCULAR; INTRAVENOUS at 09:32

## 2025-06-15 RX ADMIN — FENTANYL CITRATE 50 MCG: 50 INJECTION INTRAMUSCULAR; INTRAVENOUS at 17:10

## 2025-06-15 RX ADMIN — PANTOPRAZOLE SODIUM 40 MG: 40 INJECTION, POWDER, FOR SOLUTION INTRAVENOUS at 05:26

## 2025-06-15 RX ADMIN — Medication 3 MG: at 21:21

## 2025-06-15 NOTE — PROGRESS NOTES
Progress Note - Critical Care/ICU   Name: Jasson Carter 75 y.o. male I MRN: [id number]  Unit/Bed#: ICU 05 I Date of Admission: 2025   Date of Service: 6/15/2025 I Hospital Day: 4       Assessment & Plan   Active Hospital Problems    Diagnosis Date Noted POA    Atrial fibrillation (HCC) 2025 Unknown    SDH (subdural hematoma) (HCC) 2025 Yes    Altered mental status 2025 Unknown    Fall 2025 Unknown      Resolved Hospital Problems   No resolved problems to display.     Neuro/Psych:  Diagnoses: R chronic SDH & left acute on chronic SDH s/p left craniotomy and right sided karyn hole for SDH evacuation-      Imagin/11 CTH: 14 mm mixed density acute left cerebral convexity subdural hematoma with associated 5 mm rightward midline shift. Low-density 7 mm right cerebral convexity subdural hematoma, age indeterminate.   CT c-spine: No cervical spine fracture or traumatic malalignment    repeat CTH: Stable mixed density left convexity subdural hematoma containing hyperdense acute blood products, with stable approximately 5 mm of         left-to-right midline shift. Stable hypodense right convexity subdural hematoma   post-op CTA head/neck: Postsurgical change from partial evacuation of left hemispheric subdural hematoma. Residual hemorrhage measures up to 9 mm in thickness. Evacuation of right hemispheric subdural collection. Surgical drain and gas over the right convexity. Reduced left to right midline shift, 3 mm. No hemodynamically significant stenosis, dissection or occlusion of the carotid or vertebral arteries or major vessels of the [place].   CT Venogram Brain: Venous sinuses and deep cerebral veins are patent.  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 Persistent visualization of small draining veins extending superiorly from the cribriform plate. Stable residual left subdural hematoma.    MRI brain w/o contrast: Several small scattered acute/early subacute infarcts in the bilateral frontal and parietal lobes and left subinsular/anterior temporal region. Stable residual left subdural hematoma measuring up to 1.3 cm. Stable mass effect with 3 millimeters of left-to-right shift.  6/14 MRA: No signficant stenosis or occlusion.  6/14 CT head: Slight interval decrease in size of left subdural hematoma as detailed above. No new/additional areas of intracranial hemorrhage. Stable postsurgical changes and other ancillary findings detailed above.           Plan:  Neuro checks Q2H   Maintain SBP <140 per Neurosurgery recommendation  Maintain right subdural drain open   F/U neurosurgery post/op wound/drain care and management recommendations  Continue delirium precautions  Melatonin 3mg QHS to regulate sleep-wake cycles  Continue seroquel 50 mg BID, 75mg QHS  Continue amitriptyline 10 mg QHS  Keppra 1000mg BID x 14 days for seizure prophylaxis, per NSGY recommendation  Sedation: Precedex 0.1-0.7mcg/kg/hr, titrated as tolerated  Analgesia: Tylenol 1000 mg IV Q6H prn    CV:  Diagnoses:  A-fib, HTN, Hypercholesterolemia     Plan:  Maintain SBP<140  Continuous cardiopulmonary monitoring. Maintain MAP >65.  Labetalol 10 mg Q4H PRN, SBP> 140  Home losartan held    Pulm:  Diagnoses: Acute hypoxic respiratory failure     Plan  Continue full support: 14/430/+6/40%. Daily SBT and wean as tolerated.   Continuous pulse oximetry. Maintain O2 sat >92%.     GI:  Diagnoses: Hx GERD    Plan:  Bowel regimen: Miralax and Senna for bowel regimen   GI prophylaxis: Protonix 40 mg IV QD     :  Diagnoses: BPH without urinary rentention     Plan:  Monitor urine output  Continue to monitor BUN/Cr. Baseline Cr: 0.65    F/E/N:  F: Continuous Plasmalyte-A/Isolyte-S running at 75ml/hr. Fluid resuscitation prn.  E: Monitor and replete electrolytes for Mg >2, Phos >3, K >4.  N: Jevity 1.2 carlos running at 10ml/hr     Heme/Onc:  Diagnoses: No  active issues    Plan:  Hold chemo DVT prophylaxis until cleared by NSGY team  VTE prophylaxis: Mechanical pumps for DVT prophylaxis    Endo:  Diagnoses: No active issues    Plan:   Insulin: SSI. Monitor blood glucose goal 140-180    ID:  Diagnoses: No active issues    Plan:  Monitor fever curve and WBC.    MSK/Skin:  Diagnoses: Primary osteoarthritis involving multiple joints, Gout     Plan:  Home Allopurinol held  Q2H turning  PT/OT when appropriate       LDAs:  Lines - 2 R peripherals, Folely catheter  Drains - Subdural drain  Airways - ETT 8.0/25 cm at lip      Disposition: Critical care    ICU Core Measures     Vented Patient  VAP Bundle  VAP bundle ordered     A: Assess, Prevent, and Manage Pain Has pain been assessed? Yes  Need for changes to pain regimen? No   B: Both Spontaneous Awakening Trials (SATs) and Spontaneous Breathing Trials (SBTs) Plan to perform spontaneous awakening trial today? Yes   Plan to perform spontaneous breathing trial today?   Obvious barriers to extubation? No   C: Choice of Sedation RASS Goal: -3 Moderate Sedation  Need for changes to sedation or analgesia regimen? No   D: Delirium CAM-ICU: N/A   E: Early Mobility  Plan for early mobility? Yes   F: Family Engagement Plan for family engagement today? Yes       Review of Invasive Devices:    Negar Plan: Continue for accurate I/O monitoring for 48 hours        Prophylaxis:  VTE Mechanical pumps   Stress Ulcer  covered by pantoprazole (PROTONIX) injection 40 mg [[id number]]         24 Hour Events : Patient had tonic-clonic seizure activity last night patient received a total of 4 mg of Ativan 1 g of Keppra patient had post-ictal periods and decision was made to intubate and was placed on full support. He was found to be tachycardic with heart rate in 170s. Propofol was started and he was transitioned to Precedex.    Subjective   Review of Systems: Review of Systems not obtainable due to Clinical Condition    Objective :                    Vitals I/O      Most Recent Min/Max in 24hrs   Temp 98.2 °F (36.8 °C) Temp  Min: 97.4 °F (36.3 °C)  Max: 99 °F (37.2 °C)   Pulse 62 Pulse  Min: 56  Max: 104   Resp 20 Resp  Min: 14  Max: 28   /65 BP  Min: 99/71  Max: 152/92   O2 Sat 98 % SpO2  Min: 95 %  Max: 100 %      Intake/Output Summary (Last 24 hours) at 6/15/2025 0624  Last data filed at 6/15/2025 0200  Gross per 24 hour   Intake 1822.64 ml   Output 1525 ml   Net 297.64 ml       Diet Enteral/Parenteral; Tube Feeding No Oral Diet; Jevity 1.2 Jemal; Continuous; 10    Invasive Monitoring   None at this time        Physical Exam   Physical Exam  Vitals reviewed.   Eyes:      Conjunctiva/sclera: Conjunctivae normal.      Pupils: Pupils are equal, round, and reactive to light.   Skin:     General: Skin is warm.   HENT:      Head: Normocephalic.      Comments: Wrapped with cloth from EEG. Unable to assess skin or staples. Subdural drain intact.     Nose: Nasogastric tube present.      Mouth/Throat:      Mouth: Mucous membranes are moist.   Cardiovascular:      Rate and Rhythm: Normal rate and regular rhythm.      Pulses: Normal pulses.   Musculoskeletal:      Comments: Skin tear noted on left knee.   Abdominal:      Palpations: Abdomen is soft.   Constitutional:       Appearance: He is ill-appearing.   Pulmonary:      Effort: Pulmonary effort is normal.      Breath sounds: Normal breath sounds.      Comments: Patient is intubated and sedated, he is initiating breaths on the vent.  Neurological:      Comments: Sedated   Genitourinary/Anorectal:  Bills present.        Diagnostic Studies        Lab Results: I have reviewed the following results:     Medications:  Scheduled PRN   amitriptyline, 10 mg, HS  chlorhexidine, 15 mL, Q12H SHANEKA  insulin lispro, 1-6 Units, Q6H SHANEKA  levETIRAcetam, 1,000 mg, Q12H SHANEKA  melatonin, 3 mg, HS  pantoprazole, 40 mg, Q24H SHANEKA  polyethylene glycol, 17 g, Daily  QUEtiapine, 50 mg, BID  QUEtiapine, 75 mg, HS  senna, 1 tablet, HS       acetaminophen, 1,000 mg, Q6H PRN  labetalol, 10 mg, Q4H PRN  ondansetron, 4 mg, Once PRN       Continuous    dexmedetomidine, 0.1-0.7 mcg/kg/hr, Last Rate: 0.4 mcg/kg/hr (06/14/25 2330)  multi-electrolyte, 75 mL/hr, Last Rate: 75 mL/hr (06/13/25 0921)         Labs:   CBC    Recent Labs     06/14/25  0634 06/15/25  0506   WBC 10.68* 8.82   HGB 13.5 13.6   HCT 41.1 42.2    188     BMP    Recent Labs     06/14/25  0634 06/15/25  0506   SODIUM 141 142   K 3.7 3.9    105   CO2 25 26   AGAP 11 11   BUN 14 20   CREATININE 0.50* 0.65   CALCIUM 8.7 8.8       Coags    No recent results     Additional Electrolytes  Recent Labs     06/13/25  0639 06/14/25  0634 06/15/25  0506   MG  --  2.2 2.4   PHOS  --  2.5 3.4   CAIONIZED 1.09*  --   --           Blood Gas    No recent results  No recent results LFTs  No recent results    Infectious  No recent results  Glucose  Recent Labs     06/14/25  0634 06/15/25  0506   GLUC 104 111

## 2025-06-15 NOTE — RESPIRATORY THERAPY NOTE
Resp vent note   06/15/25 0312   Respiratory Assessment   Assessment Type Assess only   General Appearance Sedated   Respiratory Pattern Assisted   Chest Assessment Chest expansion symmetrical   Bilateral Breath Sounds Clear;Diminished   Suction ET Tube   Resp Comments Pt cont on CMV settings overnight, no changes, will cont to monitor per RCP.   O2 Device G5 vent   Vent Information   Vent ID [id number]   Vent type Cruz G5   Cruz Vent Mode (S)CMV   $ Vent Daily Charge-Subsequent Yes   $ Pulse Oximetry Spot Check Charge Completed   SpO2 98 %   (S)CMV Settings   Resp Rate (BPM) 14 BPM   VT (mL) 430 mL   FIO2 (%) 40 %   PEEP (cmH2O) 6 cmH2O   I:E Ratio 1/4   Insp Time (%) 1 %   Flow Trigger (LPM) 5   Humidification Heater   Heater Temperature (Set) 95 °F (35 °C)   (S)CMV Actuals   Resp Rate (BPM) 19 BPM   VT (mL) 523   MV 9.8   MAP (cmH2O) 7.8 cmH2O   Peak Pressure (cmH2O) 14 cmH2O   I:E Ratio (Obs) 1/2.1   Insp Resistance 8   Heater Temperature (Obs) 95 °F (35 °C)   (S)CMV Alarms   High Peak Pressure (cmH2O) 40   Low Pressure (cmH2O) 5 cm H2O   High Resp Rate (BPM) 40 BPM   Low Resp Rate (BPM) 8 BPM   High MV (L/min) 20 L/min   Low MV (L/min) 4 L/min   High VT (mL) 1000 mL   Low VT (mL) 250 mL   Apnea Time (s) 20 S   Maintenance   Alarm (pink) cable attached No   Resuscitation bag with peep valve at bedside Yes   Water bag changed No   Circuit changed No   ETT  Cuffed 8 mm   Placement Date/Time: 06/14/25 (c) 2680   Type: Cuffed  Tube Size: 8 mm  Location: Oral  Insertion attempts: 1  Placement Verification: Chest x-ray;End tidal CO2  Secured at (cm): 25   Secured at (cm) 25   Measured from Lips   Secured Location Center   Secured by Commercial tube self   Cuff Pressure (color) Green   HI-LO Suction  Intermittent suction   HI-LO Secretions Scant   HI-LO Intervention Patent

## 2025-06-15 NOTE — PLAN OF CARE
Problem: Nutrition/Hydration-ADULT  Goal: Nutrient/Hydration intake appropriate for improving, restoring or maintaining nutritional needs  Description: Monitor and assess patient's nutrition/hydration status for malnutrition. Collaborate with interdisciplinary team and initiate plan and interventions as ordered.  Monitor patient's weight and dietary intake as ordered or per policy. Utilize nutrition screening tool and intervene as necessary. Determine patient's food preferences and provide high-protein, high-caloric foods as appropriate.     INTERVENTIONS:  - Monitor oral intake, urinary output, labs, and treatment plans  - Assess nutrition and hydration status and recommend course of action  - Evaluate amount of meals eaten  - Assist patient with eating if necessary   - Allow adequate time for meals  - Recommend/ encourage appropriate diets, oral nutritional supplements, and vitamin/mineral supplements  - Order, calculate, and assess calorie counts as needed  - Recommend, monitor, and adjust tube feedings and TPN/PPN based on assessed needs  - Assess need for intravenous fluids  - Provide specific nutrition/hydration education as appropriate  - Include patient/family/caregiver in decisions related to nutrition  Outcome: Progressing     Problem: PAIN - ADULT  Goal: Verbalizes/displays adequate comfort level or baseline comfort level  Description: Interventions:  - Encourage patient to monitor pain and request assistance  - Assess pain using appropriate pain scale  - Administer analgesics as ordered based on type and severity of pain and evaluate response  - Implement non-pharmacological measures as appropriate and evaluate response  - Consider cultural and social influences on pain and pain management  - Notify physician/advanced practitioner if interventions unsuccessful or patient reports new pain  - Educate patient/family on pain management process including their role and importance of  reporting pain   -  Provide non-pharmacologic/complimentary pain relief interventions  Outcome: Progressing     Problem: INFECTION - ADULT  Goal: Absence or prevention of progression during hospitalization  Description: INTERVENTIONS:  - Assess and monitor for signs and symptoms of infection  - Monitor lab/diagnostic results  - Monitor all insertion sites, i.e. indwelling lines, tubes, and drains  - Monitor endotracheal if appropriate and nasal secretions for changes in amount and color  - [place] appropriate cooling/warming therapies per order  - Administer medications as ordered  - Instruct and encourage patient and family to use good hand hygiene technique  - Identify and instruct in appropriate isolation precautions for identified infection/condition  Outcome: Progressing  Goal: Absence of fever/infection during neutropenic period  Description: INTERVENTIONS:  - Monitor WBC  - Perform strict hand hygiene  - Limit to healthy visitors only  - No plants, dried, fresh or silk flowers with winters in patient room  Outcome: Progressing     Problem: SAFETY ADULT  Goal: Patient will remain free of falls  Description: INTERVENTIONS:  - Educate patient/family on patient safety including physical limitations  - Instruct patient to call for assistance with activity   - Consider consulting OT/PT to assist with strengthening/mobility based on AM PAC & JH-HLM score  - Consult OT/PT to assist with strengthening/mobility   - Keep Call bell within reach  - Keep bed low and locked with side rails adjusted as appropriate  - Keep care items and personal belongings within reach  - Initiate and maintain comfort rounds  - Make Fall Risk Sign visible to staff  - Offer Toileting every  Hours, in advance of need  - Initiate/Maintain alarm  - Obtain necessary fall risk management equipment:   - Apply yellow socks and bracelet for high fall risk patients  - Consider moving patient to room near nurses station  Outcome: Progressing  Goal: Maintain or return to  baseline ADL function  Description: INTERVENTIONS:  -  Assess patient's ability to carry out ADLs; assess patient's baseline for ADL function and identify physical deficits which impact ability to perform ADLs (bathing, care of mouth/teeth, toileting, grooming, dressing, etc.)  - Assess/evaluate cause of self-care deficits   - Assess range of motion  - Assess patient's mobility; develop plan if impaired  - Assess patient's need for assistive devices and provide as appropriate  - Encourage maximum independence but intervene and supervise when necessary  - Involve family in performance of ADLs  - Assess for home care needs following discharge   - Consider OT consult to assist with ADL evaluation and planning for discharge  - Provide patient education as appropriate  - Monitor functional capacity and physical performance, use of AM PAC & JH-HLM   - Monitor gait, balance and fatigue with ambulation    Outcome: Progressing  Goal: Maintains/Returns to pre admission functional level  Description: INTERVENTIONS:  - Perform AM-PAC 6 Click Basic Mobility/ Daily Activity assessment daily.  - Set and communicate daily mobility goal to care team and patient/family/caregiver.   - Collaborate with rehabilitation services on mobility goals if consulted  - Perform Range of Motion  times a day.  - Reposition patient every  hours.  - Dangle patient  times a day  - Stand patient  times a day  - Ambulate patient times a day  - Out of bed to chair  times a day   - Out of bed for meals  times a day  - Out of bed for toileting  - Record patient progress and toleration of activity level   Outcome: Progressing     Problem: DISCHARGE PLANNING  Goal: Discharge to home or other facility with appropriate resources  Description: INTERVENTIONS:  - Identify barriers to discharge w/patient and caregiver  - Arrange for needed discharge resources and transportation as appropriate  - Identify discharge learning needs (meds, wound care, etc.)  - Arrange  for interpretive services to assist at discharge as needed  - Refer to Case Management Department for coordinating discharge planning if the patient needs post-hospital services based on physician/advanced practitioner order or complex needs related to functional status, cognitive ability, or social support system  Outcome: Progressing     Problem: Knowledge Deficit  Goal: Patient/family/caregiver demonstrates understanding of disease process, treatment plan, medications, and discharge instructions  Description: Complete learning assessment and assess knowledge base.  Interventions:  - Provide teaching at level of understanding  - Provide teaching via preferred learning methods  Outcome: Progressing     Problem: NEUROSENSORY - ADULT  Goal: Achieves stable or improved neurological status  Description: INTERVENTIONS  - Monitor and report changes in neurological status  - Monitor vital signs such as temperature, blood pressure, glucose, and any other labs ordered   - Initiate measures to prevent increased intracranial pressure  - Monitor for seizure activity and implement precautions if appropriate      Outcome: Progressing  Goal: Remains free of injury related to seizures activity  Description: INTERVENTIONS  - Maintain airway, patient safety  and administer oxygen as ordered  - Monitor patient for seizure activity, document and report duration and description of seizure to physician/advanced practitioner  - If seizure occurs,  ensure patient safety during seizure  - Reorient patient post seizure  - Seizure pads on all 4 side rails  - Instruct patient/family to notify RN of any seizure activity including if an aura is experienced  - Instruct patient/family to call for assistance with activity based on nursing assessment  - Administer anti-seizure medications if ordered    Outcome: Progressing  Goal: Achieves maximal functionality and self care  Description: INTERVENTIONS  - Monitor swallowing and airway patency with  patient fatigue and changes in neurological status  - Encourage and assist patient to increase activity and self care.   - Encourage visually impaired, hearing impaired and aphasic patients to use assistive/communication devices  Outcome: Progressing     Problem: CARDIOVASCULAR - ADULT  Goal: Maintains optimal cardiac output and hemodynamic stability  Description: INTERVENTIONS:  - Monitor I/O, vital signs and rhythm  - Monitor for S/S and trends of decreased cardiac output  - Administer and titrate ordered vasoactive medications to optimize hemodynamic stability  - Assess quality of pulses, skin color and temperature  - Assess for signs of decreased coronary artery perfusion  - Instruct patient to report change in severity of symptoms  Outcome: Progressing  Goal: Absence of cardiac dysrhythmias or at baseline rhythm  Description: INTERVENTIONS:  - Continuous cardiac monitoring, vital signs, obtain 12 lead EKG if ordered  - Administer antiarrhythmic and heart rate control medications as ordered  - Monitor electrolytes and administer replacement therapy as ordered  Outcome: Progressing     Problem: RESPIRATORY - ADULT  Goal: Achieves optimal ventilation and oxygenation  Description: INTERVENTIONS:  - Assess for changes in respiratory status  - Assess for changes in mentation and behavior  - Position to facilitate oxygenation and minimize respiratory effort  - Oxygen administered by appropriate delivery if ordered  - Initiate smoking cessation education as indicated  - Encourage broncho-pulmonary hygiene including cough, deep breathe, Incentive Spirometry  - Assess the need for suctioning and aspirate as needed  - Assess and instruct to report SOB or any respiratory difficulty  - Respiratory Therapy support as indicated  Outcome: Progressing     Problem: GASTROINTESTINAL - ADULT  Goal: Minimal or absence of nausea and/or vomiting  Description: INTERVENTIONS:  - Administer IV fluids if ordered to ensure adequate  hydration  - Maintain NPO status until nausea and vomiting are resolved  - Nasogastric tube if ordered  - Administer ordered antiemetic medications as needed  - Provide nonpharmacologic comfort measures as appropriate  - Advance diet as tolerated, if ordered  - Consider nutrition services referral to assist patient with adequate nutrition and appropriate food choices  Outcome: Progressing  Goal: Maintains or returns to baseline bowel function  Description: INTERVENTIONS:  - Assess bowel function  - Encourage oral fluids to ensure adequate hydration  - Administer IV fluids if ordered to ensure adequate hydration  - Administer ordered medications as needed  - Encourage mobilization and activity  - Consider nutritional services referral to assist patient with adequate nutrition and appropriate food choices  Outcome: Progressing  Goal: Maintains adequate nutritional intake  Description: INTERVENTIONS:  - Monitor percentage of each meal consumed  - Identify factors contributing to decreased intake, treat as appropriate  - Assist with meals as needed  - Monitor I&O, weight, and lab values if indicated  - Obtain nutrition services referral as needed  Outcome: Progressing  Goal: Establish and maintain optimal ostomy function  Description: INTERVENTIONS:  - Assess bowel function  - Encourage oral fluids to ensure adequate hydration  - Administer IV fluids if ordered to ensure adequate hydration   - Administer ordered medications as needed  - Encourage mobilization and activity  - Nutrition services referral to assist patient with appropriate food choices  - Assess stoma site  - Consider wound care consult   Outcome: Progressing  Goal: Oral mucous membranes remain intact  Description: INTERVENTIONS  - Assess oral mucosa and hygiene practices  - Implement preventative oral hygiene regimen  - Implement oral medicated treatments as ordered  - Initiate Nutrition services referral as needed  Outcome: Progressing     Problem:  GENITOURINARY - ADULT  Goal: Maintains or returns to baseline urinary function  Description: INTERVENTIONS:  - Assess urinary function  - Encourage oral fluids to ensure adequate hydration if ordered  - Administer IV fluids as ordered to ensure adequate hydration  - Administer ordered medications as needed  - Offer frequent toileting  - Follow urinary retention protocol if ordered  Outcome: Progressing  Goal: Absence of urinary retention  Description: INTERVENTIONS:  - Assess patient’s ability to void and empty bladder  - Monitor I/O  - Bladder scan as needed  - Discuss with physician/AP medications to alleviate retention as needed  - Discuss catheterization for long term situations as appropriate  Outcome: Progressing  Goal: Urinary catheter remains patent  Description: INTERVENTIONS:  - Assess patency of urinary catheter  - If patient has a chronic pedersen, consider changing catheter if non-functioning  - Follow guidelines for intermittent irrigation of non-functioning urinary catheter  Outcome: Progressing     Problem: METABOLIC, FLUID AND ELECTROLYTES - ADULT  Goal: Electrolytes maintained within normal limits  Description: INTERVENTIONS:  - Monitor labs and assess patient for signs and symptoms of electrolyte imbalances  - Administer electrolyte replacement as ordered  - Monitor response to electrolyte replacements, including repeat lab results as appropriate  - Instruct patient on fluid and nutrition as appropriate  Outcome: Progressing  Goal: Fluid balance maintained  Description: INTERVENTIONS:  - Monitor labs   - Monitor I/O and WT  - Instruct patient on fluid and nutrition as appropriate  - Assess for signs & symptoms of volume excess or deficit  Outcome: Progressing  Goal: Glucose maintained within target range  Description: INTERVENTIONS:  - Monitor Blood Glucose as ordered  - Assess for signs and symptoms of hyperglycemia and hypoglycemia  - Administer ordered medications to maintain glucose within target  range  - Assess nutritional intake and initiate nutrition service referral as needed  Outcome: Progressing     Problem: SKIN/TISSUE INTEGRITY - ADULT  Goal: Skin Integrity remains intact(Skin Breakdown Prevention)  Description: Assess:  -Perform Jake assessment every   -Clean and moisturize skin every   -Inspect skin when repositioning, toileting, and assisting with ADLS  -Assess under medical devices such as  every   -Assess extremities for adequate circulation and sensation     Bed Management:  -Have minimal linens on bed & keep smooth, unwrinkled  -Change linens as needed when moist or perspiring  -Avoid sitting or lying in one position for more than  hours while in bed  -Keep HOB at degrees     Toileting:  -Offer bedside commode  -Assess for incontinence every   -Use incontinent care products after each incontinent episode such as     Activity:  -Mobilize patient  times a day  -Encourage activity and walks on unit  -Encourage or provide ROM exercises   -Turn and reposition patient every  Hours  -Use appropriate equipment to lift or move patient in bed  -Instruct/ Assist with weight shifting every  when out of bed in chair  -Consider limitation of chair time  hour intervals    Skin Care:  -Avoid use of baby powder, tape, friction and shearing, hot water or constrictive clothing  -Relieve pressure over bony prominences using   -Do not massage red bony areas    Next Steps:  -Teach patient strategies to minimize risks such as    -Consider consults to  interdisciplinary teams such as   Outcome: Progressing  Goal: Incision(s), wounds(s) or drain site(s) healing without S/S of infection  Description: INTERVENTIONS  - Assess and document dressing, incision, wound bed, drain sites and surrounding tissue  - Provide patient and family education  - Perform skin care/dressing changes every   Outcome: Progressing  Goal: Pressure injury heals and does not worsen  Description: Interventions:  - Implement low air loss mattress  or specialty surface (Criteria met)  - Apply silicone foam dressing  - Instruct/assist with weight shifting ever minutes when in chair   - Limit chair time to  hour intervals  - Use special pressure reducing interventions such as  when in chair   - Apply fecal or urinary incontinence containment device   - Perform passive or active ROM every   - Turn and reposition patient & offload bony prominences every  hours   - Utilize friction reducing device or surface for transfers   - Consider consults to  interdisciplinary teams such as  - Use incontinent care products after each incontinent episode such as   - Consider nutrition services referral as needed  Outcome: Progressing     Problem: HEMATOLOGIC - ADULT  Goal: Maintains hematologic stability  Description: INTERVENTIONS  - Assess for signs and symptoms of bleeding or hemorrhage  - Monitor labs  - Administer supportive blood products/factors as ordered and appropriate  Outcome: Progressing     Problem: MUSCULOSKELETAL - ADULT  Goal: Maintain or return mobility to safest level of function  Description: INTERVENTIONS:  - Assess patient's ability to carry out ADLs; assess patient's baseline for ADL function and identify physical deficits which impact ability to perform ADLs (bathing, care of mouth/teeth, toileting, grooming, dressing, etc.)  - Assess/evaluate cause of self-care deficits   - Assess range of motion  - Assess patient's mobility  - Assess patient's need for assistive devices and provide as appropriate  - Encourage maximum independence but intervene and supervise when necessary  - Involve family in performance of ADLs  - Assess for home care needs following discharge   - Consider OT consult to assist with ADL evaluation and planning for discharge  - Provide patient education as appropriate  Outcome: Progressing  Goal: Maintain proper alignment of affected body part  Description: INTERVENTIONS:  - Support, maintain and protect limb and body alignment  -  Provide patient/ family with appropriate education  Outcome: Progressing     Problem: Prexisting or High Potential for Compromised Skin Integrity  Goal: Skin integrity is maintained or improved  Description: INTERVENTIONS:  - Identify patients at risk for skin breakdown  - Assess and monitor skin integrity including under and around medical devices   - Assess and monitor nutrition and hydration status  - Monitor labs  - Assess for incontinence   - Turn and reposition patient  - Assist with mobility/ambulation  - Relieve pressure over paolo prominences   - Avoid friction and shearing  - Provide appropriate hygiene as needed including keeping skin clean and dry  - Evaluate need for skin moisturizer/barrier cream  - Collaborate with interdisciplinary team  - Patient/family teaching  - Consider wound care consult    Assess:  - Review Jake scale daily  - Clean and moisturize skin every   - Inspect skin when repositioning, toileting, and assisting with ADLS  - Assess under medical devices such as  every   - Assess extremities for adequate circulation and sensation     Bed Management:  - Have minimal linens on bed & keep smooth, unwrinkled  - Change linens as needed when moist or perspiring  - Avoid sitting or lying in one position for more than  hours while in bed?Keep HOB at degrees   - Toileting:  - Offer bedside commode  - Assess for incontinence every   - Use incontinent care products after each incontinent episode such as     Activity:  - Mobilize patient  times a day  - Encourage activity and walks on unit  - Encourage or provide ROM exercises   - Turn and reposition patient every  Hours  - Use appropriate equipment to lift or move patient in bed  - Instruct/ Assist with weight shifting every  when out of bed in chair  - Consider limitation of chair time  hour intervals    Skin Care:  - Avoid use of baby powder, tape, friction and shearing, hot water or constrictive clothing  - Relieve pressure over bony  prominences using   - Do not massage red bony areas    Next Steps:  - Teach patient strategies to minimize risks such as   - Consider consults to  interdisciplinary teams such as  Outcome: Progressing

## 2025-06-15 NOTE — PLAN OF CARE
Problem: Nutrition/Hydration-ADULT  Goal: Nutrient/Hydration intake appropriate for improving, restoring or maintaining nutritional needs  Description: Monitor and assess patient's nutrition/hydration status for malnutrition. Collaborate with interdisciplinary team and initiate plan and interventions as ordered.  Monitor patient's weight and dietary intake as ordered or per policy. Utilize nutrition screening tool and intervene as necessary. Determine patient's food preferences and provide high-protein, high-caloric foods as appropriate.     INTERVENTIONS:  - Monitor oral intake, urinary output, labs, and treatment plans  - Assess nutrition and hydration status and recommend course of action  - Evaluate amount of meals eaten  - Assist patient with eating if necessary   - Allow adequate time for meals  - Recommend/ encourage appropriate diets, oral nutritional supplements, and vitamin/mineral supplements  - Order, calculate, and assess calorie counts as needed  - Recommend, monitor, and adjust tube feedings and TPN/PPN based on assessed needs  - Assess need for intravenous fluids  - Provide specific nutrition/hydration education as appropriate  - Include patient/family/caregiver in decisions related to nutrition  Outcome: Progressing     Problem: PAIN - ADULT  Goal: Verbalizes/displays adequate comfort level or baseline comfort level  Description: Interventions:  - Encourage patient to monitor pain and request assistance  - Assess pain using appropriate pain scale  - Administer analgesics as ordered based on type and severity of pain and evaluate response  - Implement non-pharmacological measures as appropriate and evaluate response  - Consider cultural and social influences on pain and pain management  - Notify physician/advanced practitioner if interventions unsuccessful or patient reports new pain  - Educate patient/family on pain management process including their role and importance of  reporting pain   -  Provide non-pharmacologic/complimentary pain relief interventions  Outcome: Progressing     Problem: INFECTION - ADULT  Goal: Absence or prevention of progression during hospitalization  Description: INTERVENTIONS:  - Assess and monitor for signs and symptoms of infection  - Monitor lab/diagnostic results  - Monitor all insertion sites, i.e. indwelling lines, tubes, and drains  - Monitor endotracheal if appropriate and nasal secretions for changes in amount and color  - [place] appropriate cooling/warming therapies per order  - Administer medications as ordered  - Instruct and encourage patient and family to use good hand hygiene technique  - Identify and instruct in appropriate isolation precautions for identified infection/condition  Outcome: Progressing  Goal: Absence of fever/infection during neutropenic period  Description: INTERVENTIONS:  - Monitor WBC  - Perform strict hand hygiene  - Limit to healthy visitors only  - No plants, dried, fresh or silk flowers with winters in patient room  Outcome: Progressing     Problem: SAFETY ADULT  Goal: Patient will remain free of falls  Description: INTERVENTIONS:  - Educate patient/family on patient safety including physical limitations  - Instruct patient to call for assistance with activity   - Consider consulting OT/PT to assist with strengthening/mobility based on AM PAC & JH-HLM score  - Consult OT/PT to assist with strengthening/mobility   - Keep Call bell within reach  - Keep bed low and locked with side rails adjusted as appropriate  - Keep care items and personal belongings within reach  - Initiate and maintain comfort rounds  - Make Fall Risk Sign visible to staff  - Offer Toileting every 2 Hours, in advance of need  - Initiate/Maintain bed alarm  - Obtain necessary fall risk management equipment: bed alarm  - Apply yellow socks and bracelet for high fall risk patients  - Consider moving patient to room near nurses station  Outcome: Progressing  Goal: Maintain  or return to baseline ADL function  Description: INTERVENTIONS:  -  Assess patient's ability to carry out ADLs; assess patient's baseline for ADL function and identify physical deficits which impact ability to perform ADLs (bathing, care of mouth/teeth, toileting, grooming, dressing, etc.)  - Assess/evaluate cause of self-care deficits   - Assess range of motion  - Assess patient's mobility; develop plan if impaired  - Assess patient's need for assistive devices and provide as appropriate  - Encourage maximum independence but intervene and supervise when necessary  - Involve family in performance of ADLs  - Assess for home care needs following discharge   - Consider OT consult to assist with ADL evaluation and planning for discharge  - Provide patient education as appropriate  - Monitor functional capacity and physical performance, use of AM PAC & JH-HLM   - Monitor gait, balance and fatigue with ambulation    Outcome: Progressing  Goal: Maintains/Returns to pre admission functional level  Description: INTERVENTIONS:  - Perform AM-PAC 6 Click Basic Mobility/ Daily Activity assessment daily.  - Set and communicate daily mobility goal to care team and patient/family/caregiver.   - Collaborate with rehabilitation services on mobility goals if consulted  - Perform Range of Motion 2 times a day.  - Reposition patient every 2 hours.  - Out of bed for toileting  - Record patient progress and toleration of activity level   Outcome: Progressing     Problem: DISCHARGE PLANNING  Goal: Discharge to home or other facility with appropriate resources  Description: INTERVENTIONS:  - Identify barriers to discharge w/patient and caregiver  - Arrange for needed discharge resources and transportation as appropriate  - Identify discharge learning needs (meds, wound care, etc.)  - Arrange for interpretive services to assist at discharge as needed  - Refer to Case Management Department for coordinating discharge planning if the patient  needs post-hospital services based on physician/advanced practitioner order or complex needs related to functional status, cognitive ability, or social support system  Outcome: Progressing     Problem: Knowledge Deficit  Goal: Patient/family/caregiver demonstrates understanding of disease process, treatment plan, medications, and discharge instructions  Description: Complete learning assessment and assess knowledge base.  Interventions:  - Provide teaching at level of understanding  - Provide teaching via preferred learning methods  Outcome: Progressing     Problem: NEUROSENSORY - ADULT  Goal: Achieves stable or improved neurological status  Description: INTERVENTIONS  - Monitor and report changes in neurological status  - Monitor vital signs such as temperature, blood pressure, glucose, and any other labs ordered   - Initiate measures to prevent increased intracranial pressure  - Monitor for seizure activity and implement precautions if appropriate      Outcome: Progressing  Goal: Remains free of injury related to seizures activity  Description: INTERVENTIONS  - Maintain airway, patient safety  and administer oxygen as ordered  - Monitor patient for seizure activity, document and report duration and description of seizure to physician/advanced practitioner  - If seizure occurs,  ensure patient safety during seizure  - Reorient patient post seizure  - Seizure pads on all 4 side rails  - Instruct patient/family to notify RN of any seizure activity including if an aura is experienced  - Instruct patient/family to call for assistance with activity based on nursing assessment  - Administer anti-seizure medications if ordered    Outcome: Progressing  Goal: Achieves maximal functionality and self care  Description: INTERVENTIONS  - Monitor swallowing and airway patency with patient fatigue and changes in neurological status  - Encourage and assist patient to increase activity and self care.   - Encourage visually  impaired, hearing impaired and aphasic patients to use assistive/communication devices  Outcome: Progressing     Problem: CARDIOVASCULAR - ADULT  Goal: Maintains optimal cardiac output and hemodynamic stability  Description: INTERVENTIONS:  - Monitor I/O, vital signs and rhythm  - Monitor for S/S and trends of decreased cardiac output  - Administer and titrate ordered vasoactive medications to optimize hemodynamic stability  - Assess quality of pulses, skin color and temperature  - Assess for signs of decreased coronary artery perfusion  - Instruct patient to report change in severity of symptoms  Outcome: Progressing  Goal: Absence of cardiac dysrhythmias or at baseline rhythm  Description: INTERVENTIONS:  - Continuous cardiac monitoring, vital signs, obtain 12 lead EKG if ordered  - Administer antiarrhythmic and heart rate control medications as ordered  - Monitor electrolytes and administer replacement therapy as ordered  Outcome: Progressing     Problem: RESPIRATORY - ADULT  Goal: Achieves optimal ventilation and oxygenation  Description: INTERVENTIONS:  - Assess for changes in respiratory status  - Assess for changes in mentation and behavior  - Position to facilitate oxygenation and minimize respiratory effort  - Oxygen administered by appropriate delivery if ordered  - Initiate smoking cessation education as indicated  - Encourage broncho-pulmonary hygiene including cough, deep breathe, Incentive Spirometry  - Assess the need for suctioning and aspirate as needed  - Assess and instruct to report SOB or any respiratory difficulty  - Respiratory Therapy support as indicated  Outcome: Progressing     Problem: GASTROINTESTINAL - ADULT  Goal: Minimal or absence of nausea and/or vomiting  Description: INTERVENTIONS:  - Administer IV fluids if ordered to ensure adequate hydration  - Maintain NPO status until nausea and vomiting are resolved  - Nasogastric tube if ordered  - Administer ordered antiemetic medications  as needed  - Provide nonpharmacologic comfort measures as appropriate  - Advance diet as tolerated, if ordered  - Consider nutrition services referral to assist patient with adequate nutrition and appropriate food choices  Outcome: Progressing  Goal: Maintains or returns to baseline bowel function  Description: INTERVENTIONS:  - Assess bowel function  - Encourage oral fluids to ensure adequate hydration  - Administer IV fluids if ordered to ensure adequate hydration  - Administer ordered medications as needed  - Encourage mobilization and activity  - Consider nutritional services referral to assist patient with adequate nutrition and appropriate food choices  Outcome: Progressing  Goal: Maintains adequate nutritional intake  Description: INTERVENTIONS:  - Monitor percentage of each meal consumed  - Identify factors contributing to decreased intake, treat as appropriate  - Assist with meals as needed  - Monitor I&O, weight, and lab values if indicated  - Obtain nutrition services referral as needed  Outcome: Progressing  Goal: Establish and maintain optimal ostomy function  Description: INTERVENTIONS:  - Assess bowel function  - Encourage oral fluids to ensure adequate hydration  - Administer IV fluids if ordered to ensure adequate hydration   - Administer ordered medications as needed  - Encourage mobilization and activity  - Nutrition services referral to assist patient with appropriate food choices  - Assess stoma site  - Consider wound care consult   Outcome: Progressing  Goal: Oral mucous membranes remain intact  Description: INTERVENTIONS  - Assess oral mucosa and hygiene practices  - Implement preventative oral hygiene regimen  - Implement oral medicated treatments as ordered  - Initiate Nutrition services referral as needed  Outcome: Progressing     Problem: GENITOURINARY - ADULT  Goal: Maintains or returns to baseline urinary function  Description: INTERVENTIONS:  - Assess urinary function  - Encourage oral  fluids to ensure adequate hydration if ordered  - Administer IV fluids as ordered to ensure adequate hydration  - Administer ordered medications as needed  - Offer frequent toileting  - Follow urinary retention protocol if ordered  Outcome: Progressing  Goal: Absence of urinary retention  Description: INTERVENTIONS:  - Assess patient’s ability to void and empty bladder  - Monitor I/O  - Bladder scan as needed  - Discuss with physician/AP medications to alleviate retention as needed  - Discuss catheterization for long term situations as appropriate  Outcome: Progressing  Goal: Urinary catheter remains patent  Description: INTERVENTIONS:  - Assess patency of urinary catheter  - If patient has a chronic pedersen, consider changing catheter if non-functioning  - Follow guidelines for intermittent irrigation of non-functioning urinary catheter  Outcome: Progressing     Problem: METABOLIC, FLUID AND ELECTROLYTES - ADULT  Goal: Electrolytes maintained within normal limits  Description: INTERVENTIONS:  - Monitor labs and assess patient for signs and symptoms of electrolyte imbalances  - Administer electrolyte replacement as ordered  - Monitor response to electrolyte replacements, including repeat lab results as appropriate  - Instruct patient on fluid and nutrition as appropriate  Outcome: Progressing  Goal: Fluid balance maintained  Description: INTERVENTIONS:  - Monitor labs   - Monitor I/O and WT  - Instruct patient on fluid and nutrition as appropriate  - Assess for signs & symptoms of volume excess or deficit  Outcome: Progressing  Goal: Glucose maintained within target range  Description: INTERVENTIONS:  - Monitor Blood Glucose as ordered  - Assess for signs and symptoms of hyperglycemia and hypoglycemia  - Administer ordered medications to maintain glucose within target range  - Assess nutritional intake and initiate nutrition service referral as needed  Outcome: Progressing     Problem: SKIN/TISSUE INTEGRITY -  ADULT  Goal: Skin Integrity remains intact(Skin Breakdown Prevention)  Description: Assess:  -Perform Jake assessment every 2  -Clean and moisturize skin every 2  -Inspect skin when repositioning, toileting, and assisting with ADLS  -Assess extremities for adequate circulation and sensation     Bed Management:  -Have minimal linens on bed & keep smooth, unwrinkled  -Change linens as needed when moist or perspiring  -Avoid sitting or lying in one position for more than 2 hours while in bed  -Keep HOB at 30 degrees     Toileting:  -Offer bedside commode  -Assess for incontinence every 2    Activity:  -Mobilize patient 2 times a day  -Encourage activity and walks on unit  -Encourage or provide ROM exercises   -Turn and reposition patient every 2 Hours  -Use appropriate equipment to lift or move patient in bed  -Instruct/ Assist with weight shifting every 2 when out of bed in chair  -Consider limitation of chair time 2 hour intervals    Skin Care:  -Avoid use of baby powder, tape, friction and shearing, hot water or constrictive clothing  -Relieve pressure over bony prominences using pillows  -Do not massage red bony areas    Outcome: Progressing  Goal: Incision(s), wounds(s) or drain site(s) healing without S/S of infection  Description: INTERVENTIONS  - Assess and document dressing, incision, wound bed, drain sites and surrounding tissue  - Provide patient and family education  - Perform skin care/dressing changes every 2  Outcome: Progressing  Goal: Pressure injury heals and does not worsen  Description: Interventions:  - Implement low air loss mattress or specialty surface (Criteria met)  - Apply silicone foam dressing  - Instruct/assist with weight shifting every 60 minutes when in chair   - Limit chair time to 2 hour intervals  - Apply fecal or urinary incontinence containment device   - Perform passive or active ROM every 2  - Turn and reposition patient & offload bony prominences every 2 hours   - Utilize  friction reducing device or surface for transfers   - Consider consults to  interdisciplinary teams such as PT/OT  - Consider nutrition services referral as needed  Outcome: Progressing     Problem: HEMATOLOGIC - ADULT  Goal: Maintains hematologic stability  Description: INTERVENTIONS  - Assess for signs and symptoms of bleeding or hemorrhage  - Monitor labs  - Administer supportive blood products/factors as ordered and appropriate  Outcome: Progressing     Problem: MUSCULOSKELETAL - ADULT  Goal: Maintain or return mobility to safest level of function  Description: INTERVENTIONS:  - Assess patient's ability to carry out ADLs; assess patient's baseline for ADL function and identify physical deficits which impact ability to perform ADLs (bathing, care of mouth/teeth, toileting, grooming, dressing, etc.)  - Assess/evaluate cause of self-care deficits   - Assess range of motion  - Assess patient's mobility  - Assess patient's need for assistive devices and provide as appropriate  - Encourage maximum independence but intervene and supervise when necessary  - Involve family in performance of ADLs  - Assess for home care needs following discharge   - Consider OT consult to assist with ADL evaluation and planning for discharge  - Provide patient education as appropriate  Outcome: Progressing  Goal: Maintain proper alignment of affected body part  Description: INTERVENTIONS:  - Support, maintain and protect limb and body alignment  - Provide patient/ family with appropriate education  Outcome: Progressing     Problem: Prexisting or High Potential for Compromised Skin Integrity  Goal: Skin integrity is maintained or improved  Description: INTERVENTIONS:  - Identify patients at risk for skin breakdown  - Assess and monitor skin integrity including under and around medical devices   - Assess and monitor nutrition and hydration status  - Monitor labs  - Assess for incontinence   - Turn and reposition patient  - Assist with  mobility/ambulation  - Relieve pressure over paolo prominences   - Avoid friction and shearing  - Provide appropriate hygiene as needed including keeping skin clean and dry  - Evaluate need for skin moisturizer/barrier cream  - Collaborate with interdisciplinary team  - Patient/family teaching  - Consider wound care consult    Assess:  - Review Jake scale daily  - Clean and moisturize skin every 2  - Inspect skin when repositioning, toileting, and assisting with ADLS  - Assess under medical devices such as bp cuff every 2  - Assess extremities for adequate circulation and sensation     Bed Management:  - Have minimal linens on bed & keep smooth, unwrinkled  - Change linens as needed when moist or perspiring  - Avoid sitting or lying in one position for more than 2 hours while in bed?Keep HOB at 30 degrees   - Toileting:  - Offer bedside commode  - Assess for incontinence every 2    Activity:  - Mobilize patient 2 times a day  - Encourage activity and walks on unit  - Encourage or provide ROM exercises   - Turn and reposition patient every 2 Hours  - Use appropriate equipment to lift or move patient in bed  - Instruct/ Assist with weight shifting every 2 when out of bed in chair  - Consider limitation of chair time 2 hour intervals    Skin Care:  - Avoid use of baby powder, tape, friction and shearing, hot water or constrictive clothing  - Relieve pressure over bony prominences using pillows  - Do not massage red bony areas    Outcome: Progressing

## 2025-06-15 NOTE — RESPIRATORY THERAPY NOTE
RT Ventilator Management Note  Jasson Carter 75 y.o. male MRN: [id number]  Unit/Bed#: ICU 05 Encounter: [id number]      Daily Screen         6/15/2025  0814             Patient safety screen outcome:: Failed    Not Ready for Weaning due to:: Underline problem not resolved              Physical Exam:   Assessment Type: Assess only  General Appearance: Sedated  Respiratory Pattern: Assisted  Chest Assessment: Chest expansion symmetrical  Bilateral Breath Sounds: Clear, Diminished  Suction: ET Tube  O2 Device: G5 vent      Resp Comments: (P) pt rr changed by md.  orders updated at this time.

## 2025-06-15 NOTE — CONSULTS
Nutrition Recommendations:    Advance Jevity 1.2 by 10mL/hr q4 hrs as tolerated to goal rate of 60mL/hr continuous.   Add 2 packets Prosource Liquid Protein daily.     At goal this would provide 1848 kcals, 110g protein, 1282mL water including water neded to administer Prosource.     Additional free water flushes per critical care; suggest at least 30mL q4 hrs to help maintain tube patency.     Monitor weights, ordered recheck.

## 2025-06-15 NOTE — SPEECH THERAPY NOTE
Speech-Language Pathology Progress Note      Patient Name: Jasson Carter    Today's Date: 6/15/2025      Pt required intubation. ST orders completed at this time. Please re consult as able/appropriate.

## 2025-06-15 NOTE — ASSESSMENT & PLAN NOTE
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 Yoon 25).   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  Upon arrival to [facility], patient has significant expressive aphasia and confusion.  On 25 after partial embolization of the DAVF, pt     Imagin/11 post-op CTA head/neck: Postsurgical change from partial evacuation of left hemispheric subdural hematoma. Residual hemorrhage measures up to 9 mm in thickness. Evacuation of right hemispheric subdural collection. Surgical drain and gas over the right convexity. Reduced left to right midline shift, 3 mm. No hemodynamically significant stenosis, dissection or occlusion of the carotid or vertebral arteries or major vessels of the [place].   repeat CTH: Stable mixed density left convexity subdural hematoma containing hyperdense acute blood products, with stable approximately 5 mm of left-to-right midline shift. Stable hypodense right convexity subdural hematoma   CTH: 14 mm mixed density acute left cerebral convexity subdural hematoma with associated 5 mm rightward midline shift. Low-density 7 mm right cerebral convexity subdural hematoma, age indeterminate.   CT c-spine: No cervical spine fracture or traumatic malalignment    CT venogram: Stable posttreatment changes status post partial embolization of cribriform plate dural AV fistula. Persistent visualization of small draining veins extending superiorly from the cribriform plate.    - The patient was doing well yesterday and then went into status epilepticus and  was intubated. Repeat CT head demonstrated stable findings. Currently on video monitoring. No seizures noted.     Plan:   Continue to closely monitor neuro exam --> pt needs frequent VF checks and eye monitoring   Frequent neuro checks per primary team   Repeat STAT CTH with any acute decline in GCS > 2pts or more in 1hr   Maintain normotensive BP goals, SBP goal 100-140   6/11 removal of L SDD, 6/12 right drain removed  Continue keppra for seizure ppx x 14 days --> consider vEEG monitoring should pt have no improvement in his exam   Hold all AC/AP meds   DVT ppx: SCDs, hold chem dvt ppx until cleared by nsgy team   Medical management per primary team   Pain control per primary team   PT/OT   No embolization anticipated today/at this time due to patient's altered mental status.  Dr. Yoon discussed with pt's daughter and son that patient to remain in hospital till they have additional treatment of the DAVF. Ongoing discussion about timing for the treatment, possibly the week of 06/23/25 by Dr. Yoon, possibly sooner by Dr. Barbour.  Pt's family agreeable to the plan of care.   Neurosurgery will continue to follow. Please reach out with any further questions or concerns.

## 2025-06-15 NOTE — PROGRESS NOTES
Progress Note - Neurosurgery   Name: Jasson Carter 75 y.o. male I MRN: [id number]  Unit/Bed#: ICU 05 I Date of Admission: 2025   Date of Service: 6/15/2025 I Hospital Day: 4     Assessment & Plan  SDH (subdural hematoma) (HCC)  PPD 3 s/p Successful partial embolization of cribriform plate DAVF via the left ophthalmic artery- mild residual remains from the right ophthalmic artery (Dr. Yoon 25).   POD#4 - s/p L craniotomy and R karyn holes for evac of radha SDH ( 25)   L sided acute on chronic SDH   R chronic SDH   Arrived at [facility] ER on 25 with complaints of headache and left-sided hip pain after reported fall in the grass the day prior to arrival  Upon further questioning, patient has had some slurred speech for couple days per his wife.  Upon arrival to [facility], patient has significant expressive aphasia and confusion.  On 25 after partial embolization of the DAVF, pt     Imagin/11 post-op CTA head/neck: Postsurgical change from partial evacuation of left hemispheric subdural hematoma. Residual hemorrhage measures up to 9 mm in thickness. Evacuation of right hemispheric subdural collection. Surgical drain and gas over the right convexity. Reduced left to right midline shift, 3 mm. No hemodynamically significant stenosis, dissection or occlusion of the carotid or vertebral arteries or major vessels of the [place].   repeat CTH: Stable mixed density left convexity subdural hematoma containing hyperdense acute blood products, with stable approximately 5 mm of left-to-right midline shift. Stable hypodense right convexity subdural hematoma   CTH: 14 mm mixed density acute left cerebral convexity subdural hematoma with associated 5 mm rightward midline shift. Low-density 7 mm right cerebral convexity subdural hematoma, age indeterminate.   CT c-spine: No cervical spine fracture or traumatic malalignment    CT venogram: Stable posttreatment changes status post  partial embolization of cribriform plate dural AV fistula. Persistent visualization of small draining veins extending superiorly from the cribriform plate.   6/14 - The patient was doing well yesterday and then went into status epilepticus and was intubated. Repeat CT head demonstrated stable findings. Currently on video monitoring. No seizures noted.     Plan:   Continue to closely monitor neuro exam --> pt needs frequent VF checks and eye monitoring   Frequent neuro checks per primary team   Repeat STAT CTH with any acute decline in GCS > 2pts or more in 1hr   Maintain normotensive BP goals, SBP goal 100-140   6/11 removal of L SDD, 6/12 right drain removed  Continue keppra for seizure ppx x 14 days --> consider vEEG monitoring should pt have no improvement in his exam   Hold all AC/AP meds   DVT ppx: SCDs, hold chem dvt ppx until cleared by nsgy team   Medical management per primary team   Pain control per primary team   PT/OT   No embolization anticipated today/at this time due to patient's altered mental status.  Dr. Yoon discussed with pt's daughter and son that patient to remain in hospital till they have additional treatment of the DAVF. Ongoing discussion about timing for the treatment, possibly the week of 06/23/25 by Dr. Yoon, possibly sooner by Dr. Barbour.  Pt's family agreeable to the plan of care.   Neurosurgery will continue to follow. Please reach out with any further questions or concerns.   Atrial fibrillation (HCC)          Subjective   Intubated for seizures    Objective :  Temp:  [98.1 °F (36.7 °C)-99 °F (37.2 °C)] 98.5 °F (36.9 °C)  HR:  [] 50  BP: ()/(61-93) 117/71  Resp:  [16-22] 19  SpO2:  [95 %-100 %] 100 %  O2 Device: Ventilator    I/O         06/13 0701  06/14 0700 06/14 0701  06/15 0700 06/15 0701  06/16 0700    I.V. (mL/kg) 1916.6 (22) 1957.4 (22.4) 351.5 (4)    NG/GT  0 160    IV Piggyback 100 200     Feedings  40 40    Total Intake(mL/kg) 2016.6 (23.1) 2197.4 (25.2)  551.5 (6.3)    Urine (mL/kg/hr) 2300 (1.1) 1700 (0.8) 290 (1)    Emesis/NG output  50     Drains 0 0 0    Blood       Total Output 2300 1750 290    Net -283.4 +447.4 +261.5                 Physical Exam Neurological Exam  Intubated and sedated  Unable to complete neurologic exam      Lab Results: I have reviewed the following results:  Recent Labs     06/15/25  0506   WBC 8.82   HGB 13.6   HCT 42.2      SODIUM 142   K 3.9      CO2 26   BUN 20   CREATININE 0.65   GLUC 111   CAIONIZED 1.11*   MG 2.4   PHOS 3.4             VTE Pharmacologic Prophylaxis: Sequential compression device (Venodyne)     Administrative Statements   I have spent a total time of 15 minutes in caring for this patient on the day of the visit/encounter including Diagnostic results, Instructions for management, Patient and family education, Impressions, Counseling / Coordination of care, Documenting in the medical record, Reviewing/placing orders in the medical record (including tests, medications, and/or procedures), Obtaining or reviewing history  , and Communicating with other healthcare professionals .

## 2025-06-16 PROBLEM — M25.461 EFFUSION OF RIGHT KNEE: Status: ACTIVE | Noted: 2025-06-16

## 2025-06-16 LAB
ANION GAP SERPL CALCULATED.3IONS-SCNC: 8 MMOL/L (ref 4–13)
APPEARANCE FLD: ABNORMAL
ATRIAL RATE: 139 BPM
ATRIAL RATE: 52 BPM
ATRIAL RATE: 56 BPM
BASOPHILS # BLD AUTO: 0.03 THOUSANDS/ÂΜL (ref 0–0.1)
BASOPHILS NFR BLD AUTO: 0 % (ref 0–1)
BUN SERPL-MCNC: 17 MG/DL (ref 5–25)
CA-I BLD-SCNC: 0.99 MMOL/L (ref 1.12–1.32)
CALCIUM SERPL-MCNC: 8.5 MG/DL (ref 8.4–10.2)
CHLORIDE SERPL-SCNC: 106 MMOL/L (ref 96–108)
CO2 SERPL-SCNC: 27 MMOL/L (ref 21–32)
COLOR FLD: YELLOW
CREAT SERPL-MCNC: 0.58 MG/DL (ref 0.6–1.3)
CRP SERPL QL: 183.9 MG/L
CRYSTALS SNV QL MICRO: NORMAL
EOSINOPHIL # BLD AUTO: 0.04 THOUSAND/ÂΜL (ref 0–0.61)
EOSINOPHIL NFR BLD AUTO: 1 % (ref 0–6)
ERYTHROCYTE [DISTWIDTH] IN BLOOD BY AUTOMATED COUNT: 13.4 % (ref 11.6–15.1)
ERYTHROCYTE [SEDIMENTATION RATE] IN BLOOD: 43 MM/HOUR (ref 0–19)
GFR SERPL CREATININE-BSD FRML MDRD: 99 ML/MIN/1.73SQ M
GLUCOSE SERPL-MCNC: 123 MG/DL (ref 65–140)
GLUCOSE SERPL-MCNC: 129 MG/DL (ref 65–140)
GLUCOSE SERPL-MCNC: 132 MG/DL (ref 65–140)
GLUCOSE SERPL-MCNC: 152 MG/DL (ref 65–140)
GLUCOSE SERPL-MCNC: 174 MG/DL (ref 65–140)
GLUCOSE SERPL-MCNC: 191 MG/DL (ref 65–140)
GRAM STN SPEC: NORMAL
HCT VFR BLD AUTO: 39.5 % (ref 36.5–49.3)
HGB BLD-MCNC: 12.7 G/DL (ref 12–17)
IMM GRANULOCYTES # BLD AUTO: 0.01 THOUSAND/UL (ref 0–0.2)
IMM GRANULOCYTES NFR BLD AUTO: 0 % (ref 0–2)
LYMPHOCYTES # BLD AUTO: 0.83 THOUSANDS/ÂΜL (ref 0.6–4.47)
LYMPHOCYTES NFR BLD AUTO: 11 % (ref 14–44)
MAGNESIUM SERPL-MCNC: 2.2 MG/DL (ref 1.9–2.7)
MCH RBC QN AUTO: 32.4 PG (ref 26.8–34.3)
MCHC RBC AUTO-ENTMCNC: 32.2 G/DL (ref 31.4–37.4)
MCV RBC AUTO: 101 FL (ref 82–98)
MONOCYTES # BLD AUTO: 0.87 THOUSAND/ÂΜL (ref 0.17–1.22)
MONOCYTES NFR BLD AUTO: 12 % (ref 4–12)
MONOCYTES NFR SNV MANUAL: 13 %
NEUTROPHILS # BLD AUTO: 5.71 THOUSANDS/ÂΜL (ref 1.85–7.62)
NEUTROPHILS NFR SNV MANUAL: 87 %
NEUTS SEG NFR BLD AUTO: 76 % (ref 43–75)
NRBC BLD AUTO-RTO: 0 /100 WBCS
PHOSPHATE SERPL-MCNC: 3.4 MG/DL (ref 2.3–4.1)
PLATELET # BLD AUTO: 170 THOUSANDS/UL (ref 149–390)
PMV BLD AUTO: 9.6 FL (ref 8.9–12.7)
POTASSIUM SERPL-SCNC: 3.7 MMOL/L (ref 3.5–5.3)
PR INTERVAL: 0 MS
QRS AXIS: -3 DEGREES
QRS AXIS: 54 DEGREES
QRS AXIS: 61 DEGREES
QRSD INTERVAL: 83 MS
QRSD INTERVAL: 96 MS
QRSD INTERVAL: 96 MS
QT INTERVAL: 292 MS
QT INTERVAL: 417 MS
QT INTERVAL: 429 MS
QTC INTERVAL: 388 MS
QTC INTERVAL: 414 MS
QTC INTERVAL: 444 MS
RBC # BLD AUTO: 3.92 MILLION/UL (ref 3.88–5.62)
RBC # SNV MANUAL: 25 /UL (ref 0–10)
SODIUM SERPL-SCNC: 141 MMOL/L (ref 135–147)
T WAVE AXIS: 55 DEGREES
T WAVE AXIS: 58 DEGREES
T WAVE AXIS: 61 DEGREES
TOTAL CELLS COUNTED SPEC: 100
VENTRICULAR RATE: 139 BPM
VENTRICULAR RATE: 52 BPM
VENTRICULAR RATE: 56 BPM
WBC # BLD AUTO: 7.49 THOUSAND/UL (ref 4.31–10.16)
WBC # FLD MANUAL: 7637 /UL (ref 0–200)

## 2025-06-16 PROCEDURE — 95718 EEG PHYS/QHP 2-12 HR W/VEEG: CPT | Performed by: PSYCHIATRY & NEUROLOGY

## 2025-06-16 PROCEDURE — 93010 ELECTROCARDIOGRAM REPORT: CPT | Performed by: INTERNAL MEDICINE

## 2025-06-16 PROCEDURE — 86140 C-REACTIVE PROTEIN: CPT

## 2025-06-16 PROCEDURE — 82330 ASSAY OF CALCIUM: CPT

## 2025-06-16 PROCEDURE — 94760 N-INVAS EAR/PLS OXIMETRY 1: CPT

## 2025-06-16 PROCEDURE — 89051 BODY FLUID CELL COUNT: CPT

## 2025-06-16 PROCEDURE — 89050 BODY FLUID CELL COUNT: CPT

## 2025-06-16 PROCEDURE — 84100 ASSAY OF PHOSPHORUS: CPT

## 2025-06-16 PROCEDURE — 82948 REAGENT STRIP/BLOOD GLUCOSE: CPT

## 2025-06-16 PROCEDURE — NC001 PR NO CHARGE: Performed by: ORTHOPAEDIC SURGERY

## 2025-06-16 PROCEDURE — 83735 ASSAY OF MAGNESIUM: CPT

## 2025-06-16 PROCEDURE — 89060 EXAM SYNOVIAL FLUID CRYSTALS: CPT

## 2025-06-16 PROCEDURE — 87070 CULTURE OTHR SPECIMN AEROBIC: CPT

## 2025-06-16 PROCEDURE — 85652 RBC SED RATE AUTOMATED: CPT

## 2025-06-16 PROCEDURE — 87205 SMEAR GRAM STAIN: CPT

## 2025-06-16 PROCEDURE — 99024 POSTOP FOLLOW-UP VISIT: CPT | Performed by: NEUROLOGICAL SURGERY

## 2025-06-16 PROCEDURE — 94003 VENT MGMT INPAT SUBQ DAY: CPT

## 2025-06-16 PROCEDURE — 85025 COMPLETE CBC W/AUTO DIFF WBC: CPT

## 2025-06-16 PROCEDURE — 99291 CRITICAL CARE FIRST HOUR: CPT | Performed by: EMERGENCY MEDICINE

## 2025-06-16 PROCEDURE — 80048 BASIC METABOLIC PNL TOTAL CA: CPT

## 2025-06-16 RX ORDER — LIDOCAINE 50 MG/G
1 PATCH TOPICAL DAILY
Status: DISCONTINUED | OUTPATIENT
Start: 2025-06-17 | End: 2025-06-27 | Stop reason: HOSPADM

## 2025-06-16 RX ORDER — LIDOCAINE HYDROCHLORIDE 10 MG/ML
30 INJECTION, SOLUTION EPIDURAL; INFILTRATION; INTRACAUDAL; PERINEURAL ONCE
Status: COMPLETED | OUTPATIENT
Start: 2025-06-16 | End: 2025-06-16

## 2025-06-16 RX ORDER — BISACODYL 10 MG
10 SUPPOSITORY, RECTAL RECTAL DAILY
Status: DISCONTINUED | OUTPATIENT
Start: 2025-06-16 | End: 2025-06-27 | Stop reason: HOSPADM

## 2025-06-16 RX ORDER — CALCIUM GLUCONATE 20 MG/ML
2 INJECTION, SOLUTION INTRAVENOUS ONCE
Status: COMPLETED | OUTPATIENT
Start: 2025-06-16 | End: 2025-06-16

## 2025-06-16 RX ORDER — ACETAMINOPHEN 160 MG/5ML
650 SUSPENSION ORAL EVERY 4 HOURS PRN
Status: DISCONTINUED | OUTPATIENT
Start: 2025-06-16 | End: 2025-06-24

## 2025-06-16 RX ORDER — ENOXAPARIN SODIUM 100 MG/ML
30 INJECTION SUBCUTANEOUS EVERY 12 HOURS SCHEDULED
Status: DISCONTINUED | OUTPATIENT
Start: 2025-06-16 | End: 2025-06-27 | Stop reason: HOSPADM

## 2025-06-16 RX ORDER — AMLODIPINE BESYLATE 5 MG/1
5 TABLET ORAL DAILY
Status: DISCONTINUED | OUTPATIENT
Start: 2025-06-16 | End: 2025-06-16

## 2025-06-16 RX ORDER — POTASSIUM CHLORIDE 20MEQ/15ML
40 LIQUID (ML) ORAL ONCE
Status: COMPLETED | OUTPATIENT
Start: 2025-06-16 | End: 2025-06-16

## 2025-06-16 RX ORDER — AMLODIPINE BESYLATE 5 MG/1
5 TABLET ORAL DAILY
Status: DISCONTINUED | OUTPATIENT
Start: 2025-06-16 | End: 2025-06-24

## 2025-06-16 RX ADMIN — POTASSIUM CHLORIDE 40 MEQ: 20 SOLUTION ORAL at 08:41

## 2025-06-16 RX ADMIN — Medication 3 MG: at 21:16

## 2025-06-16 RX ADMIN — LEVETIRACETAM 1000 MG: 100 INJECTION, SOLUTION INTRAVENOUS at 08:40

## 2025-06-16 RX ADMIN — CHLORHEXIDINE GLUCONATE 15 ML: 1.2 SOLUTION ORAL at 21:16

## 2025-06-16 RX ADMIN — SODIUM CHLORIDE, SODIUM GLUCONATE, SODIUM ACETATE, POTASSIUM CHLORIDE, MAGNESIUM CHLORIDE, SODIUM PHOSPHATE, DIBASIC, AND POTASSIUM PHOSPHATE 75 ML/HR: .53; .5; .37; .037; .03; .012; .00082 INJECTION, SOLUTION INTRAVENOUS at 05:15

## 2025-06-16 RX ADMIN — ACETAMINOPHEN 650 MG: 650 SUSPENSION ORAL at 17:20

## 2025-06-16 RX ADMIN — LEVETIRACETAM 1000 MG: 100 INJECTION, SOLUTION INTRAVENOUS at 21:16

## 2025-06-16 RX ADMIN — QUETIAPINE 50 MG: 25 TABLET ORAL at 17:14

## 2025-06-16 RX ADMIN — AMLODIPINE BESYLATE 5 MG: 5 TABLET ORAL at 17:14

## 2025-06-16 RX ADMIN — LABETALOL HYDROCHLORIDE 10 MG: 5 INJECTION, SOLUTION INTRAVENOUS at 14:06

## 2025-06-16 RX ADMIN — QUETIAPINE 50 MG: 25 TABLET ORAL at 08:40

## 2025-06-16 RX ADMIN — BISACODYL 10 MG: 10 SUPPOSITORY RECTAL at 12:30

## 2025-06-16 RX ADMIN — PANTOPRAZOLE SODIUM 40 MG: 40 INJECTION, POWDER, FOR SOLUTION INTRAVENOUS at 05:41

## 2025-06-16 RX ADMIN — CALCIUM GLUCONATE 2 G: 20 INJECTION, SOLUTION INTRAVENOUS at 07:43

## 2025-06-16 RX ADMIN — DEXMEDETOMIDINE HYDROCHLORIDE 0.9 MCG/KG/HR: 400 INJECTION INTRAVENOUS at 02:11

## 2025-06-16 RX ADMIN — INSULIN LISPRO 1 UNITS: 100 INJECTION, SOLUTION INTRAVENOUS; SUBCUTANEOUS at 00:46

## 2025-06-16 RX ADMIN — DEXMEDETOMIDINE HYDROCHLORIDE 0.2 MCG/KG/HR: 400 INJECTION INTRAVENOUS at 10:57

## 2025-06-16 RX ADMIN — SODIUM CHLORIDE, SODIUM GLUCONATE, SODIUM ACETATE, POTASSIUM CHLORIDE, MAGNESIUM CHLORIDE, SODIUM PHOSPHATE, DIBASIC, AND POTASSIUM PHOSPHATE 75 ML/HR: .53; .5; .37; .037; .03; .012; .00082 INJECTION, SOLUTION INTRAVENOUS at 01:00

## 2025-06-16 RX ADMIN — SENNOSIDES 8.6 MG: 8.6 TABLET, FILM COATED ORAL at 08:40

## 2025-06-16 RX ADMIN — QUETIAPINE 75 MG: 25 TABLET ORAL at 21:16

## 2025-06-16 RX ADMIN — ENOXAPARIN SODIUM 30 MG: 30 INJECTION SUBCUTANEOUS at 12:26

## 2025-06-16 RX ADMIN — POLYETHYLENE GLYCOL 3350 17 G: 17 POWDER, FOR SOLUTION ORAL at 08:41

## 2025-06-16 RX ADMIN — LIDOCAINE HYDROCHLORIDE 30 ML: 10 INJECTION, SOLUTION EPIDURAL; INFILTRATION; INTRACAUDAL; PERINEURAL at 14:38

## 2025-06-16 RX ADMIN — CALCIUM GLUCONATE 2 G: 20 INJECTION, SOLUTION INTRAVENOUS at 08:39

## 2025-06-16 RX ADMIN — AMITRIPTYLINE HYDROCHLORIDE 10 MG: 10 TABLET ORAL at 21:17

## 2025-06-16 RX ADMIN — ENOXAPARIN SODIUM 30 MG: 30 INJECTION SUBCUTANEOUS at 21:20

## 2025-06-16 RX ADMIN — INSULIN LISPRO 2 UNITS: 100 INJECTION, SOLUTION INTRAVENOUS; SUBCUTANEOUS at 05:41

## 2025-06-16 RX ADMIN — LABETALOL HYDROCHLORIDE 10 MG: 5 INJECTION, SOLUTION INTRAVENOUS at 17:20

## 2025-06-16 RX ADMIN — CHLORHEXIDINE GLUCONATE 15 ML: 1.2 SOLUTION ORAL at 08:40

## 2025-06-16 NOTE — PLAN OF CARE
Problem: Nutrition/Hydration-ADULT  Goal: Nutrient/Hydration intake appropriate for improving, restoring or maintaining nutritional needs  Description: Monitor and assess patient's nutrition/hydration status for malnutrition. Collaborate with interdisciplinary team and initiate plan and interventions as ordered.  Monitor patient's weight and dietary intake as ordered or per policy. Utilize nutrition screening tool and intervene as necessary. Determine patient's food preferences and provide high-protein, high-caloric foods as appropriate.     INTERVENTIONS:  - Monitor oral intake, urinary output, labs, and treatment plans  - Assess nutrition and hydration status and recommend course of action  - Evaluate amount of meals eaten  - Assist patient with eating if necessary   - Allow adequate time for meals  - Recommend/ encourage appropriate diets, oral nutritional supplements, and vitamin/mineral supplements  - Order, calculate, and assess calorie counts as needed  - Recommend, monitor, and adjust tube feedings and TPN/PPN based on assessed needs  - Assess need for intravenous fluids  - Provide specific nutrition/hydration education as appropriate  - Include patient/family/caregiver in decisions related to nutrition  Outcome: Progressing     Problem: PAIN - ADULT  Goal: Verbalizes/displays adequate comfort level or baseline comfort level  Description: Interventions:  - Encourage patient to monitor pain and request assistance  - Assess pain using appropriate pain scale  - Administer analgesics as ordered based on type and severity of pain and evaluate response  - Implement non-pharmacological measures as appropriate and evaluate response  - Consider cultural and social influences on pain and pain management  - Notify physician/advanced practitioner if interventions unsuccessful or patient reports new pain  - Educate patient/family on pain management process including their role and importance of  reporting pain   -  Provide non-pharmacologic/complimentary pain relief interventions  Outcome: Progressing     Problem: INFECTION - ADULT  Goal: Absence or prevention of progression during hospitalization  Description: INTERVENTIONS:  - Assess and monitor for signs and symptoms of infection  - Monitor lab/diagnostic results  - Monitor all insertion sites, i.e. indwelling lines, tubes, and drains  - Monitor endotracheal if appropriate and nasal secretions for changes in amount and color  - [place] appropriate cooling/warming therapies per order  - Administer medications as ordered  - Instruct and encourage patient and family to use good hand hygiene technique  - Identify and instruct in appropriate isolation precautions for identified infection/condition  Outcome: Progressing  Goal: Absence of fever/infection during neutropenic period  Description: INTERVENTIONS:  - Monitor WBC  - Perform strict hand hygiene  - Limit to healthy visitors only  - No plants, dried, fresh or silk flowers with winters in patient room  Outcome: Progressing     Problem: SAFETY ADULT  Goal: Patient will remain free of falls  Description: INTERVENTIONS:  - Educate patient/family on patient safety including physical limitations  - Instruct patient to call for assistance with activity   - Consider consulting OT/PT to assist with strengthening/mobility based on AM PAC & JH-HLM score  - Consult OT/PT to assist with strengthening/mobility   - Keep Call bell within reach  - Keep bed low and locked with side rails adjusted as appropriate  - Keep care items and personal belongings within reach  - Initiate and maintain comfort rounds  - Make Fall Risk Sign visible to staff  - Offer Toileting every 2 Hours, in advance of need  - Initiate/Maintain bed alarm  - Obtain necessary fall risk management equipment: bed alarm and call bell  - Apply yellow socks and bracelet for high fall risk patients  - Consider moving patient to room near nurses station  Outcome:  Progressing  Goal: Maintain or return to baseline ADL function  Description: INTERVENTIONS:  -  Assess patient's ability to carry out ADLs; assess patient's baseline for ADL function and identify physical deficits which impact ability to perform ADLs (bathing, care of mouth/teeth, toileting, grooming, dressing, etc.)  - Assess/evaluate cause of self-care deficits   - Assess range of motion  - Assess patient's mobility; develop plan if impaired  - Assess patient's need for assistive devices and provide as appropriate  - Encourage maximum independence but intervene and supervise when necessary  - Involve family in performance of ADLs  - Assess for home care needs following discharge   - Consider OT consult to assist with ADL evaluation and planning for discharge  - Provide patient education as appropriate  - Monitor functional capacity and physical performance, use of AM PAC & JH-HLM   - Monitor gait, balance and fatigue with ambulation    Outcome: Progressing  Goal: Maintains/Returns to pre admission functional level  Description: INTERVENTIONS:  - Perform AM-PAC 6 Click Basic Mobility/ Daily Activity assessment daily.  - Set and communicate daily mobility goal to care team and patient/family/caregiver.   - Collaborate with rehabilitation services on mobility goals if consulted  - Perform Range of Motion 2 times a day.  - Reposition patient every 2 hours.  - Record patient progress and toleration of activity level   Outcome: Progressing     Problem: DISCHARGE PLANNING  Goal: Discharge to home or other facility with appropriate resources  Description: INTERVENTIONS:  - Identify barriers to discharge w/patient and caregiver  - Arrange for needed discharge resources and transportation as appropriate  - Identify discharge learning needs (meds, wound care, etc.)  - Arrange for interpretive services to assist at discharge as needed  - Refer to Case Management Department for coordinating discharge planning if the patient  needs post-hospital services based on physician/advanced practitioner order or complex needs related to functional status, cognitive ability, or social support system  Outcome: Progressing     Problem: Knowledge Deficit  Goal: Patient/family/caregiver demonstrates understanding of disease process, treatment plan, medications, and discharge instructions  Description: Complete learning assessment and assess knowledge base.  Interventions:  - Provide teaching at level of understanding  - Provide teaching via preferred learning methods  Outcome: Progressing     Problem: NEUROSENSORY - ADULT  Goal: Achieves stable or improved neurological status  Description: INTERVENTIONS  - Monitor and report changes in neurological status  - Monitor vital signs such as temperature, blood pressure, glucose, and any other labs ordered   - Initiate measures to prevent increased intracranial pressure  - Monitor for seizure activity and implement precautions if appropriate      Outcome: Progressing  Goal: Remains free of injury related to seizures activity  Description: INTERVENTIONS  - Maintain airway, patient safety  and administer oxygen as ordered  - Monitor patient for seizure activity, document and report duration and description of seizure to physician/advanced practitioner  - If seizure occurs,  ensure patient safety during seizure  - Reorient patient post seizure  - Seizure pads on all 4 side rails  - Instruct patient/family to notify RN of any seizure activity including if an aura is experienced  - Instruct patient/family to call for assistance with activity based on nursing assessment  - Administer anti-seizure medications if ordered    Outcome: Progressing  Goal: Achieves maximal functionality and self care  Description: INTERVENTIONS  - Monitor swallowing and airway patency with patient fatigue and changes in neurological status  - Encourage and assist patient to increase activity and self care.   - Encourage visually  impaired, hearing impaired and aphasic patients to use assistive/communication devices  Outcome: Progressing     Problem: CARDIOVASCULAR - ADULT  Goal: Maintains optimal cardiac output and hemodynamic stability  Description: INTERVENTIONS:  - Monitor I/O, vital signs and rhythm  - Monitor for S/S and trends of decreased cardiac output  - Administer and titrate ordered vasoactive medications to optimize hemodynamic stability  - Assess quality of pulses, skin color and temperature  - Assess for signs of decreased coronary artery perfusion  - Instruct patient to report change in severity of symptoms  Outcome: Progressing  Goal: Absence of cardiac dysrhythmias or at baseline rhythm  Description: INTERVENTIONS:  - Continuous cardiac monitoring, vital signs, obtain 12 lead EKG if ordered  - Administer antiarrhythmic and heart rate control medications as ordered  - Monitor electrolytes and administer replacement therapy as ordered  Outcome: Progressing     Problem: RESPIRATORY - ADULT  Goal: Achieves optimal ventilation and oxygenation  Description: INTERVENTIONS:  - Assess for changes in respiratory status  - Assess for changes in mentation and behavior  - Position to facilitate oxygenation and minimize respiratory effort  - Oxygen administered by appropriate delivery if ordered  - Initiate smoking cessation education as indicated  - Encourage broncho-pulmonary hygiene including cough, deep breathe, Incentive Spirometry  - Assess the need for suctioning and aspirate as needed  - Assess and instruct to report SOB or any respiratory difficulty  - Respiratory Therapy support as indicated  Outcome: Progressing     Problem: GASTROINTESTINAL - ADULT  Goal: Minimal or absence of nausea and/or vomiting  Description: INTERVENTIONS:  - Administer IV fluids if ordered to ensure adequate hydration  - Maintain NPO status until nausea and vomiting are resolved  - Nasogastric tube if ordered  - Administer ordered antiemetic medications  as needed  - Provide nonpharmacologic comfort measures as appropriate  - Advance diet as tolerated, if ordered  - Consider nutrition services referral to assist patient with adequate nutrition and appropriate food choices  Outcome: Progressing  Goal: Maintains or returns to baseline bowel function  Description: INTERVENTIONS:  - Assess bowel function  - Encourage oral fluids to ensure adequate hydration  - Administer IV fluids if ordered to ensure adequate hydration  - Administer ordered medications as needed  - Encourage mobilization and activity  - Consider nutritional services referral to assist patient with adequate nutrition and appropriate food choices  Outcome: Progressing  Goal: Maintains adequate nutritional intake  Description: INTERVENTIONS:  - Monitor percentage of each meal consumed  - Identify factors contributing to decreased intake, treat as appropriate  - Assist with meals as needed  - Monitor I&O, weight, and lab values if indicated  - Obtain nutrition services referral as needed  Outcome: Progressing  Goal: Establish and maintain optimal ostomy function  Description: INTERVENTIONS:  - Assess bowel function  - Encourage oral fluids to ensure adequate hydration  - Administer IV fluids if ordered to ensure adequate hydration   - Administer ordered medications as needed  - Encourage mobilization and activity  - Nutrition services referral to assist patient with appropriate food choices  - Assess stoma site  - Consider wound care consult   Outcome: Progressing  Goal: Oral mucous membranes remain intact  Description: INTERVENTIONS  - Assess oral mucosa and hygiene practices  - Implement preventative oral hygiene regimen  - Implement oral medicated treatments as ordered  - Initiate Nutrition services referral as needed  Outcome: Progressing     Problem: GENITOURINARY - ADULT  Goal: Maintains or returns to baseline urinary function  Description: INTERVENTIONS:  - Assess urinary function  - Encourage oral  fluids to ensure adequate hydration if ordered  - Administer IV fluids as ordered to ensure adequate hydration  - Administer ordered medications as needed  - Offer frequent toileting  - Follow urinary retention protocol if ordered  Outcome: Progressing  Goal: Absence of urinary retention  Description: INTERVENTIONS:  - Assess patient’s ability to void and empty bladder  - Monitor I/O  - Bladder scan as needed  - Discuss with physician/AP medications to alleviate retention as needed  - Discuss catheterization for long term situations as appropriate  Outcome: Progressing  Goal: Urinary catheter remains patent  Description: INTERVENTIONS:  - Assess patency of urinary catheter  - If patient has a chronic pedersen, consider changing catheter if non-functioning  - Follow guidelines for intermittent irrigation of non-functioning urinary catheter  Outcome: Progressing     Problem: METABOLIC, FLUID AND ELECTROLYTES - ADULT  Goal: Electrolytes maintained within normal limits  Description: INTERVENTIONS:  - Monitor labs and assess patient for signs and symptoms of electrolyte imbalances  - Administer electrolyte replacement as ordered  - Monitor response to electrolyte replacements, including repeat lab results as appropriate  - Instruct patient on fluid and nutrition as appropriate  Outcome: Progressing  Goal: Fluid balance maintained  Description: INTERVENTIONS:  - Monitor labs   - Monitor I/O and WT  - Instruct patient on fluid and nutrition as appropriate  - Assess for signs & symptoms of volume excess or deficit  Outcome: Progressing  Goal: Glucose maintained within target range  Description: INTERVENTIONS:  - Monitor Blood Glucose as ordered  - Assess for signs and symptoms of hyperglycemia and hypoglycemia  - Administer ordered medications to maintain glucose within target range  - Assess nutritional intake and initiate nutrition service referral as needed  Outcome: Progressing     Problem: SKIN/TISSUE INTEGRITY -  ADULT  Goal: Skin Integrity remains intact(Skin Breakdown Prevention)  Description: Assess:  -Perform Jake assessment every 2  -Clean and moisturize skin every 2  -Inspect skin when repositioning, toileting, and assisting with ADLS  -Assess extremities for adequate circulation and sensation     Bed Management:  -Have minimal linens on bed & keep smooth, unwrinkled  -Change linens as needed when moist or perspiring  -Avoid sitting or lying in one position for more than 2 hours while in bed  -Keep HOB at 30 degrees     Toileting:  -Offer bedside commode  -Assess for incontinence every 2    Activity:  -Mobilize patient 2 times a day  -Encourage activity and walks on unit  -Encourage or provide ROM exercises   -Turn and reposition patient every 2 Hours  -Use appropriate equipment to lift or move patient in bed  -Instruct/ Assist with weight shifting every 2 when out of bed in chair  -Consider limitation of chair time 2 hour intervals    Skin Care:  -Avoid use of baby powder, tape, friction and shearing, hot water or constrictive clothing  -Relieve pressure over bony prominences using pillows  -Do not massage red bony areas    Outcome: Progressing  Goal: Incision(s), wounds(s) or drain site(s) healing without S/S of infection  Description: INTERVENTIONS  - Assess and document dressing, incision, wound bed, drain sites and surrounding tissue  - Provide patient and family education  - Perform skin care/dressing changes every 2  Outcome: Progressing  Goal: Pressure injury heals and does not worsen  Description: Interventions:  - Implement low air loss mattress or specialty surface (Criteria met)  - Apply silicone foam dressing  - Instruct/assist with weight shifting every 60 minutes when in chair   - Limit chair time to 2 hour intervals  - Apply fecal or urinary incontinence containment device   - Perform passive or active ROM every 2  - Turn and reposition patient & offload bony prominences every 2 hours   - Utilize  friction reducing device or surface for transfers   - Consider nutrition services referral as needed  Outcome: Progressing     Problem: HEMATOLOGIC - ADULT  Goal: Maintains hematologic stability  Description: INTERVENTIONS  - Assess for signs and symptoms of bleeding or hemorrhage  - Monitor labs  - Administer supportive blood products/factors as ordered and appropriate  Outcome: Progressing     Problem: MUSCULOSKELETAL - ADULT  Goal: Maintain or return mobility to safest level of function  Description: INTERVENTIONS:  - Assess patient's ability to carry out ADLs; assess patient's baseline for ADL function and identify physical deficits which impact ability to perform ADLs (bathing, care of mouth/teeth, toileting, grooming, dressing, etc.)  - Assess/evaluate cause of self-care deficits   - Assess range of motion  - Assess patient's mobility  - Assess patient's need for assistive devices and provide as appropriate  - Encourage maximum independence but intervene and supervise when necessary  - Involve family in performance of ADLs  - Assess for home care needs following discharge   - Consider OT consult to assist with ADL evaluation and planning for discharge  - Provide patient education as appropriate  Outcome: Progressing  Goal: Maintain proper alignment of affected body part  Description: INTERVENTIONS:  - Support, maintain and protect limb and body alignment  - Provide patient/ family with appropriate education  Outcome: Progressing     Problem: Prexisting or High Potential for Compromised Skin Integrity  Goal: Skin integrity is maintained or improved  Description: INTERVENTIONS:  - Identify patients at risk for skin breakdown  - Assess and monitor skin integrity including under and around medical devices   - Assess and monitor nutrition and hydration status  - Monitor labs  - Assess for incontinence   - Turn and reposition patient  - Assist with mobility/ambulation  - Relieve pressure over paolo prominences   -  Avoid friction and shearing  - Provide appropriate hygiene as needed including keeping skin clean and dry  - Evaluate need for skin moisturizer/barrier cream  - Collaborate with interdisciplinary team  - Patient/family teaching  - Consider wound care consult    Assess:  - Review Jake scale daily  - Clean and moisturize skin every 2  - Inspect skin when repositioning, toileting, and assisting with ADLS  - Assess under medical devices such as bp cuff every 2  - Assess extremities for adequate circulation and sensation     Bed Management:  - Have minimal linens on bed & keep smooth, unwrinkled  - Change linens as needed when moist or perspiring  - Avoid sitting or lying in one position for more than 2 hours while in bed?Keep HOB at 30 degrees   - Toileting:  - Offer bedside commode  - Assess for incontinence every 2    Activity:  - Mobilize patient 2 times a day  - Encourage activity and walks on unit  - Encourage or provide ROM exercises   - Turn and reposition patient every 2 Hours  - Use appropriate equipment to lift or move patient in bed  - Instruct/ Assist with weight shifting every 2 when out of bed in chair  - Consider limitation of chair time 2 hour intervals    Skin Care:  - Avoid use of baby powder, tape, friction and shearing, hot water or constrictive clothing  - Relieve pressure over bony prominences using pillows  - Do not massage red bony areas  Outcome: Progressing     Problem: SAFETY,RESTRAINT: NV/NON-SELF DESTRUCTIVE BEHAVIOR  Goal: Remains free of harm/injury (restraint for non violent/non self-detsructive behavior)  Description: INTERVENTIONS:  - Instruct patient/family regarding restraint use   - Assess and monitor physiologic and psychological status   - Provide interventions and comfort measures to meet assessed patient needs   - Identify and implement measures to help patient regain control  - Assess readiness for release of restraint   Outcome: Progressing  Goal: Returns to optimal  restraint-free functioning  Description: INTERVENTIONS:  - Assess the patient's behavior and symptoms that indicate continued need for restraint  - Identify and implement measures to help patient regain control  - Assess readiness for release of restraint   Outcome: Progressing

## 2025-06-16 NOTE — ASSESSMENT & PLAN NOTE
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 Yoon 25)  POD 5 L craniotomy and R karyn holes for evac of radha SDH ( 25)   Arrived at [facility] ER on 25 with complaints of headache and left-sided hip pain after reported fall in the grass the day prior to arrival  Upon further questioning, patient has had some slurred speech for couple days per his wife.  Upon arrival to [facility], patient has significant expressive aphasia and confusion.    [id number]: Slight interval decrease in size of left subdural hematoma.  No additional areas of acute intracranial hemorrhage.  Postsurgical changes.    Plan:   Continue to closely monitor neuro exam  Patient needs frequent VF checks and eye monitoring   Frequent neuro checks per primary team   Repeat STAT CTH with any acute decline in GCS > 2pts or more in 1hr   SBP goal 100-140   SDD removed  and   Continue keppra for seizure ppx  vEEG in place. No reported seizure noted on monitoring at this time   Hold all AC/AP meds   DVT ppx: SCDs, cleared for DVT ppx.   Medical management per primary team   Pain control per primary team   PT/OT   Dr. Yoon discussed with pt's daughter and son that patient to remain in hospital till they have additional treatment of the DAVF given mild residual remains from the right ophthalmic artery.   Ongoing discussion about timing for the treatment, possibly the week of 25 by Dr. Yoon, possibly sooner by Dr. Barbour.  Pt's family agreeable to the plan of care.   Neurosurgery will begin to follow from the periphery at this time. Will monitor extubation and neurological status. Contact with any questions and concerns. Will determine timing of additional procedures. Please reach out with any further questions or concerns.

## 2025-06-16 NOTE — PROCEDURES
Procedure- Orthopedics   Jasson Carter 75 y.o. male MRN: [id number]  Unit/Bed#: ICU 05    Procedure: right knee aspiration    After sterile preparation of the skin overlying the knee and adequate analgesia with 5cc of lidocaine, an 18 gauge needle was inserted via a superior lateral portal. 205cc of clear synovial fluid was aspirated from the joint and a bandage was placed over the site. The sample was sent for synovial gram stain, WBC, crystals, and RBC. Pt tolerated the procedure well and was neurovascularly intact both pre and post procedure.    Airam Hall MD

## 2025-06-16 NOTE — PLAN OF CARE
Problem: Nutrition/Hydration-ADULT  Goal: Nutrient/Hydration intake appropriate for improving, restoring or maintaining nutritional needs  Description: Monitor and assess patient's nutrition/hydration status for malnutrition. Collaborate with interdisciplinary team and initiate plan and interventions as ordered.  Monitor patient's weight and dietary intake as ordered or per policy. Utilize nutrition screening tool and intervene as necessary. Determine patient's food preferences and provide high-protein, high-caloric foods as appropriate.     INTERVENTIONS:  - Monitor oral intake, urinary output, labs, and treatment plans  - Assess nutrition and hydration status and recommend course of action  - Evaluate amount of meals eaten  - Assist patient with eating if necessary   - Allow adequate time for meals  - Recommend/ encourage appropriate diets, oral nutritional supplements, and vitamin/mineral supplements  - Order, calculate, and assess calorie counts as needed  - Recommend, monitor, and adjust tube feedings and TPN/PPN based on assessed needs  - Assess need for intravenous fluids  - Provide specific nutrition/hydration education as appropriate  - Include patient/family/caregiver in decisions related to nutrition  Outcome: Progressing     Problem: PAIN - ADULT  Goal: Verbalizes/displays adequate comfort level or baseline comfort level  Description: Interventions:  - Encourage patient to monitor pain and request assistance  - Assess pain using appropriate pain scale  - Administer analgesics as ordered based on type and severity of pain and evaluate response  - Implement non-pharmacological measures as appropriate and evaluate response  - Consider cultural and social influences on pain and pain management  - Notify physician/advanced practitioner if interventions unsuccessful or patient reports new pain  - Educate patient/family on pain management process including their role and importance of  reporting pain   -  Provide non-pharmacologic/complimentary pain relief interventions  Outcome: Progressing     Problem: INFECTION - ADULT  Goal: Absence or prevention of progression during hospitalization  Description: INTERVENTIONS:  - Assess and monitor for signs and symptoms of infection  - Monitor lab/diagnostic results  - Monitor all insertion sites, i.e. indwelling lines, tubes, and drains  - Monitor endotracheal if appropriate and nasal secretions for changes in amount and color  - [place] appropriate cooling/warming therapies per order  - Administer medications as ordered  - Instruct and encourage patient and family to use good hand hygiene technique  - Identify and instruct in appropriate isolation precautions for identified infection/condition  Outcome: Progressing  Goal: Absence of fever/infection during neutropenic period  Description: INTERVENTIONS:  - Monitor WBC  - Perform strict hand hygiene  - Limit to healthy visitors only  - No plants, dried, fresh or silk flowers with winters in patient room  Outcome: Progressing     Problem: SAFETY ADULT  Goal: Patient will remain free of falls  Description: INTERVENTIONS:  - Educate patient/family on patient safety including physical limitations  - Instruct patient to call for assistance with activity   - Consider consulting OT/PT to assist with strengthening/mobility based on AM PAC & JH-HLM score  - Consult OT/PT to assist with strengthening/mobility   - Keep Call bell within reach  - Keep bed low and locked with side rails adjusted as appropriate  - Keep care items and personal belongings within reach  - Initiate and maintain comfort rounds  - Make Fall Risk Sign visible to staff  - Offer Toileting every 2 Hours, in advance of need  - Initiate/Maintain bed alarm  - Obtain necessary fall risk management equipment  - Apply yellow socks and bracelet for high fall risk patients  - Consider moving patient to room near nurses station  Outcome: Progressing     Problem: DISCHARGE  PLANNING  Goal: Discharge to home or other facility with appropriate resources  Description: INTERVENTIONS:  - Identify barriers to discharge w/patient and caregiver  - Arrange for needed discharge resources and transportation as appropriate  - Identify discharge learning needs (meds, wound care, etc.)  - Arrange for interpretive services to assist at discharge as needed  - Refer to Case Management Department for coordinating discharge planning if the patient needs post-hospital services based on physician/advanced practitioner order or complex needs related to functional status, cognitive ability, or social support system  Outcome: Progressing     Problem: Knowledge Deficit  Goal: Patient/family/caregiver demonstrates understanding of disease process, treatment plan, medications, and discharge instructions  Description: Complete learning assessment and assess knowledge base.  Interventions:  - Provide teaching at level of understanding  - Provide teaching via preferred learning methods  Outcome: Progressing     Problem: METABOLIC, FLUID AND ELECTROLYTES - ADULT  Goal: Electrolytes maintained within normal limits  Description: INTERVENTIONS:  - Monitor labs and assess patient for signs and symptoms of electrolyte imbalances  - Administer electrolyte replacement as ordered  - Monitor response to electrolyte replacements, including repeat lab results as appropriate  - Instruct patient on fluid and nutrition as appropriate  Outcome: Progressing  Goal: Fluid balance maintained  Description: INTERVENTIONS:  - Monitor labs   - Monitor I/O and WT  - Instruct patient on fluid and nutrition as appropriate  - Assess for signs & symptoms of volume excess or deficit  Outcome: Progressing  Goal: Glucose maintained within target range  Description: INTERVENTIONS:  - Monitor Blood Glucose as ordered  - Assess for signs and symptoms of hyperglycemia and hypoglycemia  - Administer ordered medications to maintain glucose within  target range  - Assess nutritional intake and initiate nutrition service referral as needed  Outcome: Progressing     Problem: MUSCULOSKELETAL - ADULT  Goal: Maintain or return mobility to safest level of function  Description: INTERVENTIONS:  - Assess patient's ability to carry out ADLs; assess patient's baseline for ADL function and identify physical deficits which impact ability to perform ADLs (bathing, care of mouth/teeth, toileting, grooming, dressing, etc.)  - Assess/evaluate cause of self-care deficits   - Assess range of motion  - Assess patient's mobility  - Assess patient's need for assistive devices and provide as appropriate  - Encourage maximum independence but intervene and supervise when necessary  - Involve family in performance of ADLs  - Assess for home care needs following discharge   - Consider OT consult to assist with ADL evaluation and planning for discharge  - Provide patient education as appropriate  Outcome: Progressing  Goal: Maintain proper alignment of affected body part  Description: INTERVENTIONS:  - Support, maintain and protect limb and body alignment  - Provide patient/ family with appropriate education  Outcome: Progressing

## 2025-06-16 NOTE — PROGRESS NOTES
Progress Note - Critical Care/ICU   Name: Jasson Carter 75 y.o. male I MRN: [id number]  Unit/Bed#: ICU 05 I Date of Admission: 2025   Date of Service: 2025 I Hospital Day: 5       Assessment & Plan   Active Hospital Problems    Diagnosis Date Noted POA    Atrial fibrillation (HCC) 2025 Unknown    SDH (subdural hematoma) (HCC) 2025 Yes    Altered mental status 2025 Unknown    Fall 2025 Unknown      Resolved Hospital Problems   No resolved problems to display.       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 Low-density 7 mm right cerebral convexity subdural hematoma, age indeterminate.   CT c-spine: No cervical spine fracture or traumatic malalignment    repeat CTH: Stable mixed density left convexity subdural hematoma containing hyperdense acute blood products, with stable approximately 5 mm of         left-to-right midline shift. Stable hypodense right convexity subdural hematoma   post-op CTA head/neck: Postsurgical change from partial evacuation of left hemispheric subdural hematoma. Residual hemorrhage measures up to 9 mm in thickness. Evacuation of right hemispheric subdural collection. Surgical drain and gas over the right convexity. Reduced left to right midline shift, 3 mm. No hemodynamically significant stenosis, dissection or occlusion of the carotid or vertebral arteries or major vessels of the [place].   CT Venogram Brain: Venous sinuses and deep cerebral veins are patent.  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 Persistent visualization of small draining veins extending superiorly from the cribriform plate. Stable residual left subdural hematoma.   MRI  brain w/o contrast: Several small scattered acute/early subacute infarcts in the bilateral frontal and parietal lobes and left subinsular/anterior temporal region. Stable residual left subdural hematoma measuring up to 1.3 cm. Stable mass effect with 3 millimeters of left-to-right shift.  6/14 MRA: No signficant stenosis or occlusion.  6/14 CT head: Slight interval decrease in size of left subdural hematoma as detailed above. No new/additional areas of intracranial hemorrhage. Stable postsurgical changes and other ancillary findings detailed above.     Plan:  Neuro checks Q2H   Repeat STAT CTH in acute decline in GCS greater than 2 points or more in an hour prior neurosurgery recommendation  Maintain SBP <140 per Neurosurgery recommendation  Maintain right subdural drain open   Continue delirium precautions  Melatonin 3mg QHS to regulate sleep-wake cycles  Continue seroquel 50 mg BID, 75mg QHS  Continue amitriptyline 10 mg QHS  Keppra 1000mg BID x 14 days for seizure prophylaxis, per NSGY recommendation  Discontinue VEEG  Sedation: Precedex 0.1-0.7mcg/kg/hr, titrated as tolerated    CV:   Diagnoses:  A-fib, HTN, Hypercholesterolemia      Plan:  Continuous cardiopulmonary monitoring. Maintain SBP<140, Maintain MAP >65.  Labetalol 10 mg Q4H PRN, SBP> 140  Home losartan held    Pulm:  Diagnoses: Acute hypoxic respiratory failure      Plan  Continue full support: 10/430/+6/40%. Daily SBT wean as tolerated.   Continuous pulse oximetry. Maintain O2 sat >92%.    GI:   Diagnoses: Hx GERD     Plan:  Bowel regimen: Miralax and Senna for bowel regimen   GI prophylaxis: Protonix 40 mg IV QD   Zofran 4mg PRN for nausea    :   Diagnoses: BPH without urinary rentention      Plan:  Monitor urine output  Continue to monitor BUN/Cr. Baseline Cr: 0.65    F/E/N:   F: Continuous Plasmalyte-A/Isolyte-S running at 75ml/hr. Fluid resuscitation prn.  E: Replete Ca today. Monitor and replete electrolytes for Mg >2, Phos >3, K >4.  N:  Jevity 1.2 carlos running at 60ml/hr, 2 packets Prosource Liquid Protein daily , 30 ml Q4H of free water    Heme/Onc:   Diagnosis: No active issues    Plan:   Mechanical DVT prophylaxis.  Start chemical DVT px when cleared by NSGY     Endo:   Diagnosis: No active issues    Plan:   Insulin: SSI. Monitor blood glucose goal 140-180    ID:   Diagnosis: No active issues    Plan:     Monitor fever curve and WBC     MSK/Skin:   Diagnosis: Primary osteoarthritis involving multiple joints, Gout     Plan:  Start home Allopurinol   Q2H turning  PT/OT when appropriate     Disposition: Critical care    ICU Core Measures     Vented Patient  VAP Bundle  VAP bundle ordered     A: Assess, Prevent, and Manage Pain Has pain been assessed? Yes  Need for changes to pain regimen? No   B: Both Spontaneous Awakening Trials (SATs) and Spontaneous Breathing Trials (SBTs) Plan to perform spontaneous awakening trial today? Yes   Plan to perform spontaneous breathing trial today? Yes   Obvious barriers to extubation? No   C: Choice of Sedation RASS Goal: -1 Drowsy  Need for changes to sedation or analgesia regimen? No   D: Delirium CAM-ICU: Unable to perform secondary to Acute cognitive dysfunction   E: Early Mobility  Plan for early mobility? Yes   F: Family Engagement Plan for family engagement today? Yes       Review of Invasive Devices:            Prophylaxis:  VTE Mechanical pump   Stress Ulcer  covered bypantoprazole (PROTONIX) injection 40 mg [[id number]]         24 Hour Events : No significant events overnight.   Subjective   Review of Systems: See HPI for Review of Systems    Objective :                   Vitals I/O      Most Recent Min/Max in 24hrs   Temp 99.2 °F (37.3 °C) Temp  Min: 97.6 °F (36.4 °C)  Max: 99.8 °F (37.7 °C)   Pulse 56 Pulse  Min: 50  Max: 130   Resp 20 Resp  Min: 12  Max: 26   /59 BP  Min: 97/61  Max: 168/101   O2 Sat 100 % SpO2  Min: 96 %  Max: 100 %      Intake/Output Summary (Last 24 hours) at 6/16/2025  0742  Last data filed at 6/16/2025 0636  Gross per 24 hour   Intake 2822.78 ml   Output 1110 ml   Net 1712.78 ml       Diet Enteral/Parenteral; Tube Feeding No Oral Diet; Jevity 1.2 Jemal; Continuous; 60; Prosource Protein Liquid - Two Packets; 30; Water; Every 4 hours    Invasive Monitoring   None        Physical Exam   Physical Exam  Vitals reviewed.   Eyes:      Conjunctiva/sclera: Conjunctivae normal.      Pupils: Pupils are equal, round, and reactive to light.   Skin:     General: Skin is warm.   HENT:      Head: Normocephalic.      Mouth/Throat:      Mouth: Mucous membranes are moist.   Cardiovascular:      Rate and Rhythm: Normal rate and regular rhythm.      Pulses: Normal pulses.      Heart sounds: Normal heart sounds.   Musculoskeletal:         General: Swelling present.      Comments: Swelling present in right knee, mild swelling noted around left thumb. Both sites are warm.   Abdominal:      Palpations: Abdomen is soft.   Constitutional:       Appearance: He is ill-appearing.   Pulmonary:      Effort: Pulmonary effort is normal.      Breath sounds: Normal breath sounds.      Comments: Patient is intubated and initiating spontaneous breaths on the vent.  Psychiatric:         Mood and Affect: Mood and affect normal.   Neurological:      Mental Status: Mental status is at baseline.      Motor: gross motor function is at baseline for patient. Motor deficit.      Comments: Patient able to wiggles toes, unable to flex or extend feet against resistance. Able to give thumbs up with right hand.   Genitourinary/Anorectal:  external catheter present.        Diagnostic Studies        Lab Results: I have reviewed the following results:     Medications:  Scheduled PRN   amitriptyline, 10 mg, HS  calcium gluconate, 2 g, Once   Followed by  calcium gluconate, 2 g, Once  chlorhexidine, 15 mL, Q12H SHANEKA  insulin lispro, 1-6 Units, Q6H SHANEKA  levETIRAcetam, 1,000 mg, Q12H SHANEKA  melatonin, 3 mg, HS  pantoprazole, 40 mg, Q24H  SHANEKA  polyethylene glycol, 17 g, Daily  potassium chloride, 40 mEq, Once  QUEtiapine, 50 mg, BID  QUEtiapine, 75 mg, HS  senna, 1 tablet, HS  senna, 1 tablet, BID      labetalol, 10 mg, Q4H PRN  ondansetron, 4 mg, Once PRN       Continuous    dexmedetomidine, 0.1-1.2 mcg/kg/hr, Last Rate: 0.2 mcg/kg/hr (06/16/25 0636)  multi-electrolyte, 75 mL/hr, Last Rate: 75 mL/hr (06/16/25 0515)         Labs:   CBC    Recent Labs     06/15/25  0506 06/16/25  0517   WBC 8.82 7.49   HGB 13.6 12.7   HCT 42.2 39.5    170     BMP    Recent Labs     06/15/25  0506 06/16/25  0517   SODIUM 142 141   K 3.9 3.7    106   CO2 26 27   AGAP 11 8   BUN 20 17   CREATININE 0.65 0.58*   CALCIUM 8.8 8.5       Coags    No recent results     Additional Electrolytes  Recent Labs     06/15/25  0506 06/16/25  0517 06/16/25  0549   MG 2.4 2.2  --    PHOS 3.4 3.4  --    CAIONIZED 1.11*  --  0.99*          Blood Gas    No recent results  No recent results LFTs  No recent results    Infectious  No recent results  Glucose  Recent Labs     06/15/25  0506 06/16/25  0517   GLUC 111 174*

## 2025-06-16 NOTE — RESPIRATORY THERAPY NOTE
Resp care   06/16/25 0743   Respiratory Assessment   Assessment Type Assess only   General Appearance Awake   Respiratory Pattern Assisted   Chest Assessment Chest expansion symmetrical   Bilateral Breath Sounds Clear;Diminished   Cough Strong   Suction ET Tube   Resp Comments Received pt on CMCV with settings per flow sheet. sedation being weaned off. pt placed on SBT at this time. B/S coarse, clear and decreased after sx. Plan is for possible extubation.   Vent Information   Vent ID [id number]   Vent type Cruz G5   Cruz Vent Mode (S)CMV   $ Pulse Oximetry Spot Check Charge Completed   (S)CMV Settings   Resp Rate (BPM) 10 BPM   VT (mL) 460 mL   FIO2 (%) 40 %   PEEP (cmH2O) 6 cmH2O   Insp Time (%) 1 %   Flow Trigger (LPM) 5   Humidification Heater   Heater Temperature (Set) 95 °F (35 °C)   (S)CMV Actuals   Resp Rate (BPM) 22 BPM   VT (mL) 476   MV 9.9   MAP (cmH2O) 8 cmH2O   Peak Pressure (cmH2O) 19 cmH2O   I:E Ratio (Obs) 1:1.6   Insp Resistance 10   Heater Temperature (Obs) 97.7 °F (36.5 °C)   Static Compliance (mL/cmH20) 26 mL/cmH2O   (S)CMV Alarms   High Peak Pressure (cmH2O) 40   Low Pressure (cmH2O) 5 cm H2O   High Resp Rate (BPM) 40 BPM   Low Resp Rate (BPM) 8 BPM   High MV (L/min) 20 L/min   Low MV (L/min) 4 L/min   High VT (mL) 1000 mL   Low VT (mL) 250 mL   Apnea Time (s) 20 S   Daily Screen   Patient safety screen outcome: Passed   Spont breathing trial % for 30 min Yes   IHI Ventilator Associated Pneumonia Bundle   Daily Awakening Trials Performed Not applicable (Comment)   ETT  Cuffed 8 mm   Placement Date/Time: 06/14/25 (c) 1337   Type: Cuffed  Tube Size: 8 mm  Location: Oral  Insertion attempts: 1  Placement Verification: Chest x-ray;End tidal CO2  Secured at (cm): 25   Secured at (cm) 25   Measured from Lips   Secured Location Center   Repositioned Left to Center   Secured by Commercial tube self   Site Condition Dry   Cuff Pressure (color) Green   HI-LO Suction  Intermittent suction    HI-LO Intervention Patent

## 2025-06-16 NOTE — PROGRESS NOTES
Progress Note - Neurosurgery   Name: Jasson Carter 75 y.o. male I MRN: [id number]  Unit/Bed#: ICU 05 I Date of Admission: 2025   Date of Service: 2025 I Hospital Day: 5    Assessment & Plan  SDH (subdural hematoma) (HCC)  PPD 4 from Successful partial embolization of cribriform plate DAVF via the left ophthalmic artery (Dr. Yoon 25)  POD 5 L craniotomy and R karyn holes for evac of radha SDH ( 25)   Arrived at [facility] ER on 25 with complaints of headache and left-sided hip pain after reported fall in the grass the day prior to arrival  Upon further questioning, patient has had some slurred speech for couple days per his wife.  Upon arrival to [facility], patient has significant expressive aphasia and confusion.    Imagin2025: Slight interval decrease in size of left subdural hematoma.  No additional areas of acute intracranial hemorrhage.  Postsurgical changes.    Plan:   Continue to closely monitor neuro exam  Patient needs frequent VF checks and eye monitoring   Frequent neuro checks per primary team   Repeat STAT CTH with any acute decline in GCS > 2pts or more in 1hr   SBP goal 100-140   SDD removed  and   Continue keppra for seizure ppx  vEEG in place. No reported seizure noted on monitoring at this time   Hold all AC/AP meds   DVT ppx: SCDs, cleared for DVT ppx.   Medical management per primary team   Pain control per primary team   PT/OT   Dr. Yoon discussed with pt's daughter and son that patient to remain in hospital till they have additional treatment of the DAVF given mild residual remains from the right ophthalmic artery.   Ongoing discussion about timing for the treatment, possibly the week of 25 by Dr. Yoon, possibly sooner by Dr. Barbour.  Pt's family agreeable to the plan of care.   Neurosurgery will begin to follow from the periphery at this time. Will monitor extubation and neurological status. Contact with any questions and concerns. Will  determine timing of additional procedures. Please reach out with any further questions or concerns.     Please contact the SecureChat role for the Neurosurgery service with any questions/concerns.    Subjective   Laying in bed. Intubated. On a 0.2 of precedex.    Objective :  Temp:  [97.6 °F (36.4 °C)-99.8 °F (37.7 °C)] 99.1 °F (37.3 °C)  HR:  [] 72  BP: ()/() 138/76  Resp:  [12-26] 20  SpO2:  [96 %-100 %] 100 %  O2 Device: Ventilator    I/O         06/14 0701  06/15 0700 06/15 0701  06/16 0700 06/16 0701  06/17 0700    P.O.  0 0    I.V. (mL/kg) 1957.4 (22.4) 2122.8 (24.3) 270 (3.1)    NG/GT 0 280 280    IV Piggyback 200  200    Feedings 40 420 420    Total Intake(mL/kg) 2197.4 (25.2) 2822.8 (32.4) 1170 (13.4)    Urine (mL/kg/hr) 1700 (0.8) 1110 (0.5) 350 (1.2)    Emesis/NG output 50      Drains 0 0     Total Output 1750 1110 350    Net +447.4 +1712.8 +820           Unmeasured Urine Occurrence  2 x           Physical Exam  Constitutional:       Appearance: Normal appearance. He is well-developed.   HENT:      Head: Normocephalic.      Comments: Cranial incisions healing well. vEEG in place    Cardiovascular:      Rate and Rhythm: Rhythm irregular.   Pulmonary:      Comments: Intubated     Musculoskeletal:      Cervical back: Normal range of motion.     Skin:     General: Skin is warm and dry.     Neurological:      Mental Status: He is alert.      Comments: FC in the RUE and BLE. Less motor response in the LUE. Eyes open to voice. Pupils equal and reactive. Cough intact.   Psychiatric:         Mood and Affect: Mood normal.         Behavior: Behavior normal.         Thought Content: Thought content normal.       Lab Results: I have reviewed the following results:  Recent Labs     06/16/25  0517 06/16/25  0549   WBC 7.49  --    HGB 12.7  --    HCT 39.5  --      --    SODIUM 141  --    K 3.7  --      --    CO2 27  --    BUN 17  --    CREATININE 0.58*  --    GLUC 174*  --    CAIONIZED  --   0.99*   MG 2.2  --    PHOS 3.4  --        Imaging Results Review: I personally reviewed the following image studies in PACS and associated radiology reports: CT head. My interpretation of the radiology images/reports is: See above.  Other Study Results Review: No additional pertinent studies reviewed.    VTE Pharmacologic Prophylaxis: Sequential compression device (Venodyne)

## 2025-06-16 NOTE — RESPIRATORY THERAPY NOTE
Resp care   06/16/25 1125   Respiratory Assessment   Resp Comments Pt extubated to N/C at 2 lpm   Vent Information   Ventilator End Yes   Daily Screen   Spont breathing trial outcome: Passed   Name of Medical Team Notified: critical care team   Preparing to extubate/ Notify Nurse Yes   Extubation order obtained Yes   Consider Cuff Test Yes   Patient extubated Yes   IHI Ventilator Associated Pneumonia Bundle   Oral Care Suction swab;with mouthwash

## 2025-06-16 NOTE — RESPIRATORY THERAPY NOTE
06/16/25 0303   Respiratory Assessment   Assessment Type Assess only   General Appearance Sleeping   Respiratory Pattern Assisted   Chest Assessment Chest expansion symmetrical   Bilateral Breath Sounds Clear;Diminished   Resp Comments no vent changes made overnight, pt is stable on current CMV settings for HS. WIll cont to monitor pt per protocol   O2 Device vent   Vent Information   Vent type Cruz G5   Cruz Vent Mode (S)CMV   $ Vent Daily Charge-Subsequent Yes   $ Pulse Oximetry Spot Check Charge Completed   (S)CMV Settings   Resp Rate (BPM) 10 BPM   VT (mL) 430 mL   FIO2 (%) 40 %   PEEP (cmH2O) 6 cmH2O   Insp Time (%) 1 %   Flow Trigger (LPM) 5   Humidification Heater   Heater Temperature (Set) 95 °F (35 °C)   (S)CMV Actuals   Resp Rate (BPM) 17 BPM   VT (mL) 458   MV 7.9   MAP (cmH2O) 7.6 cmH2O   Peak Pressure (cmH2O) 14 cmH2O   I:E Ratio (Obs) 1/2.5   Heater Temperature (Obs) 95 °F (35 °C)   (S)CMV Alarms   High Peak Pressure (cmH2O) 40   Low Pressure (cmH2O) 5 cm H2O   High Resp Rate (BPM) 40 BPM   Low Resp Rate (BPM) 8 BPM   High MV (L/min) 20 L/min   Low MV (L/min) 4 L/min   Apnea Time (s) 20 S   Maintenance   Alarm (pink) cable attached No   Resuscitation bag with peep valve at bedside Yes   Water bag changed No   Circuit changed No   ETT  Cuffed 8 mm   Placement Date/Time: 06/14/25 (c) 1927   Type: Cuffed  Tube Size: 8 mm  Location: Oral  Insertion attempts: 1  Placement Verification: Chest x-ray;End tidal CO2  Secured at (cm): 25   Secured at (cm) 25     RT Ventilator Management Note  Jasson Carter 75 y.o. male MRN: [id number]  Unit/Bed#: ICU 05 Encounter: [id number]      Daily Screen         6/15/2025  0814 6/15/2025  9892          Patient safety screen outcome:: Failed Passed      Not Ready for Weaning due to:: Underline problem not resolved --                Physical Exam:   Assessment Type: (P) Assess only  General Appearance: (P) Sleeping  Respiratory Pattern: (P) Assisted  Chest  Assessment: (P) Chest expansion symmetrical  Bilateral Breath Sounds: (P) Clear, Diminished  Suction: ET Tube  O2 Device: (P) vent      Resp Comments: (P) no vent changes made overnight, pt is stable on current CMV settings for HS. WIll cont to monitor pt per protocol

## 2025-06-16 NOTE — CONSULTS
Consultation - Orthopedics   Name: Jasson Carter 75 y.o. male I MRN: [id number]  Unit/Bed#: ICU 05 I Date of Admission: 6/11/2025   Date of Service: 6/16/2025 I Hospital Day: 5   Inpatient consult to Orthopedic Surgery  Consult performed by: Airam Hall MD  Consult ordered by: Tasneem Killian PA-C        Physician Requesting Evaluation: Dane Mann,*   Reason for Evaluation / Principal Problem: right knee effusion    Assessment & Plan  Effusion of right knee  74 yo male with right knee effusion secondary to tricompartmental arthritis vs pseudogout. At present, patient is afebrile with WBC of 7.49. Right knee aspiration yielded 205cc of clear synovial fluid, WBC 7637 and positive for calcium pyrophosphate. Low suspicion for septic arthritis and would recommend medical management for pseudogout.     Plan:  WBAT RLE   Recommend medical management for pseudogout   PT/OT  Pain control as needed   DVT prophylaxis as per primary team  Please contact the SecureChat role for the Orthopedics service with any questions/concerns.    History of Present Illness   HPI: Jasson Carter is a 75 y.o. male with PMHx of Afib, HTN, hypercholesterolemia, GERD, BPH, and gout who presents with a right knee effusion. He ambulates with a walker at baseline and has a history of left total knee arthroplasty with Dr. Cuevas (2019). The patient was admitted to the ICU on 6/11/25 after a fall the day prior for a large bilateral acute on chronic SDH and is now s/p partial embolization of craniotomy and R karyn holes. He was extubated earlier today. At present, he is intermittently following commands. Denies any pain at rest in his right knee as well as numbness or tingling in the extremities. History was limited due to patient mental status.     Review of Systems  I have reviewed the patient's available PMH, PSH, Social History, Family History, Meds, and Allergies  Historical Information   Past Medical  History[1]  Past Surgical History[2]  Social History[3]  E-Cigarette/Vaping    E-Cigarette Use Never User      E-Cigarette/Vaping Substances    Nicotine No     THC No     CBD No     Flavoring No     Other No     Unknown No      Family history non-contributory  Social History[4]    Current Facility-Administered Medications:     amitriptyline (ELAVIL) tablet 10 mg, HS    bisacodyl (DULCOLAX) rectal suppository 10 mg, Daily    chlorhexidine (PERIDEX) 0.12 % oral rinse 15 mL, Q12H SHANEKA    dexmedeTOMIDine (Precedex) 400 mcg in sodium chloride 0.9% 100 mL, Titrated, Last Rate: Stopped (06/16/25 1109)    enoxaparin (LOVENOX) subcutaneous injection 30 mg, Q12H SHANEKA    insulin lispro (HumALOG/ADMELOG) 100 units/mL subcutaneous injection 1-6 Units, Q6H SHANEKA **AND** Fingerstick Glucose (POCT), Q6H    labetalol (NORMODYNE) injection 10 mg, Q4H PRN    levETIRAcetam (KEPPRA) injection 1,000 mg, Q12H SHANEKA    melatonin tablet 3 mg, HS    multi-electrolyte (Plasmalyte-A/Isolyte-S PH 7.4/Normosol-R) IV solution, Continuous, Last Rate: 75 mL/hr (06/16/25 0515)    ondansetron (ZOFRAN) injection 4 mg, Once PRN    pantoprazole (PROTONIX) injection 40 mg, Q24H SHANEKA    polyethylene glycol (MIRALAX) packet 17 g, Daily    QUEtiapine (SEROquel) tablet 50 mg, BID    QUEtiapine (SEROquel) tablet 75 mg, HS    senna (SENOKOT) tablet 8.6 mg, BID  Prior to Admission Medications   Prescriptions Last Dose Informant Patient Reported? Taking?   Cinnamon 500 MG capsule   Yes No   Sig: Take by mouth   Cranberry (Cranberry Somnus Therapeuticss Geno) 215 MG CAPS   Yes No   Sig: Take by mouth   Glucosamine-Chondroit-Vit C-Mn (Glucosamine Chondroitin Complx) TABS   Yes No   Sig: Take by mouth   Melatonin 1 MG CHEW   Yes No   Sig: Chew   Multiple Vitamin (MULTI VITAMIN PO)   Yes No   Sig: Take by mouth   Zinc 50 MG TABS   Yes No   Sig: Take by mouth   allopurinol (ZYLOPRIM) 300 mg tablet  Self Yes No   Sig: Take 300 mg by mouth daily   amitriptyline (ELAVIL) 10 mg tablet   Self No No   Sig: Take 1 tablet (10 mg total) by mouth daily at bedtime If not effective after 1 week may increase to 2 tablets   calcium carbonate (OS-BONNIE) 600 MG tablet  Self Yes No   Sig: Take 600 mg by mouth 2 (two) times a day with meals   cholecalciferol (VITAMIN D3) 1,000 units tablet  Self Yes No   Sig: Take 1,000 Units by mouth daily   gabapentin (NEURONTIN) 100 mg capsule  Self Yes No   Sig: Take 100 mg by mouth 3 (three) times a day   ibuprofen (MOTRIN) 800 mg tablet   No No   Sig: Take 1 tablet (800 mg total) by mouth 3 (three) times a day   lidocaine (Lidoderm) 5 %   No No   Sig: Apply 1 patch topically over 12 hours daily Remove & Discard patch within 12 hours or as directed by MD   loratadine (CLARITIN) 10 mg tablet  Self Yes No   Sig: Take 10 mg by mouth daily   losartan (COZAAR) 100 MG tablet  Self Yes No   Sig: Take 100 mg by mouth daily   magnesium oxide (MAG-OX) 400 mg tablet   Yes No   Sig: Take by mouth   methocarbamol (ROBAXIN) 500 mg tablet   No No   Sig: Take 1 tablet (500 mg total) by mouth 2 (two) times a day      Facility-Administered Medications: None     Bee venom, Ativan [lorazepam], Fluoxetine, Levaquin [levofloxacin], and Venlafaxine    Objective      Temp:  [98.3 °F (36.8 °C)-99.8 °F (37.7 °C)] 98.3 °F (36.8 °C)  HR:  [] 102  BP: ()/() 149/86  Resp:  [14-26] 20  SpO2:  [90 %-100 %] 90 %  O2 Device: Nasal cannula  Nasal Cannula O2 Flow Rate (L/min):  [2 L/min] 2 L/min  O2 Device: Nasal cannula          I/O         06/14 0701  06/15 0700 06/15 0701 06/16 0700 06/16 0701 06/17 0700    P.O.  0 0    I.V. (mL/kg) 1957.4 (22.4) 2122.8 (24.3) 425 (4.9)    NG/GT 0 280 280    IV Piggyback 200  200    Feedings 40 420 420    Total Intake(mL/kg) 2197.4 (25.2) 2822.8 (32.4) 1325 (15.2)    Urine (mL/kg/hr) 1700 (0.8) 1110 (0.5) 900 (1.3)    Emesis/NG output 50      Drains 0 0     Stool   0    Total Output 1750 1110 900    Net +447.4 +1712.8 +425           Unmeasured Urine  "Occurrence  2 x     Unmeasured Stool Occurrence   3 x          Lines/Drains/Airways       Active Status       Name Placement date Placement time Site Days    NG/OG/Enteral Tube Left nare 06/14/25 1808  Left nare  1    External Urinary Catheter 06/15/25  1805  -- less than 1                  Physical Exam   Ortho Exam   Musculoskeletal: Right Lower Extremity  Large effusion of right knee present   TTP medial joint line, no TTP lateral joint line or over patella  SILT s/s/sp/dp/pt  Motor intact s/s/sp/dp/pt  Active ROM limited to 30 degrees of flexion, full extension   Passive ROM 50 degrees of flexion, full extension  No pain with micromotion   Limited straight leg raise     Tertiary exam was negative for additional palpable stepoffs or additional bony tenderness to palpation.    Imaging:  X Rays of right knee demonstrate severe tricompartmental arthritis without evidence of fracture or dislocation.       Lab Results: I have reviewed the following results:   Recent Labs     06/14/25  0634 06/15/25  0506 06/16/25  0517   WBC 10.68* 8.82 7.49   HGB 13.5 13.6 12.7   HCT 41.1 42.2 39.5    188 170   BUN 14 20 17   CREATININE 0.50* 0.65 0.58*     Blood Culture:   Lab Results   Component Value Date    BLOODCX No Growth After 5 Days. 06/12/2024     Wound Culture: No results found for: \"WOUNDCULT\"          [1]   Past Medical History:  Diagnosis Date    Allergic     Gout     Hypertension     PTSD (post-traumatic stress disorder)    [2]   Past Surgical History:  Procedure Laterality Date    APPENDECTOMY      CHOLECYSTECTOMY      CRANIOTOMY Bilateral 06/11/2025    Procedure: Left sided craniotomy and right sided karyn holes for evacuation of subdural hematoma;  Surgeon: Satish De La Torre MD;  Location: [facility];  Service: Neurosurgery    IR CEREBRAL ANGIOGRAPHY / INTERVENTION  06/12/2025    TOTAL KNEE ARTHROPLASTY     [3]   Social History  Tobacco Use    Smoking status: Former     Current packs/day: 0.00     Average " packs/day: 0.5 packs/day for 20.0 years (10.0 ttl pk-yrs)     Types: Cigarettes     Start date:      Quit date:      Years since quittin.4    Smokeless tobacco: Never   Vaping Use    Vaping status: Never Used   Substance and Sexual Activity    Alcohol use: Yes    Drug use: Not Currently   [4]   Social History  Tobacco Use    Smoking status: Former     Current packs/day: 0.00     Average packs/day: 0.5 packs/day for 20.0 years (10.0 ttl pk-yrs)     Types: Cigarettes     Start date:      Quit date:      Years since quittin.4    Smokeless tobacco: Never   Vaping Use    Vaping status: Never Used   Substance and Sexual Activity    Alcohol use: Yes    Drug use: Not Currently

## 2025-06-16 NOTE — ASSESSMENT & PLAN NOTE
74 yo male with right knee effusion secondary to tricompartmental arthritis vs pseudogout. At present, patient is afebrile with WBC of 7.49. Right knee aspiration yielded 205cc of clear synovial fluid, WBC 7637 and positive for calcium pyrophosphate. Low suspicion for septic arthritis and would recommend medical management for pseudogout.     Plan:  WBAT RLE   Recommend medical management for pseudogout   PT/OT  Pain control as needed   DVT prophylaxis as per primary team

## 2025-06-16 NOTE — CASE MANAGEMENT
Case Management Discharge Planning Note    Patient name Jasson Carter  Location ICU 05/ICU 05 MRN [id number]  : 1950 Date 2025       Current Admission Date: 2025  Current Admission Diagnosis:SDH (subdural hematoma) (HCC)   Patient Active Problem List    Diagnosis Date Noted    Effusion of right knee 2025    Atrial fibrillation (HCC) 2025    SDH (subdural hematoma) (HCC) 2025    Altered mental status 2025    Fall 2025    Hyponatremia 2024    Neuropathy 2021    Reflux esophagitis 2020    Seasonal allergic rhinitis due to pollen 2020    Primary osteoarthritis involving multiple joints 2020    Posttraumatic stress disorder 2020    History of gout 2019    Pure hypercholesterolemia 2017    Essential hypertension 2017    Benign prostatic hyperplasia without urinary obstruction 2011      LOS (days): 5  Geometric Mean LOS (GMLOS) (days): 6.5  Days to GMLOS:1.3     OBJECTIVE:  Risk of Unplanned Readmission Score: 17.49         Current admission status: Inpatient   Preferred Pharmacy:   [id number] [place] - [address]  [address] [place] [address]  Phone: [phone] Fax: [phone]    Primary Care Provider: Ruben Finch MD    Primary Insurance: MEDICARE  Secondary Insurance: [facility]    DISCHARGE DETAILS:    Pt now extubated  CM will continue to follow for d/c needs/recs

## 2025-06-16 NOTE — PHYSICAL THERAPY NOTE
Physical Therapy Cancellation Note    PT orders received chart review completed. Pt is currently intubated/sedated and not appropriate to participate in skilled PT at this time. PT will follow and eval as medically appropriate.     06/16/25 0900   Note Type   Note type Cancelled Session   Cancel Reasons Intubated/sedated       Kecia Rosas, PT

## 2025-06-17 PROBLEM — G40.901 STATUS EPILEPTICUS (HCC): Status: ACTIVE | Noted: 2025-06-17

## 2025-06-17 LAB
ANION GAP SERPL CALCULATED.3IONS-SCNC: 9 MMOL/L (ref 4–13)
ATRIAL RATE: 109 BPM
BASOPHILS # BLD AUTO: 0.04 THOUSANDS/ÂΜL (ref 0–0.1)
BASOPHILS NFR BLD AUTO: 1 % (ref 0–1)
BUN SERPL-MCNC: 14 MG/DL (ref 5–25)
CA-I BLD-SCNC: 1.07 MMOL/L (ref 1.12–1.32)
CALCIUM SERPL-MCNC: 8.4 MG/DL (ref 8.4–10.2)
CHLORIDE SERPL-SCNC: 107 MMOL/L (ref 96–108)
CO2 SERPL-SCNC: 26 MMOL/L (ref 21–32)
CREAT SERPL-MCNC: 0.46 MG/DL (ref 0.6–1.3)
EOSINOPHIL # BLD AUTO: 0.1 THOUSAND/ÂΜL (ref 0–0.61)
EOSINOPHIL NFR BLD AUTO: 1 % (ref 0–6)
ERYTHROCYTE [DISTWIDTH] IN BLOOD BY AUTOMATED COUNT: 13.3 % (ref 11.6–15.1)
GFR SERPL CREATININE-BSD FRML MDRD: 108 ML/MIN/1.73SQ M
GLUCOSE SERPL-MCNC: 122 MG/DL (ref 65–140)
GLUCOSE SERPL-MCNC: 135 MG/DL (ref 65–140)
GLUCOSE SERPL-MCNC: 137 MG/DL (ref 65–140)
HCT VFR BLD AUTO: 36.3 % (ref 36.5–49.3)
HGB BLD-MCNC: 12 G/DL (ref 12–17)
IMM GRANULOCYTES # BLD AUTO: 0.03 THOUSAND/UL (ref 0–0.2)
IMM GRANULOCYTES NFR BLD AUTO: 0 % (ref 0–2)
LYMPHOCYTES # BLD AUTO: 0.89 THOUSANDS/ÂΜL (ref 0.6–4.47)
LYMPHOCYTES NFR BLD AUTO: 12 % (ref 14–44)
MAGNESIUM SERPL-MCNC: 2.1 MG/DL (ref 1.9–2.7)
MCH RBC QN AUTO: 33.3 PG (ref 26.8–34.3)
MCHC RBC AUTO-ENTMCNC: 33.1 G/DL (ref 31.4–37.4)
MCV RBC AUTO: 101 FL (ref 82–98)
MONOCYTES # BLD AUTO: 0.91 THOUSAND/ÂΜL (ref 0.17–1.22)
MONOCYTES NFR BLD AUTO: 12 % (ref 4–12)
NEUTROPHILS # BLD AUTO: 5.78 THOUSANDS/ÂΜL (ref 1.85–7.62)
NEUTS SEG NFR BLD AUTO: 74 % (ref 43–75)
NRBC BLD AUTO-RTO: 0 /100 WBCS
PHOSPHATE SERPL-MCNC: 2.7 MG/DL (ref 2.3–4.1)
PLATELET # BLD AUTO: 189 THOUSANDS/UL (ref 149–390)
PMV BLD AUTO: 9.7 FL (ref 8.9–12.7)
POTASSIUM SERPL-SCNC: 3.4 MMOL/L (ref 3.5–5.3)
PR INTERVAL: 0 MS
QRS AXIS: 15 DEGREES
QRSD INTERVAL: 92 MS
QT INTERVAL: 329 MS
QTC INTERVAL: 437 MS
RBC # BLD AUTO: 3.6 MILLION/UL (ref 3.88–5.62)
SODIUM SERPL-SCNC: 142 MMOL/L (ref 135–147)
T WAVE AXIS: 61 DEGREES
VENTRICULAR RATE: 106 BPM
WBC # BLD AUTO: 7.75 THOUSAND/UL (ref 4.31–10.16)

## 2025-06-17 PROCEDURE — 93010 ELECTROCARDIOGRAM REPORT: CPT | Performed by: INTERNAL MEDICINE

## 2025-06-17 PROCEDURE — 97167 OT EVAL HIGH COMPLEX 60 MIN: CPT

## 2025-06-17 PROCEDURE — 99233 SBSQ HOSP IP/OBS HIGH 50: CPT | Performed by: STUDENT IN AN ORGANIZED HEALTH CARE EDUCATION/TRAINING PROGRAM

## 2025-06-17 PROCEDURE — NC001 PR NO CHARGE: Performed by: EMERGENCY MEDICINE

## 2025-06-17 PROCEDURE — 93005 ELECTROCARDIOGRAM TRACING: CPT

## 2025-06-17 PROCEDURE — 80048 BASIC METABOLIC PNL TOTAL CA: CPT

## 2025-06-17 PROCEDURE — 82948 REAGENT STRIP/BLOOD GLUCOSE: CPT

## 2025-06-17 PROCEDURE — 0S9C3ZZ DRAINAGE OF RIGHT KNEE JOINT, PERCUTANEOUS APPROACH: ICD-10-PCS | Performed by: ORTHOPAEDIC SURGERY

## 2025-06-17 PROCEDURE — NC001 PR NO CHARGE: Performed by: ORTHOPAEDIC SURGERY

## 2025-06-17 PROCEDURE — 84100 ASSAY OF PHOSPHORUS: CPT

## 2025-06-17 PROCEDURE — 83735 ASSAY OF MAGNESIUM: CPT

## 2025-06-17 PROCEDURE — 85025 COMPLETE CBC W/AUTO DIFF WBC: CPT

## 2025-06-17 PROCEDURE — 99222 1ST HOSP IP/OBS MODERATE 55: CPT | Performed by: ORTHOPAEDIC SURGERY

## 2025-06-17 PROCEDURE — 97163 PT EVAL HIGH COMPLEX 45 MIN: CPT

## 2025-06-17 PROCEDURE — 92610 EVALUATE SWALLOWING FUNCTION: CPT

## 2025-06-17 PROCEDURE — 82330 ASSAY OF CALCIUM: CPT

## 2025-06-17 PROCEDURE — 99233 SBSQ HOSP IP/OBS HIGH 50: CPT | Performed by: EMERGENCY MEDICINE

## 2025-06-17 RX ORDER — CALCIUM GLUCONATE 20 MG/ML
2 INJECTION, SOLUTION INTRAVENOUS ONCE
Status: COMPLETED | OUTPATIENT
Start: 2025-06-17 | End: 2025-06-17

## 2025-06-17 RX ORDER — HYDRALAZINE HYDROCHLORIDE 20 MG/ML
10 INJECTION INTRAMUSCULAR; INTRAVENOUS EVERY 6 HOURS PRN
Status: DISCONTINUED | OUTPATIENT
Start: 2025-06-17 | End: 2025-06-17

## 2025-06-17 RX ORDER — POTASSIUM CHLORIDE 20MEQ/15ML
40 LIQUID (ML) ORAL ONCE
Status: COMPLETED | OUTPATIENT
Start: 2025-06-17 | End: 2025-06-17

## 2025-06-17 RX ORDER — LABETALOL HYDROCHLORIDE 5 MG/ML
10 INJECTION, SOLUTION INTRAVENOUS EVERY 4 HOURS PRN
Status: DISCONTINUED | OUTPATIENT
Start: 2025-06-17 | End: 2025-06-27 | Stop reason: HOSPADM

## 2025-06-17 RX ORDER — LEVETIRACETAM 500 MG/1
1000 TABLET ORAL EVERY 12 HOURS SCHEDULED
Status: DISCONTINUED | OUTPATIENT
Start: 2025-06-17 | End: 2025-06-26

## 2025-06-17 RX ORDER — POTASSIUM CHLORIDE 20MEQ/15ML
20 LIQUID (ML) ORAL ONCE
Status: COMPLETED | OUTPATIENT
Start: 2025-06-17 | End: 2025-06-17

## 2025-06-17 RX ADMIN — LEVETIRACETAM 1000 MG: 500 TABLET, FILM COATED ORAL at 21:59

## 2025-06-17 RX ADMIN — ENOXAPARIN SODIUM 30 MG: 30 INJECTION SUBCUTANEOUS at 10:32

## 2025-06-17 RX ADMIN — SENNOSIDES 8.6 MG: 8.6 TABLET, FILM COATED ORAL at 17:25

## 2025-06-17 RX ADMIN — POTASSIUM CHLORIDE 40 MEQ: 20 SOLUTION ORAL at 07:06

## 2025-06-17 RX ADMIN — HYDRALAZINE HYDROCHLORIDE 10 MG: 20 INJECTION INTRAMUSCULAR; INTRAVENOUS at 11:58

## 2025-06-17 RX ADMIN — AMLODIPINE BESYLATE 5 MG: 5 TABLET ORAL at 10:18

## 2025-06-17 RX ADMIN — POTASSIUM CHLORIDE 20 MEQ: 20 SOLUTION ORAL at 07:06

## 2025-06-17 RX ADMIN — LABETALOL HYDROCHLORIDE 10 MG: 5 INJECTION, SOLUTION INTRAVENOUS at 04:49

## 2025-06-17 RX ADMIN — LIDOCAINE 1 PATCH: 700 PATCH TOPICAL at 10:31

## 2025-06-17 RX ADMIN — BISACODYL 10 MG: 10 SUPPOSITORY RECTAL at 10:32

## 2025-06-17 RX ADMIN — CALCIUM GLUCONATE 2 G: 20 INJECTION, SOLUTION INTRAVENOUS at 10:11

## 2025-06-17 RX ADMIN — CHLORHEXIDINE GLUCONATE 15 ML: 1.2 SOLUTION ORAL at 10:32

## 2025-06-17 RX ADMIN — PANTOPRAZOLE SODIUM 40 MG: 40 INJECTION, POWDER, FOR SOLUTION INTRAVENOUS at 05:04

## 2025-06-17 RX ADMIN — LEVETIRACETAM 1000 MG: 100 INJECTION, SOLUTION INTRAVENOUS at 10:31

## 2025-06-17 RX ADMIN — QUETIAPINE 50 MG: 25 TABLET ORAL at 17:25

## 2025-06-17 RX ADMIN — LABETALOL HYDROCHLORIDE 10 MG: 5 INJECTION, SOLUTION INTRAVENOUS at 15:44

## 2025-06-17 RX ADMIN — Medication 3 MG: at 21:59

## 2025-06-17 RX ADMIN — ENOXAPARIN SODIUM 30 MG: 30 INJECTION SUBCUTANEOUS at 22:00

## 2025-06-17 RX ADMIN — LABETALOL HYDROCHLORIDE 10 MG: 5 INJECTION, SOLUTION INTRAVENOUS at 10:11

## 2025-06-17 RX ADMIN — SENNOSIDES 8.6 MG: 8.6 TABLET, FILM COATED ORAL at 10:18

## 2025-06-17 RX ADMIN — QUETIAPINE 50 MG: 25 TABLET ORAL at 10:18

## 2025-06-17 RX ADMIN — SODIUM CHLORIDE, SODIUM GLUCONATE, SODIUM ACETATE, POTASSIUM CHLORIDE, MAGNESIUM CHLORIDE, SODIUM PHOSPHATE, DIBASIC, AND POTASSIUM PHOSPHATE 75 ML/HR: .53; .5; .37; .037; .03; .012; .00082 INJECTION, SOLUTION INTRAVENOUS at 05:07

## 2025-06-17 RX ADMIN — POLYETHYLENE GLYCOL 3350 17 G: 17 POWDER, FOR SOLUTION ORAL at 10:32

## 2025-06-17 RX ADMIN — QUETIAPINE 75 MG: 25 TABLET ORAL at 21:59

## 2025-06-17 RX ADMIN — LABETALOL HYDROCHLORIDE 10 MG: 5 INJECTION, SOLUTION INTRAVENOUS at 19:48

## 2025-06-17 RX ADMIN — AMITRIPTYLINE HYDROCHLORIDE 10 MG: 10 TABLET ORAL at 22:08

## 2025-06-17 NOTE — ASSESSMENT & PLAN NOTE
74 yo male with right knee effusion secondary to tricompartmental arthritis vs pseudogout. At present, patient is afebrile with WBC of 7.49. Right knee aspiration yielded 205cc of clear synovial fluid, WBC 7637 and positive for calcium pyrophosphate. Low suspicion for septic arthritis and would recommend medical management for pseudogout.     Plan:  WBAT RLE   Recommend medical management for pseudogout   PT/OT  Pain control as needed   DVT prophylaxis as per primary team  Follow-up as needed

## 2025-06-17 NOTE — PLAN OF CARE
Problem: Nutrition/Hydration-ADULT  Goal: Nutrient/Hydration intake appropriate for improving, restoring or maintaining nutritional needs  Description: Monitor and assess patient's nutrition/hydration status for malnutrition. Collaborate with interdisciplinary team and initiate plan and interventions as ordered.  Monitor patient's weight and dietary intake as ordered or per policy. Utilize nutrition screening tool and intervene as necessary. Determine patient's food preferences and provide high-protein, high-caloric foods as appropriate.     INTERVENTIONS:  - Monitor oral intake, urinary output, labs, and treatment plans  - Assess nutrition and hydration status and recommend course of action  - Evaluate amount of meals eaten  - Assist patient with eating if necessary   - Allow adequate time for meals  - Recommend/ encourage appropriate diets, oral nutritional supplements, and vitamin/mineral supplements  - Order, calculate, and assess calorie counts as needed  - Recommend, monitor, and adjust tube feedings and TPN/PPN based on assessed needs  - Assess need for intravenous fluids  - Provide specific nutrition/hydration education as appropriate  - Include patient/family/caregiver in decisions related to nutrition  Outcome: Progressing     Problem: PAIN - ADULT  Goal: Verbalizes/displays adequate comfort level or baseline comfort level  Description: Interventions:  - Encourage patient to monitor pain and request assistance  - Assess pain using appropriate pain scale  - Administer analgesics as ordered based on type and severity of pain and evaluate response  - Implement non-pharmacological measures as appropriate and evaluate response  - Consider cultural and social influences on pain and pain management  - Notify physician/advanced practitioner if interventions unsuccessful or patient reports new pain  - Educate patient/family on pain management process including their role and importance of  reporting pain   -  Provide non-pharmacologic/complimentary pain relief interventions  Outcome: Progressing     Problem: INFECTION - ADULT  Goal: Absence or prevention of progression during hospitalization  Description: INTERVENTIONS:  - Assess and monitor for signs and symptoms of infection  - Monitor lab/diagnostic results  - Monitor all insertion sites, i.e. indwelling lines, tubes, and drains  - Monitor endotracheal if appropriate and nasal secretions for changes in amount and color  - Marilla appropriate cooling/warming therapies per order  - Administer medications as ordered  - Instruct and encourage patient and family to use good hand hygiene technique  - Identify and instruct in appropriate isolation precautions for identified infection/condition  Outcome: Progressing  Goal: Absence of fever/infection during neutropenic period  Description: INTERVENTIONS:  - Monitor WBC  - Perform strict hand hygiene  - Limit to healthy visitors only  - No plants, dried, fresh or silk flowers with winters in patient room  Outcome: Progressing     Problem: SAFETY ADULT  Goal: Patient will remain free of falls  Description: INTERVENTIONS:  - Educate patient/family on patient safety including physical limitations  - Instruct patient to call for assistance with activity   - Consider consulting OT/PT to assist with strengthening/mobility based on AM PAC & JH-HLM score  - Consult OT/PT to assist with strengthening/mobility   - Keep Call bell within reach  - Keep bed low and locked with side rails adjusted as appropriate  - Keep care items and personal belongings within reach  - Initiate and maintain comfort rounds  - Make Fall Risk Sign visible to staff  - Offer Toileting every 2 Hours, in advance of need  - Initiate/Maintain bed alarm  - Obtain necessary fall risk management equipment  - Apply yellow socks and bracelet for high fall risk patients  - Consider moving patient to room near nurses station  Outcome: Progressing     Problem: DISCHARGE  PLANNING  Goal: Discharge to home or other facility with appropriate resources  Description: INTERVENTIONS:  - Identify barriers to discharge w/patient and caregiver  - Arrange for needed discharge resources and transportation as appropriate  - Identify discharge learning needs (meds, wound care, etc.)  - Arrange for interpretive services to assist at discharge as needed  - Refer to Case Management Department for coordinating discharge planning if the patient needs post-hospital services based on physician/advanced practitioner order or complex needs related to functional status, cognitive ability, or social support system  Outcome: Progressing     Problem: Knowledge Deficit  Goal: Patient/family/caregiver demonstrates understanding of disease process, treatment plan, medications, and discharge instructions  Description: Complete learning assessment and assess knowledge base.  Interventions:  - Provide teaching at level of understanding  - Provide teaching via preferred learning methods  Outcome: Progressing     Problem: NEUROSENSORY - ADULT  Goal: Achieves stable or improved neurological status  Description: INTERVENTIONS  - Monitor and report changes in neurological status  - Monitor vital signs such as temperature, blood pressure, glucose, and any other labs ordered   - Initiate measures to prevent increased intracranial pressure  - Monitor for seizure activity and implement precautions if appropriate      Outcome: Progressing  Goal: Remains free of injury related to seizures activity  Description: INTERVENTIONS  - Maintain airway, patient safety  and administer oxygen as ordered  - Monitor patient for seizure activity, document and report duration and description of seizure to physician/advanced practitioner  - If seizure occurs,  ensure patient safety during seizure  - Reorient patient post seizure  - Seizure pads on all 4 side rails  - Instruct patient/family to notify RN of any seizure activity including if  an aura is experienced  - Instruct patient/family to call for assistance with activity based on nursing assessment  - Administer anti-seizure medications if ordered    Outcome: Progressing  Goal: Achieves maximal functionality and self care  Description: INTERVENTIONS  - Monitor swallowing and airway patency with patient fatigue and changes in neurological status  - Encourage and assist patient to increase activity and self care.   - Encourage visually impaired, hearing impaired and aphasic patients to use assistive/communication devices  Outcome: Progressing     Problem: CARDIOVASCULAR - ADULT  Goal: Maintains optimal cardiac output and hemodynamic stability  Description: INTERVENTIONS:  - Monitor I/O, vital signs and rhythm  - Monitor for S/S and trends of decreased cardiac output  - Administer and titrate ordered vasoactive medications to optimize hemodynamic stability  - Assess quality of pulses, skin color and temperature  - Assess for signs of decreased coronary artery perfusion  - Instruct patient to report change in severity of symptoms  Outcome: Progressing  Goal: Absence of cardiac dysrhythmias or at baseline rhythm  Description: INTERVENTIONS:  - Continuous cardiac monitoring, vital signs, obtain 12 lead EKG if ordered  - Administer antiarrhythmic and heart rate control medications as ordered  - Monitor electrolytes and administer replacement therapy as ordered  Outcome: Progressing     Problem: RESPIRATORY - ADULT  Goal: Achieves optimal ventilation and oxygenation  Description: INTERVENTIONS:  - Assess for changes in respiratory status  - Assess for changes in mentation and behavior  - Position to facilitate oxygenation and minimize respiratory effort  - Oxygen administered by appropriate delivery if ordered  - Initiate smoking cessation education as indicated  - Encourage broncho-pulmonary hygiene including cough, deep breathe, Incentive Spirometry  - Assess the need for suctioning and aspirate as  needed  - Assess and instruct to report SOB or any respiratory difficulty  - Respiratory Therapy support as indicated  Outcome: Progressing     Problem: GASTROINTESTINAL - ADULT  Goal: Minimal or absence of nausea and/or vomiting  Description: INTERVENTIONS:  - Administer IV fluids if ordered to ensure adequate hydration  - Maintain NPO status until nausea and vomiting are resolved  - Nasogastric tube if ordered  - Administer ordered antiemetic medications as needed  - Provide nonpharmacologic comfort measures as appropriate  - Advance diet as tolerated, if ordered  - Consider nutrition services referral to assist patient with adequate nutrition and appropriate food choices  Outcome: Progressing  Goal: Maintains or returns to baseline bowel function  Description: INTERVENTIONS:  - Assess bowel function  - Encourage oral fluids to ensure adequate hydration  - Administer IV fluids if ordered to ensure adequate hydration  - Administer ordered medications as needed  - Encourage mobilization and activity  - Consider nutritional services referral to assist patient with adequate nutrition and appropriate food choices  Outcome: Progressing  Goal: Maintains adequate nutritional intake  Description: INTERVENTIONS:  - Monitor percentage of each meal consumed  - Identify factors contributing to decreased intake, treat as appropriate  - Assist with meals as needed  - Monitor I&O, weight, and lab values if indicated  - Obtain nutrition services referral as needed  Outcome: Progressing  Goal: Establish and maintain optimal ostomy function  Description: INTERVENTIONS:  - Assess bowel function  - Encourage oral fluids to ensure adequate hydration  - Administer IV fluids if ordered to ensure adequate hydration   - Administer ordered medications as needed  - Encourage mobilization and activity  - Nutrition services referral to assist patient with appropriate food choices  - Assess stoma site  - Consider wound care consult   Outcome:  Progressing  Goal: Oral mucous membranes remain intact  Description: INTERVENTIONS  - Assess oral mucosa and hygiene practices  - Implement preventative oral hygiene regimen  - Implement oral medicated treatments as ordered  - Initiate Nutrition services referral as needed  Outcome: Progressing     Problem: GENITOURINARY - ADULT  Goal: Maintains or returns to baseline urinary function  Description: INTERVENTIONS:  - Assess urinary function  - Encourage oral fluids to ensure adequate hydration if ordered  - Administer IV fluids as ordered to ensure adequate hydration  - Administer ordered medications as needed  - Offer frequent toileting  - Follow urinary retention protocol if ordered  Outcome: Progressing  Goal: Absence of urinary retention  Description: INTERVENTIONS:  - Assess patient’s ability to void and empty bladder  - Monitor I/O  - Bladder scan as needed  - Discuss with physician/AP medications to alleviate retention as needed  - Discuss catheterization for long term situations as appropriate  Outcome: Progressing  Goal: Urinary catheter remains patent  Description: INTERVENTIONS:  - Assess patency of urinary catheter  - If patient has a chronic pedersen, consider changing catheter if non-functioning  - Follow guidelines for intermittent irrigation of non-functioning urinary catheter  Outcome: Progressing     Problem: METABOLIC, FLUID AND ELECTROLYTES - ADULT  Goal: Electrolytes maintained within normal limits  Description: INTERVENTIONS:  - Monitor labs and assess patient for signs and symptoms of electrolyte imbalances  - Administer electrolyte replacement as ordered  - Monitor response to electrolyte replacements, including repeat lab results as appropriate  - Instruct patient on fluid and nutrition as appropriate  Outcome: Progressing  Goal: Fluid balance maintained  Description: INTERVENTIONS:  - Monitor labs   - Monitor I/O and WT  - Instruct patient on fluid and nutrition as appropriate  - Assess for  signs & symptoms of volume excess or deficit  Outcome: Progressing  Goal: Glucose maintained within target range  Description: INTERVENTIONS:  - Monitor Blood Glucose as ordered  - Assess for signs and symptoms of hyperglycemia and hypoglycemia  - Administer ordered medications to maintain glucose within target range  - Assess nutritional intake and initiate nutrition service referral as needed  Outcome: Progressing     Problem: HEMATOLOGIC - ADULT  Goal: Maintains hematologic stability  Description: INTERVENTIONS  - Assess for signs and symptoms of bleeding or hemorrhage  - Monitor labs  - Administer supportive blood products/factors as ordered and appropriate  Outcome: Progressing     Problem: MUSCULOSKELETAL - ADULT  Goal: Maintain or return mobility to safest level of function  Description: INTERVENTIONS:  - Assess patient's ability to carry out ADLs; assess patient's baseline for ADL function and identify physical deficits which impact ability to perform ADLs (bathing, care of mouth/teeth, toileting, grooming, dressing, etc.)  - Assess/evaluate cause of self-care deficits   - Assess range of motion  - Assess patient's mobility  - Assess patient's need for assistive devices and provide as appropriate  - Encourage maximum independence but intervene and supervise when necessary  - Involve family in performance of ADLs  - Assess for home care needs following discharge   - Consider OT consult to assist with ADL evaluation and planning for discharge  - Provide patient education as appropriate  Outcome: Progressing  Goal: Maintain proper alignment of affected body part  Description: INTERVENTIONS:  - Support, maintain and protect limb and body alignment  - Provide patient/ family with appropriate education  Outcome: Progressing     Problem: SAFETY,RESTRAINT: NV/NON-SELF DESTRUCTIVE BEHAVIOR  Goal: Remains free of harm/injury (restraint for non violent/non self-detsructive behavior)  Description: INTERVENTIONS:  -  Instruct patient/family regarding restraint use   - Assess and monitor physiologic and psychological status   - Provide interventions and comfort measures to meet assessed patient needs   - Identify and implement measures to help patient regain control  - Assess readiness for release of restraint   Outcome: Progressing  Goal: Returns to optimal restraint-free functioning  Description: INTERVENTIONS:  - Assess the patient's behavior and symptoms that indicate continued need for restraint  - Identify and implement measures to help patient regain control  - Assess readiness for release of restraint   Outcome: Progressing

## 2025-06-17 NOTE — SPEECH THERAPY NOTE
Speech Language/Pathology  Speech-Language Pathology Bedside Swallow Evaluation      Patient Name: Jasson Carter    Today's Date: 6/17/2025     Problem List  Active Problems:    SDH (subdural hematoma) (HCC)    Altered mental status    Fall    Atrial fibrillation (HCC)    Effusion of right knee    Status epilepticus (HCC)      Past Medical History  Past Medical History[1]    Past Surgical History  Past Surgical History[2]    Summary   Pt presented with  s/s suggestive of mild and mild-moderate oral and suspected mild pharyngeal dysphagia.  Symptoms or concerns included decreased bolus propulsion, decreased mastication, and inattention to the bolus, suspected pharyngeal swallow delay, suspected decreased hyolaryngeal elevation upon palpation, suspected pharyngeal residue, and ?able difficulty clearing the retention.  The pt cannot stop talking w/ po in the oral cavity as well as mid swallow.  He also stops and states he is not hungry often.     Risk/s for Aspiration: new neuro, intubation.      Recommended Diet: mechanically altered/level 2 diet, nectar thick liquids, and allow for water    Recommended Form of Meds: whole with puree and crushed with puree   Aspiration precautions and swallowing strategies: upright posture, only feed when fully alert, slow rate of feeding, small bites/sips, and alternating bites and sips  Other Recommendations: Continue frequent oral care, will follow -mbs as able        Current Medical Status  Pt is a 75 y.o. male who presented to [facility] 6/11/25 after sliding on the grass and falling.  Pt c/o L hip pain and ha. Prior to transfer to [facility] pt became confused.  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  Underwent L crani, R bur hole.     Pt with expressive aphasia, yesterday on 6/12/25  was in IR for angiogram for embolization.  The pt was poorly cooperative for initial eval/tx.  He eventually required re intubation on 6/14/25 and was extubated 6/16/25.    Today he is awake, alert  and cooperative.     Current Precautions:   Fall  Aspiration    Allergies:  No known food allergies    Past medical history:  Please see H&P for details    Special Studies:  CT head-Slight interval decrease in size of left subdural hematoma as detailed above. No new/additional areas of intracranial hemorrhage. Stable postsurgical changes and other ancillary findings detailed above.          Social/Education/Vocational Hx:  Pt lives with family    Swallow Information   Current Risks for Dysphagia & Aspiration: CVA  Current Symptoms/Concerns: recent intubation, new neuro event  Current Diet: NPO with tube feeds -ng is in place  Baseline Diet: regular diet and thin liquids      Baseline Assessment   Behavior/Cognition: alert  Speech/Language Status: able to participate in basic conversation and able to follow commands inconsistently.  Still confused, thinks he is home.  Able to offer some clear phrases at times but also has noted aphasic speech.    Patient Positioning: upright in bed  Pain Status/Interventions/Response to Interventions:   No report of or nonverbal indications of pain.       Swallow Mechanism Exam  Facial: right facial droop  Labial: bilateral decreased ROM  Lingual: decreased ROM  Velum: unable to visualize  Mandible: adequate ROM  Dentition: adequate  Vocal quality:clear/adequate   Volitional Cough: strong/productive   Respiratory Status: on NC    Consistencies Assessed and Performance   Consistencies Administered: ice chips, thin liquids, nectar thick, puree, and mechanical soft solids    Oral Stage: mild, mild-moderate, decreased bolus acceptance, decreased bolus propulsion, and decreased mastication  Pt highly distracted, talking w/ material in the oral cavity.      Pharyngeal Stage: suspected, mild, suspected pharyngeal swallow delay, suspected decreased hyolaryngeal elevation upon palpation, and multiple swallows  Swallow Mechanics:  Swallowing initiation appeared prompt.  Laryngeal rise was  palpated and judged to be within functional limits.  Mild coughing w/ the thin, pt talking with material in the mouth and mid swallows.  He does use mult swallows.      Esophageal Concerns: none reported    Strategies and Efficacy: minimize distractions    Summary and Recommendations (see above)    Results Reviewed with: patient, RN, and MD     Treatment Recommended: as able      Frequency of treatment: will follow     Patient Stated Goal:-    Dysphagia LTG  -Patient will demonstrate safe and effective oral intake (without overt s/s significant oral/pharyngeal dysphagia including s/s penetration or aspiration) for the highest appropriate diet level.     Short Term Goals:    -Pt will tolerate Dysphagia 2/mechanical soft diet and nectar thick liquid with no significant s/s oral or pharyngeal dysphagia across 1-3 diagnostic session/s.    -Patient will tolerate trials of upgraded food and/or liquid texture with no significant s/s of oral or pharyngeal dysphagia including aspiration across 1-3 diagnostic sessions     -Patient will comply with a Video/Modified Barium Swallow study for more complete assessment of swallowing anatomy/physiology/aspiration risk and to assess efficacy of treatment techniques so as to best guide treatment plan    Speech Therapy Prognosis   Prognosis: fair    Prognosis Considerations: age and medical status           [1]   Past Medical History:  Diagnosis Date    Allergic     Gout     Hypertension     PTSD (post-traumatic stress disorder)    [2]   Past Surgical History:  Procedure Laterality Date    APPENDECTOMY      CHOLECYSTECTOMY      CRANIOTOMY Bilateral 06/11/2025    Procedure: Left sided craniotomy and right sided karyn holes for evacuation of subdural hematoma;  Surgeon: Satish De La Torre MD;  Location: [facility];  Service: Neurosurgery    IR CEREBRAL ANGIOGRAPHY / INTERVENTION  06/12/2025    TOTAL KNEE ARTHROPLASTY

## 2025-06-17 NOTE — OCCUPATIONAL THERAPY NOTE
Occupational Therapy Evaluation     Patient Name: Jasson Carter  Today's Date: 6/17/2025  Problem List  Active Problems:    SDH (subdural hematoma) (HCC)    Altered mental status    Fall    Atrial fibrillation (HCC)    Effusion of right knee    Status epilepticus (HCC)    Past Medical History  Past Medical History[1]  Past Surgical History  Past Surgical History[2]        06/17/25 1001   OT Last Visit   OT Visit Date 06/17/25   Note Type   Note type Evaluation   Pain Assessment   Pain Assessment Tool 0-10   Pain Score No Pain   Restrictions/Precautions   Weight Bearing Precautions Per Order Yes   RLE Weight Bearing Per Order WBAT   Other Precautions Cognitive;Chair Alarm;Bed Alarm;Restraints;Seizure;Multiple lines;Telemetry;Fall Risk;Pain  (NGT, b/l hand mitts. posey belt when seated in recliner)   Home Living   Type of Home House   Home Layout Multi-level;1/2 bath on main level;Bed/bath upstairs;Other (Comment)  (1 GE)   Bathroom Shower/Tub Tub/shower unit   Bathroom Toilet Standard   Bathroom Equipment Other (Comment);Grab bars in shower;Shower chair  (Pt is a questionable historian, reports he has grab bars in the shower along w/ a shower chair; daughter says she is unsure if this is the true and will confirm as able)   Home Equipment Other (Comment)  (Denies any)   Additional Comments Pt is a questionable historian; pt's daughter present at EOS to confirm home living and PLOF information; pt lives w/ his wife in a split level house w/ 1 GE   Prior Function   Level of [place] Independent with ADLs;Independent with IADLS;Independent with functional mobility   Lives With Spouse   Receives Help From Family   IADLs Independent with driving;Independent with meal prep;Independent with medication management   Falls in the last 6 months 1 to 4  (2)   Vocational Retired   Comments PTA, pt I w/ ADLs and IADLs; (+) ; retired; pt's wife w/ previous TBI and has residual memory impairment and word finding  difficulty at times   Lifestyle   Autonomy I w/ ADLs, I w/ IADLs, I w/ functional mobility and transfers   Reciprocal Relationships Spouse, children   Service to Others Retired   Intrinsic Gratification Likes to go on walks   General   Family/Caregiver Present Yes   Additional General Comments Pt's children present at EOS   ADL   Eating Assistance 5  Supervision/Setup   Grooming Assistance 5  Supervision/Setup   UB Bathing Assistance 4  Minimal Assistance   LB Bathing Assistance 3  Moderate Assistance   UB Dressing Assistance 4  Minimal Assistance   LB Dressing Assistance 3  Moderate Assistance   Toileting Assistance  3  Moderate Assistance   Functional Assistance 3  Moderate Assistance   Additional Comments Pt [place] for UB ADLs and modA for LB ADLs   Bed Mobility   Supine to Sit 3  Moderate assistance   Additional items Assist x 1;HOB elevated;Increased time required;Verbal cues;LE management   Sit to Supine Unable to assess   Additional Comments Pt left OOB in recliner w/ chair alarm activated, restraints placed, and all needs within reach at EOS   Transfers   Sit to Stand 3  Moderate assistance   Additional items Assist x 2;Increased time required;Verbal cues   Stand to Sit 3  Moderate assistance   Additional items Assist x 2;Increased time required;Verbal cues   Additional Comments w/ RW; requires max VC's for technique and safety while using RW   Functional Mobility   Functional Mobility 3  Moderate assistance   Additional Comments Assist x 2; pt modA x 2 to take a few steps from bed > chair; pt requires max VC's for hand placement, technique, and safety during functional mobility   Additional items Rolling walker   Balance   Static Sitting Fair   Dynamic Sitting Fair -   Static Standing Poor   Dynamic Standing Poor   Ambulatory Poor   Activity Tolerance   Activity Tolerance Patient tolerated treatment well   Medical Staff Made Aware RN; PTKecia; SPTGokul   Nurse Made Aware Yes   RUE Assessment   RUE  Assessment WFL   LUE Assessment   LUE Assessment WFL   Hand Function   Gross Motor Coordination Impaired   Fine Motor Coordination Impaired   Hand Function Comments Pt w/ greater FMC and GMC impairment on R side   Sensation   Additional Comments Pt w/ baseline peripheral neuropathy   Proprioception   Proprioception Partial deficits in the RUE  (Pt w/ difficulty finding therapist's hand during assessment of  strength; pt appears to have decreased awareness of LUE; OT will continue to assess)   Vision - Complex Assessment   Additional Comments Pt reports no visual deficits   Cognition   Overall Cognitive Status Impaired   Arousal/Participation Alert;Responsive;Cooperative   Attention Attends with cues to redirect   Orientation Level Oriented to person;Oriented to time;Disoriented to place;Disoriented to situation   Memory Decreased recall of precautions;Decreased recall of recent events;Decreased short term memory   Following Commands Follows one step commands with increased time or repetition   Comments Pt pleasantly confused; requires redirection to task and frequent VC's for safety and technique during transfers and functional mobility   Assessment   Limitation Decreased ADL status;Decreased Safe judgement during ADL;Decreased cognition;Decreased endurance;Decreased fine motor control;Decreased self-care trans;Decreased high-level ADLs   Prognosis Fair   Assessment Pt is a 74 y/o M who presents to [facility] s/p ground level fall, dx'd w/ L sided acute on chronic SDH and R chronic SDH. Pt s/p L sided craniotomy and R sided Ann holes 6/11. Pt s/p partial embolization of L opthalmic artery 6/12. Pt has a past medical history of Allergic, Gout, Hypertension, and PTSD (post-traumatic stress disorder). At baseline, pt lives w/ his spouse in a split level house w/ 1 STE. Pt previously I w/ ADLs and IADLs and I w/ functional mobility and transfers. Pt drives. Pt has active OT consult and OOB orders. Upon OT entry, pt found  supine in bed and agreeable to participate in therapy. Pt is currently performing at [place] for UB ADLs, modA for LB ADLs, and modA x 2 for functional mobility and transfers. Upon OT evaluation, pt demonstrates impairments w/ strength, endurance, sitting balance/tolerance, standing balance/tolerance, activity tolerance, coordination, proprioception, safety awareness, orientation, judgement, and insight. OT to address the listed impairments in the following occupational performance areas: grooming, dressing, bathing, toileting, functional mobility, transfers, and cognition. Pt is currently functioning below baseline performance and demonstrates need for OT services. Pt will be seen 3-5x/week w/ goals to  within 10-14 days. OT to recommend maximum resource intensity upon d/c. Will continue to follow.   Goals   Patient Goals To return home   LTG Time Frame 10-   Long Term Goal #1 See below defined goals   Plan   Treatment Interventions ADL retraining;Functional transfer training;UE strengthening/ROM;Endurance training;Cognitive reorientation;Patient/family training;Equipment evaluation/education;Fine motor coordination activities;Compensatory technique education;Continued evaluation;Energy conservation;Activityengagement   Goal Expiration Date 25   OT Frequency 3-5x/wk   Discharge Recommendation   Recommendation Physiatry Consult   Rehab Resource Intensity Level, OT I (Maximum Resource Intensity)   Additional Comments  Pt seen as a co-session due to the patient's co-morbidities, clinically unstable presentation, and present impairments which are a regression from the patient's baseline.   Additional Comments 2 The patient's raw score on the -PAC Daily Activity Inpatient Short Form is 15. A raw score of less than 19 suggests the patient may benefit from discharge to post-acute rehabilitation services. Please refer to the recommendation of the Occupational Therapist for safe discharge planning.   [facility]  Daily Activity Inpatient   Lower Body Dressing 2   Bathing 2   Toileting 2   Upper Body Dressing 3   Grooming 3   Eating 3   Daily Activity Raw Score 15   Daily Activity Standardized Score (Calc for Raw Score >=11) 34.69   AM-PAC Applied Cognition Inpatient   Following a Speech/Presentation 2   Understanding Ordinary Conversation 3   Taking Medications 2   Remembering Where Things Are Placed or Put Away 2   Remembering List of 4-5 Errands 2   Taking Care of Complicated Tasks 1   Applied Cognition Raw Score 12   Applied Cognition Standardized Score 28.82   End of Consult   Education Provided Yes   Patient Position at End of Consult Bedside chair;Bed/Chair alarm activated;All needs within reach   Nurse Communication Nurse aware of consult     Goals   1) Pt will complete UB ADLs w/ mod I to increase functional independence.     2) Pt will complete LB ADLs w/ mod I to increase functional independence.      3) Pt will complete functional mobility w/ [place] x 1, with or without use of AD/DME, to increase independence and safety during functional tasks.      4) Pt will complete all functional transfers w/ [place] x 1, with or without use of AD/DME, to increase independence and safety w/ functional transfers.      5) Pt will tolerate 10 minutes of standing w/ [place] x 1, with or without use of AD/DME, to improve endurance to participate in functional activities.     6) Pt will complete toileting w/ mod I, with or without use of AD/DME, to increase independence w/ self-care.     7) Pt will complete bed mobility w/ S and sit EOB w/ G balance as a prerequisite for seated ADLs.     8) Pt will be A&O x 4 100% of the time w/ 1 VC as needed.     9) Pt will remain 100% attentive throughout ongoing cognitive assessment.     ANDREW Underwood              [1]   Past Medical History:  Diagnosis Date    Allergic     Gout     Hypertension     PTSD (post-traumatic stress disorder)    [2]   Past Surgical History:  Procedure Laterality Date     APPENDECTOMY      CHOLECYSTECTOMY      CRANIOTOMY Bilateral 06/11/2025    Procedure: Left sided craniotomy and right sided karyn holes for evacuation of subdural hematoma;  Surgeon: Satish De La Torre MD;  Location: [facility];  Service: Neurosurgery    IR CEREBRAL ANGIOGRAPHY / INTERVENTION  06/12/2025    TOTAL KNEE ARTHROPLASTY

## 2025-06-17 NOTE — PROGRESS NOTES
Progress Note - Critical Care/ICU   Name: Jasson Carter 75 y.o. male I MRN: [id number]  Unit/Bed#: ICU 05 I Date of Admission: 6/11/2025   Date of Service: 6/17/2025 I Hospital Day: 6       TRAUMA TRANSFER FROM ICU AND TERTIARY SURVEY NOTE    VTE Prophylaxis:Sequential compression device (Venodyne)  and Enoxaparin (Lovenox)     Disposition: Step down 2    Code status:  Level 1 - Full Code    Consultants: IP CONSULT TO NEUROSURGERY  IP CONSULT TO CASE MANAGEMENT  IP CONSULT TO PHYSICAL MEDICINE REHAB  IP CONSULT TO NUTRITION SERVICES  IP CONSULT TO PHYSICAL MEDICINE REHAB  IP CONSULT TO ORTHOPEDIC SURGERY    History of Present Illness   Mechanism of Injury:Fall    HPI/Last 24 hour events:   Greg Carter is a 75 y.o. M with PMHx of GERD, HTN, HLD, BPH, osteoarthritis, and peripheral neuropathy who presented today to the [facility] ER with complaints of headache and left hip pain. Patient states that he had a fall yesterday in the grass. Upon further evaluation, CTH revealed bilateral subdural hematomas, left side with acute blood and larger in size with midline shift. Patient was originally GCS 15 while in the ER, but upon his arrival to [facility]\"" was noted to have a decline in mental status with receptive aphasia. Patient was alert on exam, but unable to participate in most questioning due to aphasia. He was emergently taken to the OR by neurosurgery for evacuation of hematoma.    Last 24 hours:  Patient has been intermittently awake and restless overnight but has been able to be redirected to rest. He had some dysarthria but was oriented to person and year. Systolic blood pressure increased to 177 around 4 AM and patient was given labetalol as an intervention.    Reason for ICU admission: Bilateral subdural hematoma s/p left side crainotomy and right side karyn holes for evacuation of subdural hematoma    Summary of ICU clinical course:   Patient came in with bilateral subdural hematoma:  6/11: Left sided craniotomy and  right sided karyn holes for evacuation of subdural hematoma  6/12: DAVF Partial embolization via the left ophthalmic artery  6/14: intubated for airway protection post tonic-clonic seizure  6/16: Patient extubated  6/16: R knee aspiration by ortho      Recent or scheduled procedures: None        Objective :  Temp:  [98.1 °F (36.7 °C)-99.1 °F (37.3 °C)] 98.5 °F (36.9 °C)  HR:  [] 132  BP: (108-185)/() 175/92  Resp:  [14-35] 17  SpO2:  [95 %-100 %] 97 %  O2 Device: None (Room air)  Nasal Cannula O2 Flow Rate (L/min):  [2 L/min-4 L/min] 2 L/min    Physical Exam  Vitals and nursing note reviewed.   Constitutional:       Appearance: Normal appearance.   HENT:      Head: Normocephalic.      Mouth/Throat:      Mouth: Mucous membranes are moist.     Eyes:      Conjunctiva/sclera: Conjunctivae normal.      Pupils: Pupils are equal, round, and reactive to light.       Cardiovascular:      Rate and Rhythm: Tachycardia present. Rhythm irregular.      Pulses: Normal pulses.   Pulmonary:      Effort: Pulmonary effort is normal.      Breath sounds: Normal breath sounds.   Abdominal:      General: Abdomen is flat.     Musculoskeletal:         General: Normal range of motion.      Cervical back: Normal range of motion.     Skin:     General: Skin is warm.     Neurological:      Mental Status: He is alert. Mental status is at baseline. He is disoriented.      Comments: Patient follows commands. Patient is aphasic with GCS of 13.   Psychiatric:         Mood and Affect: Mood normal.           ISAR Score: Did you order a geriatric consult if the score was 2 or greater?: n/a No data recorded       Lab Results: I have reviewed the following results:    Other Study Results Review: EKG was reviewed.   Chest Xray(s):    FAST exam(s): N/A   CT Scan(s): 6/11 repeat CTH: Stable mixed density left convexity subdural hematoma containing hyperdense acute blood products, with stable approximately 5 mm of left-to-right midline shift.  Stable hypodense right convexity subdural hematoma  6/11 CTH: 14 mm mixed density acute left cerebral convexity subdural hematoma with associated 5 mm rightward midline shift. Low-density 7 mm right cerebral convexity subdural hematoma, age indeterminate.  6/11 CT c-spine: No cervical spine fracture or traumatic malalignment  6/11 CTH: Postsurgical change from partial evacuation of left hemispheric subdural hematoma. Residual hemorrhage measures up to 9 mm in thickness. Evacuation of right hemispheric subdural collection. Surgical drain and gas over the right convexity. Reduced left to right midline shift, 3 mm. No hemodynamically significant stenosis, dissection or occlusion of the carotid or vertebral arteries or major vessels of the [place]  6/12- Residual filling of the cribriform plate dural AV fistula status post embolization of the left anterior ethmoidal artery.    6/14 MRI brain: Several small scattered acute/early subacute infarcts in the bilateral frontal and parietal lobes and left subinsular/anterior temporal region. Stable residual left subdural hematoma measuring up to 1.3 cm. Stable mass effect with 3 millimeters of left-to-right shift. Thin residual extra-axial collection on the right   Additional Xray(s): positive for acute findings:  Tricompartmental osteoarthritis which is most severe laterally with very large joint effusion.      Patient seen and evaluated by Critical Care today and deemed to be appropriate for transfer to Stepdown Level 2. Spoke to DYLAN Pichardo from Trauma service regarding transfer. Critical Care can be contacted via SecureChat with any questions or concerns.

## 2025-06-17 NOTE — ASSESSMENT & PLAN NOTE
Traumatic brain injury after falling.  Bilateral subdural hemorrhage left > right with rightward shift and brain compression.  S/P emergent left craniotomy and right sided karyn hold craniotomy for subdural evacuation on 6/11  Cribiform plate DAVF on angiogram on 6/12 s/p partial embolization via the left ophthalmic artery  MRI brain showed several small scattered acute/early subacute infarcts in the bilateral frontal and parietal lobes and left sub-insular/anterior temporal region. Stable residual left subdural hematoma. Stable mass effect with 3 millimeters of left to right shift.   Seroquel 50 mg BID and 75 mg HS, IM zyprexa, precedex started for agitation   PT/OT/SLP when medically stable.   The patient may require dedicated brain injury inpatient rehabilitation once medically cleared. Will follow at this time.

## 2025-06-17 NOTE — PLAN OF CARE
Problem: PAIN - ADULT  Goal: Verbalizes/displays adequate comfort level or baseline comfort level  Description: Interventions:  - Encourage patient to monitor pain and request assistance  - Assess pain using appropriate pain scale  - Administer analgesics as ordered based on type and severity of pain and evaluate response  - Implement non-pharmacological measures as appropriate and evaluate response  - Consider cultural and social influences on pain and pain management  - Notify physician/advanced practitioner if interventions unsuccessful or patient reports new pain  - Educate patient/family on pain management process including their role and importance of  reporting pain   - Provide non-pharmacologic/complimentary pain relief interventions  Outcome: Progressing     Problem: INFECTION - ADULT  Goal: Absence or prevention of progression during hospitalization  Description: INTERVENTIONS:  - Assess and monitor for signs and symptoms of infection  - Monitor lab/diagnostic results  - Monitor all insertion sites, i.e. indwelling lines, tubes, and drains  - Monitor endotracheal if appropriate and nasal secretions for changes in amount and color  - [place] appropriate cooling/warming therapies per order  - Administer medications as ordered  - Instruct and encourage patient and family to use good hand hygiene technique  - Identify and instruct in appropriate isolation precautions for identified infection/condition  Outcome: Progressing  Goal: Absence of fever/infection during neutropenic period  Description: INTERVENTIONS:  - Monitor WBC  - Perform strict hand hygiene  - Limit to healthy visitors only  - No plants, dried, fresh or silk flowers with winters in patient room  Outcome: Progressing

## 2025-06-17 NOTE — ASSESSMENT & PLAN NOTE
Orthopedics consulted and underwent right knee aspiration which was positive for calcium pyrophosphate.   Medical management for pseudogout was recommended.

## 2025-06-17 NOTE — PROGRESS NOTES
Progress Note - PMR   Name: Jasson Carter 75 y.o. male I MRN: [id number]  Unit/Bed#: ICU 05 I Date of Admission: 6/11/2025   Date of Service: 6/17/2025 I Hospital Day: 6     Assessment & Plan  SDH (subdural hematoma) (HCC)  Traumatic brain injury after falling.  Bilateral subdural hemorrhage left > right with rightward shift and brain compression.  S/P emergent left craniotomy and right sided karyn hold craniotomy for subdural evacuation on 6/11  Cribiform plate DAVF on angiogram on 6/12 s/p partial embolization via the left ophthalmic artery  MRI brain showed several small scattered acute/early subacute infarcts in the bilateral frontal and parietal lobes and left sub-insular/anterior temporal region. Stable residual left subdural hematoma. Stable mass effect with 3 millimeters of left to right shift.   Seroquel 50 mg BID and 75 mg HS, IM zyprexa, precedex started for agitation   PT/OT/SLP when medically stable.   The patient may require dedicated brain injury inpatient rehabilitation once medically cleared. Will follow at this time.   Altered mental status    Fall    Atrial fibrillation (HCC)    Effusion of right knee  Orthopedics consulted and underwent right knee aspiration which was positive for calcium pyrophosphate.   Medical management for pseudogout was recommended.   Status epilepticus (HCC)  Keppra 1000 mg every 12 hours     Subjective   The patient was seen in the ICU. He is very confused and oriented to person only.     Chief Complaint: f/u TBI    Interval: On 6/14/2025 the patient required intubation for acute respiratory failure due to status epilepticus and has since been extubated. Repeat CT was stable. He was placed on keppra x 14 days.     Objective :  Temp:  [98.1 °F (36.7 °C)-99.1 °F (37.3 °C)] 98.5 °F (36.9 °C)  HR:  [] 96  BP: (117-185)/() 130/84  Resp:  [14-35] 15  SpO2:  [95 %-100 %] 97 %  O2 Device: None (Room air)  Nasal Cannula O2 Flow Rate (L/min):  [2 L/min-4 L/min] 2  L/min    PLOF: The patient lives with his spouse in a bi-level home with one flight to the 2nd floor. He was independent. There is a 1/2 bath on the main level.       Functional Update:  Mobility: pending   Transfers: pending   ADLs: pending     Physical Exam  Constitutional:       General: He is not in acute distress.     Appearance: He is not toxic-appearing.   HENT:      Head:      Comments: Bilateral scalp incision with staples, no erythema or drainage      Nose:      Comments: NG tube in place   Pulmonary:      Effort: Pulmonary effort is normal. No respiratory distress.   Abdominal:      General: There is no distension.     Musculoskeletal:      Comments: Moving all extremities, some difficulty with commands, appears to have left > right sided weakness      Neurological:      Mental Status: He is alert. He is disoriented.     Psychiatric:         Cognition and Memory: Cognition is impaired. Memory is impaired.         Judgment: Judgment is impulsive.         Scheduled Meds:  Current Facility-Administered Medications   Medication Dose Route Frequency Provider Last Rate    acetaminophen  650 mg Oral Q4H PRN Roselyn Manley PA-C      amitriptyline  10 mg Oral HS Razia Posada PA-C      amLODIPine  5 mg Per NG Tube Daily Pao Lamaine, DO      bisacodyl  10 mg Rectal Daily Roselyn Manley PA-C      chlorhexidine  15 mL Swish & Spit Q12H [facility] Razia Posada PA-C      enoxaparin  30 mg Subcutaneous Q12H [facility] [place]      insulin lispro  1-6 Units Subcutaneous Q6H [facility] Razia Posada PA-C      labetalol  10 mg Intravenous Q4H PRN Roselyn Manley PA-C      levETIRAcetam  1,000 mg Intravenous Q12H [facility] Roselyn Manley PA-C      lidocaine  1 patch Topical Daily Roselyn Mnaley PA-C      melatonin  3 mg Oral HS Razia Posada PA-C      ondansetron  4 mg Intravenous Once PRN Yaz Betts CRNA      pantoprazole  40 mg Intravenous Q24H [facility] Angelia Marcos PA-C      polyethylene glycol  17 g Oral  Daily Angelia Marcos PA-C      QUEtiapine  50 mg Oral BID Razia Posada PA-C      QUEtiapine  75 mg Oral HS Razia Posada PA-C      senna  1 tablet Oral BID Annette Maria Palladino,            Lab Results: I have reviewed the following results:  Results from last 7 days   Lab Units 06/17/25  0453 06/16/25  0517 06/15/25  0506   HEMOGLOBIN g/dL 12.0 12.7 13.6   HEMATOCRIT % 36.3* 39.5 42.2   WBC Thousand/uL 7.75 7.49 8.82   PLATELETS Thousands/uL 189 170 188     Results from last 7 days   Lab Units 06/17/25  0453 06/16/25  0549 06/16/25  0517 06/15/25  0506 06/13/25  0540 06/12/25  0522 06/11/25  1110 06/11/25  1052 06/11/25  0904   BUN mg/dL 14  --  17 20   < > 14 12  --  13   SODIUM mmol/L 142  --  141 142   < > 134* 133*  --  133*   POTASSIUM mmol/L 3.4*  --  3.7 3.9   < > 4.0 4.1  --  4.1   CHLORIDE mmol/L 107  --  106 105   < > 99 98  --  99   GLUCOSE, ISTAT mg/dl  --   --   --   --   --   --   --  104  --    CREATININE mg/dL 0.46*  --  0.58* 0.65   < > 0.56* 0.66  --  0.72   CALCIUM, IONIZED mmol/L 1.07* 0.99*  --  1.11*   < >  --   --   --   --    CALCIUM, IONIZED, ISTAT mmol/L  --   --   --   --   --   --   --  0.87*  --    AST U/L  --   --   --   --   --  23 26  --  27   ALT U/L  --   --   --   --   --  12 14  --  15    < > = values in this interval not displayed.       Results from last 7 days   Lab Units 06/12/25  0522 06/11/25  0904   PROTIME seconds 13.6 13.8   INR  1.01 1.02

## 2025-06-17 NOTE — PLAN OF CARE
Problem: OCCUPATIONAL THERAPY ADULT  Goal: Performs self-care activities at highest level of function for planned discharge setting.  See evaluation for individualized goals.  Description: Treatment Interventions: ADL retraining, Functional transfer training, UE strengthening/ROM, Endurance training, Cognitive reorientation, Patient/family training, Equipment evaluation/education, Fine motor coordination activities, Compensatory technique education, Continued evaluation, Energy conservation, Activityengagement          See flowsheet documentation for full assessment, interventions and recommendations.   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 Pt s/p L sided craniotomy and R sided Ann holes 6/11. Pt s/p partial embolization of L opthalmic artery 6/12. Pt has a past medical history of Allergic, Gout, Hypertension, and PTSD (post-traumatic stress disorder). At baseline, pt lives w/ his spouse in a split level house w/ 1 GE. Pt previously I w/ ADLs and IADLs and I w/ functional mobility and transfers. Pt drives. Pt has active OT consult and OOB orders. Upon OT entry, pt found supine in bed and agreeable to participate in therapy. Pt is currently performing at [place] for UB ADLs, modA for LB ADLs, and modA x 2 for functional mobility and transfers. Upon OT evaluation, pt demonstrates impairments w/ strength, endurance, sitting balance/tolerance, standing balance/tolerance, activity tolerance, coordination, proprioception, safety awareness, orientation, judgement, and insight. OT to address the listed impairments in the following occupational performance areas: grooming, dressing,  bathing, toileting, functional mobility, transfers, and cognition. Pt is currently functioning below baseline performance and demonstrates need for OT services. Pt will be seen 3-5x/week w/ goals to  within 10-14 days. OT to recommend maximum resource intensity upon d/c. Will continue to follow.  Recommendation: Physiatry Consult  Rehab Resource Intensity Level, OT: I (Maximum Resource Intensity)       2025 1624 by Emilie Islas  Note: Limitation: Decreased ADL status, Decreased Safe judgement during ADL, Decreased cognition, Decreased endurance, Decreased fine motor control, Decreased self-care trans, Decreased high-level ADLs  Prognosis: Fair  Assessment: Pt is a 76 y/o M who presents to B s/p ground level fall, dx'd w/ L sided acute on chronic SDH and R chronic SDH. Pt s/p L sided craniotomy and R sided Ann holes . Pt s/p partial embolization of L opthalmic artery . Pt has a past medical history of Allergic, Gout, Hypertension, and PTSD (post-traumatic stress disorder). At baseline, pt lives w/ his spouse in a split level house w/  GE. Pt previously I w/ ADLs and IADLs and I w/ functional mobility and transfers. Pt drives. Pt has active OT consult and OOB orders. Upon OT entry, pt found supine in bed and agreeable to participate in therapy. Pt is currently performing at [place] for UB ADLs, modA for LB ADLs, and modA x 2 for functional mobility and transfers. Upon OT evaluation, pt demonstrates impairments w/ strength, endurance, sitting balance/tolerance, standing balance/tolerance, activity tolerance, coordination, proprioception, safety awareness, orientation, judgement, and insight. OT to address the listed impairments in the following occupational performance areas: grooming, dressing, bathing, toileting, functional mobility, transfers, and cognition. Pt is currently functioning below baseline performance and demonstrates need for OT services. Pt will be seen 3-5x/week w/ goals to   within 10-14 days. OT to recommend maximum resource intensity upon d/c. Will continue to follow.  Recommendation: Physiatry Consult  Rehab Resource Intensity Level, OT: I (Maximum Resource Intensity)     ANDREW Underwood

## 2025-06-17 NOTE — PLAN OF CARE
Problem: PHYSICAL THERAPY ADULT  Goal: Performs mobility at highest level of function for planned discharge setting.  See evaluation for individualized goals.  Description: Treatment/Interventions: ADL retraining, Functional transfer training, LE strengthening/ROM, Therapeutic exercise, Cognitive reorientation, Endurance training, Patient/family training, Equipment eval/education, Gait training, Spoke to nursing  Equipment Recommended: Other (Comment) (TBD pending progress and family confirmation of available DME.)       See flowsheet documentation for full assessment, interventions and recommendations.  Note: Prognosis: Good  Problem List: Decreased strength, Decreased range of motion, Decreased endurance, Impaired balance, Decreased mobility, Decreased coordination, Decreased cognition, Impaired judgement, Decreased safety awareness  Assessment: Pt is a 75 y.o. male admitted to [facility] on 6/11/2025 due to Subdural Hematoma following a fall with head strike. PT consulted for functional mobility status assessment and discharge recommendations. PTA, pt was independent with ADLs/IADLs, independent with functional mobility with no AD, + , and retired. Currently, pt presents with deficits in strength, ROM, balance, endurance, coordination, gait dysfunction, decreased safety awareness, impulsivity, and impaired judgement as outlined above affecting their ability to safely return home, complete ADLs/IADLs, ambulate, transfer, and negotiate stairs. Personal factors affecting pt at time of evaluation include inaccessible home environment, ambulating w/ assistive device, reliance on more restrictive AD, stairs to enter home, inability to ambulate household distances, inability to navigate community distances, unable to perform dynamic tasks in community, decreased cognition, limited home support, positive fall history, limited insight into impairments, inability to perform ADLs, inability to perform  IADLs, and inability to live alone.  Pt's three children present at end of session to confirm home setup and PLOF information as pt is a questionable historian. Pt's children live about 2 hours away and are unable to provide level of assistance pt would need at this time. Following session, PT positioned to comfort in reclining chair with chair alarm, call bell, mits donned, and belt restraint placed, children present in room. Pt is currently functioning below baseline and will benefit from skilled PT intervention throughout hospital stay to maximize functional mobility for improved safety upon d/c. . From PT/mobility standpoint, recommendation at time of d/c would be level 1 pending progress in order to facilitate return to PLOF. The patient's AM-PAC Basic Mobility Inpatient Short Form Raw Score is 11. A Raw score of less than 16 suggests the patient may benefit from discharge to post-acute rehabilitation services. Please also refer to the recommendation of the Physical Therapist for safe discharge planning.  Barriers to Discharge: Inaccessible home environment, Decreased caregiver support     Rehab Resource Intensity Level, PT: I (Maximum Resource Intensity)    See flowsheet documentation for full assessment.

## 2025-06-17 NOTE — PROGRESS NOTES
Progress Note - Critical Care/ICU   Name: Jasson Carter 75 y.o. male I MRN: [id number]  Unit/Bed#: ICU 05 I Date of Admission: 2025   Date of Service: 2025 I Hospital Day: 6       Assessment & Plan   Active Hospital Problems    Diagnosis Date Noted POA    Effusion of right knee 2025 Unknown    Atrial fibrillation (HCC) 2025 Unknown    SDH (subdural hematoma) (HCC) 2025 Yes    Altered mental status 2025 Unknown    Fall 2025 Unknown      Resolved Hospital Problems   No resolved problems to display.     24 Hour Events : Patient has been intermittently awake and restless overnight but has been able to be redirected to rest.  Per nursing he had some dysarthria but was oriented to person and year. Systolic blood pressure increased to 177 around 4 AM and patient was given labetalol as an intervention. No significant events overnight.    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 Low-density 7 mm right cerebral convexity subdural hematoma, age indeterminate.   CT c-spine: No cervical spine fracture or traumatic malalignment    repeat CTH: Stable mixed density left convexity subdural hematoma containing hyperdense acute blood products, with stable approximately 5 mm of         left-to-right midline shift. Stable hypodense right convexity subdural hematoma   post-op CTA head/neck: Postsurgical change from partial evacuation of left hemispheric subdural hematoma. Residual hemorrhage measures up to 9 mm in thickness. Evacuation of right hemispheric subdural collection. Surgical drain and gas over the right convexity. Reduced left to right midline shift, 3 mm. No hemodynamically significant stenosis, dissection or occlusion of the carotid or  vertebral arteries or major vessels of the [place].  6/12 CT Venogram Brain: Venous sinuses and deep cerebral veins are patent.  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 Persistent visualization of small draining veins extending superiorly from the cribriform plate. Stable residual left subdural hematoma.  6/14 MRI brain w/o contrast: Several small scattered acute/early subacute infarcts in the bilateral frontal and parietal lobes and left subinsular/anterior temporal region. Stable residual left subdural hematoma measuring up to 1.3 cm. Stable mass effect with 3 millimeters of left-to-right shift.  6/14 MRA: No signficant stenosis or occlusion.  6/14 CT head: Slight interval decrease in size of left subdural hematoma as detailed above. No new/additional areas of intracranial hemorrhage. Stable postsurgical changes and other ancillary findings detailed above.      Plan:  Neuro checks Q2H   Repeat STAT CTH in acute decline in GCS greater than 2 points or more in an hour prior neurosurgery recommendation  Maintain SBP <140 per Neurosurgery recommendation  Continue delirium precautions  Melatonin 3mg QHS to regulate sleep-wake cycles  Continue seroquel 50 mg BID, 75mg QHS  Continue amitriptyline 10 mg QHS  Keppra 1000mg BID x 14 days for seizure prophylaxis, per NSGY recommendation        CV:   Diagnoses:  A-fib, HTN, Hypercholesterolemia      Plan:  Continuous cardiopulmonary monitoring. Maintain SBP<140, Maintain MAP >65.  Labetalol 10 mg Q4H PRN, SBP> 140  Home losartan held    Pulm:   Diagnosis: Acute hypoxic respiratory failure (resolved)    Plan:     Continuous pulse oximetry maintain O2 sat greater than 92%  Respiratory protocol  Encourage deep breathing and coughing    GI:   Diagnosis: GERD    Plan:   GI prophylaxis: Protonix 40 mg IV QD   Bowel regimen: Miralax 17g po QD, Senna 8.6 mg, Dulcolax suppository 10mg  Zofran 4mg PRN for  nausea    :   Diagnoses: BPH without urinary rentention      Plan:  Monitor urine output  Continue to monitor BUN/Cr. Baseline 0.46    F/E/N:   F: Continuous Plasmalyte-A/Isolyte-S running at 75ml/hr. Fluid resuscitation prn.  E: Replete Ca and K today. Monitor and replete electrolytes for Mg >2, Phos >3, K >4.  N: Jevity 1.2 carlos running at 60ml/hr, 2 packets Prosource Liquid Protein daily , 30 ml Q4H of free water    Heme/Onc:   Diagnosis: No active issues     Plan:   VTE prophylaxis: Mechanical pumps and Lovenox 30 mg SQ BID      Endo:   Diagnosis: No active issues     Plan:   Insulin: SSI. Monitor blood glucose goal 140-180    ID:   Diagnosis: No active issues     Plan:     Monitor fever curve and WBC     MSK/Skin:   Diagnosis: Primary osteoarthritis involving multiple joints, Gout      Plan:  Start home Allopurinol   Q2H turning  PT/OT when appropriate  Cranial incisional care    Lines: 3 Peripherals, NGT  Drains: None  Airway: None    Disposition: Critical care    ICU Core Measures     A: Assess, Prevent, and Manage Pain Has pain been assessed? Yes  Need for changes to pain regimen? No   B: Both SAT/SAT  N/A   C: Choice of Sedation RASS Goal: N/A patient not on sedation  Need for changes to sedation or analgesia regimen? N/A   D: Delirium CAM-ICU: Positive   E: Early Mobility  Plan for early mobility? Yes   F: Family Engagement Plan for family engagement today? Yes           Prophylaxis:  VTE VTE covered by:  enoxaparin, Subcutaneous, 30 mg at 06/16/25 2120       Stress Ulcer  covered bypantoprazole (PROTONIX) injection 40 mg [[id number]]         Subjective   Review of Systems: See HPI for Review of Systems    Objective :                   Vitals I/O      Most Recent Min/Max in 24hrs   Temp 98.2 °F (36.8 °C) Temp  Min: 98.2 °F (36.8 °C)  Max: 99.1 °F (37.3 °C)   Pulse 98 Pulse  Min: 56  Max: 118   Resp 14 Resp  Min: 14  Max: 35   /91 BP  Min: 105/59  Max: 185/91   O2 Sat 96 % SpO2  Min: 90 %  Max: 100 %       Intake/Output Summary (Last 24 hours) at 6/17/2025 0657  Last data filed at 6/17/2025 0600  Gross per 24 hour   Intake 3184.95 ml   Output 2550 ml   Net 634.95 ml       Diet Enteral/Parenteral; Tube Feeding No Oral Diet; Jevity 1.2 Jemal; Continuous; 60; Prosource Protein Liquid - Two Packets; 30; Water; Every 4 hours    Invasive Monitoring   None        Physical Exam   Physical Exam  Vitals reviewed.   Eyes:      Conjunctiva/sclera: Conjunctivae normal.      Pupils: Pupils are equal, round, and reactive to light.   Skin:     General: Skin is warm.      Comments: Mild swelling present in right knee, mild swelling noted around left thumb. Both sides are warm to touch.   HENT:      Head: Normocephalic.      Mouth/Throat:      Mouth: Mucous membranes are moist.   Cardiovascular:      Rate and Rhythm: Tachycardia present.      Pulses: Normal pulses.      Heart sounds: Normal heart sounds.   Musculoskeletal:         General: Normal range of motion.   Constitutional:       Appearance: He is well-developed.   Pulmonary:      Effort: Pulmonary effort is normal.   Neurological:      General: No focal deficit present.      Mental Status: He is alert. He is disoriented to place and disoriented to time.      Motor: gross motor function is at baseline for patient. Strength full and intact in all extremities.      Comments: Patient follows commands. Patient is aphasic with GCS 13.   Genitourinary/Anorectal:  external catheter present.        Diagnostic Studies        Lab Results: I have reviewed the following results:     Medications:  Scheduled PRN   amitriptyline, 10 mg, HS  amLODIPine, 5 mg, Daily  bisacodyl, 10 mg, Daily  calcium gluconate, 2 g, Once  chlorhexidine, 15 mL, Q12H [facility]  enoxaparin, 30 mg, Q12H [facility]  insulin lispro, 1-6 Units, Q6H [facility]  levETIRAcetam, 1,000 mg, Q12H [facility]  lidocaine, 1 patch, Daily  melatonin, 3 mg, HS  pantoprazole, 40 mg, Q24H [facility]  polyethylene glycol, 17 g, Daily  potassium chloride, 20 mEq,  Once  potassium chloride, 40 mEq, Once  QUEtiapine, 50 mg, BID  QUEtiapine, 75 mg, HS  senna, 1 tablet, BID      acetaminophen, 650 mg, Q4H PRN  labetalol, 10 mg, Q4H PRN  ondansetron, 4 mg, Once PRN       Continuous    multi-electrolyte, 75 mL/hr, Last Rate: 75 mL/hr (06/17/25 0507)         Labs:   CBC    Recent Labs     06/16/25 0517 06/17/25 0453   WBC 7.49 7.75   HGB 12.7 12.0   HCT 39.5 36.3*    189     BMP    Recent Labs     06/16/25 0517 06/17/25  0453   SODIUM 141 142   K 3.7 3.4*    107   CO2 27 26   AGAP 8 9   BUN 17 14   CREATININE 0.58* 0.46*   CALCIUM 8.5 8.4       Coags    No recent results     Additional Electrolytes  Recent Labs     06/16/25 0517 06/16/25  0549 06/17/25  0453   MG 2.2  --  2.1   PHOS 3.4  --  2.7   CAIONIZED  --  0.99* 1.07*          Blood Gas    No recent results  No recent results LFTs  No recent results    Infectious  No recent results  Glucose  Recent Labs     06/16/25 0517 06/17/25  0453   GLUC 174* 135

## 2025-06-17 NOTE — PHYSICAL THERAPY NOTE
Physical Therapy Evaluation     Patient's Name: Jasson Carter    Admitting Diagnosis  SDH (subdural hematoma) ([facility]) [S06.5XAA]    Problem List  Problem List[1]    Past Medical History  Past Medical History[2]    Past Surgical History  Past Surgical History[3]       06/17/25 1002   PT Last Visit   PT Visit Date 06/17/25   Note Type   Note type Evaluation   Pain Assessment   Pain Assessment Tool 0-10   Pain Score No Pain   Restrictions/Precautions   Weight Bearing Precautions Per Order No   Other Precautions Cognitive;Chair Alarm;Bed Alarm;Restraints;Seizure;Multiple lines;Telemetry;O2;Fall Risk   Home Living   Type of Home House   Home Layout Multi-level;1/2 bath on main level;Bed/bath upstairs;Stairs to enter without rails  (1STE)   Bathroom Shower/Tub Tub/shower unit   Bathroom Toilet Standard   Bathroom Equipment Other (Comment)  (pt is a questionable historian, daughter present, unsure of bathroom DME, possible grab bars and shower chair.)   Home Equipment Other (Comment)  (Daugther unsure, requires confirmation.)   Additional Comments Pt's children present to confirm home setup and PLOF following session. Pt is a questionable historian.   Prior Function   Level of [place] Independent with ADLs;Independent with functional mobility;Independent with IADLS   Lives With Spouse   Receives Help From Family   IADLs Independent with driving;Independent with meal prep;Independent with medication management   Falls in the last 6 months 1 to 4  (2)   Vocational Retired   Comments independent PTA, + , retired. Resides with his wife wwith residual deficits from previous TBI per chart.   Cognition   Overall Cognitive Status Impaired   Arousal/Participation Responsive   Orientation Level Oriented to person;Disoriented to situation;Oriented to time;Disoriented to place   Following Commands Follows one step commands with increased time or repetition   Comments Pt pleasantly confused, frequent redirection to task  and TC/VC to avoid pulling at lines and for technique during mobility. Decreased safety awareness and impaired judgement.   RLE Assessment   RLE Assessment WFL   LLE Assessment   LLE Assessment WFL   Coordination   Movements are Fluid and Coordinated 0   Coordination and Movement Description Unsteady and shaky standing posture secondary to balance and strength deficits.   Bed Mobility   Supine to Sit 3  Moderate assistance   Additional items Assist x 1;HOB elevated;Increased time required;Verbal cues;LE management   Sit to Supine Unable to assess   Additional Comments Pt OOB in recliner with chair alarm, mits donned, belt restraint, call bell, and family present in room to end session.   Transfers   Sit to Stand 3  Moderate assistance   Additional items Assist x 2;Increased time required;Verbal cues   Stand to Sit 3  Moderate assistance   Additional items Increased time required;Verbal cues   Additional Comments RW, requires instruction for sequencing and safety   Ambulation/Elevation   Gait pattern Improper Weight shift;Forward Flexion;Decreased foot clearance;Short stride;Excessively slow;Decreased hip extension;Decreased heel strike;Decreased toe off   Gait Assistance 3  Moderate assist   Additional items Assist x 2   Assistive Device Rolling walker   Distance 2' to chair   Ambulation/Elevation Additional Comments with RW, requires VC for safety and sequencing.   Balance   Static Sitting Fair   Dynamic Sitting Fair -   Static Standing Poor   Dynamic Standing Poor   Ambulatory Poor   Endurance Deficit   Endurance Deficit Yes   Endurance Deficit Description Further mobility limited by weakness and balance deficits.   Activity Tolerance   Activity Tolerance Patient limited by fatigue   Medical Staff Made Aware RN; OT, OTS due to medical complexity/comorbidities.   Nurse Made Aware yes, nsg gave clearnace to work with pt.   Assessment   Prognosis Good   Problem List Decreased strength;Decreased range of  motion;Decreased endurance;Impaired balance;Decreased mobility;Decreased coordination;Decreased cognition;Impaired judgement;Decreased safety awareness   Assessment Pt is a 75 y.o. male admitted to [facility] on 6/11/2025 due to Subdural Hematoma following a fall with head strike. PT consulted for functional mobility status assessment and discharge recommendations. PTA, pt was independent with ADLs/IADLs, independent with functional mobility with no AD, + , and retired. Currently, pt presents with deficits in strength, ROM, balance, endurance, coordination, gait dysfunction, decreased safety awareness, impulsivity, and impaired judgement as outlined above affecting their ability to safely return home, complete ADLs/IADLs, ambulate, transfer, and negotiate stairs. Personal factors affecting pt at time of evaluation include inaccessible home environment, ambulating w/ assistive device, reliance on more restrictive AD, stairs to enter home, inability to ambulate household distances, inability to navigate community distances, unable to perform dynamic tasks in community, decreased cognition, limited home support, positive fall history, limited insight into impairments, inability to perform ADLs, inability to perform IADLs, and inability to live alone.  Pt's three children present at end of session to confirm home setup and PLOF information as pt is a questionable historian. Pt's children live about 2 hours away and are unable to provide level of assistance pt would need at this time. Following session, PT positioned to comfort in reclining chair with chair alarm, call bell, mits donned, and belt restraint placed, children present in room. Pt is currently functioning below baseline and will benefit from skilled PT intervention throughout hospital stay to maximize functional mobility for improved safety upon d/c. . From PT/mobility standpoint, recommendation at time of d/c would be level 1 pending progress  in order to facilitate return to PLOF. The patient's AM-PAC Basic Mobility Inpatient Short Form Raw Score is 11. A Raw score of less than 16 suggests the patient may benefit from discharge to post-acute rehabilitation services. Please also refer to the recommendation of the Physical Therapist for safe discharge planning.   Barriers to Discharge Inaccessible home environment;Decreased caregiver support   Goals   Patient Goals To get better   STG Expiration Date 07/01/25   Short Term Goal #1 1. Pt will complete bed mobility with supervision for decreased risk of skin integrity disruption  2. Pt will complete transfers with supervision and LRAD for decreased caregiver burden  3. Pt will ambulate household distances with [place] and LRAD for increased independence and ability to complete ADL's  4. Pt will improve standing balance to at least Fair - for improved safety and reduced risk of falls.   5. Pt will display ability to negotiate 1 stair with [place] and LRAD in order to navigate home environment.   Plan   Treatment/Interventions ADL retraining;Functional transfer training;LE strengthening/ROM;Therapeutic exercise;Cognitive reorientation;Endurance training;Patient/family training;Equipment eval/education;Gait training;Spoke to nursing   PT Frequency 3-5x/wk   Discharge Recommendation   Rehab Resource Intensity Level, PT I (Maximum Resource Intensity)   Equipment Recommended Other (Comment)  (TBD pending progress and family confirmation of available DME.)   AM-PAC Basic Mobility Inpatient   Turning in Flat Bed Without Bedrails 2   Lying on Back to Sitting on Edge of Flat Bed Without Bedrails 2   Moving Bed to Chair 2   Standing Up From Chair Using Arms 2   Walk in Room 2   Climb 3-5 Stairs With Railing 1   Basic Mobility Inpatient Raw Score 11   Basic Mobility Standardized Score 30.25   [facility] Highest Level Of Mobility   -HL Goal 4: Move to chair/commode   -HLM Achieved 4: Move to chair/commode     Gokul  ALDO Malcolm         [1]   Patient Active Problem List  Diagnosis    Pure hypercholesterolemia    History of gout    Essential hypertension    Benign prostatic hyperplasia without urinary obstruction    Reflux esophagitis    Seasonal allergic rhinitis due to pollen    Primary osteoarthritis involving multiple joints    Posttraumatic stress disorder    Neuropathy    Hyponatremia    SDH (subdural hematoma) (HCC)    Altered mental status    Fall    Atrial fibrillation (HCC)    Effusion of right knee    Status epilepticus (HCC)   [2]   Past Medical History:  Diagnosis Date    Allergic     Gout     Hypertension     PTSD (post-traumatic stress disorder)    [3]   Past Surgical History:  Procedure Laterality Date    APPENDECTOMY      CHOLECYSTECTOMY      CRANIOTOMY Bilateral 06/11/2025    Procedure: Left sided craniotomy and right sided karyn holes for evacuation of subdural hematoma;  Surgeon: Satish De La Torre MD;  Location: [facility];  Service: Neurosurgery    IR CEREBRAL ANGIOGRAPHY / INTERVENTION  06/12/2025    TOTAL KNEE ARTHROPLASTY

## 2025-06-17 NOTE — PROGRESS NOTES
Progress Note - Orthopedics   Name: Jasson Carter 75 y.o. male I MRN: [id number]  Unit/Bed#: ICU 05 I Date of Admission: 6/11/2025   Date of Service: 6/17/2025 I Hospital Day: 6    Assessment & Plan  Effusion of right knee  74 yo male with right knee effusion secondary to tricompartmental arthritis vs pseudogout. At present, patient is afebrile with WBC of 7.49. Right knee aspiration yielded 205cc of clear synovial fluid, WBC 7637 and positive for calcium pyrophosphate. Low suspicion for septic arthritis and would recommend medical management for pseudogout.     Plan:  WBAT RLE   Recommend medical management for pseudogout   PT/OT  Pain control as needed   DVT prophylaxis as per primary team  Follow-up as needed     Please contact the SecureChat role for the Orthopedics service with any questions/concerns.    Subjective   No acute events, no acute distress. Denies fever/chills, SOB, and nausea. Pain well controlled. Patient reports his knee feels subjectively better. He is not interested in CSI for pain relief.     Objective      Temp:  [98.2 °F (36.8 °C)-99.1 °F (37.3 °C)] 98.2 °F (36.8 °C)  HR:  [] 90  BP: ()/() 130/81  Resp:  [15-35] 18  SpO2:  [90 %-100 %] 95 %  O2 Device: None (Room air)  Nasal Cannula O2 Flow Rate (L/min):  [2 L/min-4 L/min] 2 L/min  O2 Device: None (Room air)          I/O         06/15 0701  06/16 0700 06/16 0701  06/17 0700    P.O. 0 0    I.V. (mL/kg) 2122.8 (24.3) 1625 (18.6)    NG/ 400    IV Piggyback  200    Feedings 420 660    Total Intake(mL/kg) 2822.8 (32.4) 2885 (33.1)    Urine (mL/kg/hr) 1110 (0.5) 2550 (1.2)    Drains 0     Stool  0    Total Output 1110 2550    Net +1712.8 +335          Unmeasured Urine Occurrence 2 x     Unmeasured Stool Occurrence  3 x          Lines/Drains/Airways       Active Status       Name Placement date Placement time Site Days    NG/OG/Enteral Tube Left nare 06/14/25  1808  Left nare  2    External Urinary Catheter 06/15/25   "1805  -- 1                  Physical Exam   Musculoskeletal: Right Lower Extremity  Right knee effusion significantly decreased  No focal TTP  Sensation intact to light touch margie/saph/sp/dp/tib  Motor intact EHL/FHL, ankle DF/PF  Active ROM limited to 30 degrees of flexion, full extension  Passive ROM improved to 90 degrees of flexion, full extension  2+ DP pulse  No pain with micromotion  Straight leg raise intact      Lab Results: I have reviewed the following results:  Recent Labs     06/14/25  0634 06/15/25  0506 06/16/25  0517 06/16/25  1536 06/17/25  0453   WBC 10.68* 8.82 7.49  --  7.75   HGB 13.5 13.6 12.7  --  12.0   HCT 41.1 42.2 39.5  --  36.3*    188 170  --  189   BUN 14 20 17  --   --    CREATININE 0.50* 0.65 0.58*  --   --    ESR  --   --   --  43*  --    CRP  --   --   --  183.9*  --      Blood Culture:    Lab Results   Component Value Date    BLOODCX No Growth After 5 Days. 06/12/2024     Wound Culture: No results found for: \"WOUNDCULT\"  "

## 2025-06-18 PROBLEM — R56.1 POST TRAUMATIC SEIZURE (HCC): Status: ACTIVE | Noted: 2025-06-17

## 2025-06-18 PROBLEM — R13.10 DYSPHAGIA: Status: ACTIVE | Noted: 2025-06-18

## 2025-06-18 LAB
ANION GAP SERPL CALCULATED.3IONS-SCNC: 10 MMOL/L (ref 4–13)
BASOPHILS # BLD AUTO: 0.03 THOUSANDS/ÂΜL (ref 0–0.1)
BASOPHILS NFR BLD AUTO: 0 % (ref 0–1)
BUN SERPL-MCNC: 15 MG/DL (ref 5–25)
CA-I BLD-SCNC: 1.12 MMOL/L (ref 1.12–1.32)
CALCIUM SERPL-MCNC: 8.8 MG/DL (ref 8.4–10.2)
CHLORIDE SERPL-SCNC: 105 MMOL/L (ref 96–108)
CO2 SERPL-SCNC: 25 MMOL/L (ref 21–32)
CREAT SERPL-MCNC: 0.54 MG/DL (ref 0.6–1.3)
EOSINOPHIL # BLD AUTO: 0.15 THOUSAND/ÂΜL (ref 0–0.61)
EOSINOPHIL NFR BLD AUTO: 2 % (ref 0–6)
ERYTHROCYTE [DISTWIDTH] IN BLOOD BY AUTOMATED COUNT: 13.2 % (ref 11.6–15.1)
GFR SERPL CREATININE-BSD FRML MDRD: 101 ML/MIN/1.73SQ M
GLUCOSE SERPL-MCNC: 125 MG/DL (ref 65–140)
GLUCOSE SERPL-MCNC: 141 MG/DL (ref 65–140)
GLUCOSE SERPL-MCNC: 158 MG/DL (ref 65–140)
HCT VFR BLD AUTO: 38.5 % (ref 36.5–49.3)
HGB BLD-MCNC: 12.9 G/DL (ref 12–17)
IMM GRANULOCYTES # BLD AUTO: 0.06 THOUSAND/UL (ref 0–0.2)
IMM GRANULOCYTES NFR BLD AUTO: 1 % (ref 0–2)
LYMPHOCYTES # BLD AUTO: 1.12 THOUSANDS/ÂΜL (ref 0.6–4.47)
LYMPHOCYTES NFR BLD AUTO: 12 % (ref 14–44)
MAGNESIUM SERPL-MCNC: 2.2 MG/DL (ref 1.9–2.7)
MCH RBC QN AUTO: 33.1 PG (ref 26.8–34.3)
MCHC RBC AUTO-ENTMCNC: 33.5 G/DL (ref 31.4–37.4)
MCV RBC AUTO: 99 FL (ref 82–98)
MONOCYTES # BLD AUTO: 1.03 THOUSAND/ÂΜL (ref 0.17–1.22)
MONOCYTES NFR BLD AUTO: 11 % (ref 4–12)
NEUTROPHILS # BLD AUTO: 6.62 THOUSANDS/ÂΜL (ref 1.85–7.62)
NEUTS SEG NFR BLD AUTO: 74 % (ref 43–75)
NRBC BLD AUTO-RTO: 0 /100 WBCS
PHOSPHATE SERPL-MCNC: 3.5 MG/DL (ref 2.3–4.1)
PLATELET # BLD AUTO: 208 THOUSANDS/UL (ref 149–390)
PMV BLD AUTO: 9.9 FL (ref 8.9–12.7)
POTASSIUM SERPL-SCNC: 3.6 MMOL/L (ref 3.5–5.3)
RBC # BLD AUTO: 3.9 MILLION/UL (ref 3.88–5.62)
SODIUM SERPL-SCNC: 140 MMOL/L (ref 135–147)
WBC # BLD AUTO: 9.01 THOUSAND/UL (ref 4.31–10.16)

## 2025-06-18 PROCEDURE — 97530 THERAPEUTIC ACTIVITIES: CPT

## 2025-06-18 PROCEDURE — 84100 ASSAY OF PHOSPHORUS: CPT

## 2025-06-18 PROCEDURE — 83735 ASSAY OF MAGNESIUM: CPT

## 2025-06-18 PROCEDURE — 82330 ASSAY OF CALCIUM: CPT

## 2025-06-18 PROCEDURE — 99232 SBSQ HOSP IP/OBS MODERATE 35: CPT | Performed by: STUDENT IN AN ORGANIZED HEALTH CARE EDUCATION/TRAINING PROGRAM

## 2025-06-18 PROCEDURE — 97112 NEUROMUSCULAR REEDUCATION: CPT

## 2025-06-18 PROCEDURE — 85025 COMPLETE CBC W/AUTO DIFF WBC: CPT

## 2025-06-18 PROCEDURE — 80048 BASIC METABOLIC PNL TOTAL CA: CPT

## 2025-06-18 PROCEDURE — 82948 REAGENT STRIP/BLOOD GLUCOSE: CPT

## 2025-06-18 RX ORDER — LOSARTAN POTASSIUM 50 MG/1
100 TABLET ORAL DAILY
Status: DISCONTINUED | OUTPATIENT
Start: 2025-06-18 | End: 2025-06-27 | Stop reason: HOSPADM

## 2025-06-18 RX ADMIN — QUETIAPINE 75 MG: 25 TABLET ORAL at 21:20

## 2025-06-18 RX ADMIN — LEVETIRACETAM 1000 MG: 500 TABLET, FILM COATED ORAL at 21:21

## 2025-06-18 RX ADMIN — AMITRIPTYLINE HYDROCHLORIDE 10 MG: 10 TABLET ORAL at 21:20

## 2025-06-18 RX ADMIN — ENOXAPARIN SODIUM 30 MG: 30 INJECTION SUBCUTANEOUS at 21:21

## 2025-06-18 RX ADMIN — INSULIN LISPRO 1 UNITS: 100 INJECTION, SOLUTION INTRAVENOUS; SUBCUTANEOUS at 01:00

## 2025-06-18 RX ADMIN — QUETIAPINE 50 MG: 25 TABLET ORAL at 17:17

## 2025-06-18 RX ADMIN — PANTOPRAZOLE SODIUM 40 MG: 40 INJECTION, POWDER, FOR SOLUTION INTRAVENOUS at 06:02

## 2025-06-18 RX ADMIN — LABETALOL HYDROCHLORIDE 10 MG: 5 INJECTION, SOLUTION INTRAVENOUS at 03:14

## 2025-06-18 RX ADMIN — QUETIAPINE 50 MG: 25 TABLET ORAL at 08:44

## 2025-06-18 RX ADMIN — LEVETIRACETAM 1000 MG: 500 TABLET, FILM COATED ORAL at 08:44

## 2025-06-18 RX ADMIN — LABETALOL HYDROCHLORIDE 10 MG: 5 INJECTION, SOLUTION INTRAVENOUS at 17:29

## 2025-06-18 RX ADMIN — Medication 3 MG: at 21:21

## 2025-06-18 RX ADMIN — SENNOSIDES 8.6 MG: 8.6 TABLET, FILM COATED ORAL at 08:44

## 2025-06-18 RX ADMIN — LOSARTAN POTASSIUM 100 MG: 50 TABLET, FILM COATED ORAL at 11:23

## 2025-06-18 RX ADMIN — SENNOSIDES 8.6 MG: 8.6 TABLET, FILM COATED ORAL at 17:18

## 2025-06-18 RX ADMIN — AMLODIPINE BESYLATE 5 MG: 5 TABLET ORAL at 08:44

## 2025-06-18 RX ADMIN — POLYETHYLENE GLYCOL 3350 17 G: 17 POWDER, FOR SOLUTION ORAL at 08:44

## 2025-06-18 RX ADMIN — ENOXAPARIN SODIUM 30 MG: 30 INJECTION SUBCUTANEOUS at 08:44

## 2025-06-18 NOTE — CASE MANAGEMENT
Case Management Discharge Planning Note    Patient name Jasson Carter  Location [facility] 619/[facility] 619-01 MRN [id number]  : 1950 Date 2025       Current Admission Date: 2025  Current Admission Diagnosis:SDH (subdural hematoma) (HCC)   Patient Active Problem List    Diagnosis Date Noted    Dysphagia 2025    Post traumatic seizure (HCC) 2025    Effusion of right knee 2025    Atrial fibrillation (HCC) 2025    SDH (subdural hematoma) (HCC) 2025    Altered mental status 2025    Fall 2025    Hyponatremia 2024    Neuropathy 2021    Reflux esophagitis 2020    Seasonal allergic rhinitis due to pollen 2020    Primary osteoarthritis involving multiple joints 2020    Posttraumatic stress disorder 2020    History of gout 2019    Pure hypercholesterolemia 2017    Hypertension 2017    Benign prostatic hyperplasia without urinary obstruction 2011      LOS (days): 7  Geometric Mean LOS (GMLOS) (days): 6.5  Days to GMLOS:-0.5     OBJECTIVE:  Risk of Unplanned Readmission Score: 17.01         Current admission status: Inpatient   Preferred Pharmacy:   [facility]/pharmacy #[id number] - [place], [place] - [address]  [address]  [facility] [address]  Phone: [phone] Fax: [phone]    Primary Care Provider: Ruben Finch MD    Primary Insurance: MEDICARE  Secondary Insurance: [facility]    DISCHARGE DETAILS:    Cm met with pt and his sons, to discuss d/c planning  Pt was evaluated by OT/PT, as well as PMR, and their recommendation is TBI rehab  Pt's family preference is [facility] in [facility] placed referral and will follow up  Of note, the pt won't be stable for d/c until next week at the earliest

## 2025-06-18 NOTE — ASSESSMENT & PLAN NOTE
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   Patient will need repeat imaging with neuro IR team, which will take place possibly early next week, until then the recommendation from neurosurgery/neuro IR is that patient remain in the hospital until that follow up  CT scan on 6/14 remained stable post surgical changes  MRI on 6/14 with scattered acute and early subacute infarcts in bilateral frontal and parietal lobes, L temporal region  Keppra 1000mg BID   q4H neurochecks  Repeat CT scan if decline in GCS by >2 points  SBP goal <140   Seroquel 50mg BID and 75mg at night to help with sleep wake cycle, agitation secondary to TBI-he is improving  Continue melatonin and amitriptyline as well at night  Delirium precuations  Neurosurgery follow up as outpatient  Anticipate need for rehab placement

## 2025-06-18 NOTE — PLAN OF CARE
Problem: Nutrition/Hydration-ADULT  Goal: Nutrient/Hydration intake appropriate for improving, restoring or maintaining nutritional needs  Description: Monitor and assess patient's nutrition/hydration status for malnutrition. Collaborate with interdisciplinary team and initiate plan and interventions as ordered.  Monitor patient's weight and dietary intake as ordered or per policy. Utilize nutrition screening tool and intervene as necessary. Determine patient's food preferences and provide high-protein, high-caloric foods as appropriate.     INTERVENTIONS:  - Monitor oral intake, urinary output, labs, and treatment plans  - Assess nutrition and hydration status and recommend course of action  - Evaluate amount of meals eaten  - Assist patient with eating if necessary   - Allow adequate time for meals  - Recommend/ encourage appropriate diets, oral nutritional supplements, and vitamin/mineral supplements  - Order, calculate, and assess calorie counts as needed  - Recommend, monitor, and adjust tube feedings and TPN/PPN based on assessed needs  - Assess need for intravenous fluids  - Provide specific nutrition/hydration education as appropriate  - Include patient/family/caregiver in decisions related to nutrition  Outcome: Progressing     Problem: PAIN - ADULT  Goal: Verbalizes/displays adequate comfort level or baseline comfort level  Description: Interventions:  - Encourage patient to monitor pain and request assistance  - Assess pain using appropriate pain scale  - Administer analgesics as ordered based on type and severity of pain and evaluate response  - Implement non-pharmacological measures as appropriate and evaluate response  - Consider cultural and social influences on pain and pain management  - Notify physician/advanced practitioner if interventions unsuccessful or patient reports new pain  - Educate patient/family on pain management process including their role and importance of  reporting pain   -  Provide non-pharmacologic/complimentary pain relief interventions  Outcome: Progressing     Problem: INFECTION - ADULT  Goal: Absence or prevention of progression during hospitalization  Description: INTERVENTIONS:  - Assess and monitor for signs and symptoms of infection  - Monitor lab/diagnostic results  - Monitor all insertion sites, i.e. indwelling lines, tubes, and drains  - Monitor endotracheal if appropriate and nasal secretions for changes in amount and color  - [place] appropriate cooling/warming therapies per order  - Administer medications as ordered  - Instruct and encourage patient and family to use good hand hygiene technique  - Identify and instruct in appropriate isolation precautions for identified infection/condition  Outcome: Progressing  Goal: Absence of fever/infection during neutropenic period  Description: INTERVENTIONS:  - Monitor WBC  - Perform strict hand hygiene  - Limit to healthy visitors only  - No plants, dried, fresh or silk flowers with winters in patient room  Outcome: Progressing     Problem: SAFETY ADULT  Goal: Patient will remain free of falls  Description: INTERVENTIONS:  - Educate patient/family on patient safety including physical limitations  - Instruct patient to call for assistance with activity   - Consider consulting OT/PT to assist with strengthening/mobility based on AM PAC & JH-HLM score  - Consult OT/PT to assist with strengthening/mobility   - Keep Call bell within reach  - Keep bed low and locked with side rails adjusted as appropriate  - Keep care items and personal belongings within reach  - Initiate and maintain comfort rounds  - Make Fall Risk Sign visible to staff  - Offer Toileting every  Hours, in advance of need  - Initiate/Maintain alarm  - Obtain necessary fall risk management equipment:   - Apply yellow socks and bracelet for high fall risk patients  - Consider moving patient to room near nurses station  Outcome: Progressing  Goal: Maintain or return to  baseline ADL function  Description: INTERVENTIONS:  -  Assess patient's ability to carry out ADLs; assess patient's baseline for ADL function and identify physical deficits which impact ability to perform ADLs (bathing, care of mouth/teeth, toileting, grooming, dressing, etc.)  - Assess/evaluate cause of self-care deficits   - Assess range of motion  - Assess patient's mobility; develop plan if impaired  - Assess patient's need for assistive devices and provide as appropriate  - Encourage maximum independence but intervene and supervise when necessary  - Involve family in performance of ADLs  - Assess for home care needs following discharge   - Consider OT consult to assist with ADL evaluation and planning for discharge  - Provide patient education as appropriate  - Monitor functional capacity and physical performance, use of AM PAC & JH-HLM   - Monitor gait, balance and fatigue with ambulation    Outcome: Progressing  Goal: Maintains/Returns to pre admission functional level  Description: INTERVENTIONS:  - Perform AM-PAC 6 Click Basic Mobility/ Daily Activity assessment daily.  - Set and communicate daily mobility goal to care team and patient/family/caregiver.   - Collaborate with rehabilitation services on mobility goals if consulted  - Perform Range of Motion  times a day.  - Reposition patient every  hours.  - Dangle patient  times a day  - Stand patient  times a day  - Ambulate patient  times a day  - Out of bed to chair  times a day   - Out of bed for meals times a day  - Out of bed for toileting  - Record patient progress and toleration of activity level   Outcome: Progressing     Problem: DISCHARGE PLANNING  Goal: Discharge to home or other facility with appropriate resources  Description: INTERVENTIONS:  - Identify barriers to discharge w/patient and caregiver  - Arrange for needed discharge resources and transportation as appropriate  - Identify discharge learning needs (meds, wound care, etc.)  - Arrange  for interpretive services to assist at discharge as needed  - Refer to Case Management Department for coordinating discharge planning if the patient needs post-hospital services based on physician/advanced practitioner order or complex needs related to functional status, cognitive ability, or social support system  Outcome: Progressing     Problem: Knowledge Deficit  Goal: Patient/family/caregiver demonstrates understanding of disease process, treatment plan, medications, and discharge instructions  Description: Complete learning assessment and assess knowledge base.  Interventions:  - Provide teaching at level of understanding  - Provide teaching via preferred learning methods  Outcome: Progressing

## 2025-06-18 NOTE — PROGRESS NOTES
Patient:    MRN:  [id number]    Samra Request ID:  [id number]    Level of care reserved:  Inpatient Rehab Facility    Partner Reserved:  [facility] [place] [address] [phone]    Clinical needs requested:    Geography searched:  10 miles around [address]    Start of Service:    Request sent:  10:39am EDT on 6/18/2025 by Tyler Graf    Partner reserved:  12:44pm EDT on 6/18/2025 by Tyler Graf    Choice list shared:  12:44pm EDT on 6/18/2025 by Tyler Graf

## 2025-06-18 NOTE — CASE MANAGEMENT
Case Management Discharge Planning Note    Patient name Jasson Carter  Location [facility] 619/[facility] 619-01 MRN [id number]  : 1950 Date 2025       Current Admission Date: 2025  Current Admission Diagnosis:SDH (subdural hematoma) (HCC)   Patient Active Problem List    Diagnosis Date Noted    Dysphagia 2025    Post traumatic seizure (HCC) 2025    Effusion of right knee 2025    Atrial fibrillation (HCC) 2025    SDH (subdural hematoma) ([facility]) 2025    Altered mental status 2025    Fall 2025    Hyponatremia 2024    Neuropathy 2021    Reflux esophagitis 2020    Seasonal allergic rhinitis due to pollen 2020    Primary osteoarthritis involving multiple joints 2020    Posttraumatic stress disorder 2020    History of gout 2019    Pure hypercholesterolemia 2017    Hypertension 2017    Benign prostatic hyperplasia without urinary obstruction 2011      LOS (days): 7  Geometric Mean LOS (GMLOS) (days): 6.5  Days to GMLOS:-0.6     OBJECTIVE:  Risk of Unplanned Readmission Score: 17.05         Current admission status: Inpatient   Preferred Pharmacy:   [facility]/pharmacy #[id number] - [place] - [address]  [address] [place] [address]  Phone: [phone] Fax: [phone]    Primary Care Provider: Ruben Finch MD    Primary Insurance: MEDICARE  Secondary Insurance: [facility]    DISCHARGE DETAILS:    Pt clinically accepted to [facility]

## 2025-06-18 NOTE — PLAN OF CARE
Problem: Nutrition/Hydration-ADULT  Goal: Nutrient/Hydration intake appropriate for improving, restoring or maintaining nutritional needs  Description: Monitor and assess patient's nutrition/hydration status for malnutrition. Collaborate with interdisciplinary team and initiate plan and interventions as ordered.  Monitor patient's weight and dietary intake as ordered or per policy. Utilize nutrition screening tool and intervene as necessary. Determine patient's food preferences and provide high-protein, high-caloric foods as appropriate.     INTERVENTIONS:  - Monitor oral intake, urinary output, labs, and treatment plans  - Assess nutrition and hydration status and recommend course of action  - Evaluate amount of meals eaten  - Assist patient with eating if necessary   - Allow adequate time for meals  - Recommend/ encourage appropriate diets, oral nutritional supplements, and vitamin/mineral supplements  - Order, calculate, and assess calorie counts as needed  - Recommend, monitor, and adjust tube feedings and TPN/PPN based on assessed needs  - Assess need for intravenous fluids  - Provide specific nutrition/hydration education as appropriate  - Include patient/family/caregiver in decisions related to nutrition  Outcome: Progressing     Problem: SAFETY,RESTRAINT: NV/NON-SELF DESTRUCTIVE BEHAVIOR  Goal: Remains free of harm/injury (restraint for non violent/non self-detsructive behavior)  Description: INTERVENTIONS:  - Instruct patient/family regarding restraint use   - Assess and monitor physiologic and psychological status   - Provide interventions and comfort measures to meet assessed patient needs   - Identify and implement measures to help patient regain control  - Assess readiness for release of restraint   6/18/2025 1621 by Cece Garcia RN  Outcome: Progressing  6/18/2025 1620 by Cece Garcia RN  Outcome: Progressing  Goal: Returns to optimal restraint-free functioning  Description:  INTERVENTIONS:  - Assess the patient's behavior and symptoms that indicate continued need for restraint  - Identify and implement measures to help patient regain control  - Assess readiness for release of restraint   6/18/2025 1621 by Cece Garcia RN  Outcome: Progressing  6/18/2025 1620 by Cece Garcia RN  Outcome: Progressing     Problem: DECISION MAKING  Goal: Pt/Family able to effectively weigh alternatives and participate in decision making related to treatment and care  Description: INTERVENTIONS:  - Identify decision maker  - Determine when there are differences among patient's view, family's view, and healthcare provider's view of patient condition and care goals  - Facilitate patient/family articulation of goals for care  - Help patient/family identify pros/cons of alternative solutions  - Provide information as requested by patient/family  - Respect patient/family rights related to privacy and sharing information   - Serve as a liaison between patient, family and health care team  - Initiate consults as appropriate (Ethics Team, Palliative Care, Family Care Conference, etc.)  Outcome: Progressing     Problem: CONFUSION/THOUGHT DISTURBANCE  Goal: Thought disturbances (confusion, delirium, depression, dementia or psychosis) are managed to maintain or return to baseline mental status and functional level  Description: INTERVENTIONS:  - Assess for possible contributors to  thought disturbance, including but not limited to medications, infection, impaired vision or hearing, underlying metabolic abnormalities, dehydration, respiratory compromise,  psychiatric diagnoses and notify attending PHYSICAN/AP  - Monitor and intervene to maintain adequate nutrition, hydration, elimination, sleep and activity  - Decrease environmental stimuli, including noise as appropriate.  - Provide frequent contacts to provide refocusing, direction and reassurance as needed. Approach patient calmly with eye contact and  at their level.  - Ophelia high risk fall precautions, aspiration precautions and other safety measures, as indicated  - If delirium suspected, notify physician/AP of change in condition and request immediate in-person evaluation  - Pursue consults as appropriate including Geriatric (campus dependent), OT for cognitive evaluation/activity planning, psychiatric, pastoral care, etc.  Outcome: Progressing     Problem: BEHAVIOR  Goal: Pt/Family maintain appropriate behavior and adhere to behavioral management agreement, if implemented  Description: INTERVENTIONS:  - Assess the family dynamic   - Encourage verbalization of thoughts and concerns in a socially appropriate manner  - Assess patient/family's coping skills and non-compliant behavior (including use of illegal substances).  - Utilize positive, consistent limit setting strategies supporting safety of patient, staff and others  - Initiate consult with Case Management, Spiritual Care or other ancillary services as appropriate  - If a patient's/visitor's behavior jeopardizes the safety of the patient, staff, or others, refer to organization procedure.   - Notify Security of behavior or suspected illegal substances which indicate the need for search of the patient and/or belongings  - Encourage participation in the decision making process about a behavioral management agreement; implement if patient meets criteria  Outcome: Progressing

## 2025-06-18 NOTE — ASSESSMENT & PLAN NOTE
Secondary to TBI  Medications and delirium precautions as above  Goal is to de-escalate some of the care including NG TF with hopes we can start to remove restraints

## 2025-06-18 NOTE — PHYSICAL THERAPY NOTE
"Physical Therapy Progress Note       06/18/25 1240   PT Last Visit   PT Visit Date 06/18/25   Note Type   Note Type Treatment   Pain Assessment   Pain Assessment Tool 0-10   Pain Score No Pain   Restrictions/Precautions   RLE Weight Bearing Per Order WBAT   Other Precautions Cognitive;Chair Alarm;Bed Alarm;Restraints;Aspiration;Fall Risk;Telemetry;Multiple lines   Subjective   Subjective Pt approached supine in bed with family in room. Pt stated \"I am\" when PTA instructed him to complete a task even though the task was not being completed.   Bed Mobility   Supine to Sit 2  Maximal assistance   Additional items Assist x 2   Sit to Supine 3  Moderate assistance   Additional items Assist x 1   Transfers   Sit to Stand 3  Moderate assistance   Additional items Assist x 1   Stand to Sit 3  Moderate assistance   Additional items Assist x 1;Other  (2nd person to help keep hand placement)   Toilet transfer 3  Moderate assistance   Additional items Assist x 2;Verbal cues;Standard toilet;Other  (wall railing, low seat)   Ambulation/Elevation   Gait pattern Narrow SHEYLA;Forward Flexion;Decreased foot clearance;Shuffling;Improper Weight shift;Decreased heel strike;Decreased toe off;Short stride   Gait Assistance 3  Moderate assist   Additional items Assist x 2   Assistive Device Rolling walker   Distance 10x2   Balance   Static Sitting Poor +   Static Standing Poor   Dynamic Standing Poor   Ambulatory Poor -   Endurance Deficit   Endurance Deficit Yes   Assessment   Prognosis Fair   Problem List Decreased strength;Decreased range of motion;Impaired balance;Decreased endurance;Decreased mobility;Decreased coordination;Decreased cognition;Impaired judgement;Decreased safety awareness;Decreased skin integrity   Assessment Pt improved walking distance to bathroom, but demonstrated impulsivness and lack of saftey awareness to get there unaware that he has a catheter and he is hooked up to multiple lines. Pt. had trouble understanding " what therapy was instructing him to do assuming he was doing it already. Pt was able to ambulate with A x1 to assist with balance, but due to cog deficits as noted above he requires 2nd assist for RW managment and complex maneuvers. Anticipate pt will progress to formal gait trials next session, assuming he can follow comands to participate in theraputic tasks assuming cog status continues to improve. Presume with PT POC for increase in functional mobility, saftey awareness, and stability. At conclusion of session pt returned to bed and all saftey restraints reapplied as per Nursing instructions. Pt positioned into modified chair position in bed. PCA present for lunch set up.   Goals   Patient Goals to get better   STG Expiration Date 07/01/25   PT Treatment Day 1   Plan   Treatment/Interventions ADL retraining;LE strengthening/ROM;Functional transfer training;Elevations;Therapeutic exercise;Endurance training;Bed mobility;Gait training   Progress Progressing toward goals   PT Frequency 3-5x/wk   Discharge Recommendation   Rehab Resource Intensity Level, PT I (Maximum Resource Intensity)   AM-PAC Basic Mobility Inpatient   Turning in Flat Bed Without Bedrails 2   Lying on Back to Sitting on Edge of Flat Bed Without Bedrails 2   Moving Bed to Chair 2   Standing Up From Chair Using Arms 2   Walk in Room 2   Climb 3-5 Stairs With Railing 1   Basic Mobility Inpatient Raw Score 11   Basic Mobility Standardized Score 30.25   [facility] Highest Level Of Mobility   -HLM Goal 4: Move to chair/commode   -HLM Achieved 6: Walk 10 steps or more         An AM-PAC Basic Mobility raw score less than 16 suggests the patient may benefit from discharge to post-acute rehab services.    Emilia SULLIVAN

## 2025-06-18 NOTE — ASSESSMENT & PLAN NOTE
Witnessed possible tonic clonic activity post TBI  EEG completed and no seizures noted  On keppra 1000mg BID for 14 day course

## 2025-06-18 NOTE — PLAN OF CARE
Problem: PHYSICAL THERAPY ADULT  Goal: Performs mobility at highest level of function for planned discharge setting.  See evaluation for individualized goals.  Description: Treatment/Interventions: ADL retraining, LE strengthening/ROM, Functional transfer training, Elevations, Therapeutic exercise, Endurance training, Bed mobility, Gait training  Equipment Recommended: Other (Comment) (TBD pending progress and family confirmation of available DME.)       See flowsheet documentation for full assessment, interventions and recommendations.  6/18/2025 1556 by Emilia Connelyl  Outcome: Progressing  Note: Prognosis: Fair  Problem List: Decreased strength, Decreased range of motion, Impaired balance, Decreased endurance, Decreased mobility, Decreased coordination, Decreased cognition, Impaired judgement, Decreased safety awareness, Decreased skin integrity  Assessment: Pt improved walking distance to bathroom, but demonstrated impulsivness and lack of saftey awareness to get there unaware that he has a catheter and he is hooked up to multiple lines. Pt. had trouble understanding what therapy was instructing him to do assuming he was doing it already. Pt was able to ambulate with A x1 to assist with balance, but due to cog deficits as noted above he requires 2nd assist for RW managment and complex maneuvers. Anticipate pt will progress to formal gait trials next session, assuming he can follow comands to participate in theraputic tasks assuming cog status continues to improve. Presume with PT POC for increase in functional mobility, saftey awareness, and stability. At conclusion of session pt returned to bed and all saftey restraints reapplied as per Nursing instructions. Pt positioned into modified chair position in bed. PCA present for lunch set up.  Barriers to Discharge: Inaccessible home environment, Decreased caregiver support     Rehab Resource Intensity Level, PT: I (Maximum Resource Intensity)    See flowsheet  documentation for full assessment.     6/18/2025 1547 by Emilia Connelly  Outcome: Progressing  Note: Prognosis: Fair  Problem List: Decreased strength, Decreased range of motion, Impaired balance, Decreased endurance, Decreased mobility, Decreased coordination, Decreased cognition, Impaired judgement, Decreased safety awareness, Decreased skin integrity  Assessment: Pt improved walking distance to bathroom, but demonstrated impulsivness and lack of saftey awareness to get there unaware that he has a catheter and he is hooked up to multiple lines. Pt. had trouble understanding what therapy was instructing him to do assuming he was doing it already. Pt was able to ambulate with A x1 to assist with balance, but due to cog deficits as noted above he requires 2nd assist for RW managment and complex maneuvers. Anticipate pt will progress to formal gait trials next session, assuming he can follow comands to participate in theraputic tasks assuming cog status continues to improve. Presume with PT POC for increase in functional mobility, saftey awareness, and stability. At conclusion of session pt returned to bed and all saftey restraints reapplied as per Nursing instructions. Pt positioned into modified chair position in bed. PCA present for lunch set up.  Barriers to Discharge: Inaccessible home environment, Decreased caregiver support     Rehab Resource Intensity Level, PT: I (Maximum Resource Intensity)    See flowsheet documentation for full assessment.

## 2025-06-18 NOTE — PROGRESS NOTES
Progress Note - Trauma   Name: Jasson Carter 75 y.o. 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   Patient will need repeat imaging with neuro IR team, which will take place possibly early next week, until then the recommendation from neurosurgery/neuro IR is that patient remain in the hospital until that follow up  CT scan on 6/14 remained stable post surgical changes  MRI on 6/14 with scattered acute and early subacute infarcts in bilateral frontal and parietal lobes, L temporal region  Keppra 1000mg BID   q4H neurochecks  Repeat CT scan if decline in GCS by >2 points  SBP goal <140   Seroquel 50mg BID and 75mg at night to help with sleep wake cycle, agitation secondary to TBI-he is improving  Continue melatonin and amitriptyline as well at night  Delirium precuations  Neurosurgery follow up as outpatient  Anticipate need for rehab placement  Altered mental status  Secondary to TBI  Medications and delirium precautions as above  Goal is to de-escalate some of the care including NG TF with hopes we can start to remove restraints  Fall  PT/OT  Atrial fibrillation (HCC)  Monitor on telemetry   Effusion of right knee  No current pain  No intervention needed  Post traumatic seizure (HCC)  Witnessed possible tonic clonic activity post TBI  EEG completed and no seizures noted  On keppra 1000mg BID for 14 day course  Hypertension  Goal SBP <140 per neurosurgery   Started on norvasc  Will resume home cozaar  Dysphagia  Speech following  Continue level II modified diet, thickened liquids-nectar thick   If tolerates most of trays today will remove NG  tube      Diet- modified diet- if takes most of tray will plan to remove NG tube  Bowel Regimen: senna/miralax  VTE Prophylaxis:Enoxaparin (Lovenox)     Disposition: anticipate need for placement, not medically ready       Family was updated at bedside on the above plan for care   Questions/concerns addressed.         24 Hour Events : transitioned from ICU yesterday. No issues overnight.   Subjective : Patient reports he is feeling ok. He denies any headaches, dizziness, shortness of breath or chest pain. He is able to answer questions, but then at times has disordered/nonsensical responses.     Daughter is at bedside. Reports during the days he has been better, especially after sleep. He is still having periods of more agitation, mostly because he is an active briec at baseline and tries to get out of bed a lot here.     Objective :  Temp:  [97.4 °F (36.3 °C)-98.5 °F (36.9 °C)] 98.1 °F (36.7 °C)  HR:  [] 103  BP: (108-175)/() 135/76  Resp:  [12-21] 16  SpO2:  [93 %-98 %] 95 %  O2 Device: None (Room air)    I/O         06/16 0701  06/17 0700 06/17 0701  06/18 0700 06/18 0701  06/19 0700    P.O. 0      I.V. (mL/kg) 1775 (20.4) 536.3 (6.1)     NG/ 265     IV Piggyback 200 130     Feedings 780 1020     Total Intake(mL/kg) 3185 (36.5) 1951.3 (22.4)     Urine (mL/kg/hr) 2550 (1.2) 2700 (1.3)     Drains       Stool 0      Total Output 2550 2700     Net +635 -748.8            Unmeasured Stool Occurrence 3 x            Lines/Drains/Airways       Active Status       Name Placement date Placement time Site Days    NG/OG/Enteral Tube Left nare 06/14/25 1808  Left nare  3    External Urinary Catheter 06/15/25  1805  -- 2                  Physical Exam  Constitutional:       Comments: Restraints in place   HENT:      Head:      Comments: NG tube in place, secured with bridle     Bilateral scalp- incisions intact, no drainage, no erythema     Mouth/Throat:      Pharynx: Oropharynx is clear.     Eyes:      Pupils:  Pupils are equal, round, and reactive to light.       Cardiovascular:      Rate and Rhythm: Normal rate.   Pulmonary:      Effort: Pulmonary effort is normal.      Breath sounds: No wheezing or rales.   Chest:      Chest wall: No tenderness.   Abdominal:      General: There is no distension.      Tenderness: There is no abdominal tenderness.     Musculoskeletal:         General: No swelling, tenderness or deformity.      Cervical back: Normal range of motion.     Skin:     General: Skin is warm and dry.     Neurological:      General: No focal deficit present.      Mental Status: He is alert.      GCS: GCS eye subscore is 4. GCS verbal subscore is 4. GCS motor subscore is 6.      Comments: Confused  Is not oriented to circumstance, time or place   He recognizes his family members   He has had some intermittent agitation, will attempt to get out of bed and pull at lines/tubes    Strength is 5/5 bilateral upper, but just slightly decreased  on the left vs right   5/5 lower extremity strength   Sensation grossly intact                Lab Results: I have reviewed the following results:  Recent Labs     06/18/25  0615   WBC 9.01   HGB 12.9   HCT 38.5      SODIUM 140   K 3.6      CO2 25   BUN 15   CREATININE 0.54*   GLUC 125   CAIONIZED 1.12   MG 2.2   PHOS 3.5       Imaging Results Review: No pertinent imaging studies reviewed.  Other Study Results Review: No additional pertinent studies reviewed.

## 2025-06-19 LAB
GLUCOSE SERPL-MCNC: 117 MG/DL (ref 65–140)
GLUCOSE SERPL-MCNC: 124 MG/DL (ref 65–140)
GLUCOSE SERPL-MCNC: 128 MG/DL (ref 65–140)
GLUCOSE SERPL-MCNC: 137 MG/DL (ref 65–140)
GLUCOSE SERPL-MCNC: 154 MG/DL (ref 65–140)

## 2025-06-19 PROCEDURE — 97530 THERAPEUTIC ACTIVITIES: CPT

## 2025-06-19 PROCEDURE — 97116 GAIT TRAINING THERAPY: CPT

## 2025-06-19 PROCEDURE — 99223 1ST HOSP IP/OBS HIGH 75: CPT | Performed by: INTERNAL MEDICINE

## 2025-06-19 PROCEDURE — 99232 SBSQ HOSP IP/OBS MODERATE 35: CPT | Performed by: STUDENT IN AN ORGANIZED HEALTH CARE EDUCATION/TRAINING PROGRAM

## 2025-06-19 PROCEDURE — 82948 REAGENT STRIP/BLOOD GLUCOSE: CPT

## 2025-06-19 PROCEDURE — 97535 SELF CARE MNGMENT TRAINING: CPT

## 2025-06-19 RX ORDER — QUETIAPINE FUMARATE 100 MG/1
100 TABLET, FILM COATED ORAL
Status: DISCONTINUED | OUTPATIENT
Start: 2025-06-19 | End: 2025-06-27 | Stop reason: HOSPADM

## 2025-06-19 RX ORDER — DRONABINOL 2.5 MG/1
2.5 CAPSULE ORAL
Status: DISCONTINUED | OUTPATIENT
Start: 2025-06-19 | End: 2025-06-27 | Stop reason: HOSPADM

## 2025-06-19 RX ORDER — DRONABINOL 2.5 MG/1
2.5 CAPSULE ORAL
Status: DISCONTINUED | OUTPATIENT
Start: 2025-06-19 | End: 2025-06-19

## 2025-06-19 RX ADMIN — Medication 3 MG: at 21:28

## 2025-06-19 RX ADMIN — LABETALOL HYDROCHLORIDE 10 MG: 5 INJECTION, SOLUTION INTRAVENOUS at 07:25

## 2025-06-19 RX ADMIN — ENOXAPARIN SODIUM 30 MG: 30 INJECTION SUBCUTANEOUS at 21:28

## 2025-06-19 RX ADMIN — BISACODYL 10 MG: 10 SUPPOSITORY RECTAL at 08:58

## 2025-06-19 RX ADMIN — INSULIN LISPRO 1 UNITS: 100 INJECTION, SOLUTION INTRAVENOUS; SUBCUTANEOUS at 00:32

## 2025-06-19 RX ADMIN — LOSARTAN POTASSIUM 100 MG: 50 TABLET, FILM COATED ORAL at 08:57

## 2025-06-19 RX ADMIN — DRONABINOL 2.5 MG: 2.5 CAPSULE ORAL at 16:41

## 2025-06-19 RX ADMIN — POLYETHYLENE GLYCOL 3350 17 G: 17 POWDER, FOR SOLUTION ORAL at 08:58

## 2025-06-19 RX ADMIN — LEVETIRACETAM 1000 MG: 500 TABLET, FILM COATED ORAL at 21:28

## 2025-06-19 RX ADMIN — SENNOSIDES 8.6 MG: 8.6 TABLET, FILM COATED ORAL at 08:57

## 2025-06-19 RX ADMIN — QUETIAPINE 50 MG: 25 TABLET ORAL at 16:41

## 2025-06-19 RX ADMIN — ENOXAPARIN SODIUM 30 MG: 30 INJECTION SUBCUTANEOUS at 09:01

## 2025-06-19 RX ADMIN — QUETIAPINE FUMARATE 100 MG: 100 TABLET ORAL at 21:28

## 2025-06-19 RX ADMIN — PANTOPRAZOLE SODIUM 40 MG: 40 INJECTION, POWDER, FOR SOLUTION INTRAVENOUS at 04:55

## 2025-06-19 RX ADMIN — AMITRIPTYLINE HYDROCHLORIDE 10 MG: 10 TABLET ORAL at 21:29

## 2025-06-19 RX ADMIN — AMLODIPINE BESYLATE 5 MG: 5 TABLET ORAL at 08:58

## 2025-06-19 RX ADMIN — LIDOCAINE 1 PATCH: 700 PATCH TOPICAL at 08:58

## 2025-06-19 RX ADMIN — QUETIAPINE 50 MG: 25 TABLET ORAL at 08:57

## 2025-06-19 RX ADMIN — LEVETIRACETAM 1000 MG: 500 TABLET, FILM COATED ORAL at 08:57

## 2025-06-19 NOTE — PLAN OF CARE
Problem: Nutrition/Hydration-ADULT  Goal: Nutrient/Hydration intake appropriate for improving, restoring or maintaining nutritional needs  Description: Monitor and assess patient's nutrition/hydration status for malnutrition. Collaborate with interdisciplinary team and initiate plan and interventions as ordered.  Monitor patient's weight and dietary intake as ordered or per policy. Utilize nutrition screening tool and intervene as necessary. Determine patient's food preferences and provide high-protein, high-caloric foods as appropriate.     INTERVENTIONS:  - Monitor oral intake, urinary output, labs, and treatment plans  - Assess nutrition and hydration status and recommend course of action  - Evaluate amount of meals eaten  - Assist patient with eating if necessary   - Allow adequate time for meals  - Recommend/ encourage appropriate diets, oral nutritional supplements, and vitamin/mineral supplements  - Order, calculate, and assess calorie counts as needed  - Recommend, monitor, and adjust tube feedings and TPN/PPN based on assessed needs  - Assess need for intravenous fluids  - Provide specific nutrition/hydration education as appropriate  - Include patient/family/caregiver in decisions related to nutrition  Outcome: Progressing     Problem: PAIN - ADULT  Goal: Verbalizes/displays adequate comfort level or baseline comfort level  Description: Interventions:  - Encourage patient to monitor pain and request assistance  - Assess pain using appropriate pain scale  - Administer analgesics as ordered based on type and severity of pain and evaluate response  - Implement non-pharmacological measures as appropriate and evaluate response  - Consider cultural and social influences on pain and pain management  - Notify physician/advanced practitioner if interventions unsuccessful or patient reports new pain  - Educate patient/family on pain management process including their role and importance of  reporting pain   -  Provide non-pharmacologic/complimentary pain relief interventions  Outcome: Progressing     Problem: INFECTION - ADULT  Goal: Absence or prevention of progression during hospitalization  Description: INTERVENTIONS:  - Assess and monitor for signs and symptoms of infection  - Monitor lab/diagnostic results  - Monitor all insertion sites, i.e. indwelling lines, tubes, and drains  - Monitor endotracheal if appropriate and nasal secretions for changes in amount and color  - [place] appropriate cooling/warming therapies per order  - Administer medications as ordered  - Instruct and encourage patient and family to use good hand hygiene technique  - Identify and instruct in appropriate isolation precautions for identified infection/condition  Outcome: Progressing  Goal: Absence of fever/infection during neutropenic period  Description: INTERVENTIONS:  - Monitor WBC  - Perform strict hand hygiene  - Limit to healthy visitors only  - No plants, dried, fresh or silk flowers with winters in patient room  Outcome: Progressing     Problem: SAFETY ADULT  Goal: Patient will remain free of falls  Description: INTERVENTIONS:  - Educate patient/family on patient safety including physical limitations  - Instruct patient to call for assistance with activity   - Consider consulting OT/PT to assist with strengthening/mobility based on AM PAC & JH-HLM score  - Consult OT/PT to assist with strengthening/mobility   - Keep Call bell within reach  - Keep bed low and locked with side rails adjusted as appropriate  - Keep care items and personal belongings within reach  - Initiate and maintain comfort rounds  - Make Fall Risk Sign visible to staff  - Offer Toileting every 2 Hours, in advance of need  - Initiate/Maintain bed alarm  - Obtain necessary fall risk management equipment  - Apply yellow socks and bracelet for high fall risk patients  - Consider moving patient to room near nurses station  Outcome: Progressing  Goal: Maintain or return to  baseline ADL function  Description: INTERVENTIONS:  -  Assess patient's ability to carry out ADLs; assess patient's baseline for ADL function and identify physical deficits which impact ability to perform ADLs (bathing, care of mouth/teeth, toileting, grooming, dressing, etc.)  - Assess/evaluate cause of self-care deficits   - Assess range of motion  - Assess patient's mobility; develop plan if impaired  - Assess patient's need for assistive devices and provide as appropriate  - Encourage maximum independence but intervene and supervise when necessary  - Involve family in performance of ADLs  - Assess for home care needs following discharge   - Consider OT consult to assist with ADL evaluation and planning for discharge  - Provide patient education as appropriate  - Monitor functional capacity and physical performance, use of AM PAC & JH-HLM   - Monitor gait, balance and fatigue with ambulation    Outcome: Progressing  Goal: Maintains/Returns to pre admission functional level  Description: INTERVENTIONS:  - Perform AM-PAC 6 Click Basic Mobility/ Daily Activity assessment daily.  - Set and communicate daily mobility goal to care team and patient/family/caregiver.   - Collaborate with rehabilitation services on mobility goals if consulted  - Perform Range of Motion 3 times a day.  - Reposition patient every 2 hours.  - Dangle patient 3 times a day  - Stand patient 3 times a day  - Ambulate patient 3 times a day  - Out of bed to chair 3 times a day   - Out of bed for meals 3 times a day  - Out of bed for toileting  - Record patient progress and toleration of activity level   Outcome: Progressing     Problem: DISCHARGE PLANNING  Goal: Discharge to home or other facility with appropriate resources  Description: INTERVENTIONS:  - Identify barriers to discharge w/patient and caregiver  - Arrange for needed discharge resources and transportation as appropriate  - Identify discharge learning needs (meds, wound care, etc.)  -  Arrange for interpretive services to assist at discharge as needed  - Refer to Case Management Department for coordinating discharge planning if the patient needs post-hospital services based on physician/advanced practitioner order or complex needs related to functional status, cognitive ability, or social support system  Outcome: Progressing     Problem: Knowledge Deficit  Goal: Patient/family/caregiver demonstrates understanding of disease process, treatment plan, medications, and discharge instructions  Description: Complete learning assessment and assess knowledge base.  Interventions:  - Provide teaching at level of understanding  - Provide teaching via preferred learning methods  Outcome: Progressing     Problem: NEUROSENSORY - ADULT  Goal: Achieves stable or improved neurological status  Description: INTERVENTIONS  - Monitor and report changes in neurological status  - Monitor vital signs such as temperature, blood pressure, glucose, and any other labs ordered   - Initiate measures to prevent increased intracranial pressure  - Monitor for seizure activity and implement precautions if appropriate      Outcome: Progressing  Goal: Remains free of injury related to seizures activity  Description: INTERVENTIONS  - Maintain airway, patient safety  and administer oxygen as ordered  - Monitor patient for seizure activity, document and report duration and description of seizure to physician/advanced practitioner  - If seizure occurs,  ensure patient safety during seizure  - Reorient patient post seizure  - Seizure pads on all 4 side rails  - Instruct patient/family to notify RN of any seizure activity including if an aura is experienced  - Instruct patient/family to call for assistance with activity based on nursing assessment  - Administer anti-seizure medications if ordered    Outcome: Progressing  Goal: Achieves maximal functionality and self care  Description: INTERVENTIONS  - Monitor swallowing and airway  patency with patient fatigue and changes in neurological status  - Encourage and assist patient to increase activity and self care.   - Encourage visually impaired, hearing impaired and aphasic patients to use assistive/communication devices  Outcome: Progressing     Problem: CARDIOVASCULAR - ADULT  Goal: Maintains optimal cardiac output and hemodynamic stability  Description: INTERVENTIONS:  - Monitor I/O, vital signs and rhythm  - Monitor for S/S and trends of decreased cardiac output  - Administer and titrate ordered vasoactive medications to optimize hemodynamic stability  - Assess quality of pulses, skin color and temperature  - Assess for signs of decreased coronary artery perfusion  - Instruct patient to report change in severity of symptoms  Outcome: Progressing  Goal: Absence of cardiac dysrhythmias or at baseline rhythm  Description: INTERVENTIONS:  - Continuous cardiac monitoring, vital signs, obtain 12 lead EKG if ordered  - Administer antiarrhythmic and heart rate control medications as ordered  - Monitor electrolytes and administer replacement therapy as ordered  Outcome: Progressing     Problem: RESPIRATORY - ADULT  Goal: Achieves optimal ventilation and oxygenation  Description: INTERVENTIONS:  - Assess for changes in respiratory status  - Assess for changes in mentation and behavior  - Position to facilitate oxygenation and minimize respiratory effort  - Oxygen administered by appropriate delivery if ordered  - Initiate smoking cessation education as indicated  - Encourage broncho-pulmonary hygiene including cough, deep breathe, Incentive Spirometry  - Assess the need for suctioning and aspirate as needed  - Assess and instruct to report SOB or any respiratory difficulty  - Respiratory Therapy support as indicated  Outcome: Progressing     Problem: GASTROINTESTINAL - ADULT  Goal: Minimal or absence of nausea and/or vomiting  Description: INTERVENTIONS:  - Administer IV fluids if ordered to ensure  adequate hydration  - Maintain NPO status until nausea and vomiting are resolved  - Nasogastric tube if ordered  - Administer ordered antiemetic medications as needed  - Provide nonpharmacologic comfort measures as appropriate  - Advance diet as tolerated, if ordered  - Consider nutrition services referral to assist patient with adequate nutrition and appropriate food choices  Outcome: Progressing  Goal: Maintains or returns to baseline bowel function  Description: INTERVENTIONS:  - Assess bowel function  - Encourage oral fluids to ensure adequate hydration  - Administer IV fluids if ordered to ensure adequate hydration  - Administer ordered medications as needed  - Encourage mobilization and activity  - Consider nutritional services referral to assist patient with adequate nutrition and appropriate food choices  Outcome: Progressing  Goal: Maintains adequate nutritional intake  Description: INTERVENTIONS:  - Monitor percentage of each meal consumed  - Identify factors contributing to decreased intake, treat as appropriate  - Assist with meals as needed  - Monitor I&O, weight, and lab values if indicated  - Obtain nutrition services referral as needed  Outcome: Progressing  Goal: Establish and maintain optimal ostomy function  Description: INTERVENTIONS:  - Assess bowel function  - Encourage oral fluids to ensure adequate hydration  - Administer IV fluids if ordered to ensure adequate hydration   - Administer ordered medications as needed  - Encourage mobilization and activity  - Nutrition services referral to assist patient with appropriate food choices  - Assess stoma site  - Consider wound care consult   Outcome: Progressing  Goal: Oral mucous membranes remain intact  Description: INTERVENTIONS  - Assess oral mucosa and hygiene practices  - Implement preventative oral hygiene regimen  - Implement oral medicated treatments as ordered  - Initiate Nutrition services referral as needed  Outcome: Progressing      Problem: GENITOURINARY - ADULT  Goal: Maintains or returns to baseline urinary function  Description: INTERVENTIONS:  - Assess urinary function  - Encourage oral fluids to ensure adequate hydration if ordered  - Administer IV fluids as ordered to ensure adequate hydration  - Administer ordered medications as needed  - Offer frequent toileting  - Follow urinary retention protocol if ordered  Outcome: Progressing  Goal: Absence of urinary retention  Description: INTERVENTIONS:  - Assess patient’s ability to void and empty bladder  - Monitor I/O  - Bladder scan as needed  - Discuss with physician/AP medications to alleviate retention as needed  - Discuss catheterization for long term situations as appropriate  Outcome: Progressing  Goal: Urinary catheter remains patent  Description: INTERVENTIONS:  - Assess patency of urinary catheter  - If patient has a chronic pedersen, consider changing catheter if non-functioning  - Follow guidelines for intermittent irrigation of non-functioning urinary catheter  Outcome: Progressing     Problem: METABOLIC, FLUID AND ELECTROLYTES - ADULT  Goal: Electrolytes maintained within normal limits  Description: INTERVENTIONS:  - Monitor labs and assess patient for signs and symptoms of electrolyte imbalances  - Administer electrolyte replacement as ordered  - Monitor response to electrolyte replacements, including repeat lab results as appropriate  - Instruct patient on fluid and nutrition as appropriate  Outcome: Progressing  Goal: Fluid balance maintained  Description: INTERVENTIONS:  - Monitor labs   - Monitor I/O and WT  - Instruct patient on fluid and nutrition as appropriate  - Assess for signs & symptoms of volume excess or deficit  Outcome: Progressing  Goal: Glucose maintained within target range  Description: INTERVENTIONS:  - Monitor Blood Glucose as ordered  - Assess for signs and symptoms of hyperglycemia and hypoglycemia  - Administer ordered medications to maintain glucose  within target range  - Assess nutritional intake and initiate nutrition service referral as needed  Outcome: Progressing     Problem: HEMATOLOGIC - ADULT  Goal: Maintains hematologic stability  Description: INTERVENTIONS  - Assess for signs and symptoms of bleeding or hemorrhage  - Monitor labs  - Administer supportive blood products/factors as ordered and appropriate  Outcome: Progressing     Problem: MUSCULOSKELETAL - ADULT  Goal: Maintain or return mobility to safest level of function  Description: INTERVENTIONS:  - Assess patient's ability to carry out ADLs; assess patient's baseline for ADL function and identify physical deficits which impact ability to perform ADLs (bathing, care of mouth/teeth, toileting, grooming, dressing, etc.)  - Assess/evaluate cause of self-care deficits   - Assess range of motion  - Assess patient's mobility  - Assess patient's need for assistive devices and provide as appropriate  - Encourage maximum independence but intervene and supervise when necessary  - Involve family in performance of ADLs  - Assess for home care needs following discharge   - Consider OT consult to assist with ADL evaluation and planning for discharge  - Provide patient education as appropriate  Outcome: Progressing  Goal: Maintain proper alignment of affected body part  Description: INTERVENTIONS:  - Support, maintain and protect limb and body alignment  - Provide patient/ family with appropriate education  Outcome: Progressing     Problem: Prexisting or High Potential for Compromised Skin Integrity  Goal: Skin integrity is maintained or improved  Description: INTERVENTIONS:  - Identify patients at risk for skin breakdown  - Assess and monitor skin integrity including under and around medical devices   - Assess and monitor nutrition and hydration status  - Monitor labs  - Assess for incontinence   - Turn and reposition patient  - Assist with mobility/ambulation  - Relieve pressure over paolo prominences   -  Avoid friction and shearing  - Provide appropriate hygiene as needed including keeping skin clean and dry  - Evaluate need for skin moisturizer/barrier cream  - Collaborate with interdisciplinary team  - Patient/family teaching  - Consider wound care consult   Problem: SAFETY,RESTRAINT: NV/NON-SELF DESTRUCTIVE BEHAVIOR  Goal: Remains free of harm/injury (restraint for non violent/non self-detsructive behavior)  Description: INTERVENTIONS:  - Instruct patient/family regarding restraint use   - Assess and monitor physiologic and psychological status   - Provide interventions and comfort measures to meet assessed patient needs   - Identify and implement measures to help patient regain control  - Assess readiness for release of restraint   Outcome: Progressing  Goal: Returns to optimal restraint-free functioning  Description: INTERVENTIONS:  - Assess the patient's behavior and symptoms that indicate continued need for restraint  - Identify and implement measures to help patient regain control  - Assess readiness for release of restraint   Outcome: Progressing     Problem: DECISION MAKING  Goal: Pt/Family able to effectively weigh alternatives and participate in decision making related to treatment and care  Description: INTERVENTIONS:  - Identify decision maker  - Determine when there are differences among patient's view, family's view, and healthcare provider's view of patient condition and care goals  - Facilitate patient/family articulation of goals for care  - Help patient/family identify pros/cons of alternative solutions  - Provide information as requested by patient/family  - Respect patient/family rights related to privacy and sharing information   - Serve as a liaison between patient, family and health care team  - Initiate consults as appropriate (Ethics Team, Palliative Care, Family Care Conference, etc.)  Outcome: Progressing     Problem: CONFUSION/THOUGHT DISTURBANCE  Goal: Thought disturbances (confusion,  delirium, depression, dementia or psychosis) are managed to maintain or return to baseline mental status and functional level  Description: INTERVENTIONS:  - Assess for possible contributors to  thought disturbance, including but not limited to medications, infection, impaired vision or hearing, underlying metabolic abnormalities, dehydration, respiratory compromise,  psychiatric diagnoses and notify attending PHYSICAN/AP  - Monitor and intervene to maintain adequate nutrition, hydration, elimination, sleep and activity  - Decrease environmental stimuli, including noise as appropriate.  - Provide frequent contacts to provide refocusing, direction and reassurance as needed. Approach patient calmly with eye contact and at their level.  - [place] high risk fall precautions, aspiration precautions and other safety measures, as indicated  - If delirium suspected, notify physician/AP of change in condition and request immediate in-person evaluation  - Pursue consults as appropriate including Geriatric (campus dependent), OT for cognitive evaluation/activity planning, psychiatric, pastoral care, etc.  Outcome: Progressing     Problem: BEHAVIOR  Goal: Pt/Family maintain appropriate behavior and adhere to behavioral management agreement, if implemented  Description: INTERVENTIONS:  - Assess the family dynamic   - Encourage verbalization of thoughts and concerns in a socially appropriate manner  - Assess patient/family's coping skills and non-compliant behavior (including use of illegal substances).  - Utilize positive, consistent limit setting strategies supporting safety of patient, staff and others  - Initiate consult with Case Management, Spiritual Care or other ancillary services as appropriate  - If a patient's/visitor's behavior jeopardizes the safety of the patient, staff, or others, refer to organization procedure.   - Notify Security of behavior or suspected illegal substances which indicate the need for search of  the patient and/or belongings  - Encourage participation in the decision making process about a behavioral management agreement; implement if patient meets criteria  Outcome: Progressing

## 2025-06-19 NOTE — PLAN OF CARE
Problem: PHYSICAL THERAPY ADULT  Goal: Performs mobility at highest level of function for planned discharge setting.  See evaluation for individualized goals.  Description: Treatment/Interventions: ADL retraining, LE strengthening/ROM, Functional transfer training, Elevations, Therapeutic exercise, Endurance training, Bed mobility, Gait training  Equipment Recommended: Other (Comment) (TBD pending progress and family confirmation of available DME.)       See flowsheet documentation for full assessment, interventions and recommendations.  Outcome: Progressing  Note: Prognosis: Fair  Problem List: Decreased strength, Decreased range of motion, Impaired balance, Decreased endurance, Decreased mobility, Decreased coordination, Decreased cognition, Impaired judgement, Decreased safety awareness, Decreased skin integrity  Assessment: pt demonstrated considerable improvement in funcitonal endurance today, performing transfers with assist primarily due to poor initiation, motor planning & blaance, but this was then followed by progression to household distance mobiitly with use of RW & Ax1.  Pt  performs all tasks slowly and demonstrates limited carryover of all corrections given for posture, LE sequencing & RW management, necessiting standing rests to regain balance within RW SHEYLA & prevent either bumping into objects or potential LOB.  pt tends to deviate towards L during gait and is unable to correct this despite all physical & verbal corrections.  He performs dynamic turns with more appropriate sequencing comparative, but will continue to benefit from practice to do so without need for instructions throughout .  Pt's attending nurse & charge nurse both gave permission for pt to remain in recliner post session with waist restraint off as long as he was supervised by family & sitting on alarm.  Upon return to room, pt returned to bedisde recliner with LEs elevated & chair alarm active.  pt's son stated he would remain  present in room to ensure pt did not attempt impulsive mobiilty, pull his NG tube or slouch out of seat.  He was instructed to request nursing assist for managing pt's behaviors if necessary & for returning back to bed if pt either appeared more fatigued , no longer safe to remain in recliner, or if family had need to leave & leave pt unattended.   Pt's son verbalized understanding & offered no questions regarding the same.  Plan to continue practicing OOB mobiilty & funcitonal mobilty tasks to reduce current burden of care and progress to highest level of funcitonal independence as he continues to recover.  Barriers to Discharge: Inaccessible home environment, Decreased caregiver support     Rehab Resource Intensity Level, PT: I (Maximum Resource Intensity)    See flowsheet documentation for full assessment.

## 2025-06-19 NOTE — PLAN OF CARE
Problem: Nutrition/Hydration-ADULT  Goal: Nutrient/Hydration intake appropriate for improving, restoring or maintaining nutritional needs  Description: Monitor and assess patient's nutrition/hydration status for malnutrition. Collaborate with interdisciplinary team and initiate plan and interventions as ordered.  Monitor patient's weight and dietary intake as ordered or per policy. Utilize nutrition screening tool and intervene as necessary. Determine patient's food preferences and provide high-protein, high-caloric foods as appropriate.     INTERVENTIONS:  - Monitor oral intake, urinary output, labs, and treatment plans  - Assess nutrition and hydration status and recommend course of action  - Evaluate amount of meals eaten  - Assist patient with eating if necessary   - Allow adequate time for meals  - Recommend/ encourage appropriate diets, oral nutritional supplements, and vitamin/mineral supplements  - Order, calculate, and assess calorie counts as needed  - Recommend, monitor, and adjust tube feedings and TPN/PPN based on assessed needs  - Assess need for intravenous fluids  - Provide specific nutrition/hydration education as appropriate  - Include patient/family/caregiver in decisions related to nutrition  Outcome: Progressing     Problem: PAIN - ADULT  Goal: Verbalizes/displays adequate comfort level or baseline comfort level  Description: Interventions:  - Encourage patient to monitor pain and request assistance  - Assess pain using appropriate pain scale  - Administer analgesics as ordered based on type and severity of pain and evaluate response  - Implement non-pharmacological measures as appropriate and evaluate response  - Consider cultural and social influences on pain and pain management  - Notify physician/advanced practitioner if interventions unsuccessful or patient reports new pain  - Educate patient/family on pain management process including their role and importance of  reporting pain   -  Provide non-pharmacologic/complimentary pain relief interventions  Outcome: Progressing     Problem: INFECTION - ADULT  Goal: Absence or prevention of progression during hospitalization  Description: INTERVENTIONS:  - Assess and monitor for signs and symptoms of infection  - Monitor lab/diagnostic results  - Monitor all insertion sites, i.e. indwelling lines, tubes, and drains  - Monitor endotracheal if appropriate and nasal secretions for changes in amount and color  - [place] appropriate cooling/warming therapies per order  - Administer medications as ordered  - Instruct and encourage patient and family to use good hand hygiene technique  - Identify and instruct in appropriate isolation precautions for identified infection/condition  Outcome: Progressing  Goal: Absence of fever/infection during neutropenic period  Description: INTERVENTIONS:  - Monitor WBC  - Perform strict hand hygiene  - Limit to healthy visitors only  - No plants, dried, fresh or silk flowers with winters in patient room  Outcome: Progressing     Problem: SAFETY ADULT  Goal: Patient will remain free of falls  Description: INTERVENTIONS:  - Educate patient/family on patient safety including physical limitations  - Instruct patient to call for assistance with activity   - Consider consulting OT/PT to assist with strengthening/mobility based on AM PAC & JH-HLM score  - Consult OT/PT to assist with strengthening/mobility   - Keep Call bell within reach  - Keep bed low and locked with side rails adjusted as appropriate  - Keep care items and personal belongings within reach  - Initiate and maintain comfort rounds  - Make Fall Risk Sign visible to staff  - Offer Toileting every 2 Hours, in advance of need  - Initiate/Maintain bed alarm  - Obtain necessary fall risk management equipment: chair alarm  - Apply yellow socks and bracelet for high fall risk patients  - Consider moving patient to room near nurses station  Outcome: Progressing  Goal: Maintain  or return to baseline ADL function  Description: INTERVENTIONS:  -  Assess patient's ability to carry out ADLs; assess patient's baseline for ADL function and identify physical deficits which impact ability to perform ADLs (bathing, care of mouth/teeth, toileting, grooming, dressing, etc.)  - Assess/evaluate cause of self-care deficits   - Assess range of motion  - Assess patient's mobility; develop plan if impaired  - Assess patient's need for assistive devices and provide as appropriate  - Encourage maximum independence but intervene and supervise when necessary  - Involve family in performance of ADLs  - Assess for home care needs following discharge   - Consider OT consult to assist with ADL evaluation and planning for discharge  - Provide patient education as appropriate  - Monitor functional capacity and physical performance, use of AM PAC & JH-HLM   - Monitor gait, balance and fatigue with ambulation    Outcome: Progressing  Goal: Maintains/Returns to pre admission functional level  Description: INTERVENTIONS:  - Perform AM-PAC 6 Click Basic Mobility/ Daily Activity assessment daily.  - Set and communicate daily mobility goal to care team and patient/family/caregiver.   - Collaborate with rehabilitation services on mobility goals if consulted  - Perform Range of Motion 3 times a day.  - Reposition patient every 2 hours.  - Dangle patient 3 times a day  - Stand patient 3 times a day  - Ambulate patient 3 times a day  - Out of bed to chair 3 times a day   - Out of bed for meals 3  Problem: DISCHARGE PLANNING  Goal: Discharge to home or other facility with appropriate resources  Description: INTERVENTIONS:  - Identify barriers to discharge w/patient and caregiver  - Arrange for needed discharge resources and transportation as appropriate  - Identify discharge learning needs (meds, wound care, etc.)  - Arrange for interpretive services to assist at discharge as needed  - Refer to Case Management Department for  coordinating discharge planning if the patient needs post-hospital services based on physician/advanced practitioner order or complex needs related to functional status, cognitive ability, or social support system  Outcome: Progressing     Problem: Knowledge Deficit  Goal: Patient/family/caregiver demonstrates understanding of disease process, treatment plan, medications, and discharge instructions  Description: Complete learning assessment and assess knowledge base.  Interventions:  - Provide teaching at level of understanding  - Provide teaching via preferred learning methods  Outcome: Progressing     Problem: NEUROSENSORY - ADULT  Goal: Achieves stable or improved neurological status  Description: INTERVENTIONS  - Monitor and report changes in neurological status  - Monitor vital signs such as temperature, blood pressure, glucose, and any other labs ordered   - Initiate measures to prevent increased intracranial pressure  - Monitor for seizure activity and implement precautions if appropriate      Outcome: Progressing  Goal: Remains free of injury related to seizures activity  Description: INTERVENTIONS  - Maintain airway, patient safety  and administer oxygen as ordered  - Monitor patient for seizure activity, document and report duration and description of seizure to physician/advanced practitioner  - If seizure occurs,  ensure patient safety during seizure  - Reorient patient post seizure  - Seizure pads on all 4 side rails  - Instruct patient/family to notify RN of any seizure activity including if an aura is experienced  - Instruct patient/family to call for assistance with activity based on nursing assessment  - Administer anti-seizure medications if ordered    Outcome: Progressing  Goal: Achieves maximal functionality and self care  Description: INTERVENTIONS  - Monitor swallowing and airway patency with patient fatigue and changes in neurological status  - Encourage and assist patient to increase activity  and self care.   - Encourage visually impaired, hearing impaired and aphasic patients to use assistive/communication devices  Outcome: Progressing     Problem: CARDIOVASCULAR - ADULT  Goal: Maintains optimal cardiac output and hemodynamic stability  Description: INTERVENTIONS:  - Monitor I/O, vital signs and rhythm  - Monitor for S/S and trends of decreased cardiac output  - Administer and titrate ordered vasoactive medications to optimize hemodynamic stability  - Assess quality of pulses, skin color and temperature  - Assess for signs of decreased coronary artery perfusion  - Instruct patient to report change in severity of symptoms  Outcome: Progressing  Goal: Absence of cardiac dysrhythmias or at baseline rhythm  Description: INTERVENTIONS:  - Continuous cardiac monitoring, vital signs, obtain 12 lead EKG if ordered  - Administer antiarrhythmic and heart rate control medications as ordered  - Monitor electrolytes and administer replacement therapy as ordered  Outcome: Progressing     Problem: RESPIRATORY - ADULT  Goal: Achieves optimal ventilation and oxygenation  Description: INTERVENTIONS:  - Assess for changes in respiratory status  - Assess for changes in mentation and behavior  - Position to facilitate oxygenation and minimize respiratory effort  - Oxygen administered by appropriate delivery if ordered  - Initiate smoking cessation education as indicated  - Encourage broncho-pulmonary hygiene including cough, deep breathe, Incentive Spirometry  - Assess the need for suctioning and aspirate as needed  - Assess and instruct to report SOB or any respiratory difficulty  - Respiratory Therapy support as indicated  Outcome: Progressing     Problem: GASTROINTESTINAL - ADULT  Goal: Minimal or absence of nausea and/or vomiting  Description: INTERVENTIONS:  - Administer IV fluids if ordered to ensure adequate hydration  - Maintain NPO status until nausea and vomiting are resolved  - Nasogastric tube if ordered  -  Administer ordered antiemetic medications as needed  - Provide nonpharmacologic comfort measures as appropriate  - Advance diet as tolerated, if ordered  - Consider nutrition services referral to assist patient with adequate nutrition and appropriate food choices  Outcome: Progressing  Goal: Maintains or returns to baseline bowel function  Description: INTERVENTIONS:  - Assess bowel function  - Encourage oral fluids to ensure adequate hydration  - Administer IV fluids if ordered to ensure adequate hydration  - Administer ordered medications as needed  - Encourage mobilization and activity  - Consider nutritional services referral to assist patient with adequate nutrition and appropriate food choices  Outcome: Progressing  Goal: Maintains adequate nutritional intake  Description: INTERVENTIONS:  - Monitor percentage of each meal consumed  - Identify factors contributing to decreased intake, treat as appropriate  - Assist with meals as needed  - Monitor I&O, weight, and lab values if indicated  - Obtain nutrition services referral as needed  Outcome: Progressing  Goal: Establish and maintain optimal ostomy function  Description: INTERVENTIONS:  - Assess bowel function  - Encourage oral fluids to ensure adequate hydration  - Administer IV fluids if ordered to ensure adequate hydration   - Administer ordered medications as needed  - Encourage mobilization and activity  - Nutrition services referral to assist patient with appropriate food choices  - Assess stoma site  - Consider wound care consult   Outcome: Progressing  Goal: Oral mucous membranes remain intact  Description: INTERVENTIONS  - Assess oral mucosa and hygiene practices  - Implement preventative oral hygiene regimen  - Implement oral medicated treatments as ordered  - Initiate Nutrition services referral as needed  Outcome: Progressing     Problem: GENITOURINARY - ADULT  Goal: Maintains or returns to baseline urinary function  Description: INTERVENTIONS:  -  Assess urinary function  - Encourage oral fluids to ensure adequate hydration if ordered  - Administer IV fluids as ordered to ensure adequate hydration  - Administer ordered medications as needed  - Offer frequent toileting  - Follow urinary retention protocol if ordered  Outcome: Progressing  Goal: Absence of urinary retention  Description: INTERVENTIONS:  - Assess patient’s ability to void and empty bladder  - Monitor I/O  - Bladder scan as needed  - Discuss with physician/AP medications to alleviate retention as needed  - Discuss catheterization for long term situations as appropriate  Outcome: Progressing  Goal: Urinary catheter remains patent  Description: INTERVENTIONS:  - Assess patency of urinary catheter  - If patient has a chronic pedersen, consider changing catheter if non-functioning  - Follow guidelines for intermittent irrigation of non-functioning urinary catheter  Outcome: Progressing     Problem: METABOLIC, FLUID AND ELECTROLYTES - ADULT  Goal: Electrolytes maintained within normal limits  Description: INTERVENTIONS:  - Monitor labs and assess patient for signs and symptoms of electrolyte imbalances  - Administer electrolyte replacement as ordered  - Monitor response to electrolyte replacements, including repeat lab results as appropriate  - Instruct patient on fluid and nutrition as appropriate  Outcome: Progressing  Goal: Fluid balance maintained  Description: INTERVENTIONS:  - Monitor labs   - Monitor I/O and WT  - Instruct patient on fluid and nutrition as appropriate  - Assess for signs & symptoms of volume excess or deficit  Outcome: Progressing  Goal: Glucose maintained within target range  Description: INTERVENTIONS:  - Monitor Blood Glucose as ordered  - Assess for signs and symptoms of hyperglycemia and hypoglycemia  - Administer ordered medications to maintain glucose within target range  - Assess nutritional intake and initiate nutrition service referral as needed  Outcome: Progressing      Problem: SKIN/TISSUE INTEGRITY - ADULT  Goal: Skin Integrity remains intact(Skin Breakdown Prevention)  Description: Assess:  -Perform Jake assessment   -Clean and moisturize skin  -Inspect skin when repositioning, toileting, and assisting with ADLS  -Assess under medical devices   -Assess extremities for adequate circulation and sensation     Bed Management:  -Have minimal linens on bed & keep smooth, unwrinkled  -Change linens as needed when moist or perspiring  -Avoid sitting or lying in one position for more than 2 hours while in bed  -Keep HOB at 30 degrees     Toileting:  -Offer bedside commode  -Assess for incontinence   -Use incontinent care products after each incontinent episode   Activity:  -Mobilize patient 3 times a day  -Encourage activity and walks on unit  -Encourage or provide ROM exercises   -Turn and reposition patient every 2 Hours  -Use appropriate equipment to lift or move patient in bed  -Instruct/ Assist with weight shifting when out of bed in chair  -Consider limitation of chair time 1 hour intervals    Skin Care:  -Avoid use of baby powder, tape, friction and shearing, hot water or constrictive clothing  -Relieve pressure over bony prominences   -Do not massage red bony areas    Next Steps:  -Teach patient strategies to minimize risks  -Consider consults to  interdisciplinary teams   Outcome: Progressing  Goal: Incision(s), wounds(s) or drain site(s) healing without S/S of infection  Description: INTERVENTIONS  - Assess and document dressing, incision, wound bed, drain sites and surrounding tissue  - Provide patient and family education  - Perform skin care/dressing changes   Outcome: Progressing  Goal: Pressure injury heals and does not worsen  Description: Interventions:  - Implement low air loss mattress or specialty surface (Criteria met)  - Apply silicone foam dressing  - Instruct/assist with weight shifting every 60 minutes when in chair   - Limit chair time to 1 hour intervals  -  Use special pressure reducing interventions when in chair   - Apply fecal or urinary incontinence containment device   - Perform passive or active ROM   - Turn and reposition patient & offload bony prominences hours   - Utilize friction reducing device or surface for transfers   - Consider consults to  interdisciplinary teams   - Use incontinent care products after each incontinent episode   - Consider nutrition services referral as needed  Outcome: Progressing    times a day  - Out of bed for toileting  - Record patient progress and toleration of activity level   Outcome: Progressing

## 2025-06-19 NOTE — OCCUPATIONAL THERAPY NOTE
Occupational Therapy Treatment Note:      06/19/25 1210   OT Last Visit   OT Visit Date 06/19/25   Note Type   Note Type Treatment   Pain Assessment   Pain Assessment Tool FLACC   Pain Rating: FLACC (Rest) - Face 0   Pain Rating: FLACC (Rest) - Legs 0   Pain Rating: FLACC (Rest) - Activity 0   Pain Rating: FLACC (Rest) - Cry 0   Pain Rating: FLACC (Rest) - Consolability 0   Score: FLACC (Rest) 0   Pain Rating: FLACC (Activity) - Face 0   Pain Rating: FLACC (Activity) - Legs 0   Pain Rating: FLACC (Activity) - Activity 0   Pain Rating: FLACC (Activity) - Cry 0   Pain Rating: FLACC (Activity) - Consolability 0   Score: FLACC (Activity) 0   Restrictions/Precautions   Other Precautions Cognitive;Chair Alarm;Bed Alarm;Fall Risk   ADL   Where Assessed Chair   Grooming Assistance 4  Minimal Assistance   UB Dressing Assistance 4  Minimal Assistance   LB Dressing Assistance 2  Maximal Assistance  (pt with moderate difficulty using r hand for functional lb dressing tasks. decreased proprioceptive awareness r ue as well as weakended coordination. pt did NOT require hand over hand for tasks)   Toileting Assistance  2  Maximal Assistance   Toileting Comments despite male purewick pt often fixates on need to use restroom   Functional Standing Tolerance   Time p+ balance during clothing management. pt props self on backs of legs against sturdy bed   Bed Mobility   Supine to Sit 3  Moderate assistance   Additional items Assist x 1   Transfers   Sit to Stand 3  Moderate assistance   Additional items Assist x 1   Stand to Sit 3  Moderate assistance   Additional items Assist x 1  (rw)   Functional Mobility   Functional Mobility 3  Moderate assistance   Additional Comments ax2   Additional items Rolling walker   Cognition   Overall Cognitive Status Impaired   Arousal/Participation Alert;Cooperative   Attention Attends with cues to redirect   Memory Decreased recall of precautions;Decreased recall of recent events   Following Commands  Follows one step commands without difficulty   Activity Tolerance   Activity Tolerance Patient tolerated treatment well   Assessment   Assessment pt participated in am ot session and was seen focusing on ub and lb dressing, functional mobility and transfers, safety with rw use and pt and family education. pt was initially in waist restraint however [facility] approved pt to be out of same with family supervision. pt with ngt for feeding currently. pt was pleasantly confused at times  and was cooperative. pt was able to sequence routine tasks this session and initiated and terminated task with ease. will follow focusing on goals from ie.   Plan   Treatment Interventions ADL retraining;Functional transfer training;Endurance training;Patient/family training;Equipment evaluation/education;Cognitive reorientation;Activityengagement;Compensatory technique education;Fine motor coordination activities;Neuromuscular reeducation;UE strengthening/ROM   Goal Expiration Date 07/01/25   OT Treatment Day 1   OT Frequency 3-5x/wk   Discharge Recommendation   Rehab Resource Intensity Level, OT I (Maximum Resource Intensity)     April A Storm

## 2025-06-19 NOTE — PHYSICAL THERAPY NOTE
Physical Therapy Progress Note     06/19/25 1220   PT Last Visit   PT Visit Date 06/19/25   Note Type   Note Type Treatment   Pain Assessment   Pain Score No Pain   Restrictions/Precautions   RLE Weight Bearing Per Order WBAT   Other Precautions Cognitive;Chair Alarm;Bed Alarm;Pain;Fall Risk;Multiple lines  (NG tube)   Subjective   Subjective pt encountered supine in bed, with family present.  Pt  awake & alert, pleasant and agreeable to OOB mboitily.  he is able to answer questions appropriately & follow commands, but has limited carryover of instructions between tasks.  Denies pain & offers no change in status with activity   Bed Mobility   Supine to Sit 3  Moderate assistance   Additional items Assist x 1  (+ 2nd for distal trunk support)   Transfers   Sit to Stand 3  Moderate assistance   Additional items Assist x 1   Stand to Sit 3  Moderate assistance   Additional items Assist x 1;Armrests;Increased time required;Verbal cues   Ambulation/Elevation   Gait pattern Excessively slow;Short stride;Foward flexed;Inconsistent morgan;Shuffling;Narrow SHEYLA;Scissoring;Improper Weight shift;Poor UE support   Gait Assistance 3  Moderate assist   Additional items Assist x 1  (+ chair follow)   Assistive Device Rolling walker   Distance 25' x 2, 40', 45' x 2   Balance   Static Sitting Fair -   Dynamic Sitting Poor +   Static Standing Poor   Dynamic Standing Poor   Ambulatory Poor   Activity Tolerance   Activity Tolerance Patient tolerated treatment well   Nurse Made Aware DELANO Ball   Assessment   Prognosis Fair   Problem List Decreased strength;Decreased range of motion;Impaired balance;Decreased endurance;Decreased mobility;Decreased coordination;Decreased cognition;Impaired judgement;Decreased safety awareness;Decreased skin integrity   Assessment pt demonstrated considerable improvement in funcitonal endurance today, performing transfers with assist primarily due to poor initiation, motor planning & blaance, but this was  then followed by progression to household distance mobiitly with use of RW & Ax1.  Pt  performs all tasks slowly and demonstrates limited carryover of all corrections given for posture, LE sequencing & RW management, necessiting standing rests to regain balance within RW SHEYLA & prevent either bumping into objects or potential LOB.  pt tends to deviate towards L during gait and is unable to correct this despite all physical & verbal corrections.  He performs dynamic turns with more appropriate sequencing comparative, but will continue to benefit from practice to do so without need for instructions throughout .  Pt's attending nurse & charge nurse both gave permission for pt to remain in recliner post session with waist restraint off as long as he was supervised by family & sitting on alarm.  Upon return to room, pt returned to bedisde recliner with LEs elevated & chair alarm active.  pt's son stated he would remain present in room to ensure pt did not attempt impulsive mobiilty, pull his NG tube or slouch out of seat.  He was instructed to request nursing assist for managing pt's behaviors if necessary & for returning back to bed if pt either appeared more fatigued , no longer safe to remain in recliner, or if family had need to leave & leave pt unattended.   Pt's son verbalized understanding & offered no questions regarding the same.  Plan to continue practicing OOB mobiilty & funcitonal mobilty tasks to reduce current burden of care and progress to highest level of funcitonal independence as he continues to recover.   Goals   Patient Goals to get better   STG Expiration Date 07/01/25   PT Treatment Day 2   Plan   Treatment/Interventions LE strengthening/ROM;Functional transfer training;Elevations;Therapeutic exercise;Endurance training;Patient/family training;Equipment eval/education;Bed mobility;Gait training   Progress Progressing toward goals   PT Frequency 3-5x/wk   Discharge Recommendation   Rehab Resource  Intensity Level, PT I (Maximum Resource Intensity)   Equipment Recommended Walker   AM-PAC Basic Mobility Inpatient   Turning in Flat Bed Without Bedrails 2   Lying on Back to Sitting on Edge of Flat Bed Without Bedrails 2   Moving Bed to Chair 2   Standing Up From Chair Using Arms 2   Walk in Room 2   Climb 3-5 Stairs With Railing 1   Basic Mobility Inpatient Raw Score 11   Basic Mobility Standardized Score 30.25   [facility] Highest Level Of Mobility   -HLM Goal 4: Move to chair/commode   -HLM Achieved 7: Walk 25 feet or more       Maxime Valente PTA    An [facility] Basic Mobility Raw Score less than 17 suggests pt would benefit from post acute rehab.  Please also refer to the recommendation of the Physical Therapist for safe discharge planning.

## 2025-06-19 NOTE — CONSULTS
Consultation - Geriatric Medicine   Jasson Carter 75 y.o. male MRN: [id number]  Unit/Bed#: [facility] 619-01 Encounter: [id number]      Assessment & Plan     Ambulatory dysfunction with fall  -reportedly mechanical fall day prior to admission   -(+) head strike (-) loss of consciousness  -injuries as outlined below  -Does not require use of assist device for ambulation at baseline  -endorses hx prior falls at least one additional in past six months   -high risk future falls due to age, hx fall, deconditioning/debility and unfamiliar environment   -encourage good body mechanics and assist with all transfers  -keep personal items and call bell close to prevent reaching  -maintain environment free of fall hazards  -encourage appropriate footwear and adequate lighting at all times when out of bed  -consider home fall risk assessment and personal fall alert system if returning home following rehab   -PT and OT following     Subdural hematoma  -s/p fall as outlined above  -CTH on initial presentation noted 14 mm mixed density acute left cerebral convexity subdural hematoma with associated 5 mm rightward midline shift and low-density 7 mm right cerebral convexity subdural hematoma age indeterminate   -s/p left-sided crani and right sided bur hole for evaluation of subdural hematoma 6/11/25  -s/p partial embolization DAVF via left ophthalmic artery 6/12/25   -post-op course complicated by seizure briefly requiring intubation for airway protection as well as acute stroke in multiple vascular territories  -currently on Keppra for seizure ppx   -CTH 6/14/25 notes decrease size of L SDH with no new area of hemorrhage and stable post op changes   -neurochecks per protocol  -Nsx on consult and following, recommend additional treatment of DAVF during current hospitalization, timing TBD by Nsx   -PT/OT/CM following     Altered mental status  -alert and oriented to person place situation and year not month or day  -mentation  fluctuating during admission typically clearer cognition during day and more impulsive/inattentive in evenings   -at baseline fully oriented independent with ADLs and iADLs  -no prior memory or cognitive concerns reported  -multifactorial including recent SDH requiring surgical evacuation, recent CVA in multiple vascular territories, post traumatic seizure and prolonged hospitalization in setting of TBI  -currently on Elavil HS which patient reports is not current home medication (although not completely reliable historian at time of evaluation [facility] support patient statement), consider d/c as can contribute to confusion and increases risk QTc prolongation   -if complains of persistent neuropathy symptoms can consider trial low dose Gabapentin HS   -currently on Seroquel 50mg BID and 75mg HS, consider re-timing of HS dose to 21:00 hours to allow time to onset prior to sleep, monitor electrolytes and QTc closely (437msec on 6/17/25), caution with increasing dosage due to risk lowering seizure threshold   -continue supportive cares and strict delirium precautions     Posttraumatic seizure  -reportedly witnessed tonic clonic activity following TBI  -no seizure activity reported on 24H EEG completed earlier in admission   -currently on Keppra for seizure ppx  -continue seizure precautions     Acute CVA of multiple vascular territories   -MRI brain 6/14/25 notes several small scattered acute/early subacute infarcts bilateral frontal and parietal lobes and left subinsular/anterior temporal region  -consider stroke pathway   -remains high risk recurrence, cont secondary risk factor modifications   -Nsx on consult     Dysphagia  -NG tube currently in place for dysphagia and poor nutritional intake  -speech on consult, currently on level II dysphagia diet, mech soft with nectar thick liquids   -potential trial NG removal if oral intake improves   -attempt to de-escalate restraints following d/c of NG  tube  -continue strict aspiration precautions     Atrial fibrillation  -reportedly new diagnosis this admission   -monitoring on telemetry  -no recent echo fr review, consider obtaining   -rates appear to be well controlled on current regimen  -not currently on systemic a/c due to SDH requiring surgical evacuation as above  -continue optimization of hemodynamics    -consider Cardiology consult     Cognitive screening  -alert and oriented to person place situation year not month or day  -reportedly fully oriented and independent with ADLs/iADLs at baseline  -no prior cognitive testing on record for review  -TSH WNL 2.364, B12 low at 204 on o/p [facility], recommend supplementation to goal at least 400  -at risk cardiovascular, cerebrovascular and TBI related cognitive decline, continue secondary risk factor modifications and supportive cares, consider cognitive testing with OT  -encourage patient remain physically, socially, cognitively active and engaged to maintain cognitive acuity    Impaired Vision  -recommend use of corrective lenses at all appropriate times  -encourage adequate lighting and encourage use of assistance with ambulation  -keep personal belongings close to person to avoid reaching  -encourage appropriate footwear at all times  -consider large font for printed materials provided to patient     Impaired Hearing  -Encourage use of hearing aids at all appropriate times  -encourage providers and caregivers to speak slowly and clearly directly to patient  -minimize background noise to encourage patient engagement  -consider use of hearing amplifier to reduce risk of straining to hear if hearing aids are not present or are not sufficient   -encourage use of teach back method to ensure clear communication      Deconditioning/debility/frailty  -clinical frailty scale stage V, mildly frail, progressive  -multifactorial including age, HTN, HLD now with fall and multitude of acute traumatic injuries  superimposed in elderly individual with limited physiologic and metabolic reserves in elderly individual with limited physiologic and metabolic reserves   -continue optimization chronic conditions and address acute derangements as arise  -encourage well balanced nutritional intake, nutrition on consult  -ensure underlying anxiety/mood/depression symptoms are well controlled as may impact response to therapies as well as overall sense of wellbeing and quality of life  -continue psychosocial support of patient and caregivers   -continue to ensure treatment and interventions align with patient wishes and goals of care     Delirium precautions  -Patient is high risk of delirium due to age, SDH requiring surgical evacuation, TBI, acute/subacute stroke and prolonged hospitalization   -continue delirium precautions  -encourage normal sleep/wake cycle  -minimize overnight interruptions, group overnight vitals/labs/nursing checks as medically appropriate   -dim lights, close blinds and turn off tv to minimize stimulation and encourage sleep environment in evenings  -ensure that pain if present is well controlled   -monitor for fecal and urinary retention which may precipitate delirium  -encourage early mobilization and ambulation with assist once cleared to safely do so  -provide frequent reorientation and redirection as indicated and appropriate   -encourage family and friends at the bedside to help calm patient if anxious  -minimize use of medications which may precipitate or worsen delirium such as tramadol, benzodiazepine, anticholinergics, and benadryl  -encourage hydration and nutrition   -redirect unwanted behaviors as first line    Home medication review  [facility]:    Allopurinol 300 Mg daily  Amlodipine 2.5 Mg daily  Losartan 100 Mg daily  Magnesium oxide 420 Mg daily  Melatonin 10 Mg HS PRN  Propranolol 60 Mg daily    Amitriptyline 10mg HS listed in PTA medication list from 2021, no recent dispenses  listed and not listed in medication list obtained from [facility], patient reports not currently taking as o/p and only fills medications through [facility] so unlikely to be active at different pharmacy     Care coordination: rounded with Lizzy (RN) and Arlene (Trauma AP)    History of Present Illness   Physician Requesting Consult: Dane Mann,*  Reason for Consult / Principal Problem: Fall  Hx and PE limited by: N/A  Additional history obtained from: Chart review and patient evaluation, outpatient records [facility]    HPI: Jasson Carter is a 75 y.o. year old male with hypertension, hyperlipidemia, PTSD, BPH, and afib who is admitted to the Trauma service with fall and altered mental status found to have bilateral acute on chronic subdural hematomas on admission imaging. He underwent left-sided craniotomy and right bur holes for evacuation of bilateral subdural hematoma on 6/11/2025 as well as partial mobilization of cribriform plate dural AV fistula on 6/12/25 after which he developed seizure requiring intubation and was found to have acute stroke in multiple vascular territories on MRI. He has since been extubated and transferred to the general medical unit where he is being seen in consultation by Geriatrics for altered mental status. Greg is seen and examined at bedside where he is lying resting, he reports that he slept well and denies pain or acute complaints stating that he feels a little better everyday and is optimistic that his appetite and strength will recover in time. Nursing reports overnight he often pulls at NG tube otherwise no acute events reported.     Greg notes that prior to admission he was residing home with his wife of 25 years and that he was independent with ADLs and iADLs with no prior memory or cognitive concerns. He denies use of assist device for ambulation at baseline but does endorse at least one additional recent fall resulting in rib fracture which he notes he has since recovered from.  He requires use of glasses for reading and wears hearing aids, does not use dentures.     Inpatient consult to Gerontology  Consult performed by: Estefania Loo DO  Consult ordered by: Coreen Guevara PA-C        Review of Systems   Constitutional:  Positive for appetite change (reduced from baseline). Negative for chills and fever.   HENT:  Positive for hearing loss (wears hearing aids).    Eyes:  Positive for visual disturbance (glasses for reading).   Respiratory: Negative.  Negative for shortness of breath.    Cardiovascular: Negative.  Negative for chest pain and palpitations.   Gastrointestinal: Negative.  Negative for abdominal pain.   Genitourinary: Negative.    Musculoskeletal: Negative.  Negative for gait problem.   Skin: Negative.    Neurological: Negative.  Negative for dizziness, light-headedness, numbness and headaches.   Hematological: Negative.    Psychiatric/Behavioral: Negative.  Negative for sleep disturbance.    All other systems reviewed and are negative.    Historical Information   Past Medical History[1]  Past Surgical History[2]  Social History   Social History     Substance and Sexual Activity   Alcohol Use Yes     Social History     Substance and Sexual Activity   Drug Use Not Currently     Tobacco Use History[3]  Family History: Family History[4]    Meds/Allergies   all current active meds have been reviewed    Allergies[5]    Objective     Intake/Output Summary (Last 24 hours) at 6/19/2025 0805  Last data filed at 6/19/2025 0518  Gross per 24 hour   Intake 1075 ml   Output 1100 ml   Net -25 ml     Invasive Devices       Peripheral Intravenous Line  Duration             Peripheral IV 06/18/25 Left Antecubital <1 day              Drain  Duration             NG/OG/Enteral Tube Left nare 4 days    External Urinary Catheter 3 days                  Physical Exam  Vitals and nursing note reviewed.   Constitutional:       General: He is not in acute distress.     Appearance: Normal appearance.  He is not toxic-appearing.   HENT:      Head: Normocephalic.      Comments: Bilateral surgical sites on scalp appear clean and dry      Nose: Nose normal.      Comments: Bridled NG tube in place      Mouth/Throat:      Mouth: Mucous membranes are dry.     Eyes:      General: No scleral icterus.        Right eye: No discharge.         Left eye: No discharge.      Conjunctiva/sclera: Conjunctivae normal.      Comments: Glasses at bedside table    Neck:      Comments: Voice mildly hoarse otherwise norm   Cardiovascular:      Rate and Rhythm: Normal rate and regular rhythm.      Pulses: Normal pulses.   Pulmonary:      Effort: Pulmonary effort is normal. No respiratory distress.      Breath sounds: No wheezing.      Comments: Saturating well on room air   Abdominal:      General: Bowel sounds are normal. There is no distension.      Palpations: Abdomen is soft.      Tenderness: There is no abdominal tenderness.     Musculoskeletal:      Cervical back: Neck supple.      Right lower leg: No edema.      Left lower leg: No edema.      Comments: Mildly reduced overall muscle mass      Skin:     General: Skin is warm and dry.     Neurological:      Mental Status: He is alert.      Comments: Awake and alert oriented to person place situation and year, disoriented to month and day of week    Psychiatric:         Mood and Affect: Mood normal.         Behavior: Behavior normal.      Comments: Polite pleasant cooperative        Lab Results:     I have personally reviewed pertinent lab results including the following:    Results from last 7 days   Lab Units 06/18/25  0615 06/17/25 0453 06/16/25  0517   WBC Thousand/uL 9.01 7.75 7.49   HEMOGLOBIN g/dL 12.9 12.0 12.7   HEMATOCRIT % 38.5 36.3* 39.5   PLATELETS Thousands/uL 208 189 170   SEGS PCT % 74 74 76*   MONO PCT % 11 12 12   EOS PCT % 2 1 1     Results from last 7 days   Lab Units 06/18/25  0615 06/17/25  0453 06/16/25  0517   POTASSIUM mmol/L 3.6 3.4* 3.7   CHLORIDE mmol/L 105  107 106   CO2 mmol/L 25 26 27   BUN mg/dL 15 14 17   CREATININE mg/dL 0.54* 0.46* 0.58*   CALCIUM mg/dL 8.8 8.4 8.5     I have personally reviewed the following imaging study reports in PACS:    25- CTH, CT C-spine, XR hip/pelvis, repeat CTH, CTA head and neck with and without contrast  25- IR cerebral angio, CTA head/neck, MRI brain   25- CT venogram   25- MRA head, MRI brain, CXR, CTH  6/15/25- XR knee, L hand XR    Therapies:   PT: following   OT: following     VTE Prophylaxis: Enoxaparin (Lovenox)    Code Status: Level 1 - Full Code  Advance Directive and Living Will: Yes    Power of :    POLST:      Family and Social Support: wife     Goals of Care: recovery from acute injuries          [1]   Past Medical History:  Diagnosis Date    Allergic     Gout     Hypertension     PTSD (post-traumatic stress disorder)    [2]   Past Surgical History:  Procedure Laterality Date    APPENDECTOMY      CHOLECYSTECTOMY      CRANIOTOMY Bilateral 2025    Procedure: Left sided craniotomy and right sided karyn holes for evacuation of subdural hematoma;  Surgeon: Satish De La Torre MD;  Location: [facility];  Service: Neurosurgery    IR CEREBRAL ANGIOGRAPHY / INTERVENTION  2025    TOTAL KNEE ARTHROPLASTY     [3]   Social History  Tobacco Use   Smoking Status Former    Current packs/day: 0.00    Average packs/day: 0.5 packs/day for 20.0 years (10.0 ttl pk-yrs)    Types: Cigarettes    Start date:     Quit date:     Years since quittin.4   Smokeless Tobacco Never   [4]   Family History  Problem Relation Name Age of Onset    Lung cancer Father     [5]   Allergies  Allergen Reactions    Bee Venom Shortness Of Breath    Ativan [Lorazepam] Delirium    Fluoxetine Diarrhea     Other reaction(s): Diarrhea, Tremor, Sweating, Sweating, Diarrhea, Tremor, Sweating, Sweating, Diarrhea, Tremor, Sweating, Sweating    Levaquin [Levofloxacin] Itching    Venlafaxine      Other reaction(s): Tremor, Sweating,  Tremor, Sweating

## 2025-06-19 NOTE — PLAN OF CARE
Problem: Nutrition/Hydration-ADULT  Goal: Nutrient/Hydration intake appropriate for improving, restoring or maintaining nutritional needs  Description: Monitor and assess patient's nutrition/hydration status for malnutrition. Collaborate with interdisciplinary team and initiate plan and interventions as ordered.  Monitor patient's weight and dietary intake as ordered or per policy. Utilize nutrition screening tool and intervene as necessary. Determine patient's food preferences and provide high-protein, high-caloric foods as appropriate.     INTERVENTIONS:  - Monitor oral intake, urinary output, labs, and treatment plans  - Assess nutrition and hydration status and recommend course of action  - Evaluate amount of meals eaten  - Assist patient with eating if necessary   - Allow adequate time for meals  - Recommend/ encourage appropriate diets, oral nutritional supplements, and vitamin/mineral supplements  - Order, calculate, and assess calorie counts as needed  - Recommend, monitor, and adjust tube feedings and TPN/PPN based on assessed needs  - Assess need for intravenous fluids  - Provide specific nutrition/hydration education as appropriate  - Include patient/family/caregiver in decisions related to nutrition  6/19/2025 1605 by Cece Garcia RN  Outcome: Progressing  6/19/2025 1605 by Cece Garcia RN  Outcome: Progressing     Problem: BEHAVIOR  Goal: Pt/Family maintain appropriate behavior and adhere to behavioral management agreement, if implemented  Description: INTERVENTIONS:  - Assess the family dynamic   - Encourage verbalization of thoughts and concerns in a socially appropriate manner  - Assess patient/family's coping skills and non-compliant behavior (including use of illegal substances).  - Utilize positive, consistent limit setting strategies supporting safety of patient, staff and others  - Initiate consult with Case Management, Spiritual Care or other ancillary services as appropriate  -  If a patient's/visitor's behavior jeopardizes the safety of the patient, staff, or others, refer to organization procedure.   - Notify Security of behavior or suspected illegal substances which indicate the need for search of the patient and/or belongings  - Encourage participation in the decision making process about a behavioral management agreement; implement if patient meets criteria  6/19/2025 1605 by Cece Garcia RN  Outcome: Progressing  6/19/2025 1605 by Cece Garcia RN  Outcome: Progressing     Problem: CONFUSION/THOUGHT DISTURBANCE  Goal: Thought disturbances (confusion, delirium, depression, dementia or psychosis) are managed to maintain or return to baseline mental status and functional level  Description: INTERVENTIONS:  - Assess for possible contributors to  thought disturbance, including but not limited to medications, infection, impaired vision or hearing, underlying metabolic abnormalities, dehydration, respiratory compromise,  psychiatric diagnoses and notify attending PHYSICAN/AP  - Monitor and intervene to maintain adequate nutrition, hydration, elimination, sleep and activity  - Decrease environmental stimuli, including noise as appropriate.  - Provide frequent contacts to provide refocusing, direction and reassurance as needed. Approach patient calmly with eye contact and at their level.  - [place] high risk fall precautions, aspiration precautions and other safety measures, as indicated  - If delirium suspected, notify physician/AP of change in condition and request immediate in-person evaluation  - Pursue consults as appropriate including Geriatric (campus dependent), OT for cognitive evaluation/activity planning, psychiatric, pastoral care, etc.  6/19/2025 1605 by Cece Garcia RN  Outcome: Progressing  6/19/2025 1605 by Cece Garcia RN  Outcome: Progressing     Problem: DECISION MAKING  Goal: Pt/Family able to effectively weigh alternatives and participate in  decision making related to treatment and care  Description: INTERVENTIONS:  - Identify decision maker  - Determine when there are differences among patient's view, family's view, and healthcare provider's view of patient condition and care goals  - Facilitate patient/family articulation of goals for care  - Help patient/family identify pros/cons of alternative solutions  - Provide information as requested by patient/family  - Respect patient/family rights related to privacy and sharing information   - Serve as a liaison between patient, family and health care team  - Initiate consults as appropriate (Ethics Team, Palliative Care, Family Care Conference, etc.)  6/19/2025 1605 by Cece Garcia RN  Outcome: Progressing  6/19/2025 1605 by Cece Garcia RN  Outcome: Progressing     Problem: SAFETY,RESTRAINT: NV/NON-SELF DESTRUCTIVE BEHAVIOR  Goal: Remains free of harm/injury (restraint for non violent/non self-detsructive behavior)  Description: INTERVENTIONS:  - Instruct patient/family regarding restraint use   - Assess and monitor physiologic and psychological status   - Provide interventions and comfort measures to meet assessed patient needs   - Identify and implement measures to help patient regain control  - Assess readiness for release of restraint   6/19/2025 1605 by Cece Garcia RN  Outcome: Progressing  6/19/2025 1605 by Cece Garcia RN  Outcome: Progressing  Goal: Returns to optimal restraint-free functioning  Description: INTERVENTIONS:  - Assess the patient's behavior and symptoms that indicate continued need for restraint  - Identify and implement measures to help patient regain control  - Assess readiness for release of restraint   6/19/2025 1605 by Cece Garcia RN  Outcome: Progressing  6/19/2025 1605 by Cece Garcia RN  Outcome: Progressing

## 2025-06-19 NOTE — PLAN OF CARE
Problem: OCCUPATIONAL THERAPY ADULT  Goal: Performs self-care activities at highest level of function for planned discharge setting.  See evaluation for individualized goals.  Description: Treatment Interventions: ADL retraining, Functional transfer training, UE strengthening/ROM, Endurance training, Cognitive reorientation, Patient/family training, Equipment evaluation/education, Fine motor coordination activities, Compensatory technique education, Continued evaluation, Energy conservation, Activityengagement          See flowsheet documentation for full assessment, interventions and recommendations.   Outcome: Progressing  Note: Limitation: Decreased ADL status, Decreased Safe judgement during ADL, Decreased cognition, Decreased endurance, Decreased fine motor control, Decreased self-care trans, Decreased high-level ADLs  Prognosis: Fair  Assessment: pt participated in am ot session and was seen focusing on ub and lb dressing, functional mobility and transfers, safety with rw use and pt and family education. pt was initially in waist restraint however nsg approved pt to be out of same with family supervision. pt with ngt for feeding currently. pt was pleasantly confused at times  and was cooperative. pt was able to sequence routine tasks this session and initiated and terminated task with ease. will follow focusing on goals from ie.  Recommendation: Physiatry Consult  Rehab Resource Intensity Level, OT: I (Maximum Resource Intensity)        April A Storm

## 2025-06-19 NOTE — ASSESSMENT & PLAN NOTE
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   Patient will need repeat imaging with neuro IR team, which will take place possibly early next week, until then the recommendation from neurosurgery/neuro IR is that patient remain in the hospital until that follow up  CT scan on 6/14 remained stable post surgical changes  MRI on 6/14 with scattered acute and early subacute infarcts in bilateral frontal and parietal lobes, L temporal region  Keppra 1000mg BID   q4H neurochecks  Repeat CT scan if decline in GCS by >2 points  SBP goal <140   Seroquel 50mg BID and 75mg at night to help with sleep wake cycle, agitation secondary to TBI-he is improving  Continue melatonin and amitriptyline as well at night  Delirium precuations  Neurosurgery follow up as outpatient  Anticipate need for rehab placement

## 2025-06-19 NOTE — CASE MANAGEMENT
Case Management Discharge Planning Note    Patient name Jasson Carter  Location [facility] 619/[facility] 619-01 MRN [id number]  : 1950 Date 2025       Current Admission Date: 2025  Current Admission Diagnosis:SDH (subdural hematoma) (HCC)   Patient Active Problem List    Diagnosis Date Noted    Dysphagia 2025    Post traumatic seizure (HCC) 2025    Effusion of right knee 2025    Atrial fibrillation (HCC) 2025    SDH (subdural hematoma) (HCC) 2025    Altered mental status 2025    Fall 2025    Hyponatremia 2024    Neuropathy 2021    Reflux esophagitis 2020    Seasonal allergic rhinitis due to pollen 2020    Primary osteoarthritis involving multiple joints 2020    Posttraumatic stress disorder 2020    History of gout 2019    Pure hypercholesterolemia 2017    Hypertension 2017    Benign prostatic hyperplasia without urinary obstruction 2011      LOS (days): 8  Geometric Mean LOS (GMLOS) (days): 6.5  Days to GMLOS:-1.5     OBJECTIVE:  Risk of Unplanned Readmission Score: 17.26         Current admission status: Inpatient   Preferred Pharmacy:   [facility]/pharmacy #[id number] - [place] - [address]  [address]  [facility] [address]  Phone: [phone] Fax: [phone]    Primary Care Provider: Ruben Finch MD    Primary Insurance: MEDICARE  Secondary Insurance: [facility]    DISCHARGE DETAILS:    Plan for VBS today; CM will follow for continued progress and medical stability. Plan remains for a d/c to [facility]

## 2025-06-19 NOTE — ASSESSMENT & PLAN NOTE
Speech following  Continue level II modified diet, thickened liquids-nectar thick   Continue feeds by NGT nightly  6/19 start Marinol to stimulate appetite  Plan for VBS tomorrow, 6/20 with speech

## 2025-06-19 NOTE — ASSESSMENT & PLAN NOTE
- Status post fall with the below noted injuries.  - Fall precautions.  - Geriatric Medicine consultation for evaluation, medication review and recommendations.  - PT and OT evaluation and treatment as indicated.  - Case Management consultation for disposition planning.     Osteoarthritis of left knee

## 2025-06-19 NOTE — PROGRESS NOTES
Progress Note - Trauma   Name: Jasson Carter 75 y.o. 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   Patient will need repeat imaging with neuro IR team, which will take place possibly early next week, until then the recommendation from neurosurgery/neuro IR is that patient remain in the hospital until that follow up  CT scan on 6/14 remained stable post surgical changes  MRI on 6/14 with scattered acute and early subacute infarcts in bilateral frontal and parietal lobes, L temporal region  Keppra 1000mg BID   q4H neurochecks  Repeat CT scan if decline in GCS by >2 points  SBP goal <140   Seroquel 50mg BID and 75mg at night to help with sleep wake cycle, agitation secondary to TBI-he is improving  Continue melatonin and amitriptyline as well at night  Delirium precuations  Neurosurgery follow up as outpatient  Anticipate need for rehab placement  Altered mental status  Secondary to TBI  Medications and delirium precautions as above  Goal is to de-escalate some of the care including NG TF with hopes we can start to remove restraints  Fall  - Status post fall with the below noted injuries.  - Fall precautions.  - Geriatric Medicine consultation for evaluation, medication review and recommendations.  - PT and OT evaluation and treatment as indicated.  - Case Management consultation for disposition planning.    Atrial fibrillation (HCC)  Monitor on telemetry   Effusion of right knee  No current pain  No intervention needed  Post traumatic seizure (HCC)  Witnessed possible tonic clonic activity post TBI  EEG completed and no seizures noted  On  keppra 1000mg BID for 14 day course  Hypertension  Goal SBP <140 per neurosurgery   Started on norvasc  Will resume home cozaar  Dysphagia  Speech following  Continue level II modified diet, thickened liquids-nectar thick   Continue feeds by NGT nightly  6/19 start Marinol to stimulate appetite  Plan for VBS tomorrow, 6/20 with speech    Bowel Regimen: Dulcolax, Miralax, senna  VTE Prophylaxis:VTE covered by:  enoxaparin, Subcutaneous, 30 mg at 06/19/25 0901        Disposition: Continue current level of care.    Please contact the SecureChat role for the Trauma service with any questions/concerns.    24 Hour Events : No acute events overnight. Per nursing, patient still with mild agitation overnight and decreased appetite this morning.   Subjective : Patient alert and oriented x 4 this morning. He states he is feeling well and denies any pain. He does endorse depressed appetite but otherwise denies complaints. He was up and ambulating with PT/OT.     Objective :  Temp:  [97.7 °F (36.5 °C)-100.1 °F (37.8 °C)] 97.7 °F (36.5 °C)  HR:  [] 96  BP: (135-175)/() 174/115  Resp:  [15-19] 16  SpO2:  [91 %-98 %] 98 %  O2 Device: None (Room air)    I/O         06/17 0701  06/18 0700 06/18 0701  06/19 0700 06/19 0701  06/20 0700    P.O.  170     I.V. (mL/kg) 536.3 (6.1)      NG/ 490     IV Piggyback 130      Feedings 1020 415     Total Intake(mL/kg) 1951.3 (22.4) 1075 (12.3)     Urine (mL/kg/hr) 2700 (1.3) 1100 (0.5)     Emesis/NG output  0     Stool       Total Output 2700 1100     Net -748.8 -25            Unmeasured Urine Occurrence  1 x           Lines/Drains/Airways       Active Status       Name Placement date Placement time Site Days    NG/OG/Enteral Tube Left nare 06/14/25  1808  Left nare  4    External Urinary Catheter 06/15/25  1805  -- 3                  Physical Exam  Vitals and nursing note reviewed.   Constitutional:       General: He is not in acute distress.     Appearance: He is  well-developed.   HENT:      Head: Normocephalic.      Comments: Surgical incisions healing well without signs of infection     Nose:      Comments: NGT in place    Eyes:      Extraocular Movements: Extraocular movements intact.      Conjunctiva/sclera: Conjunctivae normal.       Cardiovascular:      Rate and Rhythm: Normal rate and regular rhythm.      Heart sounds: Normal heart sounds.   Pulmonary:      Effort: Pulmonary effort is normal. No respiratory distress.      Breath sounds: Normal breath sounds.   Chest:      Chest wall: No tenderness.   Abdominal:      General: There is no distension.      Palpations: Abdomen is soft.      Tenderness: There is no abdominal tenderness. There is no guarding.     Musculoskeletal:         General: No swelling or deformity. Normal range of motion.      Cervical back: Normal range of motion and neck supple.     Skin:     General: Skin is warm and dry.      Capillary Refill: Capillary refill takes less than 2 seconds.     Neurological:      General: No focal deficit present.      Mental Status: He is alert and oriented to person, place, and time. Mental status is at baseline.      Sensory: No sensory deficit.      Motor: No weakness.     Psychiatric:         Mood and Affect: Mood normal.         Behavior: Behavior normal.             Lab Results: I have reviewed the following results:  Recent Labs     06/18/25  0615   WBC 9.01   HGB 12.9   HCT 38.5      SODIUM 140   K 3.6      CO2 25   BUN 15   CREATININE 0.54*   GLUC 125   CAIONIZED 1.12   MG 2.2   PHOS 3.5       Imaging Results Review: No pertinent imaging studies reviewed.  Other Study Results Review: No additional pertinent studies reviewed.

## 2025-06-20 ENCOUNTER — APPOINTMENT (INPATIENT)
Dept: RADIOLOGY | Facility: HOSPITAL | Age: 75
DRG: 025 | End: 2025-06-20
Payer: MEDICARE

## 2025-06-20 LAB
GLUCOSE SERPL-MCNC: 113 MG/DL (ref 65–140)
GLUCOSE SERPL-MCNC: 117 MG/DL (ref 65–140)
GLUCOSE SERPL-MCNC: 143 MG/DL (ref 65–140)
GLUCOSE SERPL-MCNC: 146 MG/DL (ref 65–140)

## 2025-06-20 PROCEDURE — 97535 SELF CARE MNGMENT TRAINING: CPT

## 2025-06-20 PROCEDURE — 92526 ORAL FUNCTION THERAPY: CPT

## 2025-06-20 PROCEDURE — 97530 THERAPEUTIC ACTIVITIES: CPT

## 2025-06-20 PROCEDURE — 99233 SBSQ HOSP IP/OBS HIGH 50: CPT | Performed by: INTERNAL MEDICINE

## 2025-06-20 PROCEDURE — 82948 REAGENT STRIP/BLOOD GLUCOSE: CPT

## 2025-06-20 PROCEDURE — 74230 X-RAY XM SWLNG FUNCJ C+: CPT

## 2025-06-20 PROCEDURE — 99232 SBSQ HOSP IP/OBS MODERATE 35: CPT | Performed by: STUDENT IN AN ORGANIZED HEALTH CARE EDUCATION/TRAINING PROGRAM

## 2025-06-20 PROCEDURE — 97116 GAIT TRAINING THERAPY: CPT

## 2025-06-20 PROCEDURE — 92611 MOTION FLUOROSCOPY/SWALLOW: CPT

## 2025-06-20 RX ADMIN — ENOXAPARIN SODIUM 30 MG: 30 INJECTION SUBCUTANEOUS at 21:52

## 2025-06-20 RX ADMIN — QUETIAPINE FUMARATE 100 MG: 100 TABLET ORAL at 21:52

## 2025-06-20 RX ADMIN — LIDOCAINE 1 PATCH: 700 PATCH TOPICAL at 08:10

## 2025-06-20 RX ADMIN — SENNOSIDES 8.6 MG: 8.6 TABLET, FILM COATED ORAL at 17:26

## 2025-06-20 RX ADMIN — QUETIAPINE 50 MG: 25 TABLET ORAL at 08:09

## 2025-06-20 RX ADMIN — POLYETHYLENE GLYCOL 3350 17 G: 17 POWDER, FOR SOLUTION ORAL at 08:10

## 2025-06-20 RX ADMIN — DRONABINOL 2.5 MG: 2.5 CAPSULE ORAL at 12:51

## 2025-06-20 RX ADMIN — SENNOSIDES 8.6 MG: 8.6 TABLET, FILM COATED ORAL at 08:09

## 2025-06-20 RX ADMIN — QUETIAPINE 50 MG: 25 TABLET ORAL at 17:26

## 2025-06-20 RX ADMIN — Medication 3 MG: at 21:52

## 2025-06-20 RX ADMIN — AMLODIPINE BESYLATE 5 MG: 5 TABLET ORAL at 08:09

## 2025-06-20 RX ADMIN — PANTOPRAZOLE SODIUM 40 MG: 40 INJECTION, POWDER, FOR SOLUTION INTRAVENOUS at 05:25

## 2025-06-20 RX ADMIN — DRONABINOL 2.5 MG: 2.5 CAPSULE ORAL at 17:26

## 2025-06-20 RX ADMIN — LEVETIRACETAM 1000 MG: 500 TABLET, FILM COATED ORAL at 08:09

## 2025-06-20 RX ADMIN — LEVETIRACETAM 1000 MG: 500 TABLET, FILM COATED ORAL at 21:52

## 2025-06-20 RX ADMIN — ACETAMINOPHEN 650 MG: 650 SUSPENSION ORAL at 08:11

## 2025-06-20 RX ADMIN — LOSARTAN POTASSIUM 100 MG: 50 TABLET, FILM COATED ORAL at 08:09

## 2025-06-20 RX ADMIN — ENOXAPARIN SODIUM 30 MG: 30 INJECTION SUBCUTANEOUS at 08:09

## 2025-06-20 NOTE — ASSESSMENT & PLAN NOTE
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   - Patient will need repeat imaging with neuro IR team, which will take place possibly early next week, until then the recommendation from neurosurgery/neuro IR is that patient remain in the hospital until that follow up  - CT scan on 6/14 remained stable post surgical changes  - MRI on 6/14 with scattered acute and early subacute infarcts in bilateral frontal and parietal lobes, L temporal region  - Keppra 1000mg BID   - q4H neurochecks  - Repeat CT scan if decline in GCS by >2 points  - SBP goal <140   - Seroquel 50mg BID and 75mg at night to help with sleep wake cycle, agitation secondary to TBI-he is improving  - Continue melatonin and amitriptyline as well at night  - Delirium precuations  - Neurosurgery follow up as outpatient  - Anticipate need for rehab placement

## 2025-06-20 NOTE — SPEECH THERAPY NOTE
"Speech Language/Pathology  Speech-Language Pathology Progress Note      Patient Name: Jasson Carter    Today's Date: 6/20/2025      Subjective:  Pt was awake and alert. He was sitting upright in bed. Patient stated \" No I really am quite full, my boys made me eat\".    Objective:  Pt was seen for additional treatment/follow up- ng has been removed.  Pt seen w/ straw sips thin.   Swallow function:   Bolus retrieval was fair, the pt needed repositioning in the bed and was verbally cued to take a single sip. AP transfer was timely. Pharyngeal swallow initiation was present. Hyolaryngeal excursion was mildly sluggish - he did use 2-3 swallows per sip. No s/s aspiration occurred during session today.  Pt took ~ 6 oz with SLP, declined any upgraded solid material.   Assessment:  Pt w/ improved swallow function with thin without NG in place.  Appropriate for upgrade at least to thin.     Plan:  Change liquid consistency to thin liquids.   Oral care often  SLP will continue to trial solid  "

## 2025-06-20 NOTE — PLAN OF CARE
Problem: Nutrition/Hydration-ADULT  Goal: Nutrient/Hydration intake appropriate for improving, restoring or maintaining nutritional needs  Description: Monitor and assess patient's nutrition/hydration status for malnutrition. Collaborate with interdisciplinary team and initiate plan and interventions as ordered.  Monitor patient's weight and dietary intake as ordered or per policy. Utilize nutrition screening tool and intervene as necessary. Determine patient's food preferences and provide high-protein, high-caloric foods as appropriate.     INTERVENTIONS:  - Monitor oral intake, urinary output, labs, and treatment plans  - Assess nutrition and hydration status and recommend course of action  - Evaluate amount of meals eaten  - Assist patient with eating if necessary   - Allow adequate time for meals  - Recommend/ encourage appropriate diets, oral nutritional supplements, and vitamin/mineral supplements  - Order, calculate, and assess calorie counts as needed  - Recommend, monitor, and adjust tube feedings and TPN/PPN based on assessed needs  - Assess need for intravenous fluids  - Provide specific nutrition/hydration education as appropriate  - Include patient/family/caregiver in decisions related to nutrition  Outcome: Progressing     Problem: PAIN - ADULT  Goal: Verbalizes/displays adequate comfort level or baseline comfort level  Description: Interventions:  - Encourage patient to monitor pain and request assistance  - Assess pain using appropriate pain scale  - Administer analgesics as ordered based on type and severity of pain and evaluate response  - Implement non-pharmacological measures as appropriate and evaluate response  - Consider cultural and social influences on pain and pain management  - Notify physician/advanced practitioner if interventions unsuccessful or patient reports new pain  - Educate patient/family on pain management process including their role and importance of  reporting pain   -  Provide non-pharmacologic/complimentary pain relief interventions  Outcome: Progressing     Problem: INFECTION - ADULT  Goal: Absence or prevention of progression during hospitalization  Description: INTERVENTIONS:  - Assess and monitor for signs and symptoms of infection  - Monitor lab/diagnostic results  - Monitor all insertion sites, i.e. indwelling lines, tubes, and drains  - Monitor endotracheal if appropriate and nasal secretions for changes in amount and color  - [place] appropriate cooling/warming therapies per order  - Administer medications as ordered  - Instruct and encourage patient and family to use good hand hygiene technique  - Identify and instruct in appropriate isolation precautions for identified infection/condition  Outcome: Progressing  Goal: Absence of fever/infection during neutropenic period  Description: INTERVENTIONS:  - Monitor WBC  - Perform strict hand hygiene  - Limit to healthy visitors only  - No plants, dried, fresh or silk flowers with winters in patient room  Outcome: Progressing     Problem: SAFETY ADULT  Goal: Patient will remain free of falls  Description: INTERVENTIONS:  - Educate patient/family on patient safety including physical limitations  - Instruct patient to call for assistance with activity   - Consider consulting OT/PT to assist with strengthening/mobility based on AM PAC & JH-HLM score  - Consult OT/PT to assist with strengthening/mobility   - Keep Call bell within reach  - Keep bed low and locked with side rails adjusted as appropriate  - Keep care items and personal belongings within reach  - Initiate and maintain comfort rounds  - Make Fall Risk Sign visible to staff  - Apply yellow socks and bracelet for high fall risk patients  - Consider moving patient to room near nurses station  Outcome: Progressing  Goal: Maintain or return to baseline ADL function  Description: INTERVENTIONS:  -  Assess patient's ability to carry out ADLs; assess patient's baseline for ADL  function and identify physical deficits which impact ability to perform ADLs (bathing, care of mouth/teeth, toileting, grooming, dressing, etc.)  - Assess/evaluate cause of self-care deficits   - Assess range of motion  - Assess patient's mobility; develop plan if impaired  - Assess patient's need for assistive devices and provide as appropriate  - Encourage maximum independence but intervene and supervise when necessary  - Involve family in performance of ADLs  - Assess for home care needs following discharge   - Consider OT consult to assist with ADL evaluation and planning for discharge  - Provide patient education as appropriate  - Monitor functional capacity and physical performance, use of AM PAC & JH-HLM   - Monitor gait, balance and fatigue with ambulation    Outcome: Progressing  Goal: Maintains/Returns to pre admission functional level  Description: INTERVENTIONS:  - Perform AM-PAC 6 Click Basic Mobility/ Daily Activity assessment daily.  - Set and communicate daily mobility goal to care team and patient/family/caregiver.   - Collaborate with rehabilitation services on mobility goals if consulted  - Perform Range of Motion 3 times a day.  - Reposition patient every 2 hours.  - Dangle patient 3 times a day  - Stand patient 3 times a day  - Ambulate patient 3 times a day  - Out of bed to chair 3 times a day   - Out of bed for meals 3  Problem: Knowledge Deficit  Goal: Patient/family/caregiver demonstrates understanding of disease process, treatment plan, medications, and discharge instructions  Description: Complete learning assessment and assess knowledge base.  Interventions:  - Provide teaching at level of understanding  - Provide teaching via preferred learning methods  Outcome: Progressing     Problem: NEUROSENSORY - ADULT  Goal: Achieves stable or improved neurological status  Description: INTERVENTIONS  - Monitor and report changes in neurological status  - Monitor vital signs such as temperature,  blood pressure, glucose, and any other labs ordered   - Initiate measures to prevent increased intracranial pressure  - Monitor for seizure activity and implement precautions if appropriate      Outcome: Progressing  Goal: Remains free of injury related to seizures activity  Description: INTERVENTIONS  - Maintain airway, patient safety  and administer oxygen as ordered  - Monitor patient for seizure activity, document and report duration and description of seizure to physician/advanced practitioner  - If seizure occurs,  ensure patient safety during seizure  - Reorient patient post seizure  - Seizure pads on all 4 side rails  - Instruct patient/family to notify RN of any seizure activity including if an aura is experienced  - Instruct patient/family to call for assistance with activity based on nursing assessment  - Administer anti-seizure medications if ordered    Outcome: Progressing  Goal: Achieves maximal functionality and self care  Description: INTERVENTIONS  - Monitor swallowing and airway patency with patient fatigue and changes in neurological status  - Encourage and assist patient to increase activity and self care.   - Encourage visually impaired, hearing impaired and aphasic patients to use assistive/communication devices  Outcome: Progressing     Problem: RESPIRATORY - ADULT  Goal: Achieves optimal ventilation and oxygenation  Description: INTERVENTIONS:  - Assess for changes in respiratory status  - Assess for changes in mentation and behavior  - Position to facilitate oxygenation and minimize respiratory effort  - Oxygen administered by appropriate delivery if ordered  - Initiate smoking cessation education as indicated  - Encourage broncho-pulmonary hygiene including cough, deep breathe, Incentive Spirometry  - Assess the need for suctioning and aspirate as needed  - Assess and instruct to report SOB or any respiratory difficulty  - Respiratory Therapy support as indicated  Outcome: Progressing      Problem: GASTROINTESTINAL - ADULT  Goal: Minimal or absence of nausea and/or vomiting  Description: INTERVENTIONS:  - Administer IV fluids if ordered to ensure adequate hydration  - Maintain NPO status until nausea and vomiting are resolved  - Nasogastric tube if ordered  - Administer ordered antiemetic medications as needed  - Provide nonpharmacologic comfort measures as appropriate  - Advance diet as tolerated, if ordered  - Consider nutrition services referral to assist patient with adequate nutrition and appropriate food choices  Outcome: Progressing  Goal: Maintains or returns to baseline bowel function  Description: INTERVENTIONS:  - Assess bowel function  - Encourage oral fluids to ensure adequate hydration  - Administer IV fluids if ordered to ensure adequate hydration  - Administer ordered medications as needed  - Encourage mobilization and activity  - Consider nutritional services referral to assist patient with adequate nutrition and appropriate food choices  Outcome: Progressing  Goal: Maintains adequate nutritional intake  Description: INTERVENTIONS:  - Monitor percentage of each meal consumed  - Identify factors contributing to decreased intake, treat as appropriate  - Assist with meals as needed  - Monitor I&O, weight, and lab values if indicated  - Obtain nutrition services referral as needed  Outcome: Progressing  Goal: Establish and maintain optimal ostomy function  Description: INTERVENTIONS:  - Assess bowel function  - Encourage oral fluids to ensure adequate hydration  - Administer IV fluids if ordered to ensure adequate hydration   - Administer ordered medications as needed  - Encourage mobilization and activity  - Nutrition services referral to assist patient with appropriate food choices  - Assess stoma site  - Consider wound care consult   Outcome: Progressing  Goal: Oral mucous membranes remain intact  Description: INTERVENTIONS  - Assess oral mucosa and hygiene practices  - Implement  preventative oral hygiene regimen  - Implement oral medicated treatments as ordered  - Initiate Nutrition services referral as needed  Outcome: Progressing     Problem: METABOLIC, FLUID AND ELECTROLYTES - ADULT  Goal: Electrolytes maintained within normal limits  Description: INTERVENTIONS:  - Monitor labs and assess patient for signs and symptoms of electrolyte imbalances  - Administer electrolyte replacement as ordered  - Monitor response to electrolyte replacements, including repeat lab results as appropriate  - Instruct patient on fluid and nutrition as appropriate  Outcome: Progressing  Goal: Fluid balance maintained  Description: INTERVENTIONS:  - Monitor labs   - Monitor I/O and WT  - Instruct patient on fluid and nutrition as appropriate  - Assess for signs & symptoms of volume excess or deficit  Outcome: Progressing  Goal: Glucose maintained within target range  Description: INTERVENTIONS:  - Monitor Blood Glucose as ordered  - Assess for signs and symptoms of hyperglycemia and hypoglycemia  - Administer ordered medications to maintain glucose within target range  - Assess nutritional intake and initiate nutrition service referral as needed  Outcome: Progressing     Problem: GENITOURINARY - ADULT  Goal: Maintains or returns to baseline urinary function  Description: INTERVENTIONS:  - Assess urinary function  - Encourage oral fluids to ensure adequate hydration if ordered  - Administer IV fluids as ordered to ensure adequate hydration  - Administer ordered medications as needed  - Offer frequent toileting  - Follow urinary retention protocol if ordered  Outcome: Progressing  Goal: Absence of urinary retention  Description: INTERVENTIONS:  - Assess patient’s ability to void and empty bladder  - Monitor I/O  - Bladder scan as needed  - Discuss with physician/AP medications to alleviate retention as needed  - Discuss catheterization for long term situations as appropriate  Outcome: Progressing  Goal: Urinary  catheter remains patent  Description: INTERVENTIONS:  - Assess patency of urinary catheter  - If patient has a chronic pedersen, consider changing catheter if non-functioning  - Follow guidelines for intermittent irrigation of non-functioning urinary catheter  Outcome: Progressing     Problem: HEMATOLOGIC - ADULT  Goal: Maintains hematologic stability  Description: INTERVENTIONS  - Assess for signs and symptoms of bleeding or hemorrhage  - Monitor labs  - Administer supportive blood products/factors as ordered and appropriate  Outcome: Progressing     Problem: MUSCULOSKELETAL - ADULT  Goal: Maintain or return mobility to safest level of function  Description: INTERVENTIONS:  - Assess patient's ability to carry out ADLs; assess patient's baseline for ADL function and identify physical deficits which impact ability to perform ADLs (bathing, care of mouth/teeth, toileting, grooming, dressing, etc.)  - Assess/evaluate cause of self-care deficits   - Assess range of motion  - Assess patient's mobility  - Assess patient's need for assistive devices and provide as appropriate  - Encourage maximum independence but intervene and supervise when necessary  - Involve family in performance of ADLs  - Assess for home care needs following discharge   - Consider OT consult to assist with ADL evaluation and planning for discharge  - Provide patient education as appropriate  Outcome: Progressing  Goal: Maintain proper alignment of affected body part  Description: INTERVENTIONS:  - Support, maintain and protect limb and body alignment  - Provide patient/ family with appropriate education  Outcome: Progressing     Problem: DECISION MAKING  Goal: Pt/Family able to effectively weigh alternatives and participate in decision making related to treatment and care  Description: INTERVENTIONS:  - Identify decision maker  - Determine when there are differences among patient's view, family's view, and healthcare provider's view of patient condition  and care goals  - Facilitate patient/family articulation of goals for care  - Help patient/family identify pros/cons of alternative solutions  - Provide information as requested by patient/family  - Respect patient/family rights related to privacy and sharing information   - Serve as a liaison between patient, family and health care team  - Initiate consults as appropriate (Ethics Team, Palliative Care, Family Care Conference, etc.)  Outcome: Progressing     Problem: SAFETY,RESTRAINT: NV/NON-SELF DESTRUCTIVE BEHAVIOR  Goal: Remains free of harm/injury (restraint for non violent/non self-detsructive behavior)  Description: INTERVENTIONS:  - Instruct patient/family regarding restraint use   - Assess and monitor physiologic and psychological status   - Provide interventions and comfort measures to meet assessed patient needs   - Identify and implement measures to help patient regain control  - Assess readiness for release of restraint   Outcome: Progressing  Goal: Returns to optimal restraint-free functioning  Description: INTERVENTIONS:  - Assess the patient's behavior and symptoms that indicate continued need for restraint  - Identify and implement measures to help patient regain control  - Assess readiness for release of restraint   Outcome: Progressing     Problem: CONFUSION/THOUGHT DISTURBANCE  Goal: Thought disturbances (confusion, delirium, depression, dementia or psychosis) are managed to maintain or return to baseline mental status and functional level  Description: INTERVENTIONS:  - Assess for possible contributors to  thought disturbance, including but not limited to medications, infection, impaired vision or hearing, underlying metabolic abnormalities, dehydration, respiratory compromise,  psychiatric diagnoses and notify attending PHYSICAN/AP  - Monitor and intervene to maintain adequate nutrition, hydration, elimination, sleep and activity  - Decrease environmental stimuli, including noise as  appropriate.  - Provide frequent contacts to provide refocusing, direction and reassurance as needed. Approach patient calmly with eye contact and at their level.  - [place] high risk fall precautions, aspiration precautions and other safety measures, as indicated  - If delirium suspected, notify physician/AP of change in condition and request immediate in-person evaluation  - Pursue consults as appropriate including Geriatric (campus dependent), OT for cognitive evaluation/activity planning, psychiatric, pastoral care, etc.  Outcome: Progressing     Problem: BEHAVIOR  Goal: Pt/Family maintain appropriate behavior and adhere to behavioral management agreement, if implemented  Description: INTERVENTIONS:  - Assess the family dynamic   - Encourage verbalization of thoughts and concerns in a socially appropriate manner  - Assess patient/family's coping skills and non-compliant behavior (including use of illegal substances).  - Utilize positive, consistent limit setting strategies supporting safety of patient, staff and others  - Initiate consult with Case Management, Spiritual Care or other ancillary services as appropriate  - If a patient's/visitor's behavior jeopardizes the safety of the patient, staff, or others, refer to organization procedure.   - Notify Security of behavior or suspected illegal substances which indicate the need for search of the patient and/or belongings  - Encourage participation in the decision making process about a behavioral management agreement; implement if patient meets criteria  Outcome: Progressing    times a day  - Out of bed for toileting  - Record patient progress and toleration of activity level   Outcome: Progressing

## 2025-06-20 NOTE — PROGRESS NOTES
Progress Note - Geriatric Medicine   Jasson Carter 75 y.o. male MRN: [id number]  [facility] Encounter: [id number]      Assessment/Plan:    Ambulatory dysfunction with fall  -reportedly mechanical fall day prior to admission   -admitted with below noted injuries   -Does not require use of assist device for ambulation at baseline  -endorses hx prior falls at least one additional in past six months   -remains high risk future falls due to age, hx of fall, deconditioning/debility and unfamiliar environment   -encourage good body mechanics and assist with all transfers  -maintain environment free of fall hazards  -encourage appropriate footwear and adequate lighting at all times when out of bed  -consider home fall risk assessment and personal fall alert system if returning home following rehab   -PT and OT following, anticipate need for rehab on hosp d/c      Subdural hematoma  -s/p fall as outlined above  -CTH on initial presentation noted 14 mm mixed density acute left cerebral convexity subdural hematoma with associated 5 mm rightward midline shift and low-density 7 mm right cerebral convexity subdural hematoma age indeterminate   -s/p left-sided crani and right sided bur hole for evaluation of subdural hematoma 6/11/25  -s/p partial embolization DAVF via left ophthalmic artery 6/12/25   -post-op course complicated by seizure briefly requiring intubation for airway protection as well as acute stroke in multiple vascular territories  -currently on Keppra for seizure ppx   -CTH 6/14/25 notes decrease size of L SDH with no new area of hemorrhage and stable post op changes   -neurochecks per protocol  -Nsx on consult and following, recommend additional treatment of DAVF during current hospitalization, timing TBD by Nsx   -PT/OT/CM following      Altered mental status  -alert and oriented to person place situation and year not month or day on initial consultation, today very confused oriented only to  self otherwise responses completely inappropriate to question asked, nursing notes has been fluctuating worse in afternoon/evenings   -mentation fluctuating during admission typically clearer cognition during day and more impulsive/inattentive in evenings   -at baseline fully oriented independent with ADLs and iADLs  -no prior memory or cognitive concerns reported  -multifactorial including recent SDH requiring surgical evacuation, recent CVA in multiple vascular territories, post traumatic seizure and prolonged hospitalization in setting of TBI  -currently on Elavil which is not current home medication [facility], consider d/c as may contribute to confusion and QTc prolongation in older adults  -if complains of persistent neuropathy symptoms can consider trial low dose Gabapentin HS   -currently on Seroquel 50mg BID and 75mg HS, consider re-timing of HS dose to 21:00 hours to allow time to onset prior to sleep, monitor electrolytes and QTc closely (437msec on 6/17/25), caution with increasing dosage due to risk lowering seizure threshold   -continue supportive cares and strict delirium precautions      Posttraumatic seizure  -reportedly witnessed tonic clonic activity following TBI  -no seizure activity reported on 24H EEG completed earlier in admission   -continued on course of Keppra for seizure ppx  -continue seizure precautions      Acute CVA of multiple vascular territories   -MRI brain 6/14/25 notes several small scattered acute/early subacute infarcts bilateral frontal and parietal lobes and left subinsular/anterior temporal region  -consider stroke pathway   -remains high risk recurrence, cont secondary risk factor modifications   -Nsx on consult      Dysphagia  -NG tube removed earlier today  -speech on consult, currently on level II dysphagia diet, mech soft with nectar thick liquids   -continue to de-escalate restraints since d/c of NG tube  -continue strict aspiration precautions   -speech on  consult and following   -Marinol added 6/19 for potential appetite stimulation   -if no significant improvement in oral intake over weekend with removal of NG tube could consider low dose Mirtazapine 15mg HS with close monitoring of electrolytes, if too sedate with use can consider reducing evening Seroquel dose to allow for addition of Mirtazapine      Atrial fibrillation  -reportedly new diagnosis this admission   -monitored on telemetry  -no recent echo fr review, consider obtaining   -rates appear to be well controlled on current regimen  -not currently on systemic a/c due to SDH requiring surgical evacuation as above  -continue optimization of hemodynamics    -potential Cardiology consult per ICU documentation earlier in admission      Cognitive screening  -awake and alert but completely disoriented at time of evaluation   -reportedly fully oriented and independent with ADLs/iADLs at baseline  -no prior cognitive testing on record for review  -TSH WNL 2.364, B12 low at 204 on [facility]p [facility], recommend supplementation to goal at least 400  -at risk cardiovascular, cerebrovascular and TBI related cognitive decline, continue secondary risk factor modifications and supportive cares, consider cognitive testing with OT  -encourage patient remain physically, socially, cognitively active and engaged to maintain cognitive acuity     Impaired Vision  -recommend use of corrective lenses at all appropriate times  -encourage adequate lighting and encourage use of assistance with ambulation  -keep personal belongings close to person to avoid reaching  -encourage appropriate footwear at all times  -consider large font for printed materials provided to patient      Impaired Hearing  -Encourage use of hearing aids at all appropriate times  -encourage providers and caregivers to speak slowly and clearly directly to patient  -minimize background noise to encourage patient engagement  -consider use of hearing amplifier to  reduce risk of straining to hear if hearing aids are not present or are not sufficient   -encourage use of teach back method to ensure clear communication       Frailty syndrome in geriatric patient  -clinical frailty scale stage V, mildly frail, progressive  -multifactorial including age, HTN, HLD now with fall and multitude of acute traumatic injuries superimposed in elderly individual with limited physiologic and metabolic reserves in elderly individual with limited physiologic and metabolic reserves   -continue optimization chronic conditions and address acute derangements as arise  -encourage well balanced nutritional intake, nutrition on consult  -ensure underlying anxiety/mood/depression symptoms are well controlled as may impact response to therapies as well as overall sense of wellbeing and quality of life  -continue psychosocial support of patient and caregivers   -continue to ensure treatment and interventions align with patient wishes and goals of care      High risk developing delirium   -due to age, SDH requiring surgical evacuation, TBI, acute/subacute stroke and prolonged hospitalization   -continue delirium precautions  -encourage normal sleep/wake cycle  -minimize overnight interruptions, group overnight vitals/labs/nursing checks as medically appropriate   -dim lights, close blinds and turn off tv to minimize stimulation and encourage sleep environment in evenings  -ensure that pain if present is well controlled   -monitor for fecal and urinary retention which may precipitate delirium, last BM recorded 6/19  -encourage early mobilization and ambulation with assist once cleared to safely do so  -provide frequent reorientation and redirection as indicated and appropriate   -encourage family and friends at the bedside to help calm patient if anxious  -minimize use of medications which may precipitate or worsen delirium such as tramadol, benzodiazepine, anticholinergics, and benadryl  -encourage well  "balanced hydration and nutrition   -redirect unwanted behaviors as first line    Care coordination: rounded with Yayo (Trauma AP)    Subjective:     Sib is seen and examined at bedside where he is sitting resting comfortably he is sleeping but awakens to voice, he is pleasantly confused oriented to self talking about going to work last night and all the work he got done, offers no new acute complaints.     Review of Systems   Unable to perform ROS: Mental status change     Objective:     Vitals: Blood pressure 137/82, pulse 91, temperature (!) 97.3 °F (36.3 °C), resp. rate 16, height 6' 3.98\" (1.93 m), weight 87.2 kg (192 lb 3.9 oz), SpO2 96%.,Body mass index is 23.41 kg/m².      Intake/Output Summary (Last 24 hours) at 6/20/2025 1452  Last data filed at 6/20/2025 0530  Gross per 24 hour   Intake 1914 ml   Output 0 ml   Net 1914 ml       Current Medications: Reviewed    Physical Exam:   Physical Exam  Vitals and nursing note reviewed.   Constitutional:       General: He is not in acute distress.     Appearance: He is not toxic-appearing.   HENT:      Head: Normocephalic.      Comments: Bilateral scalp surgical sites clean and dry with staples in place     Nose: Nose normal.      Comments: NG tube no longer in place      Mouth/Throat:      Mouth: Mucous membranes are dry.     Eyes:      General: No scleral icterus.        Right eye: No discharge.         Left eye: No discharge.      Conjunctiva/sclera: Conjunctivae normal.     Neck:      Comments: Phonation norm   Cardiovascular:      Rate and Rhythm: Normal rate and regular rhythm.      Pulses: Normal pulses.   Pulmonary:      Effort: Pulmonary effort is normal. No respiratory distress.      Comments: Saturating well on room air   Abdominal:      General: Bowel sounds are normal. There is no distension.      Palpations: Abdomen is soft.      Tenderness: There is no abdominal tenderness.      Comments: Soft waist posey in place      Musculoskeletal:      Cervical " back: Neck supple.      Comments: Reduced overall muscle mass      Skin:     General: Skin is warm and dry.     Neurological:      Mental Status: He is alert.      Comments: Awake and alert, pleasantly confused stating he was working last night, speech otherwise non-sensical    Psychiatric:      Comments: Impulsive but easily redirected         Invasive Devices       Peripheral Intravenous Line  Duration             Peripheral IV 06/18/25 Left Antecubital 1 day              Drain  Duration             NG/OG/Enteral Tube Left nare 5 days    External Urinary Catheter 4 days                  Lab Results:     I have personally reviewed pertinent lab results including the following:    Results from last 7 days   Lab Units 06/18/25 0615 06/17/25 0453 06/16/25  0517   WBC Thousand/uL 9.01 7.75 7.49   HEMOGLOBIN g/dL 12.9 12.0 12.7   HEMATOCRIT % 38.5 36.3* 39.5   PLATELETS Thousands/uL 208 189 170   SEGS PCT % 74 74 76*   MONO PCT % 11 12 12   EOS PCT % 2 1 1     Results from last 7 days   Lab Units 06/18/25  0615 06/17/25  0453 06/16/25  0517   POTASSIUM mmol/L 3.6 3.4* 3.7   CHLORIDE mmol/L 105 107 106   CO2 mmol/L 25 26 27   BUN mg/dL 15 14 17   CREATININE mg/dL 0.54* 0.46* 0.58*   CALCIUM mg/dL 8.8 8.4 8.5     I have personally reviewed the following imaging study reports in PACS:    6/20/25- [facility] [place]

## 2025-06-20 NOTE — CASE MANAGEMENT
Case Management Discharge Planning Note    Patient name Jasson Carter  Location [facility] 619/[facility] 619-01 MRN [id number]  : 1950 Date 2025       Current Admission Date: 2025  Current Admission Diagnosis:SDH (subdural hematoma) (HCC)   Patient Active Problem List    Diagnosis Date Noted    Dysphagia 2025    Post traumatic seizure (HCC) 2025    Effusion of right knee 2025    Atrial fibrillation (HCC) 2025    SDH (subdural hematoma) (HCC) 2025    Altered mental status 2025    Fall 2025    Hyponatremia 2024    Neuropathy 2021    Reflux esophagitis 2020    Seasonal allergic rhinitis due to pollen 2020    Primary osteoarthritis involving multiple joints 2020    Posttraumatic stress disorder 2020    History of gout 2019    Pure hypercholesterolemia 2017    Hypertension 2017    Benign prostatic hyperplasia without urinary obstruction 2011      LOS (days): 9  Geometric Mean LOS (GMLOS) (days): 6.5  Days to GMLOS:-2.5     OBJECTIVE:  Risk of Unplanned Readmission Score: 17.69         Current admission status: Inpatient   Preferred Pharmacy:   [facility]/pharmacy [facility][id number] - [place] - [address]  [address] [place] [address]  Phone: [phone] Fax: [phone]    Primary Care Provider: Ruben Finch MD    Primary Insurance: MEDICARE  Secondary Insurance: [facility]    DISCHARGE DETAILS:    Pt for VBS today  Will continue to follow, as the pt will likely have some sort of Nsg intervention next week

## 2025-06-20 NOTE — ASSESSMENT & PLAN NOTE
- Speech following  - Continue level II modified diet, thickened liquids-nectar thick   - Continue feeds by NGT nightly  - 6/19 start Marinol to stimulate appetite  - Plan for VBS today, 6/20 with speech  - Will DC NG tube with anticipation to attempt p.o. trial

## 2025-06-20 NOTE — PROGRESS NOTES
Minimal meal completion and has not been consuming mightyshake per RN. Will trial magic cup with meals. Current TF regimen of Jevity 1.2 @ 60 mL/hr x 10 hours providing ~33% estimated energy needs, 31% protein needs on the low end.     Recommendation  Adjust tube feed to Jevity 1.5 @ 75 mL/hr x 10 hours (9931-0343). Provides 750 mL formula, 1125 calories, 48g protein, 570 mL water from TF formula. Meets ~45-50% of estimated energy and protein needs. Water flushes of 270 mL q4h would provide 2190 mL total fluid (25 mL/kg). Patient is taking some fluid PO, may need to adjust flushes based on this. Ultimately defer flushes to primary team.    Obtain updated weight as able. Continue to monitor electrolytes, GI tolerance    Noted that marinol was recently initiated. Monitor oral intake. If there is no improvement in meal completion, consider adjusting to continuous feeds. Jevity 1.5 at 65 mL/hr x 24 hours would provide 1560 mL formula, 2340 kcals, 100g protein, 1186 mL fluid from TF formula. Additional flushes per primary team, recommend minimum 30 mL q4h to maintain feeding tube patency.

## 2025-06-20 NOTE — SPEECH THERAPY NOTE
"Speech Language/Pathology  Speech-Language Pathology Progress Note      Patient Name: Jasson Carter    Today's Date: 6/20/2025      Subjective:  Pt was awake and alert. He was sitting upright in bed. Patient stated \"Oh hello there \".    Objective:  Pt was seen today for dysphagia therapy. Current diet is dysphagia 2 mechanical soft with nectar thick liquids. Pt was on room air. Oral care had already been completed. Focus of today's session was to maximize PO intake safety, improve use of strategies to minimize risk of aspiration, and to educate pt/family on safe PO intake strategies, results of MBS, and anatomy and physiology of swallowing/dysphagia in relation to pt's medical conditions. Sons present- education provided on MBS.  Pt w/ mod/severe retention in the pharynx with difficulty clearing.  Requested ng be removed.  No gross aspiration though retention placed him at risk to aspirate.  Textures offered today included soft solid, mech soft solid, and nectar thick liquid via straw.  Swallow function:   Bolus retrieval, breakdown, and AP transfer were slow but appropriate.  The pt continues to state he is not hungry and does not want more po.  Encouraged to take at least half of his tray - needed cues each bite/sip.   Pharyngeal swallow initiation was present. Hyolaryngeal excursion was sluggish and he did need mult swallows per bite/sip. No s/s aspiration occurred during session today. No coughing.  Pt stated he was full repeatedly during meal.    Pt was able to use the head turn to theL with his head down but not consistently.      Assessment:  Pt continues w/ poor appetite though is tolerating the current diet.  Once NG is out slp will trial w/ thin and strategy bedside.  Education completed w/ family present.     Plan:  Continue dysphagia 2 mechanical soft and nectar thick liquids. Continue ST follow up. Subsequent sessions to focus on maximizing PO intake safety, determining potential for diet texture " advancement, determining potential for liquid consistency advancement, and improving pt's self monitoring.   Implemented All Universal Safety Interventions:  [place] to call system. Call bell, personal items and telephone within reach. Instruct patient to call for assistance. Room bathroom lighting operational. Non-slip footwear when patient is off stretcher. Physically safe environment: no spills, clutter or unnecessary equipment. Stretcher in lowest position, wheels locked, appropriate side rails in place.

## 2025-06-20 NOTE — PLAN OF CARE
Problem: PHYSICAL THERAPY ADULT  Goal: Performs mobility at highest level of function for planned discharge setting.  See evaluation for individualized goals.  Description: Treatment/Interventions: ADL retraining, LE strengthening/ROM, Functional transfer training, Elevations, Therapeutic exercise, Endurance training, Bed mobility, Gait training  Equipment Recommended: Other (Comment) (TBD pending progress and family confirmation of available DME.)       See flowsheet documentation for full assessment, interventions and recommendations.  Outcome: Progressing  Note: Prognosis: Fair  Problem List: Decreased strength, Decreased range of motion, Impaired balance, Decreased endurance, Decreased mobility, Decreased coordination, Decreased cognition, Impaired judgement, Decreased safety awareness, Decreased skin integrity  Assessment: Pt continues to require mod Ax1-2 for all tasks at this time primarily due to impaired neuromotor command, balance, lack of safety awarenss & initiation of tasks despite instructions given throughout session as noted above.  Pt completes all tasks without overt LOB or change in status, but is easily distracted & required redirection to task & appropriate use of RW.  Pt returned to bed with waist restraint reapplied & all needs in reach.  PT POC & d/c recommendations remian appropriate at this time to address deficits to maximize his abilities while reducing long term burden of care pending onging cognitive recovery in hopes that he can regain higher level of funcitonal independence.  Barriers to Discharge: Inaccessible home environment, Decreased caregiver support     Rehab Resource Intensity Level, PT: I (Maximum Resource Intensity)    See flowsheet documentation for full assessment.

## 2025-06-20 NOTE — ASSESSMENT & PLAN NOTE
- Witnessed possible tonic clonic activity post TBI  - EEG completed and no seizures noted  - On keppra 1000mg BID for 14 day course

## 2025-06-20 NOTE — OCCUPATIONAL THERAPY NOTE
"  Occupational Therapy Treatment Note:      06/20/25 1455   OT Last Visit   OT Visit Date 06/20/25   Note Type   Note Type Treatment   Pain Assessment   Pain Assessment Tool 0-10   Pain Score No Pain   Restrictions/Precautions   Weight Bearing Precautions Per Order No   Other Precautions Cognitive;Restraints  (poor initiation of requested tasks)   ADL   Where Assessed Chair   Grooming Assistance 3  Moderate Assistance   Grooming Deficit   (poor initiation of all tasks. asst for chaining activitty)   UB Dressing Assistance 3  Moderate Assistance   LB Dressing Assistance 2  Maximal Assistance   Toileting Assistance  2  Maximal Assistance   Functional Standing Tolerance   Time f- balance during clothing management c rw   Bed Mobility   Supine to Sit 3  Moderate assistance   Additional items   (needs tactile hand hold for motor / task  initiation)   Sit to Supine 3  Moderate assistance   Transfers   Sit to Stand 3  Moderate assistance   Stand to Sit 3  Moderate assistance   Functional Mobility   Functional Mobility 3  Moderate assistance   Additional Comments ax1   Additional items Rolling walker   Subjective   Subjective \" is that the little dog making that noise?\"   Cognition   Overall Cognitive Status Impaired   Arousal/Participation Alert;Cooperative   Attention Attends with cues to redirect   Memory Decreased recall of precautions;Decreased recall of recent events;Decreased short term memory   Following Commands Follows one step commands with increased time or repetition   Comments pt requires visual cues along with simple verbal commands. was noted eric unable to initaite when only given verbal instruciton ie put your pants on..   Activity Tolerance   Activity Tolerance Patient tolerated treatment well   Assessment   Assessment pt participated in pm ot session and was seen focusing on activity tolerence, functional transfers functional mobility, lb dressing  and direction following. pt continues to wear waist " "restraint while in bed. ng tube removed earlier today. pt continues with some confusion but generally was able to id place as \" hospital\" pt with difficulty initiating task to mere verbal request ie \"wash your mouth\" when he would perform task he would not do so thoroughlly. pt talkative and cooperative this session.   Plan   Treatment Interventions ADL retraining;Functional transfer training;Endurance training;Patient/family training;Equipment evaluation/education;Activityengagement   Goal Expiration Date 07/01/25   OT Treatment Day 2   OT Frequency 3-5x/wk   Discharge Recommendation   Rehab Resource Intensity Level, OT I (Maximum Resource Intensity)   AM-PAC Daily Activity Inpatient   Lower Body Dressing 2   Bathing 2   Toileting 2   Upper Body Dressing 2   Grooming 2   Eating 3   Daily Activity Raw Score 13   Daily Activity Standardized Score (Calc for Raw Score >=11) 32.03   AM-PAC Applied Cognition Inpatient   Following a Speech/Presentation 2   Understanding Ordinary Conversation 3   Taking Medications 2   Remembering Where Things Are Placed or Put Away 2   Remembering List of 4-5 Errands 2   Taking Care of Complicated Tasks 2   Applied Cognition Raw Score 13   Applied Cognition Standardized Score 30.46     April A Storm   "

## 2025-06-20 NOTE — PHYSICAL THERAPY NOTE
Physical Therapy Progress Note     06/20/25 1452   PT Last Visit   PT Visit Date 06/20/25   Note Type   Note Type Treatment   Pain Assessment   Pain Assessment Tool 0-10   Pain Score No Pain   Restrictions/Precautions   RLE Weight Bearing Per Order WBAT   Other Precautions Cognitive;Chair Alarm;Bed Alarm;Restraints;Fall Risk   Subjective   Subjective pt encountered supine in bed, pleasantly confused.  Reports no new complaints.  Requires repeated instructions for command following at times, and demonstrates improved follow through with visual cues at times.   Bed Mobility   Supine to Sit 3  Moderate assistance   Additional items Assist x 2   Sit to Supine 3  Moderate assistance   Additional items Assist x 1   Transfers   Sit to Stand 3  Moderate assistance   Additional items Assist x 1   Stand to Sit 3  Moderate assistance   Additional items Assist x 1   Ambulation/Elevation   Gait pattern Short stride;Inconsistent morgan;Decreased foot clearance;Narrow SHEYLA;Improper Weight shift;Poor UE support;Excessively slow   Gait Assistance 3  Moderate assist   Additional items Assist x 1   Assistive Device Rolling walker   Distance 45'x  4 with standing rests to correct posture or RW management   Balance   Static Sitting Fair   Static Standing Poor   Ambulatory Poor   Assessment   Prognosis Fair   Problem List Decreased strength;Decreased range of motion;Impaired balance;Decreased endurance;Decreased mobility;Decreased coordination;Decreased cognition;Impaired judgement;Decreased safety awareness;Decreased skin integrity   Assessment Pt continues to require mod Ax1-2 for all tasks at this time primarily due to impaired neuromotor command, balance, lack of safety awarenss & initiation of tasks despite instructions given throughout session as noted above.  Pt completes all tasks without overt LOB or change in status, but is easily distracted & required redirection to task & appropriate use of RW.  Pt returned to bed with waist  restraint reapplied & all needs in reach.  PT POC & d/c recommendations remian appropriate at this time to address deficits to maximize his abilities while reducing long term burden of care pending onging cognitive recovery in hopes that he can regain higher level of funcitonal independence.   Goals   Patient Goals to walk the dogs   [facility] Expiration Date 07/01/25   PT Treatment Day 3   Plan   Treatment/Interventions Functional transfer training;LE strengthening/ROM;Elevations;Therapeutic exercise;Endurance training;Cognitive reorientation;Patient/family training;Equipment eval/education;Bed mobility;Gait training   Progress Progressing toward goals   PT Frequency 3-5x/wk   Discharge Recommendation   Rehab Resource Intensity Level, PT I (Maximum Resource Intensity)   Equipment Recommended Walker   Walker Package Recommended Wheeled walker   AM-PAC Basic Mobility Inpatient   Turning in Flat Bed Without Bedrails 2   Lying on Back to Sitting on Edge of Flat Bed Without Bedrails 2   Moving Bed to Chair 2   Standing Up From Chair Using Arms 2   Walk in Room 2   Climb 3-5 Stairs With Railing 1   Basic Mobility Inpatient Raw Score 11   Basic Mobility Standardized Score 30.25   [facility] Highest Level Of Mobility   -HLM Goal 4: Move to chair/commode   -HLM Achieved 7: Walk 25 feet or more       Maxime Valente PTA    An [facility] Basic Mobility Raw Score less than 17 suggests pt would benefit from post acute rehab.  Please also refer to the recommendation of the Physical Therapist for safe discharge planning.

## 2025-06-20 NOTE — ASSESSMENT & PLAN NOTE
- Secondary to TBI  - Medications and delirium precautions as above  - Goal is to de-escalate some of the care including NG TF with hopes we can start to remove restraints

## 2025-06-20 NOTE — PROCEDURES
Speech Pathology - Modified Barium Swallow Study    Patient Name: Jasson Carter    Today's Date: 6/20/2025     Problem List  Active Problems:    Hypertension    SDH (subdural hematoma) (HCC)    Altered mental status    Fall    Atrial fibrillation (HCC)    Effusion of right knee    Post traumatic seizure (HCC)    Dysphagia      Past Medical History  Past Medical History[1]    Past Surgical History  Past Surgical History[2]    Assessment Summary:    Pt presents with mild-moderate oropharyngeal dysphagia characterized by reduced manipulation of material, reduced breakdown with labored attempts at times w/ solids.  The hyoid moves minimally, there is no epiglottic inversion and the pharyngeal stripping wave is reduced to clear the material.  The epiglottis hits the NG- does not clear also at the UES/CP. ?able ng hindering ability to clear the pharynx.  He has increased retention and reduced clearing on the R with mult swallows needed.  A head turn L assists in fully clearing.  He is distracted at times talking w/ material in the oral cavity or mid swallow.    Note: Images are available for review in [employer] as desired.    Recommendations:   Recommended Diet: mechanically altered/level 2 diet and nectar thick liquids   When NG is removed will trial upgraded material   Recommended Form of Medications: crushed with puree   Aspiration precautions and compensatory swallowing strategies: upright posture, slow rate of feeding, small bites/sips, alternating bites and sips, and head turn L , head down  Consider referral to:  RD-?able intake  SLP Dysphagia therapy recommended: yes    Results Reviewed with: patient, RN, and PA   Pt/Family Education: initiated. Pt and caregivers would benefit from/require continued education.      General Information;  Pt is a 75 y.o. male with a PMH remarkable for recent fall and SDH with need for crani.  Pt intubated and extubated x2 this stay.    Current  "concerns for dysphagia include poor intake and coughing w/ po.  MBS was recommended to assess oropharyngeal stage swallowing skills at this time.     Prior MBS:-      Pt was viewed sitting upright in the lateral and AP positions. Trials administered were consistent with MBSImP Validated Protocol: Pt was given 5-mL thin liquid x2, 20-mL cup sip thin, 40-mL sequential swallow thin, 5-mL nectar thick, 20-mL cup sip nectar thick, 5-mL honey thick, 5-mL pudding, ½ cookie coated with 3-mL pudding, 5-mL nectar thick in the AP position, and 5-mL pudding in the AP position. Pt was also given thin liquids by straw, sequential sips difficult 2* ng and pt's ability to complete.     Initial view observations/comments: Clear view of the upper airway and NGT in place      8-Point Penetration-Aspiration Scale   Thin liquid 6 - Material enters the airway, passes below the vocal folds and is ejected into the larynx or out of the airway  -   Nectar thick liquid 4 - Material enters the airway, contacts the vocal folds, and is ejected from the airway    Honey thick liquid 1 - Material does not enter the airway   Puree (pudding) 2 - Material enters the airway, remains above the vocal folds, and is ejected  from the airway   Solid 2 - Material enters the airway, remains above the vocal folds, and is ejected  from the airway     Strategies and Efficacy: pt cued to use mult swallows=needed cued for each mult swallow in order to clear the pharynx. Attempted a head turn R and L with his head down- L cleared the retention and the pt subjectively stated it was \"better\"    Aspiration Response and Efficacy:  transient.  Pt had coughing with any amount of airway invasion.      MBS IMP Rating    ORAL Impairment  Compinent 1--Lip Closure  Judged at any point during the swallow.  1 - Interlabial escape; no progression to anterior lip    Component 2--Tongue Control During Bolus Hold  Judged on held liquid boluses only and prior to productive tongue " movement.   1 - Escape to lateral buccal cavity/floor of mouth (FOM)    Component 3--Bolus Preparation/Mastication  Judged only during presentation of 1/2 shortbread cookie coated in pudding.   1 - Slow prolonged chewing/mashing with complete re-collection    Component 4--Bolus Transport/Lingual Motion  Judged after first productive tongue movement for oral bolus transport.  3 - Repetitive/disorganized tongue motion    Component 5--Oral Residue  Judged after first swallow or after the last swallow of the sequential swallow task.  3 - Majority of bolus remaining   Location   C - Tongue-retention with puree and solids with need for 2* swallow that was cued to clear    Component 6--Initiation of Pharyngeal Swallow  Judged at first movement of the brisk superior-anterior hyoid trajectory.  3 - Bolus head in pyriforms      PHARYNGEAL Impairment  Component 7--Soft Palate Elevation  Judged during maximum displacement of soft palate.  1 - Trace column of contrast or air between the SP and PW-mild redrirection with the ng     Component 8--Laryngeal Elevation  Judged when epiglottis is in its most horizontal position.  2 - Minimal superior movement of thyroid cartilage with minimal approximation of arytenoids to epiglottic petiole    Component 9--Anterior Hyoid Excursion  Judged at height of swallow/maximal anterior hyoid displacement.  1 - Partial anterior movement    Component 10--Epiglottic Movement  Judged at height of swallow/maximal anterior hyoid displacement.  2 - No inversion    Component 11--Laryngeal Vestibular Closure  Judged at height of swallow/maximal anterior hyoid displacement.  1 - Incomplete; narrow column air/contrast in laryngeal vestibule    Component 12--Pharyngeal Stripping Wave  Judged during the full duration of the pharyngeal swallow.  1 - Present - diminished    Component 13--Pharyngeal Contraction  Judged in AP view at rest and throughout maximum movement of structures.  2 - Unilateral Bulging  R  retention, L channel preference  Component 14--Pharyngoesophageal Segment Opening  Judged during maximum distension of PES and throughout opening and closure.  1 - Partial distension/partial duration; partial obstruction to flow    Component 15--Tongue Base (TB) Retraction  Judged during maximum retraction of the tongue base.  2 - Narrow column of contrast or air between TB and PW    Component 16--Pharyngeal Residue  Judged after first swallow or after the last swallow of the sequential swallow task.  3 - Majority of contrast within or on pharyngeal structures   Location   F - Diffuse (>3 areas)      ESOPHAGEAL Impairment  Component 17--Esophageal Clearance Upright Position  Judged in AP view during bolus transit through the oral cavity to the LES  0 - Complete clearance; esophageal coating               [1]   Past Medical History:  Diagnosis Date    Allergic     Gout     Hypertension     PTSD (post-traumatic stress disorder)    [2]   Past Surgical History:  Procedure Laterality Date    APPENDECTOMY      CHOLECYSTECTOMY      CRANIOTOMY Bilateral 06/11/2025    Procedure: Left sided craniotomy and right sided karyn holes for evacuation of subdural hematoma;  Surgeon: Satish De La Torre MD;  Location: [facility];  Service: Neurosurgery    IR CEREBRAL ANGIOGRAPHY / INTERVENTION  06/12/2025    TOTAL KNEE ARTHROPLASTY

## 2025-06-20 NOTE — PLAN OF CARE
"  Problem: OCCUPATIONAL THERAPY ADULT  Goal: Performs self-care activities at highest level of function for planned discharge setting.  See evaluation for individualized goals.  Description: Treatment Interventions: ADL retraining, Functional transfer training, UE strengthening/ROM, Endurance training, Cognitive reorientation, Patient/family training, Equipment evaluation/education, Fine motor coordination activities, Compensatory technique education, Continued evaluation, Energy conservation, Activityengagement          See flowsheet documentation for full assessment, interventions and recommendations.   Outcome: Progressing  Note: Limitation: Decreased ADL status, Decreased Safe judgement during ADL, Decreased cognition, Decreased endurance, Decreased fine motor control, Decreased self-care trans, Decreased high-level ADLs  Prognosis: Fair  Assessment: pt participated in pm ot session and was seen focusing on activity tolerence, functional transfers functional mobility, lb dressing  and direction following. pt continues to wear waist restraint while in bed. ng tube removed earlier today. pt continues with some confusion but generally was able to id place as \" hospital\" pt with difficulty initiating task to mere verbal request ie \"wash your mouth\" when he would perform task he would not do so thoroughlly. pt talkative and cooperative this session.  Recommendation: Physiatry Consult  Rehab Resource Intensity Level, OT: I (Maximum Resource Intensity)        April A Storm   "

## 2025-06-21 LAB
ANION GAP SERPL CALCULATED.3IONS-SCNC: 9 MMOL/L (ref 4–13)
BACTERIA SPEC BFLD CULT: NO GROWTH
BASOPHILS # BLD AUTO: 0.06 THOUSANDS/ÂΜL (ref 0–0.1)
BASOPHILS NFR BLD AUTO: 1 % (ref 0–1)
BUN SERPL-MCNC: 18 MG/DL (ref 5–25)
CALCIUM SERPL-MCNC: 8.7 MG/DL (ref 8.4–10.2)
CHLORIDE SERPL-SCNC: 98 MMOL/L (ref 96–108)
CO2 SERPL-SCNC: 28 MMOL/L (ref 21–32)
CREAT SERPL-MCNC: 0.67 MG/DL (ref 0.6–1.3)
EOSINOPHIL # BLD AUTO: 0.19 THOUSAND/ÂΜL (ref 0–0.61)
EOSINOPHIL NFR BLD AUTO: 2 % (ref 0–6)
ERYTHROCYTE [DISTWIDTH] IN BLOOD BY AUTOMATED COUNT: 12.9 % (ref 11.6–15.1)
GFR SERPL CREATININE-BSD FRML MDRD: 93 ML/MIN/1.73SQ M
GLUCOSE SERPL-MCNC: 102 MG/DL (ref 65–140)
GLUCOSE SERPL-MCNC: 137 MG/DL (ref 65–140)
GLUCOSE SERPL-MCNC: 84 MG/DL (ref 65–140)
GLUCOSE SERPL-MCNC: 86 MG/DL (ref 65–140)
GLUCOSE SERPL-MCNC: 93 MG/DL (ref 65–140)
GRAM STN SPEC: NORMAL
GRAM STN SPEC: NORMAL
HCT VFR BLD AUTO: 39.4 % (ref 36.5–49.3)
HGB BLD-MCNC: 12.9 G/DL (ref 12–17)
IMM GRANULOCYTES # BLD AUTO: 0.04 THOUSAND/UL (ref 0–0.2)
IMM GRANULOCYTES NFR BLD AUTO: 1 % (ref 0–2)
LYMPHOCYTES # BLD AUTO: 1.45 THOUSANDS/ÂΜL (ref 0.6–4.47)
LYMPHOCYTES NFR BLD AUTO: 17 % (ref 14–44)
MAGNESIUM SERPL-MCNC: 2.2 MG/DL (ref 1.9–2.7)
MCH RBC QN AUTO: 32.6 PG (ref 26.8–34.3)
MCHC RBC AUTO-ENTMCNC: 32.7 G/DL (ref 31.4–37.4)
MCV RBC AUTO: 100 FL (ref 82–98)
MONOCYTES # BLD AUTO: 0.76 THOUSAND/ÂΜL (ref 0.17–1.22)
MONOCYTES NFR BLD AUTO: 9 % (ref 4–12)
NEUTROPHILS # BLD AUTO: 5.84 THOUSANDS/ÂΜL (ref 1.85–7.62)
NEUTS SEG NFR BLD AUTO: 70 % (ref 43–75)
NRBC BLD AUTO-RTO: 0 /100 WBCS
PHOSPHATE SERPL-MCNC: 3.7 MG/DL (ref 2.3–4.1)
PLATELET # BLD AUTO: 245 THOUSANDS/UL (ref 149–390)
PMV BLD AUTO: 10.5 FL (ref 8.9–12.7)
POTASSIUM SERPL-SCNC: 3.6 MMOL/L (ref 3.5–5.3)
RBC # BLD AUTO: 3.96 MILLION/UL (ref 3.88–5.62)
SODIUM SERPL-SCNC: 135 MMOL/L (ref 135–147)
WBC # BLD AUTO: 8.34 THOUSAND/UL (ref 4.31–10.16)

## 2025-06-21 PROCEDURE — 82948 REAGENT STRIP/BLOOD GLUCOSE: CPT

## 2025-06-21 PROCEDURE — 84100 ASSAY OF PHOSPHORUS: CPT | Performed by: SURGERY

## 2025-06-21 PROCEDURE — 83735 ASSAY OF MAGNESIUM: CPT | Performed by: SURGERY

## 2025-06-21 PROCEDURE — 80048 BASIC METABOLIC PNL TOTAL CA: CPT | Performed by: SURGERY

## 2025-06-21 PROCEDURE — 99232 SBSQ HOSP IP/OBS MODERATE 35: CPT | Performed by: SURGERY

## 2025-06-21 PROCEDURE — 85025 COMPLETE CBC W/AUTO DIFF WBC: CPT | Performed by: SURGERY

## 2025-06-21 RX ORDER — INSULIN LISPRO 100 [IU]/ML
1-6 INJECTION, SOLUTION INTRAVENOUS; SUBCUTANEOUS
Status: DISCONTINUED | OUTPATIENT
Start: 2025-06-22 | End: 2025-06-27 | Stop reason: HOSPADM

## 2025-06-21 RX ADMIN — DRONABINOL 2.5 MG: 2.5 CAPSULE ORAL at 17:30

## 2025-06-21 RX ADMIN — LEVETIRACETAM 1000 MG: 500 TABLET, FILM COATED ORAL at 08:33

## 2025-06-21 RX ADMIN — AMLODIPINE BESYLATE 5 MG: 5 TABLET ORAL at 08:34

## 2025-06-21 RX ADMIN — ENOXAPARIN SODIUM 30 MG: 30 INJECTION SUBCUTANEOUS at 21:00

## 2025-06-21 RX ADMIN — QUETIAPINE 50 MG: 25 TABLET ORAL at 08:34

## 2025-06-21 RX ADMIN — ENOXAPARIN SODIUM 30 MG: 30 INJECTION SUBCUTANEOUS at 08:33

## 2025-06-21 RX ADMIN — DRONABINOL 2.5 MG: 2.5 CAPSULE ORAL at 12:01

## 2025-06-21 RX ADMIN — QUETIAPINE FUMARATE 100 MG: 100 TABLET ORAL at 21:00

## 2025-06-21 RX ADMIN — Medication 3 MG: at 21:00

## 2025-06-21 RX ADMIN — SENNOSIDES 8.6 MG: 8.6 TABLET, FILM COATED ORAL at 17:29

## 2025-06-21 RX ADMIN — LEVETIRACETAM 1000 MG: 500 TABLET, FILM COATED ORAL at 21:00

## 2025-06-21 RX ADMIN — LOSARTAN POTASSIUM 100 MG: 50 TABLET, FILM COATED ORAL at 08:33

## 2025-06-21 RX ADMIN — QUETIAPINE 50 MG: 25 TABLET ORAL at 17:29

## 2025-06-21 RX ADMIN — PANTOPRAZOLE SODIUM 40 MG: 40 INJECTION, POWDER, FOR SOLUTION INTRAVENOUS at 05:43

## 2025-06-21 NOTE — CASE MANAGEMENT
Case Management Discharge Planning Note    Patient name Jasson Carter  Location [facility] 619/[facility] 6[facility]-01 MRN [id number]  : 1950 Date 2025       Current Admission Date: 2025  Current Admission Diagnosis:SDH (subdural hematoma) (HCC)   Patient Active Problem List    Diagnosis Date Noted    Dysphagia 2025    Post traumatic seizure (HCC) 2025    Effusion of right knee 2025    Atrial fibrillation (HCC) 2025    SDH (subdural hematoma) (HCC) 2025    Altered mental status 2025    Fall 2025    Hyponatremia 2024    Neuropathy 2021    Reflux esophagitis 2020    Seasonal allergic rhinitis due to pollen 2020    Primary osteoarthritis involving multiple joints 2020    Posttraumatic stress disorder 2020    History of gout 2019    Pure hypercholesterolemia 2017    Hypertension 2017    Benign prostatic hyperplasia without urinary obstruction 2011      LOS (days): 10  Geometric Mean LOS (GMLOS) (days): 6.5  Days to GMLOS:-3.6     OBJECTIVE:  Risk of Unplanned Readmission Score: 17.81         Current admission status: Inpatient   Preferred Pharmacy:   [facility]/pharmacy #[id number] - [place] - [address]  [address] [place] [address]  Phone: [phone] Fax: [phone]    Primary Care Provider: Ruben Finch MD    Primary Insurance: MEDICARE  Secondary Insurance: [facility]    DISCHARGE DETAILS:    Updated therapy notes sent to [facility]  They're willing to accept the pt for IP rehab once medically stable  Awaiting Neurosurgery plan this coming week

## 2025-06-21 NOTE — PLAN OF CARE
Problem: Nutrition/Hydration-ADULT  Goal: Nutrient/Hydration intake appropriate for improving, restoring or maintaining nutritional needs  Description: Monitor and assess patient's nutrition/hydration status for malnutrition. Collaborate with interdisciplinary team and initiate plan and interventions as ordered.  Monitor patient's weight and dietary intake as ordered or per policy. Utilize nutrition screening tool and intervene as necessary. Determine patient's food preferences and provide high-protein, high-caloric foods as appropriate.     INTERVENTIONS:  - Monitor oral intake, urinary output, labs, and treatment plans  - Assess nutrition and hydration status and recommend course of action  - Evaluate amount of meals eaten  - Assist patient with eating if necessary   - Allow adequate time for meals  - Recommend/ encourage appropriate diets, oral nutritional supplements, and vitamin/mineral supplements  - Order, calculate, and assess calorie counts as needed  - Recommend, monitor, and adjust tube feedings and TPN/PPN based on assessed needs  - Assess need for intravenous fluids  - Provide specific nutrition/hydration education as appropriate  - Include patient/family/caregiver in decisions related to nutrition  Outcome: Progressing     Problem: SKIN/TISSUE INTEGRITY - ADULT  Goal: Skin Integrity remains intact(Skin Breakdown Prevention)  Description: Assess:  -Perform Jake assessment every 12 hours    -Clean and moisturize skin every shift  -Inspect skin when repositioning, toileting, and assisting with ADLS  -Assess under medical devices    -Assess extremities for adequate circulation and sensation     Bed Management:  -Have minimal linens on bed & keep smooth, unwrinkled  -Change linens as needed when moist or perspiring  -Avoid sitting or lying in one position for more than 2 hours while in bed  -Keep HOB at 30 degrees     Toileting:  -Offer bedside commode  -Assess for incontinence every shift   -Use  incontinent care products after each incontinent episode     Activity:  -Mobilize patient 3 times a day  -Encourage activity and walks on unit  -Encourage or provide ROM exercises   -Turn and reposition patient every 2 Hours  -Use appropriate equipment to lift or move patient in bed  -Instruct/ Assist with weight shifting every 2 when out of bed in chair  -Consider limitation of chair time 4 hour intervals    Skin Care:  -Avoid use of baby powder, tape, friction and shearing, hot water or constrictive clothing  -Relieve pressure over bony prominences using allevyns   -Do not massage red bony areas    Next Steps:  -Teach patient strategies to minimize risks    -Consider consults to  interdisciplinary teams   Outcome: Progressing  Goal: Incision(s), wounds(s) or drain site(s) healing without S/S of infection  Description: INTERVENTIONS  - Assess and document dressing, incision, wound bed, drain sites and surrounding tissue  - Provide patient and family education  - Perform skin care/dressing changes per order  Outcome: Progressing  Goal: Pressure injury heals and does not worsen  Description: Interventions:  - Implement low air loss mattress or specialty surface (Criteria met)  - Apply silicone foam dressing  - Instruct/assist with weight shifting every 2 minutes when in chair   - Limit chair time to 4 hour intervals  - Use special pressure reducing interventions when in chair   - Apply fecal or urinary incontinence containment device   - Perform passive or active ROM every 4  - Turn and reposition patient & offload bony prominences every 2 hours   - Utilize friction reducing device or surface for transfers   - Consider consults to  interdisciplinary teams   - Use incontinent care products after each incontinent episode   - Consider nutrition services referral as needed  Outcome: Progressing     Problem: Prexisting or High Potential for Compromised Skin Integrity  Goal: Skin integrity is maintained or  improved  Description: INTERVENTIONS:  - Identify patients at risk for skin breakdown  - Assess and monitor skin integrity including under and around medical devices   - Assess and monitor nutrition and hydration status  - Monitor labs  - Assess for incontinence   - Turn and reposition patient  - Assist with mobility/ambulation  - Relieve pressure over paolo prominences   - Avoid friction and shearing  - Provide appropriate hygiene as needed including keeping skin clean and dry  - Evaluate need for skin moisturizer/barrier cream  - Collaborate with interdisciplinary team  - Patient/family teaching  - Consider wound care consult    Assess:  - Review Jake scale daily  - Clean and moisturize skin every shift  - Inspect skin when repositioning, toileting, and assisting with ADLS  - Assess under medical devices  - Assess extremities for adequate circulation and sensation     Bed Management:  - Have minimal linens on bed & keep smooth, unwrinkled  - Change linens as needed when moist or perspiring  - Avoid sitting or lying in one position for more than 2 hours while in bed?Keep HOB at 30 degrees   - Toileting:  - Offer bedside commode  - Assess for incontinence every 2 hours  - Use incontinent care products after each incontinent episode     Activity:  - Mobilize patient 3 times a day  - Encourage activity and walks on unit  - Encourage or provide ROM exercises   - Turn and reposition patient every 2 Hours  - Use appropriate equipment to lift or move patient in bed    Skin Care:  - Avoid use of baby powder, tape, friction and shearing, hot water or constrictive clothing  - Relieve pressure over bony prominences   - Do not massage red bony areas    Next Steps:  - Teach patient strategies to minimize risks   - Consider consults to  interdisciplinary teams   Outcome: Progressing     Problem: SAFETY,RESTRAINT: NV/NON-SELF DESTRUCTIVE BEHAVIOR  Goal: Remains free of harm/injury (restraint for non violent/non self-detsructive  behavior)  Description: INTERVENTIONS:  - Instruct patient/family regarding restraint use   - Assess and monitor physiologic and psychological status   - Provide interventions and comfort measures to meet assessed patient needs   - Identify and implement measures to help patient regain control  - Assess readiness for release of restraint   Outcome: Progressing  Goal: Returns to optimal restraint-free functioning  Description: INTERVENTIONS:  - Assess the patient's behavior and symptoms that indicate continued need for restraint  - Identify and implement measures to help patient regain control  - Assess readiness for release of restraint   Outcome: Progressing     Problem: CONFUSION/THOUGHT DISTURBANCE  Goal: Thought disturbances (confusion, delirium, depression, dementia or psychosis) are managed to maintain or return to baseline mental status and functional level  Description: INTERVENTIONS:  - Assess for possible contributors to  thought disturbance, including but not limited to medications, infection, impaired vision or hearing, underlying metabolic abnormalities, dehydration, respiratory compromise,  psychiatric diagnoses and notify attending PHYSICAN/AP  - Monitor and intervene to maintain adequate nutrition, hydration, elimination, sleep and activity  - Decrease environmental stimuli, including noise as appropriate.  - Provide frequent contacts to provide refocusing, direction and reassurance as needed. Approach patient calmly with eye contact and at their level.  - [place] high risk fall precautions, aspiration precautions and other safety measures, as indicated  - If delirium suspected, notify physician/AP of change in condition and request immediate in-person evaluation  - Pursue consults as appropriate including Geriatric (campus dependent), OT for cognitive evaluation/activity planning, psychiatric, pastoral care, etc.  Outcome: Progressing     Problem: BEHAVIOR  Goal: Pt/Family maintain appropriate  behavior and adhere to behavioral management agreement, if implemented  Description: INTERVENTIONS:  - Assess the family dynamic   - Encourage verbalization of thoughts and concerns in a socially appropriate manner  - Assess patient/family's coping skills and non-compliant behavior (including use of illegal substances).  - Utilize positive, consistent limit setting strategies supporting safety of patient, staff and others  - Initiate consult with Case Management, Spiritual Care or other ancillary services as appropriate  - If a patient's/visitor's behavior jeopardizes the safety of the patient, staff, or others, refer to organization procedure.   - Notify Security of behavior or suspected illegal substances which indicate the need for search of the patient and/or belongings  - Encourage participation in the decision making process about a behavioral management agreement; implement if patient meets criteria  Outcome: Progressing

## 2025-06-21 NOTE — PLAN OF CARE
Problem: Nutrition/Hydration-ADULT  Goal: Nutrient/Hydration intake appropriate for improving, restoring or maintaining nutritional needs  Description: Monitor and assess patient's nutrition/hydration status for malnutrition. Collaborate with interdisciplinary team and initiate plan and interventions as ordered.  Monitor patient's weight and dietary intake as ordered or per policy. Utilize nutrition screening tool and intervene as necessary. Determine patient's food preferences and provide high-protein, high-caloric foods as appropriate.     INTERVENTIONS:  - Monitor oral intake, urinary output, labs, and treatment plans  - Assess nutrition and hydration status and recommend course of action  - Evaluate amount of meals eaten  - Assist patient with eating if necessary   - Allow adequate time for meals  - Recommend/ encourage appropriate diets, oral nutritional supplements, and vitamin/mineral supplements  - Order, calculate, and assess calorie counts as needed  - Recommend, monitor, and adjust tube feedings and TPN/PPN based on assessed needs  - Assess need for intravenous fluids  - Provide specific nutrition/hydration education as appropriate  - Include patient/family/caregiver in decisions related to nutrition  Outcome: Progressing     Problem: PAIN - ADULT  Goal: Verbalizes/displays adequate comfort level or baseline comfort level  Description: Interventions:  - Encourage patient to monitor pain and request assistance  - Assess pain using appropriate pain scale  - Administer analgesics as ordered based on type and severity of pain and evaluate response  - Implement non-pharmacological measures as appropriate and evaluate response  - Consider cultural and social influences on pain and pain management  - Notify physician/advanced practitioner if interventions unsuccessful or patient reports new pain  - Educate patient/family on pain management process including their role and importance of  reporting pain   -  Provide non-pharmacologic/complimentary pain relief interventions  Outcome: Progressing     Problem: INFECTION - ADULT  Goal: Absence or prevention of progression during hospitalization  Description: INTERVENTIONS:  - Assess and monitor for signs and symptoms of infection  - Monitor lab/diagnostic results  - Monitor all insertion sites, i.e. indwelling lines, tubes, and drains  - Monitor endotracheal if appropriate and nasal secretions for changes in amount and color  - [place] appropriate cooling/warming therapies per order  - Administer medications as ordered  - Instruct and encourage patient and family to use good hand hygiene technique  - Identify and instruct in appropriate isolation precautions for identified infection/condition  Outcome: Progressing  Goal: Absence of fever/infection during neutropenic period  Description: INTERVENTIONS:  - Monitor WBC  - Perform strict hand hygiene  - Limit to healthy visitors only  - No plants, dried, fresh or silk flowers with winters in patient room  Outcome: Progressing     Problem: SAFETY ADULT  Goal: Patient will remain free of falls  Description: INTERVENTIONS:  - Educate patient/family on patient safety including physical limitations  - Instruct patient to call for assistance with activity   - Consider consulting OT/PT to assist with strengthening/mobility based on AM PAC & JH-HLM score  - Consult OT/PT to assist with strengthening/mobility   - Keep Call bell within reach  - Keep bed low and locked with side rails adjusted as appropriate  - Keep care items and personal belongings within reach  - Initiate and maintain comfort rounds  - Make Fall Risk Sign visible to staff  - Offer Toileting every 1 Hours, in advance of need  - Initiate/Maintain alarm  - Obtain necessary fall risk management equipment  - Apply yellow socks and bracelet for high fall risk patients  - Consider moving patient to room near nurses station  Outcome: Progressing  Goal: Maintain or return to  baseline ADL function  Description: INTERVENTIONS:  -  Assess patient's ability to carry out ADLs; assess patient's baseline for ADL function and identify physical deficits which impact ability to perform ADLs (bathing, care of mouth/teeth, toileting, grooming, dressing, etc.)  - Assess/evaluate cause of self-care deficits   - Assess range of motion  - Assess patient's mobility; develop plan if impaired  - Assess patient's need for assistive devices and provide as appropriate  - Encourage maximum independence but intervene and supervise when necessary  - Involve family in performance of ADLs  - Assess for home care needs following discharge   - Consider OT consult to assist with ADL evaluation and planning for discharge  - Provide patient education as appropriate  - Monitor functional capacity and physical performance, use of AM PAC & JH-HLM   - Monitor gait, balance and fatigue with ambulation    Outcome: Progressing  Goal: Maintains/Returns to pre admission functional level  Description: INTERVENTIONS:  - Perform AM-PAC 6 Click Basic Mobility/ Daily Activity assessment daily.  - Set and communicate daily mobility goal to care team and patient/family/caregiver.   - Collaborate with rehabilitation services on mobility goals if consulted  - Perform Range of Motion 3 times a day.  - Reposition patient every 2 hours.  - Dangle patient 3 times a day  - Stand patient 3 times a day  - Ambulate patient 3 times a day  - Out of bed to chair 3 times a day   - Out of bed for meals 3 times a day  - Out of bed for toileting  - Record patient progress and toleration of activity level   Outcome: Progressing     Problem: DISCHARGE PLANNING  Goal: Discharge to home or other facility with appropriate resources  Description: INTERVENTIONS:  - Identify barriers to discharge w/patient and caregiver  - Arrange for needed discharge resources and transportation as appropriate  - Identify discharge learning needs (meds, wound care, etc.)  -  Arrange for interpretive services to assist at discharge as needed  - Refer to Case Management Department for coordinating discharge planning if the patient needs post-hospital services based on physician/advanced practitioner order or complex needs related to functional status, cognitive ability, or social support system  Outcome: Progressing     Problem: Knowledge Deficit  Goal: Patient/family/caregiver demonstrates understanding of disease process, treatment plan, medications, and discharge instructions  Description: Complete learning assessment and assess knowledge base.  Interventions:  - Provide teaching at level of understanding  - Provide teaching via preferred learning methods  Outcome: Progressing     Problem: NEUROSENSORY - ADULT  Goal: Achieves stable or improved neurological status  Description: INTERVENTIONS  - Monitor and report changes in neurological status  - Monitor vital signs such as temperature, blood pressure, glucose, and any other labs ordered   - Initiate measures to prevent increased intracranial pressure  - Monitor for seizure activity and implement precautions if appropriate      Outcome: Progressing  Goal: Remains free of injury related to seizures activity  Description: INTERVENTIONS  - Maintain airway, patient safety  and administer oxygen as ordered  - Monitor patient for seizure activity, document and report duration and description of seizure to physician/advanced practitioner  - If seizure occurs,  ensure patient safety during seizure  - Reorient patient post seizure  - Seizure pads on all 4 side rails  - Instruct patient/family to notify RN of any seizure activity including if an aura is experienced  - Instruct patient/family to call for assistance with activity based on nursing assessment  - Administer anti-seizure medications if ordered    Outcome: Progressing  Goal: Achieves maximal functionality and self care  Description: INTERVENTIONS  - Monitor swallowing and airway  patency with patient fatigue and changes in neurological status  - Encourage and assist patient to increase activity and self care.   - Encourage visually impaired, hearing impaired and aphasic patients to use assistive/communication devices  Outcome: Progressing     Problem: CARDIOVASCULAR - ADULT  Goal: Maintains optimal cardiac output and hemodynamic stability  Description: INTERVENTIONS:  - Monitor I/O, vital signs and rhythm  - Monitor for S/S and trends of decreased cardiac output  - Administer and titrate ordered vasoactive medications to optimize hemodynamic stability  - Assess quality of pulses, skin color and temperature  - Assess for signs of decreased coronary artery perfusion  - Instruct patient to report change in severity of symptoms  Outcome: Progressing  Goal: Absence of cardiac dysrhythmias or at baseline rhythm  Description: INTERVENTIONS:  - Continuous cardiac monitoring, vital signs, obtain 12 lead EKG if ordered  - Administer antiarrhythmic and heart rate control medications as ordered  - Monitor electrolytes and administer replacement therapy as ordered  Outcome: Progressing     Problem: RESPIRATORY - ADULT  Goal: Achieves optimal ventilation and oxygenation  Description: INTERVENTIONS:  - Assess for changes in respiratory status  - Assess for changes in mentation and behavior  - Position to facilitate oxygenation and minimize respiratory effort  - Oxygen administered by appropriate delivery if ordered  - Initiate smoking cessation education as indicated  - Encourage broncho-pulmonary hygiene including cough, deep breathe, Incentive Spirometry  - Assess the need for suctioning and aspirate as needed  - Assess and instruct to report SOB or any respiratory difficulty  - Respiratory Therapy support as indicated  Outcome: Progressing     Problem: GASTROINTESTINAL - ADULT  Goal: Minimal or absence of nausea and/or vomiting  Description: INTERVENTIONS:  - Administer IV fluids if ordered to ensure  adequate hydration  - Maintain NPO status until nausea and vomiting are resolved  - Nasogastric tube if ordered  - Administer ordered antiemetic medications as needed  - Provide nonpharmacologic comfort measures as appropriate  - Advance diet as tolerated, if ordered  - Consider nutrition services referral to assist patient with adequate nutrition and appropriate food choices  Outcome: Progressing  Goal: Maintains or returns to baseline bowel function  Description: INTERVENTIONS:  - Assess bowel function  - Encourage oral fluids to ensure adequate hydration  - Administer IV fluids if ordered to ensure adequate hydration  - Administer ordered medications as needed  - Encourage mobilization and activity  - Consider nutritional services referral to assist patient with adequate nutrition and appropriate food choices  Outcome: Progressing  Goal: Maintains adequate nutritional intake  Description: INTERVENTIONS:  - Monitor percentage of each meal consumed  - Identify factors contributing to decreased intake, treat as appropriate  - Assist with meals as needed  - Monitor I&O, weight, and lab values if indicated  - Obtain nutrition services referral as needed  Outcome: Progressing  Goal: Establish and maintain optimal ostomy function  Description: INTERVENTIONS:  - Assess bowel function  - Encourage oral fluids to ensure adequate hydration  - Administer IV fluids if ordered to ensure adequate hydration   - Administer ordered medications as needed  - Encourage mobilization and activity  - Nutrition services referral to assist patient with appropriate food choices  - Assess stoma site  - Consider wound care consult   Outcome: Progressing  Goal: Oral mucous membranes remain intact  Description: INTERVENTIONS  - Assess oral mucosa and hygiene practices  - Implement preventative oral hygiene regimen  - Implement oral medicated treatments as ordered  - Initiate Nutrition services referral as needed  Outcome: Progressing      Problem: GENITOURINARY - ADULT  Goal: Maintains or returns to baseline urinary function  Description: INTERVENTIONS:  - Assess urinary function  - Encourage oral fluids to ensure adequate hydration if ordered  - Administer IV fluids as ordered to ensure adequate hydration  - Administer ordered medications as needed  - Offer frequent toileting  - Follow urinary retention protocol if ordered  Outcome: Progressing  Goal: Absence of urinary retention  Description: INTERVENTIONS:  - Assess patient’s ability to void and empty bladder  - Monitor I/O  - Bladder scan as needed  - Discuss with physician/AP medications to alleviate retention as needed  - Discuss catheterization for long term situations as appropriate  Outcome: Progressing  Goal: Urinary catheter remains patent  Description: INTERVENTIONS:  - Assess patency of urinary catheter  - If patient has a chronic pedersen, consider changing catheter if non-functioning  - Follow guidelines for intermittent irrigation of non-functioning urinary catheter  Outcome: Progressing     Problem: METABOLIC, FLUID AND ELECTROLYTES - ADULT  Goal: Electrolytes maintained within normal limits  Description: INTERVENTIONS:  - Monitor labs and assess patient for signs and symptoms of electrolyte imbalances  - Administer electrolyte replacement as ordered  - Monitor response to electrolyte replacements, including repeat lab results as appropriate  - Instruct patient on fluid and nutrition as appropriate  Outcome: Progressing  Goal: Fluid balance maintained  Description: INTERVENTIONS:  - Monitor labs   - Monitor I/O and WT  - Instruct patient on fluid and nutrition as appropriate  - Assess for signs & symptoms of volume excess or deficit  Outcome: Progressing  Goal: Glucose maintained within target range  Description: INTERVENTIONS:  - Monitor Blood Glucose as ordered  - Assess for signs and symptoms of hyperglycemia and hypoglycemia  - Administer ordered medications to maintain glucose  within target range  - Assess nutritional intake and initiate nutrition service referral as needed  Outcome: Progressing     Problem: SKIN/TISSUE INTEGRITY - ADULT  Goal: Skin Integrity remains intact(Skin Breakdown Prevention)  Description: Assess:  -Perform Jake assessment  -Clean and moisturize skin  -Inspect skin when repositioning, toileting, and assisting with ADLS  -Assess under medical devices  -Assess extremities for adequate circulation and sensation     Bed Management:  -Have minimal linens on bed & keep smooth, unwrinkled  -Change linens as needed when moist or perspiring  -Avoid sitting or lying in one position for more than 2 hours while in bed  -Keep HOB at 30 degrees     Toileting:  -Offer bedside commode  -Assess for incontinence   -Use incontinent care products after each incontinent episode     Activity:  -Mobilize patient 3 times a day  -Encourage activity and walks on unit  -Encourage or provide ROM exercises   -Turn and reposition patient every 2 Hours  -Use appropriate equipment to lift or move patient in bed  -Instruct/ Assist with weight shifting every 15 min when out of bed in chair  -Consider limitation of chair time 2 hour intervals    Skin Care:  -Avoid use of baby powder, tape, friction and shearing, hot water or constrictive clothing  -Relieve pressure over bony prominences  -Do not massage red bony areas    Next Steps:  -Teach patient strategies to minimize risks   -Consider consults to  interdisciplinary teams  Outcome: Progressing  Goal: Incision(s), wounds(s) or drain site(s) healing without S/S of infection  Description: INTERVENTIONS  - Assess and document dressing, incision, wound bed, drain sites and surrounding tissue  - Provide patient and family education  - Perform skin care/dressing changes   Outcome: Progressing  Goal: Pressure injury heals and does not worsen  Description: Interventions:  - Implement low air loss mattress or specialty surface (Criteria met)  - Apply  silicone foam dressing  - Instruct/assist with weight shifting every 15 minutes when in chair   - Limit chair time to 2 hour intervals  - Use special pressure reducing interventions such as waffle cushions when in chair   - Apply fecal or urinary incontinence containment device   - Perform passive or active ROM   - Turn and reposition patient & offload bony prominences every 2 hours   - Utilize friction reducing device or surface for transfers   - Consider consults to  interdisciplinary teams  - Use incontinent care products after each incontinent episode  - Consider nutrition services referral as needed  Outcome: Progressing     Problem: HEMATOLOGIC - ADULT  Goal: Maintains hematologic stability  Description: INTERVENTIONS  - Assess for signs and symptoms of bleeding or hemorrhage  - Monitor labs  - Administer supportive blood products/factors as ordered and appropriate  Outcome: Progressing     Problem: MUSCULOSKELETAL - ADULT  Goal: Maintain or return mobility to safest level of function  Description: INTERVENTIONS:  - Assess patient's ability to carry out ADLs; assess patient's baseline for ADL function and identify physical deficits which impact ability to perform ADLs (bathing, care of mouth/teeth, toileting, grooming, dressing, etc.)  - Assess/evaluate cause of self-care deficits   - Assess range of motion  - Assess patient's mobility  - Assess patient's need for assistive devices and provide as appropriate  - Encourage maximum independence but intervene and supervise when necessary  - Involve family in performance of ADLs  - Assess for home care needs following discharge   - Consider OT consult to assist with ADL evaluation and planning for discharge  - Provide patient education as appropriate  Outcome: Progressing  Goal: Maintain proper alignment of affected body part  Description: INTERVENTIONS:  - Support, maintain and protect limb and body alignment  - Provide patient/ family with appropriate  education  Outcome: Progressing     Problem: Prexisting or High Potential for Compromised Skin Integrity  Goal: Skin integrity is maintained or improved  Description: INTERVENTIONS:  - Identify patients at risk for skin breakdown  - Assess and monitor skin integrity including under and around medical devices   - Assess and monitor nutrition and hydration status  - Monitor labs  - Assess for incontinence   - Turn and reposition patient  - Assist with mobility/ambulation  - Relieve pressure over paolo prominences   - Avoid friction and shearing  - Provide appropriate hygiene as needed including keeping skin clean and dry  - Evaluate need for skin moisturizer/barrier cream  - Collaborate with interdisciplinary team  - Patient/family teaching  - Consider wound care consult    Assess:  - Review Jake scale daily  - Clean and moisturize skin   - Inspect skin when repositioning, toileting, and assisting with ADLS  - Assess under medical devices  - Assess extremities for adequate circulation and sensation     Bed Management:  - Have minimal linens on bed & keep smooth, unwrinkled  - Change linens as needed when moist or perspiring  - Avoid sitting or lying in one position for more than 2 hours while in bed?Keep HOB at 30 degrees   - Toileting:  - Offer bedside commode  - Assess for incontinence   - Use incontinent care products after each incontinent episode    Activity:  - Mobilize patient 3 times a day  - Encourage activity and walks on unit  - Encourage or provide ROM exercises   - Turn and reposition patient every 2 Hours  - Use appropriate equipment to lift or move patient in bed  - Instruct/ Assist with weight shifting every 15 min when out of bed in chair  - Consider limitation of chair time 2 hour intervals    Skin Care:  - Avoid use of baby powder, tape, friction and shearing, hot water or constrictive clothing  - Relieve pressure over bony prominences  - Do not massage red bony areas    Next Steps:  - Teach  patient strategies to minimize risks  - Consider consults to  interdisciplinary teams  Outcome: Progressing     Problem: SAFETY,RESTRAINT: NV/NON-SELF DESTRUCTIVE BEHAVIOR  Goal: Remains free of harm/injury (restraint for non violent/non self-detsructive behavior)  Description: INTERVENTIONS:  - Instruct patient/family regarding restraint use   - Assess and monitor physiologic and psychological status   - Provide interventions and comfort measures to meet assessed patient needs   - Identify and implement measures to help patient regain control  - Assess readiness for release of restraint   Outcome: Progressing  Goal: Returns to optimal restraint-free functioning  Description: INTERVENTIONS:  - Assess the patient's behavior and symptoms that indicate continued need for restraint  - Identify and implement measures to help patient regain control  - Assess readiness for release of restraint   Outcome: Progressing     Problem: DECISION MAKING  Goal: Pt/Family able to effectively weigh alternatives and participate in decision making related to treatment and care  Description: INTERVENTIONS:  - Identify decision maker  - Determine when there are differences among patient's view, family's view, and healthcare provider's view of patient condition and care goals  - Facilitate patient/family articulation of goals for care  - Help patient/family identify pros/cons of alternative solutions  - Provide information as requested by patient/family  - Respect patient/family rights related to privacy and sharing information   - Serve as a liaison between patient, family and health care team  - Initiate consults as appropriate (Ethics Team, Palliative Care, Family Care Conference, etc.)  Outcome: Progressing     Problem: CONFUSION/THOUGHT DISTURBANCE  Goal: Thought disturbances (confusion, delirium, depression, dementia or psychosis) are managed to maintain or return to baseline mental status and functional level  Description:  INTERVENTIONS:  - Assess for possible contributors to  thought disturbance, including but not limited to medications, infection, impaired vision or hearing, underlying metabolic abnormalities, dehydration, respiratory compromise,  psychiatric diagnoses and notify attending PHYSICAN/AP  - Monitor and intervene to maintain adequate nutrition, hydration, elimination, sleep and activity  - Decrease environmental stimuli, including noise as appropriate.  - Provide frequent contacts to provide refocusing, direction and reassurance as needed. Approach patient calmly with eye contact and at their level.  - [place] high risk fall precautions, aspiration precautions and other safety measures, as indicated  - If delirium suspected, notify physician/AP of change in condition and request immediate in-person evaluation  - Pursue consults as appropriate including Geriatric (campus dependent), OT for cognitive evaluation/activity planning, psychiatric, pastoral care, etc.  Outcome: Progressing     Problem: BEHAVIOR  Goal: Pt/Family maintain appropriate behavior and adhere to behavioral management agreement, if implemented  Description: INTERVENTIONS:  - Assess the family dynamic   - Encourage verbalization of thoughts and concerns in a socially appropriate manner  - Assess patient/family's coping skills and non-compliant behavior (including use of illegal substances).  - Utilize positive, consistent limit setting strategies supporting safety of patient, staff and others  - Initiate consult with Case Management, Spiritual Care or other ancillary services as appropriate  - If a patient's/visitor's behavior jeopardizes the safety of the patient, staff, or others, refer to organization procedure.   - Notify Security of behavior or suspected illegal substances which indicate the need for search of the patient and/or belongings  - Encourage participation in the decision making process about a behavioral management agreement; implement  if patient meets criteria  Outcome: Progressing

## 2025-06-21 NOTE — ASSESSMENT & PLAN NOTE
- Speech following  - Continue level II modified diet, thickened liquids-nectar thick   - Continue feeds by NGT nightly  - 6/19 start Marinol to stimulate appetite  - Plan for VBS today, 6/20 with speech  -Continue calorie counts, tolerated 100% of breakfast this AM with assistance  -Continue assist to feed

## 2025-06-21 NOTE — ASSESSMENT & PLAN NOTE
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   - Patient will need repeat imaging with neuro IR team, which will take place possibly early next week, until then the recommendation from neurosurgery/neuro IR is that patient remain in the hospital until that follow up  - CT scan on 6/14 remained stable post surgical changes  - MRI on 6/14 with scattered acute and early subacute infarcts in bilateral frontal and parietal lobes, L temporal region  - Keppra 1000mg BID   - q4H neurochecks  - Repeat CT scan if decline in GCS by >2 points  - SBP goal <140   - Seroquel 50mg BID and 75mg at night to help with sleep wake cycle, agitation secondary to TBI  - Continue melatonin and amitriptyline as well at night  - Delirium precuations  - Neurosurgery follow up as outpatient

## 2025-06-21 NOTE — ASSESSMENT & PLAN NOTE
- Secondary to TBI  - Medications and delirium precautions as above  -Palliative/Geriatrics on board

## 2025-06-21 NOTE — PROGRESS NOTES
Progress Note - Trauma   Name: Jasson Carter 75 y.o. 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   - Patient will need repeat imaging with neuro IR team, which will take place possibly early next week, until then the recommendation from neurosurgery/neuro IR is that patient remain in the hospital until that follow up  - CT scan on 6/14 remained stable post surgical changes  - MRI on 6/14 with scattered acute and early subacute infarcts in bilateral frontal and parietal lobes, L temporal region  - Keppra 1000mg BID   - q4H neurochecks  - Repeat CT scan if decline in GCS by >2 points  - SBP goal <140   - Seroquel 50mg BID and 75mg at night to help with sleep wake cycle, agitation secondary to TBI  - Continue melatonin and amitriptyline as well at night  - Delirium precuations  - Neurosurgery follow up as outpatient    Altered mental status  - Secondary to TBI  - Medications and delirium precautions as above  -Palliative/Geriatrics on board  Fall  - Status post fall with the below noted injuries.  - Fall precautions.  - Geriatric Medicine consultation for evaluation, medication review and recommendations.  - PT and OT evaluation and treatment as indicated.  - Case Management consultation for disposition planning.  Atrial fibrillation (HCC)  - Monitor on telemetry   Effusion of right knee  - No current pain  - No intervention needed  Post traumatic seizure (HCC)  - Witnessed possible tonic clonic activity post TBI  - EEG completed and no seizures noted  - On keppra 1000mg BID for 14 day course  Hypertension  - Goal SBP <140 per neurosurgery    - Started on norvasc  - Will resume home cozaar  Dysphagia  - Speech following  - Continue level II modified diet, thickened liquids-nectar thick   - Continue feeds by NGT nightly  - 6/19 start Marinol to stimulate appetite  - Plan for VBS today, 6/20 with speech  -Continue calorie counts, tolerated 100% of breakfast this AM with assistance  -Continue assist to feed    Bowel Regimen: Senokot, miralax  VTE Prophylaxis:VTE covered by:  enoxaparin, Subcutaneous, 30 mg at 06/21/25 0833         Disposition: [facility] next week     24 Hour Events : NG removed started on dysphagia 2 diet  Subjective : Tolerating diet with assistance. Denies any pain. No nausea or vomiting. Tolerating diet.    Objective :  Temp:  [97.3 °F (36.3 °C)-99.2 °F (37.3 °C)] 97.3 °F (36.3 °C)  HR:  [] 96  BP: (116-156)/(80-98) 116/80  Resp:  [16-20] 18  SpO2:  [95 %-98 %] 97 %  O2 Device: None (Room air)    I/O         06/19 0701  06/20 0700 06/20 0701  06/21 0700 06/21 0701  06/22 0700    P.O. 240 630 200    NG/GT 1096      Feedings 758      Total Intake(mL/kg) 2094 (24) 630 (7.2) 200 (2.3)    Urine (mL/kg/hr) 600 (0.3) 350 (0.2)     Emesis/NG output 0      Stool 0      Total Output 600 350     Net +1494 +280 +200           Unmeasured Urine Occurrence 2 x 4 x     Unmeasured Stool Occurrence 2 x              Physical Exam   General: NAD  HENT: NCAT MMM. Incision c/d/i  Neck: supple, no JVD  CV: nl rate  Lungs: nl wob. No resp distress  ABD: Soft, nontender, nondistended  Extrem: No CCE  Neuro: AAOx3. GCS 15.           Lab Results: I have reviewed the following results:  Recent Labs     06/21/25  0329   WBC 8.34   HGB 12.9   HCT 39.4      SODIUM 135   K 3.6   CL 98   CO2 28   BUN 18   CREATININE 0.67   GLUC 86   MG 2.2   PHOS 3.7       Imaging Results Review: No pertinent imaging studies reviewed.  Other Study Results Review: No additional pertinent studies reviewed.

## 2025-06-22 ENCOUNTER — APPOINTMENT (INPATIENT)
Dept: RADIOLOGY | Facility: HOSPITAL | Age: 75
DRG: 025 | End: 2025-06-22
Payer: MEDICARE

## 2025-06-22 LAB
GLUCOSE SERPL-MCNC: 104 MG/DL (ref 65–140)
GLUCOSE SERPL-MCNC: 111 MG/DL (ref 65–140)
GLUCOSE SERPL-MCNC: 113 MG/DL (ref 65–140)
GLUCOSE SERPL-MCNC: 133 MG/DL (ref 65–140)
GLUCOSE SERPL-MCNC: 96 MG/DL (ref 65–140)

## 2025-06-22 PROCEDURE — 72125 CT NECK SPINE W/O DYE: CPT

## 2025-06-22 PROCEDURE — 70450 CT HEAD/BRAIN W/O DYE: CPT

## 2025-06-22 PROCEDURE — 99232 SBSQ HOSP IP/OBS MODERATE 35: CPT | Performed by: SURGERY

## 2025-06-22 PROCEDURE — NC001 PR NO CHARGE: Performed by: NURSE PRACTITIONER

## 2025-06-22 PROCEDURE — 73030 X-RAY EXAM OF SHOULDER: CPT

## 2025-06-22 PROCEDURE — 82948 REAGENT STRIP/BLOOD GLUCOSE: CPT

## 2025-06-22 RX ADMIN — LABETALOL HYDROCHLORIDE 10 MG: 5 INJECTION, SOLUTION INTRAVENOUS at 22:34

## 2025-06-22 RX ADMIN — PANTOPRAZOLE SODIUM 40 MG: 40 INJECTION, POWDER, FOR SOLUTION INTRAVENOUS at 06:29

## 2025-06-22 RX ADMIN — QUETIAPINE 50 MG: 25 TABLET ORAL at 09:32

## 2025-06-22 RX ADMIN — DRONABINOL 2.5 MG: 2.5 CAPSULE ORAL at 11:44

## 2025-06-22 RX ADMIN — LOSARTAN POTASSIUM 100 MG: 50 TABLET, FILM COATED ORAL at 09:32

## 2025-06-22 RX ADMIN — AMLODIPINE BESYLATE 5 MG: 5 TABLET ORAL at 09:32

## 2025-06-22 RX ADMIN — LEVETIRACETAM 1000 MG: 500 TABLET, FILM COATED ORAL at 22:13

## 2025-06-22 RX ADMIN — QUETIAPINE 50 MG: 25 TABLET ORAL at 18:31

## 2025-06-22 RX ADMIN — ACETAMINOPHEN 650 MG: 650 SUSPENSION ORAL at 13:22

## 2025-06-22 RX ADMIN — SENNOSIDES 8.6 MG: 8.6 TABLET, FILM COATED ORAL at 09:32

## 2025-06-22 RX ADMIN — SENNOSIDES 8.6 MG: 8.6 TABLET, FILM COATED ORAL at 18:31

## 2025-06-22 RX ADMIN — Medication 3 MG: at 22:13

## 2025-06-22 RX ADMIN — POLYETHYLENE GLYCOL 3350 17 G: 17 POWDER, FOR SOLUTION ORAL at 09:32

## 2025-06-22 RX ADMIN — QUETIAPINE FUMARATE 100 MG: 100 TABLET ORAL at 22:13

## 2025-06-22 RX ADMIN — LEVETIRACETAM 1000 MG: 500 TABLET, FILM COATED ORAL at 09:32

## 2025-06-22 NOTE — PROGRESS NOTES
06/22/25 1800   Clinical Encounter Type   Crisis Visit   (Responded to Rapid. No family present.)

## 2025-06-22 NOTE — RAPID RESPONSE
Rapid Response Note  Jasson Carter 75 y.o. male MRN: [id number]  Unit[facility]Bed[facility] Encounter: [id number]    Rapid Response Notification(s):   Response called date/time:  6/22/2025 8:43 AM  Response team arrival date/time:  6/22/2025 8:44 AM  Response end date/time:  6/22/2025 8:50 AM  Level of care:  [facility]  Rapid response location:  [facility] unit  Primary reason for rapid response call:  Fall    Rapid Response Intervention(s):   Airway:  None  Breathing:  None  Circulation:  None  Fluids administered:  None  Medications administered:  None       Assessment:   Unwitnessed fall. Staff heard loud crashing sound and entered the room to find patient tangled in walker on the ground with head slouched against the wall. Patient awake and alert with no c/o pain or obvious injuries. C-collar placed. Patient able to sit upright and stood and pivoted back to bed with assistance from staff. No neck or spine tenderness. No deformities palpated. No sensory or motor dysfunction. Pupils +3 and reactive bilaterally. VS and glucose WNL. Patient states he was trying to get up to go to the bathroom and tripped on the walker. No dizziness prior to fall. No LOC.     Plan:   Stat CT head and CT cervical spine   Dr. Soto from Trauma (primary service) made aware of events     Rapid Response Outcome:   Transfer:  Remain on floor  Code Status: Level 1 (Full Code)      Family notified: Yes, Name of Family member contacted voicemail left for both daughter Shasha and wife Edelmira, stating calling with update and instructed to call main nursing station when message received. Communicated this to Dr. Soto from primary team.           Background/Situation:   Jasson Carter is a 75 y.o. male who presented to the ED 1 day post fall with c/o pain. Noted to have AMS with GCS 13 and expressive aphasia. Found to have 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 midline shift s/p emergent L craniotomy and R karyn hole craniotomy  for evacuation, now POD #11.     Review of Systems   All other systems reviewed and are negative.      Objective:   Vitals:    06/22/25 0842 06/22/25 0843 06/22/25 0844 06/22/25 0845   BP:  143/89 143/89    BP Location:       Pulse: 105 100  100   Resp:  19     Temp:  97.9 °F (36.6 °C)     TempSrc:       SpO2: 97% 97%  97%   Weight:       Height:         Physical Exam  Constitutional:       General: He is awake.      Appearance: Normal appearance. He is well-developed.   HENT:      Head: Normocephalic and atraumatic.     Eyes:      General: Lids are normal.      Extraocular Movements: Extraocular movements intact.      Conjunctiva/sclera: Conjunctivae normal.      Pupils: Pupils are equal, round, and reactive to light.     Pulmonary:      Effort: Pulmonary effort is normal.     Musculoskeletal:         General: Normal range of motion.      Cervical back: No spinous process tenderness or muscular tenderness.     Skin:     General: Skin is warm and dry.      Capillary Refill: Capillary refill takes less than 2 seconds.     Neurological:      General: No focal deficit present.      Mental Status: He is alert and oriented to person, place, and time.      GCS: GCS eye subscore is 4. GCS verbal subscore is 5. GCS motor subscore is 6.      Comments: S/p craniotomy with staples    Psychiatric:         Behavior: Behavior is cooperative.

## 2025-06-22 NOTE — QUICK NOTE
S/p fall with rapid response called. No LOC, reports no head strike    CT C spine negative, C collar removed    CTH with slight interval decrease in size of left subdural hematoma compared to 6/14/2024. No new intracranial hemorrhage. Given new fall, recommend short-term interval follow-up head CT (6-8 hours) to evaluate for acute on chronic hemorrhage.     Plan:  Repeat CTH at 3 pm today  Will hold Lovenox pending CT stability, resume if no sign of acute hemorrhage

## 2025-06-22 NOTE — ASSESSMENT & PLAN NOTE
- Status post fall with the below noted injuries.  - Fall precautions.  - Geriatric Medicine consultation for evaluation, medication review and recommendations.  - PT and OT evaluation and treatment as indicated.  - Case Management consultation for disposition planning.    Patient with fall when trying to ambulate out of bed this morning, reports no head strike. Neuro intact without deficits. C collar placed.    Plan:  Obtain stat CTH/CT C spine  CT Cspine negative -dc cervical collar

## 2025-06-22 NOTE — PLAN OF CARE
Problem: Nutrition/Hydration-ADULT  Goal: Nutrient/Hydration intake appropriate for improving, restoring or maintaining nutritional needs  Description: Monitor and assess patient's nutrition/hydration status for malnutrition. Collaborate with interdisciplinary team and initiate plan and interventions as ordered.  Monitor patient's weight and dietary intake as ordered or per policy. Utilize nutrition screening tool and intervene as necessary. Determine patient's food preferences and provide high-protein, high-caloric foods as appropriate.     INTERVENTIONS:  - Monitor oral intake, urinary output, labs, and treatment plans  - Assess nutrition and hydration status and recommend course of action  - Evaluate amount of meals eaten  - Assist patient with eating if necessary   - Allow adequate time for meals  - Recommend/ encourage appropriate diets, oral nutritional supplements, and vitamin/mineral supplements  - Order, calculate, and assess calorie counts as needed  - Recommend, monitor, and adjust tube feedings and TPN/PPN based on assessed needs  - Assess need for intravenous fluids  - Provide specific nutrition/hydration education as appropriate  - Include patient/family/caregiver in decisions related to nutrition  Outcome: Progressing     Problem: PAIN - ADULT  Goal: Verbalizes/displays adequate comfort level or baseline comfort level  Description: Interventions:  - Encourage patient to monitor pain and request assistance  - Assess pain using appropriate pain scale  - Administer analgesics as ordered based on type and severity of pain and evaluate response  - Implement non-pharmacological measures as appropriate and evaluate response  - Consider cultural and social influences on pain and pain management  - Notify physician/advanced practitioner if interventions unsuccessful or patient reports new pain  - Educate patient/family on pain management process including their role and importance of  reporting pain   -  Provide non-pharmacologic/complimentary pain relief interventions  Outcome: Progressing     Problem: INFECTION - ADULT  Goal: Absence or prevention of progression during hospitalization  Description: INTERVENTIONS:  - Assess and monitor for signs and symptoms of infection  - Monitor lab/diagnostic results  - Monitor all insertion sites, i.e. indwelling lines, tubes, and drains  - Monitor endotracheal if appropriate and nasal secretions for changes in amount and color  - [place] appropriate cooling/warming therapies per order  - Administer medications as ordered  - Instruct and encourage patient and family to use good hand hygiene technique  - Identify and instruct in appropriate isolation precautions for identified infection/condition  Outcome: Progressing  Goal: Absence of fever/infection during neutropenic period  Description: INTERVENTIONS:  - Monitor WBC  - Perform strict hand hygiene  - Limit to healthy visitors only  - No plants, dried, fresh or silk flowers with winters in patient room  Outcome: Progressing     Problem: SAFETY ADULT  Goal: Patient will remain free of falls  Description: INTERVENTIONS:  - Educate patient/family on patient safety including physical limitations  - Instruct patient to call for assistance with activity   - Consider consulting OT/PT to assist with strengthening/mobility based on AM PAC & JH-HLM score  - Consult OT/PT to assist with strengthening/mobility   - Keep Call bell within reach  - Keep bed low and locked with side rails adjusted as appropriate  - Keep care items and personal belongings within reach  - Initiate and maintain comfort rounds  - Make Fall Risk Sign visible to staff  - Offer Toileting every 2 Hours, in advance of need  - Initiate/Maintain bed/chair alarm  - Obtain necessary fall risk management equipment:   - Apply yellow socks and bracelet for high fall risk patients  - Consider moving patient to room near nurses station  Outcome: Progressing  Goal: Maintain or  return to baseline ADL function  Description: INTERVENTIONS:  -  Assess patient's ability to carry out ADLs; assess patient's baseline for ADL function and identify physical deficits which impact ability to perform ADLs (bathing, care of mouth/teeth, toileting, grooming, dressing, etc.)  - Assess/evaluate cause of self-care deficits   - Assess range of motion  - Assess patient's mobility; develop plan if impaired  - Assess patient's need for assistive devices and provide as appropriate  - Encourage maximum independence but intervene and supervise when necessary  - Involve family in performance of ADLs  - Assess for home care needs following discharge   - Consider OT consult to assist with ADL evaluation and planning for discharge  - Provide patient education as appropriate  - Monitor functional capacity and physical performance, use of AM PAC & JH-HLM   - Monitor gait, balance and fatigue with ambulation    Outcome: Progressing  Goal: Maintains/Returns to pre admission functional level  Description: INTERVENTIONS:  - Perform AM-PAC 6 Click Basic Mobility/ Daily Activity assessment daily.  - Set and communicate daily mobility goal to care team and patient/family/caregiver.   - Collaborate with rehabilitation services on mobility goals if consulted  - Perform Range of Motion 3 times a day.  - Reposition patient every 2 hours.  - Dangle patient 3 times a day  - Stand patient 3 times a day  - Ambulate patient 3 times a day  - Out of bed to chair 3 times a day   - Out of bed for meals 3 times a day  - Out of bed for toileting  - Record patient progress and toleration of activity level   Outcome: Progressing     Problem: DISCHARGE PLANNING  Goal: Discharge to home or other facility with appropriate resources  Description: INTERVENTIONS:  - Identify barriers to discharge w/patient and caregiver  - Arrange for needed discharge resources and transportation as appropriate  - Identify discharge learning needs (meds, wound  care, etc.)  - Arrange for interpretive services to assist at discharge as needed  - Refer to Case Management Department for coordinating discharge planning if the patient needs post-hospital services based on physician/advanced practitioner order or complex needs related to functional status, cognitive ability, or social support system  Outcome: Progressing     Problem: Knowledge Deficit  Goal: Patient/family/caregiver demonstrates understanding of disease process, treatment plan, medications, and discharge instructions  Description: Complete learning assessment and assess knowledge base.  Interventions:  - Provide teaching at level of understanding  - Provide teaching via preferred learning methods  Outcome: Progressing     Problem: NEUROSENSORY - ADULT  Goal: Achieves stable or improved neurological status  Description: INTERVENTIONS  - Monitor and report changes in neurological status  - Monitor vital signs such as temperature, blood pressure, glucose, and any other labs ordered   - Initiate measures to prevent increased intracranial pressure  - Monitor for seizure activity and implement precautions if appropriate      Outcome: Progressing  Goal: Remains free of injury related to seizures activity  Description: INTERVENTIONS  - Maintain airway, patient safety  and administer oxygen as ordered  - Monitor patient for seizure activity, document and report duration and description of seizure to physician/advanced practitioner  - If seizure occurs,  ensure patient safety during seizure  - Reorient patient post seizure  - Seizure pads on all 4 side rails  - Instruct patient/family to notify RN of any seizure activity including if an aura is experienced  - Instruct patient/family to call for assistance with activity based on nursing assessment  - Administer anti-seizure medications if ordered    Outcome: Progressing  Goal: Achieves maximal functionality and self care  Description: INTERVENTIONS  - Monitor swallowing  and airway patency with patient fatigue and changes in neurological status  - Encourage and assist patient to increase activity and self care.   - Encourage visually impaired, hearing impaired and aphasic patients to use assistive/communication devices  Outcome: Progressing     Problem: CARDIOVASCULAR - ADULT  Goal: Maintains optimal cardiac output and hemodynamic stability  Description: INTERVENTIONS:  - Monitor I/O, vital signs and rhythm  - Monitor for S/S and trends of decreased cardiac output  - Administer and titrate ordered vasoactive medications to optimize hemodynamic stability  - Assess quality of pulses, skin color and temperature  - Assess for signs of decreased coronary artery perfusion  - Instruct patient to report change in severity of symptoms  Outcome: Progressing  Goal: Absence of cardiac dysrhythmias or at baseline rhythm  Description: INTERVENTIONS:  - Continuous cardiac monitoring, vital signs, obtain 12 lead EKG if ordered  - Administer antiarrhythmic and heart rate control medications as ordered  - Monitor electrolytes and administer replacement therapy as ordered  Outcome: Progressing     Problem: RESPIRATORY - ADULT  Goal: Achieves optimal ventilation and oxygenation  Description: INTERVENTIONS:  - Assess for changes in respiratory status  - Assess for changes in mentation and behavior  - Position to facilitate oxygenation and minimize respiratory effort  - Oxygen administered by appropriate delivery if ordered  - Initiate smoking cessation education as indicated  - Encourage broncho-pulmonary hygiene including cough, deep breathe, Incentive Spirometry  - Assess the need for suctioning and aspirate as needed  - Assess and instruct to report SOB or any respiratory difficulty  - Respiratory Therapy support as indicated  Outcome: Progressing     Problem: GASTROINTESTINAL - ADULT  Goal: Minimal or absence of nausea and/or vomiting  Description: INTERVENTIONS:  - Administer IV fluids if ordered  to ensure adequate hydration  - Maintain NPO status until nausea and vomiting are resolved  - Nasogastric tube if ordered  - Administer ordered antiemetic medications as needed  - Provide nonpharmacologic comfort measures as appropriate  - Advance diet as tolerated, if ordered  - Consider nutrition services referral to assist patient with adequate nutrition and appropriate food choices  Outcome: Progressing  Goal: Maintains or returns to baseline bowel function  Description: INTERVENTIONS:  - Assess bowel function  - Encourage oral fluids to ensure adequate hydration  - Administer IV fluids if ordered to ensure adequate hydration  - Administer ordered medications as needed  - Encourage mobilization and activity  - Consider nutritional services referral to assist patient with adequate nutrition and appropriate food choices  Outcome: Progressing  Goal: Maintains adequate nutritional intake  Description: INTERVENTIONS:  - Monitor percentage of each meal consumed  - Identify factors contributing to decreased intake, treat as appropriate  - Assist with meals as needed  - Monitor I&O, weight, and lab values if indicated  - Obtain nutrition services referral as needed  Outcome: Progressing  Goal: Establish and maintain optimal ostomy function  Description: INTERVENTIONS:  - Assess bowel function  - Encourage oral fluids to ensure adequate hydration  - Administer IV fluids if ordered to ensure adequate hydration   - Administer ordered medications as needed  - Encourage mobilization and activity  - Nutrition services referral to assist patient with appropriate food choices  - Assess stoma site  - Consider wound care consult   Outcome: Progressing  Goal: Oral mucous membranes remain intact  Description: INTERVENTIONS  - Assess oral mucosa and hygiene practices  - Implement preventative oral hygiene regimen  - Implement oral medicated treatments as ordered  - Initiate Nutrition services referral as needed  Outcome:  Progressing     Problem: GENITOURINARY - ADULT  Goal: Maintains or returns to baseline urinary function  Description: INTERVENTIONS:  - Assess urinary function  - Encourage oral fluids to ensure adequate hydration if ordered  - Administer IV fluids as ordered to ensure adequate hydration  - Administer ordered medications as needed  - Offer frequent toileting  - Follow urinary retention protocol if ordered  Outcome: Progressing  Goal: Absence of urinary retention  Description: INTERVENTIONS:  - Assess patient’s ability to void and empty bladder  - Monitor I/O  - Bladder scan as needed  - Discuss with physician/AP medications to alleviate retention as needed  - Discuss catheterization for long term situations as appropriate  Outcome: Progressing  Goal: Urinary catheter remains patent  Description: INTERVENTIONS:  - Assess patency of urinary catheter  - If patient has a chronic pedersen, consider changing catheter if non-functioning  - Follow guidelines for intermittent irrigation of non-functioning urinary catheter  Outcome: Progressing     Problem: METABOLIC, FLUID AND ELECTROLYTES - ADULT  Goal: Electrolytes maintained within normal limits  Description: INTERVENTIONS:  - Monitor labs and assess patient for signs and symptoms of electrolyte imbalances  - Administer electrolyte replacement as ordered  - Monitor response to electrolyte replacements, including repeat lab results as appropriate  - Instruct patient on fluid and nutrition as appropriate  Outcome: Progressing  Goal: Fluid balance maintained  Description: INTERVENTIONS:  - Monitor labs   - Monitor I/O and WT  - Instruct patient on fluid and nutrition as appropriate  - Assess for signs & symptoms of volume excess or deficit  Outcome: Progressing  Goal: Glucose maintained within target range  Description: INTERVENTIONS:  - Monitor Blood Glucose as ordered  - Assess for signs and symptoms of hyperglycemia and hypoglycemia  - Administer ordered medications to  maintain glucose within target range  - Assess nutritional intake and initiate nutrition service referral as needed  Outcome: Progressing     Problem: SKIN/TISSUE INTEGRITY - ADULT  Goal: Skin Integrity remains intact(Skin Breakdown Prevention)  Description: Assess:  -Perform Jake assessment every shift  -Clean and moisturize skin every shift  -Inspect skin when repositioning, toileting, and assisting with ADLS  -Assess under medical devices such as rebeca every shift  -Assess extremities for adequate circulation and sensation     Bed Management:  -Have minimal linens on bed & keep smooth, unwrinkled  -Change linens as needed when moist or perspiring  -Avoid sitting or lying in one position for more than 2 hours while in bed  -Keep HOB at 30 degrees     Toileting:  -Offer bedside commode  -Assess for incontinence every shift  -Use incontinent care products after each incontinent episode such as barrier cream     Activity:  -Mobilize patient 3 times a day  -Encourage activity and walks on unit  -Encourage or provide ROM exercises   -Turn and reposition patient every 2 Hours  -Use appropriate equipment to lift or move patient in bed  -Instruct/ Assist with weight shifting every hour  when out of bed in chair  -Consider limitation of chair time 2 hour intervals    Skin Care:  -Avoid use of baby powder, tape, friction and shearing, hot water or constrictive clothing  -Relieve pressure over bony prominences using alevin  -Do not massage red bony areas    Next Steps:  -Teach patient strategies to minimize risks such as    -Consider consults to  interdisciplinary teams such as pt/ot  Outcome: Progressing  Goal: Incision(s), wounds(s) or drain site(s) healing without S/S of infection  Description: INTERVENTIONS  - Assess and document dressing, incision, wound bed, drain sites and surrounding tissue  - Provide patient and family education  - Perform skin care/dressing changes every shift  Outcome: Progressing  Goal:  Pressure injury heals and does not worsen  Description: Interventions:  - Implement low air loss mattress or specialty surface (Criteria met)  - Apply silicone foam dressing  - Instruct/assist with weight shifting every 30 minutes when in chair   - Limit chair time to 2 hour intervals  - Use special pressure reducing interventions such as cushion when in chair   - Apply fecal or urinary incontinence containment device   - Perform passive or active ROM every hour   - Turn and reposition patient & offload bony prominences every 2 hours   - Utilize friction reducing device or surface for transfers   - Consider consults to  interdisciplinary teams such as pt/ot  - Use incontinent care products after each incontinent episode such as barrier cream   - Consider nutrition services referral as needed  Outcome: Progressing     Problem: HEMATOLOGIC - ADULT  Goal: Maintains hematologic stability  Description: INTERVENTIONS  - Assess for signs and symptoms of bleeding or hemorrhage  - Monitor labs  - Administer supportive blood products/factors as ordered and appropriate  Outcome: Progressing     Problem: MUSCULOSKELETAL - ADULT  Goal: Maintain or return mobility to safest level of function  Description: INTERVENTIONS:  - Assess patient's ability to carry out ADLs; assess patient's baseline for ADL function and identify physical deficits which impact ability to perform ADLs (bathing, care of mouth/teeth, toileting, grooming, dressing, etc.)  - Assess/evaluate cause of self-care deficits   - Assess range of motion  - Assess patient's mobility  - Assess patient's need for assistive devices and provide as appropriate  - Encourage maximum independence but intervene and supervise when necessary  - Involve family in performance of ADLs  - Assess for home care needs following discharge   - Consider OT consult to assist with ADL evaluation and planning for discharge  - Provide patient education as appropriate  Outcome:  Progressing  Goal: Maintain proper alignment of affected body part  Description: INTERVENTIONS:  - Support, maintain and protect limb and body alignment  - Provide patient/ family with appropriate education  Outcome: Progressing     Problem: Prexisting or High Potential for Compromised Skin Integrity  Goal: Skin integrity is maintained or improved  Description: INTERVENTIONS:  - Identify patients at risk for skin breakdown  - Assess and monitor skin integrity including under and around medical devices   - Assess and monitor nutrition and hydration status  - Monitor labs  - Assess for incontinence   - Turn and reposition patient  - Assist with mobility/ambulation  - Relieve pressure over paolo prominences   - Avoid friction and shearing  - Provide appropriate hygiene as needed including keeping skin clean and dry  - Evaluate need for skin moisturizer/barrier cream  - Collaborate with interdisciplinary team  - Patient/family teaching  - Consider wound care consult    Assess:  - Review Jake scale daily  - Clean and moisturize skin every shift  - Inspect skin when repositioning, toileting, and assisting with ADLS  - Assess under medical devices such as rebeca every shift  - Assess extremities for adequate circulation and sensation     Bed Management:  - Have minimal linens on bed & keep smooth, unwrinkled  - Change linens as needed when moist or perspiring  - Avoid sitting or lying in one position for more than 2 hours while in bed?Keep HOB at 30 degrees   - Toileting:  - Offer bedside commode  - Assess for incontinence every hour  - Use incontinent care products after each incontinent episode such as barrier cream     Activity:  - Mobilize patient 3 times a day  - Encourage activity and walks on unit  - Encourage or provide ROM exercises   - Turn and reposition patient every 2 Hours  - Use appropriate equipment to lift or move patient in bed  - Instruct/ Assist with weight shifting every hour when out of bed in  chair  - Consider limitation of chair time 2 hour intervals    Skin Care:  - Avoid use of baby powder, tape, friction and shearing, hot water or constrictive clothing  - Relieve pressure over bony prominences using alevin  - Do not massage red bony areas    Next Steps:  - Teach patient strategies to minimize risks such as   - Consider consults to  interdisciplinary teams such as pt/ot  Outcome: Progressing     Problem: SAFETY,RESTRAINT: NV/NON-SELF DESTRUCTIVE BEHAVIOR  Goal: Remains free of harm/injury (restraint for non violent/non self-detsructive behavior)  Description: INTERVENTIONS:  - Instruct patient/family regarding restraint use   - Assess and monitor physiologic and psychological status   - Provide interventions and comfort measures to meet assessed patient needs   - Identify and implement measures to help patient regain control  - Assess readiness for release of restraint   Outcome: Progressing  Goal: Returns to optimal restraint-free functioning  Description: INTERVENTIONS:  - Assess the patient's behavior and symptoms that indicate continued need for restraint  - Identify and implement measures to help patient regain control  - Assess readiness for release of restraint   Outcome: Progressing     Problem: DECISION MAKING  Goal: Pt/Family able to effectively weigh alternatives and participate in decision making related to treatment and care  Description: INTERVENTIONS:  - Identify decision maker  - Determine when there are differences among patient's view, family's view, and healthcare provider's view of patient condition and care goals  - Facilitate patient/family articulation of goals for care  - Help patient/family identify pros/cons of alternative solutions  - Provide information as requested by patient/family  - Respect patient/family rights related to privacy and sharing information   - Serve as a liaison between patient, family and health care team  - Initiate consults as appropriate (Ethics Team,  Palliative Care, Family Care Conference, etc.)  Outcome: Progressing     Problem: CONFUSION/THOUGHT DISTURBANCE  Goal: Thought disturbances (confusion, delirium, depression, dementia or psychosis) are managed to maintain or return to baseline mental status and functional level  Description: INTERVENTIONS:  - Assess for possible contributors to  thought disturbance, including but not limited to medications, infection, impaired vision or hearing, underlying metabolic abnormalities, dehydration, respiratory compromise,  psychiatric diagnoses and notify attending PHYSICAN/AP  - Monitor and intervene to maintain adequate nutrition, hydration, elimination, sleep and activity  - Decrease environmental stimuli, including noise as appropriate.  - Provide frequent contacts to provide refocusing, direction and reassurance as needed. Approach patient calmly with eye contact and at their level.  - [place] high risk fall precautions, aspiration precautions and other safety measures, as indicated  - If delirium suspected, notify physician/AP of change in condition and request immediate in-person evaluation  - Pursue consults as appropriate including Geriatric (campus dependent), OT for cognitive evaluation/activity planning, psychiatric, pastoral care, etc.  Outcome: Progressing     Problem: BEHAVIOR  Goal: Pt/Family maintain appropriate behavior and adhere to behavioral management agreement, if implemented  Description: INTERVENTIONS:  - Assess the family dynamic   - Encourage verbalization of thoughts and concerns in a socially appropriate manner  - Assess patient/family's coping skills and non-compliant behavior (including use of illegal substances).  - Utilize positive, consistent limit setting strategies supporting safety of patient, staff and others  - Initiate consult with Case Management, Spiritual Care or other ancillary services as appropriate  - If a patient's/visitor's behavior jeopardizes the safety of the patient,  staff, or others, refer to organization procedure.   - Notify Security of behavior or suspected illegal substances which indicate the need for search of the patient and/or belongings  - Encourage participation in the decision making process about a behavioral management agreement; implement if patient meets criteria  Outcome: Progressing
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
## 2025-06-22 NOTE — PROGRESS NOTES
"Pt  complains of pain to right side  abd pain "for years" and states " they said there was something wrong with my liver '.  Rates her pain a 8/10   " Progress Note - Trauma   Name: Jasson Carter 75 y.o. 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   - Patient will need repeat imaging with neuro IR team, which will take place possibly early next week, until then the recommendation from neurosurgery/neuro IR is that patient remain in the hospital until that follow up  - CT scan on 6/14 remained stable post surgical changes  - MRI on 6/14 with scattered acute and early subacute infarcts in bilateral frontal and parietal lobes, L temporal region  - Keppra 1000mg BID   - q4H neurochecks  - Repeat CT scan if decline in GCS by >2 points  - SBP goal <140   - Seroquel 50mg BID and 75mg at night to help with sleep wake cycle, agitation secondary to TBI  - Continue melatonin and amitriptyline as well at night  - Delirium precuations  - Neurosurgery follow up as outpatient    Altered mental status  - Secondary to TBI  - Medications and delirium precautions as above  -Palliative/Geriatrics on board  Fall  - Status post fall with the below noted injuries.  - Fall precautions.  - Geriatric Medicine consultation for evaluation, medication review and recommendations.  - PT and OT evaluation and treatment as indicated.  - Case Management consultation for disposition planning.    Patient with fall when trying to ambulate out of bed this morning, reports no head strike. Neuro intact without deficits. C collar placed.    Plan:  Obtain stat CTH/CT C spine  CT Cspine negative -dc cervical collar  Atrial fibrillation (HCC)  - Monitor on telemetry   Effusion of right knee  - No current pain  - No intervention  needed  Post traumatic seizure (HCC)  - Witnessed possible tonic clonic activity post TBI  - EEG completed and no seizures noted  - On keppra 1000mg BID for 14 day course  Hypertension  - Goal SBP <140 per neurosurgery   - Started on norvasc  - Will resume home cozaar  Dysphagia  - Speech following  - Continue level II modified diet, thickened liquids-nectar thick   - 6/19 start Marinol to stimulate appetite  -Continue calorie counts, tolerated 100% of breakfast this AM with assistance  -Continue assist to feed  - monitor intake, if fails dysphagia diet, may require feeding access    Bowel Regimen: senokot, miralax  VTE Prophylaxis:Sequential compression device (Venodyne)  and Enoxaparin (Lovenox)     Disposition: [facility] when medically stable    24 Hour Events : no acute events overnight. Fall this morning with rapid response called. CTH/C spine obtained. No focal neurological deficits or notable injuries    Subjective : Patient seen and examined at bedside, in no acute distress. No acute events overnight. Denies n/v. Reports eating a bit more. No nausea or vomiting.       Objective :  Temp:  [97.3 °F (36.3 °C)-98.8 °F (37.1 °C)] 97.4 °F (36.3 °C)  HR:  [] 108  BP: (116-143)/(76-89) 135/84  Resp:  [17-19] 19  SpO2:  [95 %-97 %] 97 %  O2 Device: None (Room air)    I/O         06/20 0701  06/21 0700 06/21 0701  06/22 0700 06/22 0701  06/23 0700    P.O. 630 1020     NG/GT       Feedings       Total Intake(mL/kg) 630 (7.2) 1020 (11.7)     Urine (mL/kg/hr) 350 (0.2) 750 (0.4)     Emesis/NG output       Stool       Total Output 350 750     Net +280 +270            Unmeasured Urine Occurrence 4 x 1 x             Physical Exam   General: NAD, alert and oriented  HENT: NCAT MMM. Incision c/d/i  Neck: supple, no JVD  CV: nl rate  Lungs: nl wob. No resp distress, on RA  ABD: Soft, nontender, nondistended  Extrem: No CCE  Neuro: AAOx3. GCS 15. No focal neurological deficits         Lab Results: I have reviewed the  following results:  Recent Labs     06/21/25  0329   WBC 8.34   HGB 12.9   HCT 39.4      SODIUM 135   K 3.6   CL 98   CO2 28   BUN 18   CREATININE 0.67   GLUC 86   MG 2.2   PHOS 3.7

## 2025-06-22 NOTE — QUICK NOTE
Patient's daughter Shasha returned call. She was made aware of RRT called for unwitnessed fall. Updated on patient's stable condition with CT head and CT cervical spine results pending.

## 2025-06-22 NOTE — PLAN OF CARE
Problem: Nutrition/Hydration-ADULT  Goal: Nutrient/Hydration intake appropriate for improving, restoring or maintaining nutritional needs  Description: Monitor and assess patient's nutrition/hydration status for malnutrition. Collaborate with interdisciplinary team and initiate plan and interventions as ordered.  Monitor patient's weight and dietary intake as ordered or per policy. Utilize nutrition screening tool and intervene as necessary. Determine patient's food preferences and provide high-protein, high-caloric foods as appropriate.     INTERVENTIONS:  - Monitor oral intake, urinary output, labs, and treatment plans  - Assess nutrition and hydration status and recommend course of action  - Evaluate amount of meals eaten  - Assist patient with eating if necessary   - Allow adequate time for meals  - Recommend/ encourage appropriate diets, oral nutritional supplements, and vitamin/mineral supplements  - Order, calculate, and assess calorie counts as needed  - Recommend, monitor, and adjust tube feedings and TPN/PPN based on assessed needs  - Assess need for intravenous fluids  - Provide specific nutrition/hydration education as appropriate  - Include patient/family/caregiver in decisions related to nutrition  Outcome: Progressing     Problem: PAIN - ADULT  Goal: Verbalizes/displays adequate comfort level or baseline comfort level  Description: Interventions:  - Encourage patient to monitor pain and request assistance  - Assess pain using appropriate pain scale  - Administer analgesics as ordered based on type and severity of pain and evaluate response  - Implement non-pharmacological measures as appropriate and evaluate response  - Consider cultural and social influences on pain and pain management  - Notify physician/advanced practitioner if interventions unsuccessful or patient reports new pain  - Educate patient/family on pain management process including their role and importance of  reporting pain   -  Provide non-pharmacologic/complimentary pain relief interventions  Outcome: Progressing     Problem: INFECTION - ADULT  Goal: Absence or prevention of progression during hospitalization  Description: INTERVENTIONS:  - Assess and monitor for signs and symptoms of infection  - Monitor lab/diagnostic results  - Monitor all insertion sites, i.e. indwelling lines, tubes, and drains  - Monitor endotracheal if appropriate and nasal secretions for changes in amount and color  - [place] appropriate cooling/warming therapies per order  - Administer medications as ordered  - Instruct and encourage patient and family to use good hand hygiene technique  - Identify and instruct in appropriate isolation precautions for identified infection/condition  Outcome: Progressing  Goal: Absence of fever/infection during neutropenic period  Description: INTERVENTIONS:  - Monitor WBC  - Perform strict hand hygiene  - Limit to healthy visitors only  - No plants, dried, fresh or silk flowers with winters in patient room  Outcome: Progressing     Problem: SAFETY ADULT  Goal: Patient will remain free of falls  Description: INTERVENTIONS:  - Educate patient/family on patient safety including physical limitations  - Instruct patient to call for assistance with activity   - Consider consulting OT/PT to assist with strengthening/mobility based on AM PAC & JH-HLM score  - Consult OT/PT to assist with strengthening/mobility   - Keep Call bell within reach  - Keep bed low and locked with side rails adjusted as appropriate  - Keep care items and personal belongings within reach  - Initiate and maintain comfort rounds  - Make Fall Risk Sign visible to staff  - Offer Toileting every 2 Hours, in advance of need  - Initiate/Maintain bed alarm  - Obtain necessary fall risk management equipment  - Apply yellow socks and bracelet for high fall risk patients  - Consider moving patient to room near nurses station  Outcome: Progressing  Goal: Maintain or return to  baseline ADL function  Description: INTERVENTIONS:  -  Assess patient's ability to carry out ADLs; assess patient's baseline for ADL function and identify physical deficits which impact ability to perform ADLs (bathing, care of mouth/teeth, toileting, grooming, dressing, etc.)  - Assess/evaluate cause of self-care deficits   - Assess range of motion  - Assess patient's mobility; develop plan if impaired  - Assess patient's need for assistive devices and provide as appropriate  - Encourage maximum independence but intervene and supervise when necessary  - Involve family in performance of ADLs  - Assess for home care needs following discharge   - Consider OT consult to assist with ADL evaluation and planning for discharge  - Provide patient education as appropriate  - Monitor functional capacity and physical performance, use of AM PAC & JH-HLM   - Monitor gait, balance and fatigue with ambulation    Outcome: Progressing  Goal: Maintains/Returns to pre admission functional level  Description: INTERVENTIONS:  - Perform AM-PAC 6 Click Basic Mobility/ Daily Activity assessment daily.  - Set and communicate daily mobility goal to care team and patient/family/caregiver.   - Collaborate with rehabilitation services on mobility goals if consulted  - Perform Range of Motion 3 times a day.  - Reposition patient every 2 hours.  - Dangle patient 3 times a day  - Stand patient 3 times a day  - Ambulate patient 3 times a day  - Out of bed to chair 3 times a day   - Out of bed for meals 3 times a day  - Out of bed for toileting  - Record patient progress and toleration of activity level   Outcome: Progressing     Problem: DISCHARGE PLANNING  Goal: Discharge to home or other facility with appropriate resources  Description: INTERVENTIONS:  - Identify barriers to discharge w/patient and caregiver  - Arrange for needed discharge resources and transportation as appropriate  - Identify discharge learning needs (meds, wound care, etc.)  -  Arrange for interpretive services to assist at discharge as needed  - Refer to Case Management Department for coordinating discharge planning if the patient needs post-hospital services based on physician/advanced practitioner order or complex needs related to functional status, cognitive ability, or social support system  Outcome: Progressing     Problem: Knowledge Deficit  Goal: Patient/family/caregiver demonstrates understanding of disease process, treatment plan, medications, and discharge instructions  Description: Complete learning assessment and assess knowledge base.  Interventions:  - Provide teaching at level of understanding  - Provide teaching via preferred learning methods  Outcome: Progressing     Problem: NEUROSENSORY - ADULT  Goal: Achieves stable or improved neurological status  Description: INTERVENTIONS  - Monitor and report changes in neurological status  - Monitor vital signs such as temperature, blood pressure, glucose, and any other labs ordered   - Initiate measures to prevent increased intracranial pressure  - Monitor for seizure activity and implement precautions if appropriate      Outcome: Progressing  Goal: Remains free of injury related to seizures activity  Description: INTERVENTIONS  - Maintain airway, patient safety  and administer oxygen as ordered  - Monitor patient for seizure activity, document and report duration and description of seizure to physician/advanced practitioner  - If seizure occurs,  ensure patient safety during seizure  - Reorient patient post seizure  - Seizure pads on all 4 side rails  - Instruct patient/family to notify RN of any seizure activity including if an aura is experienced  - Instruct patient/family to call for assistance with activity based on nursing assessment  - Administer anti-seizure medications if ordered    Outcome: Progressing  Goal: Achieves maximal functionality and self care  Description: INTERVENTIONS  - Monitor swallowing and airway  patency with patient fatigue and changes in neurological status  - Encourage and assist patient to increase activity and self care.   - Encourage visually impaired, hearing impaired and aphasic patients to use assistive/communication devices  Outcome: Progressing     Problem: CARDIOVASCULAR - ADULT  Goal: Maintains optimal cardiac output and hemodynamic stability  Description: INTERVENTIONS:  - Monitor I/O, vital signs and rhythm  - Monitor for S/S and trends of decreased cardiac output  - Administer and titrate ordered vasoactive medications to optimize hemodynamic stability  - Assess quality of pulses, skin color and temperature  - Assess for signs of decreased coronary artery perfusion  - Instruct patient to report change in severity of symptoms  Outcome: Progressing  Goal: Absence of cardiac dysrhythmias or at baseline rhythm  Description: INTERVENTIONS:  - Continuous cardiac monitoring, vital signs, obtain 12 lead EKG if ordered  - Administer antiarrhythmic and heart rate control medications as ordered  - Monitor electrolytes and administer replacement therapy as ordered  Outcome: Progressing     Problem: RESPIRATORY - ADULT  Goal: Achieves optimal ventilation and oxygenation  Description: INTERVENTIONS:  - Assess for changes in respiratory status  - Assess for changes in mentation and behavior  - Position to facilitate oxygenation and minimize respiratory effort  - Oxygen administered by appropriate delivery if ordered  - Initiate smoking cessation education as indicated  - Encourage broncho-pulmonary hygiene including cough, deep breathe, Incentive Spirometry  - Assess the need for suctioning and aspirate as needed  - Assess and instruct to report SOB or any respiratory difficulty  - Respiratory Therapy support as indicated  Outcome: Progressing     Problem: GASTROINTESTINAL - ADULT  Goal: Minimal or absence of nausea and/or vomiting  Description: INTERVENTIONS:  - Administer IV fluids if ordered to ensure  adequate hydration  - Maintain NPO status until nausea and vomiting are resolved  - Nasogastric tube if ordered  - Administer ordered antiemetic medications as needed  - Provide nonpharmacologic comfort measures as appropriate  - Advance diet as tolerated, if ordered  - Consider nutrition services referral to assist patient with adequate nutrition and appropriate food choices  Outcome: Progressing  Goal: Maintains or returns to baseline bowel function  Description: INTERVENTIONS:  - Assess bowel function  - Encourage oral fluids to ensure adequate hydration  - Administer IV fluids if ordered to ensure adequate hydration  - Administer ordered medications as needed  - Encourage mobilization and activity  - Consider nutritional services referral to assist patient with adequate nutrition and appropriate food choices  Outcome: Progressing  Goal: Maintains adequate nutritional intake  Description: INTERVENTIONS:  - Monitor percentage of each meal consumed  - Identify factors contributing to decreased intake, treat as appropriate  - Assist with meals as needed  - Monitor I&O, weight, and lab values if indicated  - Obtain nutrition services referral as needed  Outcome: Progressing  Goal: Establish and maintain optimal ostomy function  Description: INTERVENTIONS:  - Assess bowel function  - Encourage oral fluids to ensure adequate hydration  - Administer IV fluids if ordered to ensure adequate hydration   - Administer ordered medications as needed  - Encourage mobilization and activity  - Nutrition services referral to assist patient with appropriate food choices  - Assess stoma site  - Consider wound care consult   Outcome: Progressing  Goal: Oral mucous membranes remain intact  Description: INTERVENTIONS  - Assess oral mucosa and hygiene practices  - Implement preventative oral hygiene regimen  - Implement oral medicated treatments as ordered  - Initiate Nutrition services referral as needed  Outcome: Progressing      Problem: GENITOURINARY - ADULT  Goal: Maintains or returns to baseline urinary function  Description: INTERVENTIONS:  - Assess urinary function  - Encourage oral fluids to ensure adequate hydration if ordered  - Administer IV fluids as ordered to ensure adequate hydration  - Administer ordered medications as needed  - Offer frequent toileting  - Follow urinary retention protocol if ordered  Outcome: Progressing  Goal: Absence of urinary retention  Description: INTERVENTIONS:  - Assess patient’s ability to void and empty bladder  - Monitor I/O  - Bladder scan as needed  - Discuss with physician/AP medications to alleviate retention as needed  - Discuss catheterization for long term situations as appropriate  Outcome: Progressing  Goal: Urinary catheter remains patent  Description: INTERVENTIONS:  - Assess patency of urinary catheter  - If patient has a chronic pedersen, consider changing catheter if non-functioning  - Follow guidelines for intermittent irrigation of non-functioning urinary catheter  Outcome: Progressing     Problem: METABOLIC, FLUID AND ELECTROLYTES - ADULT  Goal: Electrolytes maintained within normal limits  Description: INTERVENTIONS:  - Monitor labs and assess patient for signs and symptoms of electrolyte imbalances  - Administer electrolyte replacement as ordered  - Monitor response to electrolyte replacements, including repeat lab results as appropriate  - Instruct patient on fluid and nutrition as appropriate  Outcome: Progressing  Goal: Fluid balance maintained  Description: INTERVENTIONS:  - Monitor labs   - Monitor I/O and WT  - Instruct patient on fluid and nutrition as appropriate  - Assess for signs & symptoms of volume excess or deficit  Outcome: Progressing  Goal: Glucose maintained within target range  Description: INTERVENTIONS:  - Monitor Blood Glucose as ordered  - Assess for signs and symptoms of hyperglycemia and hypoglycemia  - Administer ordered medications to maintain glucose  within target range  - Assess nutritional intake and initiate nutrition service referral as needed  Outcome: Progressing     Problem: HEMATOLOGIC - ADULT  Goal: Maintains hematologic stability  Description: INTERVENTIONS  - Assess for signs and symptoms of bleeding or hemorrhage  - Monitor labs  - Administer supportive blood products/factors as ordered and appropriate  Outcome: Progressing     Problem: MUSCULOSKELETAL - ADULT  Goal: Maintain or return mobility to safest level of function  Description: INTERVENTIONS:  - Assess patient's ability to carry out ADLs; assess patient's baseline for ADL function and identify physical deficits which impact ability to perform ADLs (bathing, care of mouth/teeth, toileting, grooming, dressing, etc.)  - Assess/evaluate cause of self-care deficits   - Assess range of motion  - Assess patient's mobility  - Assess patient's need for assistive devices and provide as appropriate  - Encourage maximum independence but intervene and supervise when necessary  - Involve family in performance of ADLs  - Assess for home care needs following discharge   - Consider OT consult to assist with ADL evaluation and planning for discharge  - Provide patient education as appropriate  Outcome: Progressing  Goal: Maintain proper alignment of affected body part  Description: INTERVENTIONS:  - Support, maintain and protect limb and body alignment  - Provide patient/ family with appropriate education  Outcome: Progressing     Problem: Prexisting or High Potential for Compromised Skin Integrity  Goal: Skin integrity is maintained or improved  Description: INTERVENTIONS:  - Identify patients at risk for skin breakdown  - Assess and monitor skin integrity including under and around medical devices   - Assess and monitor nutrition and hydration status  - Monitor labs  - Assess for incontinence   - Turn and reposition patient  - Assist with mobility/ambulation  - Relieve pressure over paolo prominences   -  Avoid friction and shearing  - Provide appropriate hygiene as needed including keeping skin clean and dry  - Evaluate need for skin moisturizer/barrier cream  - Collaborate with interdisciplinary team  - Patient/family teaching  - Consider wound care consult  Problem: SAFETY,RESTRAINT: NV/NON-SELF DESTRUCTIVE BEHAVIOR  Goal: Remains free of harm/injury (restraint for non violent/non self-detsructive behavior)  Description: INTERVENTIONS:  - Instruct patient/family regarding restraint use   - Assess and monitor physiologic and psychological status   - Provide interventions and comfort measures to meet assessed patient needs   - Identify and implement measures to help patient regain control  - Assess readiness for release of restraint   Outcome: Progressing  Goal: Returns to optimal restraint-free functioning  Description: INTERVENTIONS:  - Assess the patient's behavior and symptoms that indicate continued need for restraint  - Identify and implement measures to help patient regain control  - Assess readiness for release of restraint   Outcome: Progressing     Problem: DECISION MAKING  Goal: Pt/Family able to effectively weigh alternatives and participate in decision making related to treatment and care  Description: INTERVENTIONS:  - Identify decision maker  - Determine when there are differences among patient's view, family's view, and healthcare provider's view of patient condition and care goals  - Facilitate patient/family articulation of goals for care  - Help patient/family identify pros/cons of alternative solutions  - Provide information as requested by patient/family  - Respect patient/family rights related to privacy and sharing information   - Serve as a liaison between patient, family and health care team  - Initiate consults as appropriate (Ethics Team, Palliative Care, Family Care Conference, etc.)  Outcome: Progressing     Problem: CONFUSION/THOUGHT DISTURBANCE  Goal: Thought disturbances (confusion,  delirium, depression, dementia or psychosis) are managed to maintain or return to baseline mental status and functional level  Description: INTERVENTIONS:  - Assess for possible contributors to  thought disturbance, including but not limited to medications, infection, impaired vision or hearing, underlying metabolic abnormalities, dehydration, respiratory compromise,  psychiatric diagnoses and notify attending PHYSICAN/AP  - Monitor and intervene to maintain adequate nutrition, hydration, elimination, sleep and activity  - Decrease environmental stimuli, including noise as appropriate.  - Provide frequent contacts to provide refocusing, direction and reassurance as needed. Approach patient calmly with eye contact and at their level.  - [place] high risk fall precautions, aspiration precautions and other safety measures, as indicated  - If delirium suspected, notify physician/AP of change in condition and request immediate in-person evaluation  - Pursue consults as appropriate including Geriatric (campus dependent), OT for cognitive evaluation/activity planning, psychiatric, pastoral care, etc.  Outcome: Progressing     Problem: BEHAVIOR  Goal: Pt/Family maintain appropriate behavior and adhere to behavioral management agreement, if implemented  Description: INTERVENTIONS:  - Assess the family dynamic   - Encourage verbalization of thoughts and concerns in a socially appropriate manner  - Assess patient/family's coping skills and non-compliant behavior (including use of illegal substances).  - Utilize positive, consistent limit setting strategies supporting safety of patient, staff and others  - Initiate consult with Case Management, Spiritual Care or other ancillary services as appropriate  - If a patient's/visitor's behavior jeopardizes the safety of the patient, staff, or others, refer to organization procedure.   - Notify Security of behavior or suspected illegal substances which indicate the need for search of  the patient and/or belongings  - Encourage participation in the decision making process about a behavioral management agreement; implement if patient meets criteria  Outcome: Progressing

## 2025-06-22 NOTE — NURSING NOTE
While completing nursing care in room 605, I was notified by Pamela Harris RN at 0841 that this patient, Jasson Carter, had sustained an unwitnessed fall and that a rapid response had been called. After assuring the safety of the patient that I was with, I then walked over to room 619. The bed exit alarm was alarming upon my entry of the room. Upon entering, the patient was on the floor, on top of the walker that was tipped over, and he was leaning against the wall. A cervical collar was applied, and the patient was alert and oriented X4 upon my assessment, vitals signs were stable, and his neurological assessment remained intact and at baseline. We then transferred the patient back to bed upon the approval of DYLAN Munoz, who had responded to the rapid response. A STAT head CT and C-spine was then ordered.

## 2025-06-22 NOTE — ASSESSMENT & PLAN NOTE
- Speech following  - Continue level II modified diet, thickened liquids-nectar thick   - 6/19 start Marinol to stimulate appetite  -Continue calorie counts, tolerated 100% of breakfast this AM with assistance  -Continue assist to feed  - monitor intake, if fails dysphagia diet, may require feeding access

## 2025-06-23 PROBLEM — S22.42XD CLOSED FRACTURE OF MULTIPLE RIBS OF LEFT SIDE WITH ROUTINE HEALING: Status: ACTIVE | Noted: 2025-06-23

## 2025-06-23 PROBLEM — M25.512 LEFT SHOULDER PAIN: Status: ACTIVE | Noted: 2025-06-23

## 2025-06-23 LAB
ANION GAP SERPL CALCULATED.3IONS-SCNC: 9 MMOL/L (ref 4–13)
BUN SERPL-MCNC: 18 MG/DL (ref 5–25)
CALCIUM SERPL-MCNC: 8.9 MG/DL (ref 8.4–10.2)
CHLORIDE SERPL-SCNC: 102 MMOL/L (ref 96–108)
CO2 SERPL-SCNC: 25 MMOL/L (ref 21–32)
CREAT SERPL-MCNC: 0.7 MG/DL (ref 0.6–1.3)
ERYTHROCYTE [DISTWIDTH] IN BLOOD BY AUTOMATED COUNT: 12.6 % (ref 11.6–15.1)
GFR SERPL CREATININE-BSD FRML MDRD: 91 ML/MIN/1.73SQ M
GLUCOSE SERPL-MCNC: 109 MG/DL (ref 65–140)
GLUCOSE SERPL-MCNC: 111 MG/DL (ref 65–140)
GLUCOSE SERPL-MCNC: 135 MG/DL (ref 65–140)
GLUCOSE SERPL-MCNC: 208 MG/DL (ref 65–140)
GLUCOSE SERPL-MCNC: 99 MG/DL (ref 65–140)
HCT VFR BLD AUTO: 43.2 % (ref 36.5–49.3)
HGB BLD-MCNC: 14.3 G/DL (ref 12–17)
MCH RBC QN AUTO: 32.7 PG (ref 26.8–34.3)
MCHC RBC AUTO-ENTMCNC: 33.1 G/DL (ref 31.4–37.4)
MCV RBC AUTO: 99 FL (ref 82–98)
PLATELET # BLD AUTO: 291 THOUSANDS/UL (ref 149–390)
PMV BLD AUTO: 10.5 FL (ref 8.9–12.7)
POTASSIUM SERPL-SCNC: 4 MMOL/L (ref 3.5–5.3)
QRS AXIS: -18 DEGREES
QRSD INTERVAL: 90 MS
QT INTERVAL: 356 MS
QTC INTERVAL: 464 MS
RBC # BLD AUTO: 4.37 MILLION/UL (ref 3.88–5.62)
SODIUM SERPL-SCNC: 136 MMOL/L (ref 135–147)
T WAVE AXIS: 55 DEGREES
VENTRICULAR RATE: 102 BPM
WBC # BLD AUTO: 8.5 THOUSAND/UL (ref 4.31–10.16)

## 2025-06-23 PROCEDURE — 85027 COMPLETE CBC AUTOMATED: CPT

## 2025-06-23 PROCEDURE — 99232 SBSQ HOSP IP/OBS MODERATE 35: CPT | Performed by: SURGERY

## 2025-06-23 PROCEDURE — 94760 N-INVAS EAR/PLS OXIMETRY 1: CPT

## 2025-06-23 PROCEDURE — 93005 ELECTROCARDIOGRAM TRACING: CPT

## 2025-06-23 PROCEDURE — 92526 ORAL FUNCTION THERAPY: CPT

## 2025-06-23 PROCEDURE — 93010 ELECTROCARDIOGRAM REPORT: CPT | Performed by: INTERNAL MEDICINE

## 2025-06-23 PROCEDURE — 80048 BASIC METABOLIC PNL TOTAL CA: CPT

## 2025-06-23 PROCEDURE — 82948 REAGENT STRIP/BLOOD GLUCOSE: CPT

## 2025-06-23 PROCEDURE — 99024 POSTOP FOLLOW-UP VISIT: CPT | Performed by: STUDENT IN AN ORGANIZED HEALTH CARE EDUCATION/TRAINING PROGRAM

## 2025-06-23 RX ADMIN — LEVETIRACETAM 1000 MG: 500 TABLET, FILM COATED ORAL at 09:51

## 2025-06-23 RX ADMIN — SENNOSIDES 8.6 MG: 8.6 TABLET, FILM COATED ORAL at 09:51

## 2025-06-23 RX ADMIN — ENOXAPARIN SODIUM 30 MG: 30 INJECTION SUBCUTANEOUS at 12:45

## 2025-06-23 RX ADMIN — QUETIAPINE 50 MG: 25 TABLET ORAL at 09:51

## 2025-06-23 RX ADMIN — DRONABINOL 2.5 MG: 2.5 CAPSULE ORAL at 12:34

## 2025-06-23 RX ADMIN — QUETIAPINE FUMARATE 100 MG: 100 TABLET ORAL at 21:55

## 2025-06-23 RX ADMIN — Medication 3 MG: at 21:55

## 2025-06-23 RX ADMIN — QUETIAPINE 50 MG: 25 TABLET ORAL at 17:24

## 2025-06-23 RX ADMIN — ENOXAPARIN SODIUM 30 MG: 30 INJECTION SUBCUTANEOUS at 21:55

## 2025-06-23 RX ADMIN — LIDOCAINE 1 PATCH: 700 PATCH TOPICAL at 09:51

## 2025-06-23 RX ADMIN — BISACODYL 10 MG: 10 SUPPOSITORY RECTAL at 09:51

## 2025-06-23 RX ADMIN — ACETAMINOPHEN 650 MG: 650 SUSPENSION ORAL at 16:31

## 2025-06-23 RX ADMIN — AMLODIPINE BESYLATE 5 MG: 5 TABLET ORAL at 09:51

## 2025-06-23 RX ADMIN — PANTOPRAZOLE SODIUM 40 MG: 40 INJECTION, POWDER, FOR SOLUTION INTRAVENOUS at 06:33

## 2025-06-23 RX ADMIN — LOSARTAN POTASSIUM 100 MG: 50 TABLET, FILM COATED ORAL at 09:51

## 2025-06-23 RX ADMIN — DRONABINOL 2.5 MG: 2.5 CAPSULE ORAL at 16:31

## 2025-06-23 RX ADMIN — INSULIN LISPRO 2 UNITS: 100 INJECTION, SOLUTION INTRAVENOUS; SUBCUTANEOUS at 21:55

## 2025-06-23 RX ADMIN — SENNOSIDES 8.6 MG: 8.6 TABLET, FILM COATED ORAL at 17:24

## 2025-06-23 RX ADMIN — LEVETIRACETAM 1000 MG: 500 TABLET, FILM COATED ORAL at 21:55

## 2025-06-23 RX ADMIN — POLYETHYLENE GLYCOL 3350 17 G: 17 POWDER, FOR SOLUTION ORAL at 09:51

## 2025-06-23 NOTE — ASSESSMENT & PLAN NOTE
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 Yoon 25)  POD 12 L craniotomy and R akryn holes for evac of radha SDH ( 25)   Arrived at [facility] ER on 25 with complaints of headache and left-sided hip pain after reported fall in the grass the day prior to arrival  Upon further questioning, patient has had some slurred speech for couple days per his wife.  Upon arrival to [facility], patient has significant expressive aphasia and confusion.    Imagin25: Stable mixed density left convexity subdural hematoma, with stable minimal rightward midline shift. 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    Plan:   Continue to closely monitor neuro exam  Frequent neuro checks per primary team   Repeat STAT CTH with any acute decline in GCS > 2pts or more in 1hr   SBP goal 100-140   SDD removed  and 25  Continue keppra for seizure ppx  Hold all AC/AP meds   DVT ppx: SCDs, Lovenox  Medical management per primary team   Pain control per primary team   PT/OT/Speech therapies as tolerated.   Pt will need additional treatment of the DAVF given mild residual remains from the right ophthalmic artery. Tentative plan for treatment/embolization of the DAVF on 25 by Dr. Yoon. Updated pt's son at bedside on the plan.   Neurosurgery will continue to follow. Please contact nsx with any questions and concerns. Please reach out with any further questions or concerns.

## 2025-06-23 NOTE — SPEECH THERAPY NOTE
"Speech Language/Pathology  Speech-Language Pathology Progress Note      Patient Name: Jasson Carter    Today's Date: 6/23/2025      Subjective:  Pt was awake and alert. He was sitting upright in bed. Patient stated \" I could go for a coke\".    Objective:  Pt was seen today for dysphagia therapy. No longer going to procedure today, will likely go for embolization of the DAVF on 6/24/25.  RRT yesterday with fall oob unwitnessed. Current diet is dysphagia 3 dental soft with thin liquids. Pt was on room air. Oral care had already been completed. Focus of today's session was determine potential for diet texture advancement and improve pt's self monitoring. Textures offered today included pb and j, soda via straw.    Swallow function:   Pt was able to self feed material.  Bolus retrieval was WFL. Mastication and Breakdown were timely. No oral residue was noted with the material offered.  He used mult swallows and requested the sips of soda without cues.  Pharyngeal swallow initiation was present. Hyolaryngeal excursion was adequate. No s/s aspiration occurred during session today.  SLP provided min cueing/feedback throughout session.     Assessment:  Swallow function is improving with upgraded material.  He is appropriate for an upgrade to regular.   Plan:  Upgrade to regular with thin  Continue w/ supervision with meals, may be impulsive with intake  "

## 2025-06-23 NOTE — ASSESSMENT & PLAN NOTE
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 Subsequent MRI showed scattered acute and early subacute infarcts in bilateral frontal and parietal lobes, L temporal region  Planning for repeat embolization on 6/25  Continue with Keppra 1000 mg twice daily  Continue every 4 neurochecks  Repeat CT scan if decline in GCS by >2 points  SBP goal 100-140   Continue with Seroquel 50/grams, melatonin, and amitriptyline to help with sleep/wake regulation and agitation secondary to TBI.   Delirium precautions  Patient will require follow-up with neurosurgery as an outpatient.

## 2025-06-23 NOTE — ASSESSMENT & PLAN NOTE
- Secondary to TBI  - Medications and delirium precautions as above  - Palliative/Geriatrics on board

## 2025-06-23 NOTE — ASSESSMENT & PLAN NOTE
Secondary to fall on 6/22  XR negative for acute traumatic injury-incidentally found to have a left rib fractures  Continue to monitor  Analgesia as needed

## 2025-06-23 NOTE — RESPIRATORY THERAPY NOTE
Airway Clearance Protocol         06/23/25 1831   Respiratory Protocol   Protocol Initiated? Yes   Protocol Selection Airway Clearance   Language Barrier? No   Medical & Social History Reviewed? Yes   Diagnostic Studies Reviewed? Yes   Physical Assessment Performed? Yes   Airway Clearance Plan Incentive Spirometer   Respiratory Assessment   Assessment Type Assess only   General Appearance Awake   Respiratory Pattern Normal   Chest Assessment No Chest Expansion   Bilateral Breath Sounds Clear;Diminished   Cough Moist   Resp Comments Pt extubated 12 days ago, and became a s/p fall.  Fell again yesterday (6-22-25 rapid response). subural hematoma. Pt has no pulm history and does not take pulm meds at home.  Currently doing well on room air but unable / not willing to try to do his incentive spirometer both after RN and this RRT tried.  Equipment in the room.  No other changes to suggest at this tijme.   O2 Device room airf   Additional Assessments   Pulse (!) 115   Respirations 16   SpO2 97 %

## 2025-06-23 NOTE — ASSESSMENT & PLAN NOTE
New diagnosis on this admission  Continue to monitor on telemetry--repeat EKG today.  Consider cardiology consult  No anticoagulation given SDH.  Patient appears to have been on propranolol-consider reinitiating tomorrow pending results of EKG.

## 2025-06-23 NOTE — CASE MANAGEMENT
Case Management Discharge Planning Note    Patient name Jasson Carter  Location [facility] 632/[facility] 632-01 MRN [id number]  : 1950 Date 2025       Current Admission Date: 2025  Current Admission Diagnosis:SDH (subdural hematoma) (HCC)   Patient Active Problem List    Diagnosis Date Noted    Dysphagia 2025    Post traumatic seizure (HCC) 2025    Effusion of right knee 2025    Atrial fibrillation (HCC) 2025    SDH (subdural hematoma) (HCC) 2025    Altered mental status 2025    Fall 2025    Hyponatremia 2024    Neuropathy 2021    Reflux esophagitis 2020    Seasonal allergic rhinitis due to pollen 2020    Primary osteoarthritis involving multiple joints 2020    Posttraumatic stress disorder 2020    History of gout 2019    Pure hypercholesterolemia 2017    Hypertension 2017    Benign prostatic hyperplasia without urinary obstruction 2011      LOS (days): 12  Geometric Mean LOS (GMLOS) (days): 6.5  Days to GMLOS:-5.5     OBJECTIVE:  Risk of Unplanned Readmission Score: 18.28         Current admission status: Inpatient   Preferred Pharmacy:   [facility]/pharmacy #[id number] - [place] - [address]  [address]  [facility] [address]  Phone: [phone] Fax: [phone]    Primary Care Provider: Ruben Finch MD    Primary Insurance: MEDICARE  Secondary Insurance: [facility]    DISCHARGE DETAILS:    CM met with pt's son to review d/c planning  Pt was clinically accepted to [facility] for TBI rehab, when medically stable  Pt likely to have another neurosurgical intervention this week  CM will follow

## 2025-06-23 NOTE — ASSESSMENT & PLAN NOTE
- Status post fall with the below noted injuries.  - Patient suffered repeat fall on 6/22 in the setting of TBI/impulsiveness-CT head and C-spine were negative  - Fall precautions.  - Geriatric Medicine consultation for evaluation, medication review and recommendations.  - PT and OT evaluation and treatment as indicated.  - Case Management consultation for disposition planning.

## 2025-06-23 NOTE — PROGRESS NOTES
Progress Note - Trauma   Name: Jasson Carter 75 y.o. male I MRN: [id number]  Unit/Bed#: [facility] 632-01 I Date of Admission: 6/11/2025   Date of Service: 6/23/2025 I Hospital Day: 12    Assessment & Plan  Fall  - Status post fall with the below noted injuries.  - Patient suffered repeat fall on 6/22 in the setting of TBI/impulsiveness-CT head and C-spine were negative  - Fall precautions.  - Geriatric Medicine consultation for evaluation, medication review and recommendations.  - PT and OT evaluation and treatment as indicated.  - Case Management consultation for disposition planning.  SDH (subdural hematoma) (HCC)  Secondary to fall  Patient initially found with 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 midline shift s/p L craniotomy and R karyn hole craniotomy for evacuation, R sided drain removed previously  Neurosurgery consulted note appreciated  6/11 left-sided craniotomy with right-sided bur holes for evacuation of subdural hematoma  6/12 DAVF partial embolization via left ophthalmic artery  6/14 CT scan stable. Subsequent MRI showed scattered acute and early subacute infarcts in bilateral frontal and parietal lobes, L temporal region  Planning for repeat embolization on 6/25  Continue with Keppra 1000 mg twice daily  Continue every 4 neurochecks  Repeat CT scan if decline in GCS by >2 points  SBP goal 100-140   Continue with Seroquel 50/grams, melatonin, and amitriptyline to help with sleep/wake regulation and agitation secondary to TBI.   Delirium precautions  Patient will require follow-up with neurosurgery as an outpatient.  Altered mental status  - Secondary to TBI  - Medications and delirium precautions as above  - Palliative/Geriatrics on board  Post traumatic seizure (HCC)  - Witnessed possible tonic clonic activity post TBI  - EEG completed and no seizures noted  - On keppra 1000mg BID for 14 day course  Closed fracture of multiple ribs of left side with routine healing  Secondary to initial  fall.  Found incidentally while assessing left shoulder pain after 2nd fall.  XR imaging showed: Acute mild displaced lateral left fifth rib fracture and questionable left lateral sixth rib fracture  Minimal tenderness  Rib fracture protocol  Continue to monitor  Follow-up with trauma as needed.  Left shoulder pain  Secondary to fall on 6/22  XR negative for acute traumatic injury-incidentally found to have a left rib fractures  Continue to monitor  Analgesia as needed  Atrial fibrillation (HCC)  New diagnosis on this admission  Continue to monitor on telemetry--repeat EKG today.  Consider cardiology consult  No anticoagulation given SDH.  Patient appears to have been on propranolol-consider reinitiating tomorrow pending results of EKG.  Hypertension  Continue home Cozaar and amlodipine  Hold home propranolol  Dysphagia  Speech consulted note appreciated  Recommended level 2 dysphagia diet with thin liquids  Continue Marinol  Patient has been tolerating feeds-continue to monitor    Bowel Regimen: Senna  VTE Prophylaxis:VTE covered by:  enoxaparin, Subcutaneous, 30 mg at 06/23/25 1245        Disposition: Continue inpatient care until patient undergoes repeat embolization on 6/25.    24 Hour Events : No events reported overnight.  Subjective : Patient complains of left shoulder pain.  He denies any other pain or discomfort.  He reports that he has been eating.  He does not believe that he is confused though was unsure what hospital he is in.  Nursing reports that patient is occasionally impulsive though redirectable and pleasant overall.  No other complaints or concerns offered.    Grandson was updated by attending at bedside during rounds.    Objective :  Temp:  [97.1 °F (36.2 °C)-98.4 °F (36.9 °C)] 98.4 °F (36.9 °C)  HR:  [] 107  BP: (114-184)/() 114/75  Resp:  [16-19] 16  SpO2:  [95 %-97 %] 97 %  O2 Device: None (Room air)    I/O         06/21 0701  06/22 0700 06/22 0701  06/23 0700 06/23 0701 06/24  0700    P.O. 1020 120 480    Total Intake(mL/kg) 1020 (11.7) 120 (1.4) 480 (5.5)    Urine (mL/kg/hr) 750 (0.4) 0 (0) 350 (0.5)    Stool  1     Total Output 750 1 350    Net +270 +119 +130           Unmeasured Urine Occurrence 1 x 4 x 1 x    Unmeasured Stool Occurrence  1 x             Physical Exam:   GENERAL APPEARANCE: No acute distress  NEURO: GCS 14 due to confusion.  HEENT: Normocephalic, atraumatic.  Neck supple.  CV: Regular rate and rhythm.  +2 radial and dorsalis pedis pulse, bilaterally.  LUNGS: Clear to auscultation, bilaterally.  Chest wall is nontender.  GI: Abdomen is soft nontender.  : Pelvis is stable.  MSK: Limited range of motion of the left shoulder due to pain.  Generalized left shoulder tenderness-no significant effusion.  All other extremities display full range of motion and are nontender.  SKIN: Warm, dry.         Lab Results: I have reviewed the following results:  Recent Labs     06/21/25  0329 06/23/25  0651   WBC 8.34 8.50   HGB 12.9 14.3   HCT 39.4 43.2    291   SODIUM 135 136   K 3.6 4.0   CL 98 102   CO2 28 25   BUN 18 18   CREATININE 0.67 0.70   GLUC 86 109   MG 2.2  --    PHOS 3.7  --        Imaging Results Review: No pertinent imaging studies reviewed.  Other Study Results Review: No additional pertinent studies reviewed.

## 2025-06-23 NOTE — PROGRESS NOTES
Progress Note - Neurosurgery   Name: Jasson Carter 75 y.o. male I MRN: [id number]  Unit/Bed#: [facility] 632-01 I Date of Admission: 2025   Date of Service: 2025 I Hospital Day: 12    Assessment & Plan  SDH (subdural hematoma) (HCC)  PPD 11 from Successful partial embolization of cribriform plate DAVF via the left ophthalmic artery (Dr. Yoon 25)  POD 12 L craniotomy and R karyn holes for evac of radha SDH ( 25)   Arrived at [facility] ER on 25 with complaints of headache and left-sided hip pain after reported fall in the grass the day prior to arrival  Upon further questioning, patient has had some slurred speech for couple days per his wife.  Upon arrival to [facility], patient has significant expressive aphasia and confusion.    Imagin25: Stable mixed density left convexity subdural hematoma, with stable minimal rightward midline shift. 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    Plan:   Continue to closely monitor neuro exam  Frequent neuro checks per primary team   Repeat STAT CTH with any acute decline in GCS > 2pts or more in 1hr   SBP goal 100-140   SDD removed  and 25  Continue keppra for seizure ppx  Hold all AC/AP meds   DVT ppx: SCDs, Lovenox  Medical management per primary team   Pain control per primary team   PT/OT/Speech therapies as tolerated.   Pt will need additional treatment of the DAVF given mild residual remains from the right ophthalmic artery. Tentative plan for treatment/embolization of the DAVF on 25 by Dr. Yoon. Updated pt's son at bedside on the plan.   Neurosurgery will continue to follow. Please contact nsx with any questions and concerns. Please reach out with any further questions or concerns.   Hypertension    Effusion of right knee    Post traumatic seizure (HCC)    Dysphagia      Subjective   Per report, pt had a fall yesterday 25, remains on close one on one observation today. CTH/CT C spine was  negative for acute changes. Pt reports he is feeling better today. Pt's son reports he seems better today than yesterday. He is reported to continue to have waxing and waning exams from alert and oriented especially in the morning to drowsy/confused later in the day/at night or after certain medications.     Objective :  Temp:  [97.1 °F (36.2 °C)-97.5 °F (36.4 °C)] 97.3 °F (36.3 °C)  HR:  [] 95  BP: (116-184)/() 119/79  Resp:  [16-19] 17  SpO2:  [95 %-97 %] 97 %  O2 Device: None (Room air)    I/O         06/21 0701 06/22 0700 06/22 0701 06/23 0700 06/23 0701  06/24 0700    P.O. 1020 120     Total Intake(mL/kg) 1020 (11.7) 120 (1.4)     Urine (mL/kg/hr) 750 (0.4) 0 (0) 350 (1)    Stool  1     Total Output 750 1 350    Net +270 +119 -350           Unmeasured Urine Occurrence 1 x 4 x 1 x    Unmeasured Stool Occurrence  1 x           Physical Exam Neurological Exam      General appearance: alert, appears stated age, cooperative and no distress  Head: Normocephalic, without obvious abnormality, right karyn hole incision CDI.   Eyes: EOMI, PERRL  Neck: supple, symmetrical, trachea midline  Lungs: non labored breathing on room air  Heart: regular heart rate  Neurologic:   Mental status: Alert, oriented to person, not oriented to place, thought the month was July instead of June, not oriented to time.   Cranial nerves: grossly intact (Cranial nerves II-XII)  Sensory: normal to LT X 4  Motor: moving all extremities, Strength BUE 4/5 except Left SF 3-4/5 secondary hx of left shoulder issues.   Coordination: no drift RUE, left UE ROM limited 2/2 to chronic left shoulder issues but able to lift to about 60-70 degrees and maintain it there.       Lab Results: I have reviewed the following results:  Recent Labs     06/21/25  0329 06/23/25  0651   WBC 8.34 8.50   HGB 12.9 14.3   HCT 39.4 43.2    291   SODIUM 135 136   K 3.6 4.0   CL 98 102   CO2 28 25   BUN 18 18   CREATININE 0.67 0.70   GLUC 86 109   MG 2.2   --    PHOS 3.7  --        VTE Pharmacologic Prophylaxis: Sequential compression device (Venodyne)

## 2025-06-23 NOTE — DISCHARGE INSTR - AVS FIRST PAGE
Discharge Instructions  Evacuation of subdural hematoma    Activity:  Must wear craniectomy helmet when out of bed  Do not lift, push or pull more than 10 pounds for 2 weeks.  Avoid bending, lifting and twisting for 2 weeks. No running. No athletic activities until cleared.   No driving for at least 2 weeks or until cleared by Neurosurgery.   When able to shower, continue to use clean towel and washcloth for 2 weeks post-op.  Do not use a hair dryer, and avoid hair products such as mousse, oils, and gels. Do not brush your hair away from the incision since this will put strain on the suture line.  Do not dye or perm hair for 6 weeks or until cleared by physician.  Continue to change bed linens and pajamas more frequently. Wear clean clothes daily.   May walk as tolerated. Recommend 4 short walks daily.     Surgical incision care:  Keep dressings in place for 3 days.  After 3 days, incisions may be left open to air, but should remain clean.  Keep incisions dry for 3 days.  May shower after 3 days using a baby shampoo including head incision. Rinse off shampoo and pat dry.   Avoid rubbing the incision but gently massage hair.   Do not immerse the incisions in water for 6 weeks.  Staples/suture will be removed at your 2 week postoperative visit.   Do not apply any creams or ointments to the incision, unless otherwise instructed by [facility].  Contact office if increasing redness, drainage, pain or swelling occurs around the incisions or if you develop a fever greater than 101F  Do not dye/perm hair or use any hair products until cleared by Neurosurgery.     Postoperative medication:  [facility] will provide pain medication in the postoperative period. All prescriptions must come from a single practice.   Take medications as prescribed.  Call office with any questions/concerns.  May use over the counter Tylenol. No NSAIDs (ie. Ibuprofen, Aleve, Advil, Naproxen)  Please  contact office for questions regarding dosage and modifications.  No antiplatelet or anticoagulation medication (ie. Coumadin, Aspirin, Plavix) until cleared by [facility], unless otherwise instructed. Please contact . [facility] if you have any questions about the effects of any of your medications on blood clotting.  Do not operate heavy machinery or vehicles while taking sedating medications.  Use a bowel regimen while on opioids as they induce constipation. Ie. Senokot-S, Miralax, Colace, etc. Increase fiber and water intake.       Follow-up in about 2 weeks with a repeat CT head without contrast prior to visit.     Plan for additional follow up at 6 weeks post-operative visit.     ** Please notify the office if incision becomes red, swollen, tender, or has increased drainage, and temp>101.  Return to the ER if you experience increased headache, drowsiness, weakness, nausea/vomiting, or seizures.**      Discharge Instructions - Orthopedics  Jasson Carter 75 y.o. male MRN: [id number]  Unit/Bed#: [facility] 632-01    Weight Bearing Status:                                           You may bear weight on the right lower extremity.    Pain:  Continue analgesics as directed    Dressing Instructions:   Please keep clean, dry and intact until follow up     Appt Instructions:   If you do not have your appointment, please call the clinic at [phone]  Otherwise follow up as scheduled.    Contact the office sooner if you experience any increased numbness/tingling in the extremities.

## 2025-06-23 NOTE — PLAN OF CARE
Problem: Nutrition/Hydration-ADULT  Goal: Nutrient/Hydration intake appropriate for improving, restoring or maintaining nutritional needs  Description: Monitor and assess patient's nutrition/hydration status for malnutrition. Collaborate with interdisciplinary team and initiate plan and interventions as ordered.  Monitor patient's weight and dietary intake as ordered or per policy. Utilize nutrition screening tool and intervene as necessary. Determine patient's food preferences and provide high-protein, high-caloric foods as appropriate.     INTERVENTIONS:  - Monitor oral intake, urinary output, labs, and treatment plans  - Assess nutrition and hydration status and recommend course of action  - Evaluate amount of meals eaten  - Assist patient with eating if necessary   - Allow adequate time for meals  - Recommend/ encourage appropriate diets, oral nutritional supplements, and vitamin/mineral supplements  - Order, calculate, and assess calorie counts as needed  - Recommend, monitor, and adjust tube feedings and TPN/PPN based on assessed needs  - Assess need for intravenous fluids  - Provide specific nutrition/hydration education as appropriate  - Include patient/family/caregiver in decisions related to nutrition  Outcome: Progressing     Problem: PAIN - ADULT  Goal: Verbalizes/displays adequate comfort level or baseline comfort level  Description: Interventions:  - Encourage patient to monitor pain and request assistance  - Assess pain using appropriate pain scale  - Administer analgesics as ordered based on type and severity of pain and evaluate response  - Implement non-pharmacological measures as appropriate and evaluate response  - Consider cultural and social influences on pain and pain management  - Notify physician/advanced practitioner if interventions unsuccessful or patient reports new pain  - Educate patient/family on pain management process including their role and importance of  reporting pain   -  Provide non-pharmacologic/complimentary pain relief interventions  Outcome: Progressing     Problem: INFECTION - ADULT  Goal: Absence or prevention of progression during hospitalization  Description: INTERVENTIONS:  - Assess and monitor for signs and symptoms of infection  - Monitor lab/diagnostic results  - Monitor all insertion sites, i.e. indwelling lines, tubes, and drains  - Monitor endotracheal if appropriate and nasal secretions for changes in amount and color  - [place] appropriate cooling/warming therapies per order  - Administer medications as ordered  - Instruct and encourage patient and family to use good hand hygiene technique  - Identify and instruct in appropriate isolation precautions for identified infection/condition  Outcome: Progressing  Goal: Absence of fever/infection during neutropenic period  Description: INTERVENTIONS:  - Monitor WBC  - Perform strict hand hygiene  - Limit to healthy visitors only  - No plants, dried, fresh or silk flowers with winters in patient room  Outcome: Progressing     Problem: SAFETY ADULT  Goal: Patient will remain free of falls  Description: INTERVENTIONS:  - Educate patient/family on patient safety including physical limitations  - Instruct patient to call for assistance with activity   - Consider consulting OT/PT to assist with strengthening/mobility based on AM PAC & JH-HLM score  - Consult OT/PT to assist with strengthening/mobility   - Keep Call bell within reach  - Keep bed low and locked with side rails adjusted as appropriate  - Keep care items and personal belongings within reach  - Initiate and maintain comfort rounds  - Make Fall Risk Sign visible to staff  - Offer Toileting every 2 Hours, in advance of need  - Initiate/Maintain bed alarm  - Obtain necessary fall risk management equipment: chair alarm  - Apply yellow socks and bracelet for high fall risk patients  - Consider moving patient to room near nurses station  Outcome: Progressing  Goal: Maintain  or return to baseline ADL function  Description: INTERVENTIONS:  -  Assess patient's ability to carry out ADLs; assess patient's baseline for ADL function and identify physical deficits which impact ability to perform ADLs (bathing, care of mouth/teeth, toileting, grooming, dressing, etc.)  - Assess/evaluate cause of self-care deficits   - Assess range of motion  - Assess patient's mobility; develop plan if impaired  - Assess patient's need for assistive devices and provide as appropriate  - Encourage maximum independence but intervene and supervise when necessary  - Involve family in performance of ADLs  - Assess for home care needs following discharge   - Consider OT consult to assist with ADL evaluation and planning for discharge  - Provide patient education as appropriate  - Monitor functional capacity and physical performance, use of AM PAC & JH-HLM   - Monitor gait, balance and fatigue with ambulation    Outcome: Progressing  Goal: Maintains/Returns to pre admission functional level  Description: INTERVENTIONS:  - Perform AM-PAC 6 Click Basic Mobility/ Daily Activity assessment daily.  - Set and communicate daily mobility goal to care team and patient/family/caregiver.   - Collaborate with rehabilitation services on mobility goals if consulted  - Perform Range of Motion 3 times a day.  - Reposition patient every 2 hours.  - Dangle patient 3 times a day  - Stand patient 3 times a day  - Ambulate patient 3 times a day  - Out of bed to chair 3 times a day   - Out of bed for meals 3 times a day  - Out of bed for toileting  - Record patient progress and toleration of activity level   Outcome: Progressing     Problem: DISCHARGE PLANNING  Goal: Discharge to home or other facility with appropriate resources  Description: INTERVENTIONS:  - Identify barriers to discharge w/patient and caregiver  - Arrange for needed discharge resources and transportation as appropriate  - Identify discharge learning needs (meds, wound  care, etc.)  - Arrange for interpretive services to assist at discharge as needed  - Refer to Case Management Department for coordinating discharge planning if the patient needs post-hospital services based on physician/advanced practitioner order or complex needs related to functional status, cognitive ability, or social support system  Outcome: Progressing     Problem: Knowledge Deficit  Goal: Patient/family/caregiver demonstrates understanding of disease process, treatment plan, medications, and discharge instructions  Description: Complete learning assessment and assess knowledge base.  Interventions:  - Provide teaching at level of understanding  - Provide teaching via preferred learning methods  Outcome: Progressing     Problem: NEUROSENSORY - ADULT  Goal: Achieves stable or improved neurological status  Description: INTERVENTIONS  - Monitor and report changes in neurological status  - Monitor vital signs such as temperature, blood pressure, glucose, and any other labs ordered   - Initiate measures to prevent increased intracranial pressure  - Monitor for seizure activity and implement precautions if appropriate      Outcome: Progressing  Goal: Remains free of injury related to seizures activity  Description: INTERVENTIONS  - Maintain airway, patient safety  and administer oxygen as ordered  - Monitor patient for seizure activity, document and report duration and description of seizure to physician/advanced practitioner  - If seizure occurs,  ensure patient safety during seizure  - Reorient patient post seizure  - Seizure pads on all 4 side rails  - Instruct patient/family to notify RN of any seizure activity including if an aura is experienced  - Instruct patient/family to call for assistance with activity based on nursing assessment  - Administer anti-seizure medications if ordered    Outcome: Progressing  Goal: Achieves maximal functionality and self care  Description: INTERVENTIONS  - Monitor swallowing  and airway patency with patient fatigue and changes in neurological status  - Encourage and assist patient to increase activity and self care.   - Encourage visually impaired, hearing impaired and aphasic patients to use assistive/communication devices  Outcome: Progressing     Problem: CARDIOVASCULAR - ADULT  Goal: Maintains optimal cardiac output and hemodynamic stability  Description: INTERVENTIONS:  - Monitor I/O, vital signs and rhythm  - Monitor for S/S and trends of decreased cardiac output  - Administer and titrate ordered vasoactive medications to optimize hemodynamic stability  - Assess quality of pulses, skin color and temperature  - Assess for signs of decreased coronary artery perfusion  - Instruct patient to report change in severity of symptoms  Outcome: Progressing  Goal: Absence of cardiac dysrhythmias or at baseline rhythm  Description: INTERVENTIONS:  - Continuous cardiac monitoring, vital signs, obtain 12 lead EKG if ordered  - Administer antiarrhythmic and heart rate control medications as ordered  - Monitor electrolytes and administer replacement therapy as ordered  Outcome: Progressing     Problem: RESPIRATORY - ADULT  Goal: Achieves optimal ventilation and oxygenation  Description: INTERVENTIONS:  - Assess for changes in respiratory status  - Assess for changes in mentation and behavior  - Position to facilitate oxygenation and minimize respiratory effort  - Oxygen administered by appropriate delivery if ordered  - Initiate smoking cessation education as indicated  - Encourage broncho-pulmonary hygiene including cough, deep breathe, Incentive Spirometry  - Assess the need for suctioning and aspirate as needed  - Assess and instruct to report SOB or any respiratory difficulty  - Respiratory Therapy support as indicated  Outcome: Progressing     Problem: GASTROINTESTINAL - ADULT  Goal: Minimal or absence of nausea and/or vomiting  Description: INTERVENTIONS:  - Administer IV fluids if ordered  to ensure adequate hydration  - Maintain NPO status until nausea and vomiting are resolved  - Nasogastric tube if ordered  - Administer ordered antiemetic medications as needed  - Provide nonpharmacologic comfort measures as appropriate  - Advance diet as tolerated, if ordered  - Consider nutrition services referral to assist patient with adequate nutrition and appropriate food choices  Outcome: Progressing  Goal: Maintains or returns to baseline bowel function  Description: INTERVENTIONS:  - Assess bowel function  - Encourage oral fluids to ensure adequate hydration  - Administer IV fluids if ordered to ensure adequate hydration  - Administer ordered medications as needed  - Encourage mobilization and activity  - Consider nutritional services referral to assist patient with adequate nutrition and appropriate food choices  Outcome: Progressing  Goal: Maintains adequate nutritional intake  Description: INTERVENTIONS:  - Monitor percentage of each meal consumed  - Identify factors contributing to decreased intake, treat as appropriate  - Assist with meals as needed  - Monitor I&O, weight, and lab values if indicated  - Obtain nutrition services referral as needed  Outcome: Progressing  Goal: Establish and maintain optimal ostomy function  Description: INTERVENTIONS:  - Assess bowel function  - Encourage oral fluids to ensure adequate hydration  - Administer IV fluids if ordered to ensure adequate hydration   - Administer ordered medications as needed  - Encourage mobilization and activity  - Nutrition services referral to assist patient with appropriate food choices  - Assess stoma site  - Consider wound care consult   Outcome: Progressing  Goal: Oral mucous membranes remain intact  Description: INTERVENTIONS  - Assess oral mucosa and hygiene practices  - Implement preventative oral hygiene regimen  - Implement oral medicated treatments as ordered  - Initiate Nutrition services referral as needed  Outcome:  Progressing     Problem: GENITOURINARY - ADULT  Goal: Maintains or returns to baseline urinary function  Description: INTERVENTIONS:  - Assess urinary function  - Encourage oral fluids to ensure adequate hydration if ordered  - Administer IV fluids as ordered to ensure adequate hydration  - Administer ordered medications as needed  - Offer frequent toileting  - Follow urinary retention protocol if ordered  Outcome: Progressing  Goal: Absence of urinary retention  Description: INTERVENTIONS:  - Assess patient’s ability to void and empty bladder  - Monitor I/O  - Bladder scan as needed  - Discuss with physician/AP medications to alleviate retention as needed  - Discuss catheterization for long term situations as appropriate  Outcome: Progressing  Goal: Urinary catheter remains patent  Description: INTERVENTIONS:  - Assess patency of urinary catheter  - If patient has a chronic pedersen, consider changing catheter if non-functioning  - Follow guidelines for intermittent irrigation of non-functioning urinary catheter  Outcome: Progressing     Problem: METABOLIC, FLUID AND ELECTROLYTES - ADULT  Goal: Electrolytes maintained within normal limits  Description: INTERVENTIONS:  - Monitor labs and assess patient for signs and symptoms of electrolyte imbalances  - Administer electrolyte replacement as ordered  - Monitor response to electrolyte replacements, including repeat lab results as appropriate  - Instruct patient on fluid and nutrition as appropriate  Outcome: Progressing  Goal: Fluid balance maintained  Description: INTERVENTIONS:  - Monitor labs   - Monitor I/O and WT  - Instruct patient on fluid and nutrition as appropriate  - Assess for signs & symptoms of volume excess or deficit  Outcome: Progressing  Goal: Glucose maintained within target range  Description: INTERVENTIONS:  - Monitor Blood Glucose as ordered  - Assess for signs and symptoms of hyperglycemia and hypoglycemia  - Administer ordered medications to  maintain glucose within target range  - Assess nutritional intake and initiate nutrition service referral as needed  Outcome: Progressing     Problem: HEMATOLOGIC - ADULT  Goal: Maintains hematologic stability  Description: INTERVENTIONS  - Assess for signs and symptoms of bleeding or hemorrhage  - Monitor labs  - Administer supportive blood products/factors as ordered and appropriate  Outcome: Progressing     Problem: MUSCULOSKELETAL - ADULT  Goal: Maintain or return mobility to safest level of function  Description: INTERVENTIONS:  - Assess patient's ability to carry out ADLs; assess patient's baseline for ADL function and identify physical deficits which impact ability to perform ADLs (bathing, care of mouth/teeth, toileting, grooming, dressing, etc.)  - Assess/evaluate cause of self-care deficits   - Assess range of motion  - Assess patient's mobility  - Assess patient's need for assistive devices and provide as appropriate  - Encourage maximum independence but intervene and supervise when necessary  - Involve family in performance of ADLs  - Assess for home care needs following discharge   - Consider OT consult to assist with ADL evaluation and planning for discharge  - Provide patient education as appropriate  Outcome: Progressing  Goal: Maintain proper alignment of affected body part  Description: INTERVENTIONS:  - Support, maintain and protect limb and body alignment  - Provide patient/ family with appropriate education  Outcome: Progressing     Problem: Prexisting or High Potential for Compromised Skin Integrity  Goal: Skin integrity is maintained or improved  Description: INTERVENTIONS:  - Identify patients at risk for skin breakdown  - Assess and monitor skin integrity including under and around medical devices   - Assess and monitor nutrition and hydration status  - Monitor labs  - Assess for incontinence   - Turn and reposition patient  - Assist with mobility/ambulation  - Relieve pressure over [place]olo  prominences   - Avoid friction and shearing  - Provide appropriate hygiene as needed including keeping skin clean and dry  - Evaluate need for skin moisturizer/barrier cream  - Collaborate with interdisciplinary team  - Patient/family teaching  - Consider wound care consult    Assess:  - Review Jake scale daily  - Clean and moisturize skin   - Inspect skin when repositioning, toileting, and assisting with ADLS  - Assess under medical devices   - Assess extremities for adequate circulation and sensation     Bed Management:  - Have minimal linens on bed & keep smooth, unwrinkled  - Change linens as needed when moist or perspiring  - Avoid sitting or lying in one position for more than 2 hours while in beds   - Toileting:  - Offer bedside commode  - Assess for incontinence  - Use incontinent care products after each incontinent episode    Activity:  - Mobilize patient 3 times a day  - Encourage activity and walks on unit  - Encourage or provide ROM exercises   - Turn and reposition patient every 2 Hours  - Use appropriate equipment to lift or move patient in bed  - Instruct/ Assist with weight shifting every hour when out of bed in chair  - Consider limitation of chair time 1 hour intervals    Skin Care:  - Avoid use of baby powder, tape, friction and shearing, hot water or constrictive clothing  - Relieve pressure over bony prominences  - Do not massage red bony areas    Next Steps:  - Teach patient strategies to minimize risks  - Consider consults to  interdisciplinary teams   Outcome: Progressing     Problem: DECISION MAKING  Goal: Pt/Family able to effectively weigh alternatives and participate in decision making related to treatment and care  Description: INTERVENTIONS:  - Identify decision maker  - Determine when there are differences among patient's view, family's view, and healthcare provider's view of patient condition and care goals  - Facilitate patient/family articulation of goals for care  - Help  patient/family identify pros/cons of alternative solutions  - Provide information as requested by patient/family  - Respect patient/family rights related to privacy and sharing information   - Serve as a liaison between patient, family and health care team  - Initiate consults as appropriate (Ethics Team, Palliative Care, Family Care Conference, etc.)  Outcome: Progressing     Problem: CONFUSION/THOUGHT DISTURBANCE  Goal: Thought disturbances (confusion, delirium, depression, dementia or psychosis) are managed to maintain or return to baseline mental status and functional level  Description: INTERVENTIONS:  - Assess for possible contributors to  thought disturbance, including but not limited to medications, infection, impaired vision or hearing, underlying metabolic abnormalities, dehydration, respiratory compromise,  psychiatric diagnoses and notify attending PHYSICAN/AP  - Monitor and intervene to maintain adequate nutrition, hydration, elimination, sleep and activity  - Decrease environmental stimuli, including noise as appropriate.  - Provide frequent contacts to provide refocusing, direction and reassurance as needed. Approach patient calmly with eye contact and at their level.  - [place] high risk fall precautions, aspiration precautions and other safety measures, as indicated  - If delirium suspected, notify physician/AP of change in condition and request immediate in-person evaluation  - Pursue consults as appropriate including Geriatric (campus dependent), OT for cognitive evaluation/activity planning, psychiatric, pastoral care, etc.  Outcome: Progressing     Problem: BEHAVIOR  Goal: Pt/Family maintain appropriate behavior and adhere to behavioral management agreement, if implemented  Description: INTERVENTIONS:  - Assess the family dynamic   - Encourage verbalization of thoughts and concerns in a socially appropriate manner  - Assess patient/family's coping skills and non-compliant behavior (including  use of illegal substances).  - Utilize positive, consistent limit setting strategies supporting safety of patient, staff and others  - Initiate consult with Case Management, Spiritual Care or other ancillary services as appropriate  - If a patient's/visitor's behavior jeopardizes the safety of the patient, staff, or others, refer to organization procedure.   - Notify Security of behavior or suspected illegal substances which indicate the need for search of the patient and/or belongings  - Encourage participation in the decision making process about a behavioral management agreement; implement if patient meets criteria  Outcome: Progressing

## 2025-06-23 NOTE — ASSESSMENT & PLAN NOTE
Speech consulted note appreciated  Recommended level 2 dysphagia diet with thin liquids  Continue Marinol  Patient has been tolerating feeds-continue to monitor

## 2025-06-23 NOTE — DISCHARGE INSTR - OTHER ORDERS
Skin Care Plan:  1-Preventative Hydraguard to buttocks, sacrum, and bilateral heels twice a day and as needed.   2-Turn/reposition every 2 hours or when medically stable for pressure re-distribution on skin . Utilize AfterYes turning and repositioning system.   3-Elevate heels to offload pressure  4-Moisturize skin daily with skin nourishing cream  5-Ehob cushion in chair when out of bed.  6-Cleanse B/L elbows with soap and water. Pat dry. Apply Silicone Border Foam (Mepilex) to areas. Ayan with P for Prevention and change every 3 days or as needed with soilage/displacement. Peel back and inspect Q-shift.

## 2025-06-23 NOTE — WOUND OSTOMY CARE
Wound Care consulted for left elbow wound. Bilateral elbows have well adhered scabs with no skin loss or drainage noted.  Left elbow   Right elbow     Orders listed below and wound care will sign off, call or Secure Chat Message with questions.       Skin Care Plan:  1-Preventative Hydraguard to buttocks, sacrum, and bilateral heels twice a day and as needed.   2-Turn/reposition every 2 hours or when medically stable for pressure re-distribution on skin . Utilize AOMi turning and repositioning system.   3-Elevate heels to offload pressure  4-Moisturize skin daily with skin nourishing cream  5-Ehob cushion in chair when out of bed.  6-Cleanse B/L elbows with soap and water. Pat dry. Apply Silicone Border Foam (Mepilex) to areas. Ayan with P for Prevention and change every 3 days or as needed with soilage/displacement. Peel back and inspect Q-shift.          Pamela Luther RN, BSN  Patient assessed with Sanaz Goss RN, BSN, CWOCN

## 2025-06-23 NOTE — ASSESSMENT & PLAN NOTE
Secondary to initial fall.  Found incidentally while assessing left shoulder pain after 2nd fall.  XR imaging showed: Acute mild displaced lateral left fifth rib fracture and questionable left lateral sixth rib fracture  Minimal tenderness  Rib fracture protocol  Continue to monitor  Follow-up with trauma as needed.

## 2025-06-24 ENCOUNTER — APPOINTMENT (INPATIENT)
Dept: NON INVASIVE DIAGNOSTICS | Facility: HOSPITAL | Age: 75
DRG: 025 | End: 2025-06-24
Attending: SURGERY
Payer: MEDICARE

## 2025-06-24 LAB
BSA FOR ECHO PROCEDURE: 2.18 M2
GLUCOSE SERPL-MCNC: 102 MG/DL (ref 65–140)
GLUCOSE SERPL-MCNC: 114 MG/DL (ref 65–140)
GLUCOSE SERPL-MCNC: 135 MG/DL (ref 65–140)
GLUCOSE SERPL-MCNC: 136 MG/DL (ref 65–140)
IVC: 16 MM
RA PRESSURE ESTIMATED: 8 MMHG
RV PSP: 34 MMHG
SL CV LV EF: 50
TR MAX PG: 26 MMHG
TR PEAK VELOCITY: 2.6 M/S
TRICUSPID VALVE PEAK REGURGITATION VELOCITY: 2.57 M/S

## 2025-06-24 PROCEDURE — 93325 DOPPLER ECHO COLOR FLOW MAPG: CPT

## 2025-06-24 PROCEDURE — 93321 DOPPLER ECHO F-UP/LMTD STD: CPT

## 2025-06-24 PROCEDURE — 82948 REAGENT STRIP/BLOOD GLUCOSE: CPT

## 2025-06-24 PROCEDURE — 93325 DOPPLER ECHO COLOR FLOW MAPG: CPT | Performed by: INTERNAL MEDICINE

## 2025-06-24 PROCEDURE — 99233 SBSQ HOSP IP/OBS HIGH 50: CPT | Performed by: INTERNAL MEDICINE

## 2025-06-24 PROCEDURE — 99223 1ST HOSP IP/OBS HIGH 75: CPT | Performed by: INTERNAL MEDICINE

## 2025-06-24 PROCEDURE — 93308 TTE F-UP OR LMTD: CPT | Performed by: INTERNAL MEDICINE

## 2025-06-24 PROCEDURE — 99024 POSTOP FOLLOW-UP VISIT: CPT | Performed by: RADIOLOGY

## 2025-06-24 PROCEDURE — 93308 TTE F-UP OR LMTD: CPT

## 2025-06-24 PROCEDURE — 93321 DOPPLER ECHO F-UP/LMTD STD: CPT | Performed by: INTERNAL MEDICINE

## 2025-06-24 PROCEDURE — 99232 SBSQ HOSP IP/OBS MODERATE 35: CPT | Performed by: SURGERY

## 2025-06-24 RX ORDER — ACETAMINOPHEN 325 MG/1
975 TABLET ORAL EVERY 8 HOURS SCHEDULED
Status: DISCONTINUED | OUTPATIENT
Start: 2025-06-24 | End: 2025-06-27 | Stop reason: HOSPADM

## 2025-06-24 RX ORDER — METOPROLOL SUCCINATE 25 MG/1
25 TABLET, EXTENDED RELEASE ORAL DAILY
Status: DISCONTINUED | OUTPATIENT
Start: 2025-06-25 | End: 2025-06-27 | Stop reason: HOSPADM

## 2025-06-24 RX ADMIN — POLYETHYLENE GLYCOL 3350 17 G: 17 POWDER, FOR SOLUTION ORAL at 09:17

## 2025-06-24 RX ADMIN — Medication 3 MG: at 21:26

## 2025-06-24 RX ADMIN — DRONABINOL 2.5 MG: 2.5 CAPSULE ORAL at 11:47

## 2025-06-24 RX ADMIN — LOSARTAN POTASSIUM 100 MG: 50 TABLET, FILM COATED ORAL at 09:17

## 2025-06-24 RX ADMIN — ENOXAPARIN SODIUM 30 MG: 30 INJECTION SUBCUTANEOUS at 21:26

## 2025-06-24 RX ADMIN — SENNOSIDES 8.6 MG: 8.6 TABLET, FILM COATED ORAL at 09:17

## 2025-06-24 RX ADMIN — QUETIAPINE 50 MG: 25 TABLET ORAL at 09:17

## 2025-06-24 RX ADMIN — QUETIAPINE 50 MG: 25 TABLET ORAL at 17:34

## 2025-06-24 RX ADMIN — AMLODIPINE BESYLATE 5 MG: 5 TABLET ORAL at 09:17

## 2025-06-24 RX ADMIN — ACETAMINOPHEN 650 MG: 650 SUSPENSION ORAL at 13:29

## 2025-06-24 RX ADMIN — BISACODYL 10 MG: 10 SUPPOSITORY RECTAL at 09:17

## 2025-06-24 RX ADMIN — LIDOCAINE 1 PATCH: 700 PATCH TOPICAL at 09:17

## 2025-06-24 RX ADMIN — DRONABINOL 2.5 MG: 2.5 CAPSULE ORAL at 17:34

## 2025-06-24 RX ADMIN — ACETAMINOPHEN 975 MG: 325 TABLET ORAL at 21:26

## 2025-06-24 RX ADMIN — LEVETIRACETAM 1000 MG: 500 TABLET, FILM COATED ORAL at 21:26

## 2025-06-24 RX ADMIN — ENOXAPARIN SODIUM 30 MG: 30 INJECTION SUBCUTANEOUS at 09:17

## 2025-06-24 RX ADMIN — QUETIAPINE FUMARATE 100 MG: 100 TABLET ORAL at 21:26

## 2025-06-24 RX ADMIN — LEVETIRACETAM 1000 MG: 500 TABLET, FILM COATED ORAL at 09:17

## 2025-06-24 RX ADMIN — PANTOPRAZOLE SODIUM 40 MG: 40 INJECTION, POWDER, FOR SOLUTION INTRAVENOUS at 05:57

## 2025-06-24 RX ADMIN — SENNOSIDES 8.6 MG: 8.6 TABLET, FILM COATED ORAL at 17:35

## 2025-06-24 NOTE — ASSESSMENT & PLAN NOTE
Losartan 100 mg qd at home (currently held)  Amlodipine 5 mg qd currently  /72 this am  SBP goal 100-140 per Neurosurgery  Plan  Stop Amlodipine  Start metoprolol succinate 25mg daily

## 2025-06-24 NOTE — PHYSICAL THERAPY NOTE
Physical Therapy Cancellation Note       06/24/25 1542   Note Type   Note Type Cancelled Session   Cancel Reasons Refusal     Attempted to initiate PT twice with pt today.  Pt declining participation in mobility both times.  It is noted that pt sustained unwitnissed fall since last session with diagnosed rib injury.  Discussed the same with supervising PT Van Arana DPT who confirms that current POC remains appropriate.  Will continue to follow and treat.    Maxime Valente, PTA

## 2025-06-24 NOTE — PROGRESS NOTES
Progress Note - Neurosurgery   Name: Jasson Carter 75 y.o. male I MRN: [id number]  Unit/Bed#: [facility] 632-01 I Date of Admission: 2025   Date of Service: 2025 I Hospital Day: 13    Assessment & Plan  SDH (subdural hematoma) (HCC)  PPD 12 from Successful partial embolization of cribriform plate DAVF via the left ophthalmic artery (Dr. Yoon 25)  POD 13 L craniotomy and R karyn holes for evac of radha SDH ( 25)   Arrived at [facility] ER on 25 with complaints of headache and left-sided hip pain after reported fall in the grass the day prior to arrival  Upon further questioning, patient has had some slurred speech for couple days per his wife.  Upon arrival to [facility], patient has significant expressive aphasia and confusion.    Imagin25: Stable mixed density left convexity subdural hematoma, with stable minimal rightward midline shift. 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    Plan:   Continue to closely monitor neuro exam  Frequent neuro checks per primary team   Repeat STAT CTH with any acute decline in GCS > 2pts or more in 1hr   SBP goal 100-140   SDD removed  and 25  Continue keppra for seizure ppx  Hold all AC/AP meds   DVT ppx: SCDs, Lovenox  Medical management per primary team   Pain control per primary team   PT/OT/Speech therapies as tolerated.   Plan discussed by Dr. Yoon with family. No further inpatient treatments at this time   Will plan for DC to rehab   If patient improves will plan for elective treatment of additional DAVF from the outpatient setting.   Cleared for discharge at this time  Neurosurgery will see patient tomorrow for staple/suture removal as he will be 2 weeks s/p intervention. Please reach out with any further questions or concerns.     Please contact the SecureChat role for the Neurosurgery service with any questions/concerns.    Subjective   No acute issues or concerns. Still with some delirium. Incoherent speech  at times. Son at bedside.     Objective :  Temp:  [97.4 °F (36.3 °C)-98.4 °F (36.9 °C)] 97.6 °F (36.4 °C)  HR:  [] 104  BP: (103-119)/(71-79) 109/71  Resp:  [16-19] 18  SpO2:  [95 %-98 %] 96 %  O2 Device: None (Room air)  Nasal Cannula O2 Flow Rate (L/min):  [0 L/min] 0 L/min    I/O         06/22 0701 06/23 0700 06/23 0701 06/24 0700 06/24 0701 06/25 0700    P.O. 120 1330 180    Total Intake(mL/kg) 120 (1.4) 1330 (15.3) 180 (2.1)    Urine (mL/kg/hr) 0 (0) 850 (0.4) 200 (0.4)    Stool 1  0    Total Output 1 850 200    Net +119 +480 -20           Unmeasured Urine Occurrence 4 x 4 x 1 x    Unmeasured Stool Occurrence 1 x  1 x          Physical Exam  Constitutional:       General: He is not in acute distress.     Appearance: Normal appearance. He is not ill-appearing.   HENT:      Head: Normocephalic.      Comments: Bilateral cranial incisions healing well. Staples and suture in place.     Eyes:      Extraocular Movements: Extraocular movements intact.      Pupils: Pupils are equal, round, and reactive to light.     Pulmonary:      Effort: Pulmonary effort is normal.     Musculoskeletal:      Cervical back: Normal range of motion.     Neurological:      Mental Status: He is alert and oriented to person, place, and time.      Cranial Nerves: No cranial nerve deficit.      Sensory: No sensory deficit.      Motor: No weakness.     Psychiatric:         Mood and Affect: Mood normal.         Behavior: Behavior normal.       Lab Results: I have reviewed the following results:  Recent Labs     06/23/25  0651   WBC 8.50   HGB 14.3   HCT 43.2      SODIUM 136   K 4.0      CO2 25   BUN 18   CREATININE 0.70   GLUC 109       Imaging Results Review: I personally reviewed the following image studies in PACS and associated radiology reports: CT head. My interpretation of the radiology images/reports is: See above.  Other Study Results Review: No additional pertinent studies reviewed.    VTE Pharmacologic  Prophylaxis: Sequential compression device (Venodyne)  and Enoxaparin (Lovenox)

## 2025-06-24 NOTE — PROGRESS NOTES
Progress Note - Trauma   Name: Jasson Carter 75 y.o. male I MRN: [id number]  [facility] I Date of Admission: 6/11/2025   Date of Service: 6/24/2025 I Hospital Day: 13    Assessment & Plan  Fall  - Status post fall with the below noted injuries.  - Patient suffered repeat fall on 6/22 in the setting of TBI/impulsiveness-CT head and C-spine were negative  - Fall precautions.  - Geriatric Medicine consultation for evaluation, medication review and recommendations.  - PT and OT evaluation and treatment as indicated.  - Case Management consultation for disposition planning.  SDH (subdural hematoma) (HCC)  Secondary to fall  Patient initially found with 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 midline shift s/p L craniotomy and R karyn hole craniotomy for evacuation, R sided drain removed previously  Neurosurgery consulted note appreciated  6/11 left-sided craniotomy with right-sided bur holes for evacuation of subdural hematoma  6/12 DAVF partial embolization via left ophthalmic artery  6/14 CT scan stable. 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   Continue with Keppra 1000 mg twice daily  Continue every 4hr neurochecks  Repeat CT scan if decline in GCS by >2 points  SBP goal 100-140   Continue with Seroquel 50/grams, melatonin, and amitriptyline to help with sleep/wake regulation and agitation secondary to TBI.   Delirium precautions  Plan for 2 week follow up with NSGY tomorrow for wound check and staple/suture removal.   Patient will require follow-up with neurosurgery as an outpatient.  Altered mental status  - Secondary to TBI  - Medications and delirium precautions as above  - Palliative/Geriatrics on board  Post traumatic seizure (HCC)  - Witnessed possible tonic clonic activity post TBI  - EEG completed and no seizures noted  - On keppra  1000mg BID for 14 day course  Closed fracture of multiple ribs of left side with routine healing  Secondary to initial fall.  Found incidentally while assessing left shoulder pain after 2nd fall.  XR imaging showed: Acute mild displaced lateral left fifth rib fracture and questionable left lateral sixth rib fracture  Minimal tenderness  Rib fracture protocol  Continue to monitor  Follow-up with trauma as needed.  Left shoulder pain  Secondary to fall on 6/22  XR negative for acute traumatic injury-incidentally found to have a left rib fractures  Continue to monitor  Analgesia as needed  Atrial fibrillation (HCC)  New diagnosis on this admission  Continue to monitor on telemetry--repeat EKG 6/23.  Cardiology consult, appreciate recommendations.   No anticoagulation given SDH.  Patient appears to have been on propranolol-consider reinitiating tomorrow pending results of EKG.  Echo pending  Hypertension  Continue home Cozaar and amlodipine  Hold home propranolol  Dysphagia  Speech consulted note appreciated  Recommended level 2 dysphagia diet with thin liquids  Continue Marinol  Patient has been tolerating feeds-continue to monitor    Bowel Regimen: Dulcolax, Miralax, senna  VTE Prophylaxis:VTE covered by:  enoxaparin, Subcutaneous, 30 mg at 06/23/25 5321        Disposition: Continue current level of care. Anticipate discharge to rehab pending medical stability.    Please contact the SecureChat role for the Trauma service with any questions/concerns.    24 Hour Events : no acute events overnight.   Subjective : Patient is resting comfortably in the chair upon evaluation this morning. He denies any complaints at this time. He does endorse a depressed appetite. Per 1:1, patient did not each much at all yesterday.     Objective :  Temp:  [97.4 °F (36.3 °C)-98.4 °F (36.9 °C)] 97.5 °F (36.4 °C)  HR:  [] 82  BP: (103-123)/(72-79) 119/79  Resp:  [16-19] 18  SpO2:  [95 %-98 %] 97 %  O2 Device: None (Room air)  Nasal  Cannula O2 Flow Rate (L/min):  [0 L/min] 0 L/min    I/O         06/22 0701  06/23 0700 06/23 0701  06/24 0700 06/24 0701  06/25 0700    P.O. 120 1330     Total Intake(mL/kg) 120 (1.4) 1330 (15.3)     Urine (mL/kg/hr) 0 (0) 850 (0.4)     Stool 1      Total Output 1 850     Net +119 +480            Unmeasured Urine Occurrence 4 x 4 x     Unmeasured Stool Occurrence 1 x              Physical Exam  Vitals and nursing note reviewed.   Constitutional:       General: He is not in acute distress.     Appearance: He is well-developed.   HENT:      Head: Normocephalic.      Comments: Surgical incision healing well, no signs of infection    Eyes:      Extraocular Movements: Extraocular movements intact.      Conjunctiva/sclera: Conjunctivae normal.       Cardiovascular:      Rate and Rhythm: Normal rate. Rhythm irregular.      Pulses: Normal pulses.      Heart sounds: Normal heart sounds.   Pulmonary:      Effort: Pulmonary effort is normal. No respiratory distress.      Breath sounds: Normal breath sounds.   Chest:      Chest wall: No tenderness.   Abdominal:      General: There is no distension.      Palpations: Abdomen is soft.      Tenderness: There is no abdominal tenderness. There is no guarding.     Musculoskeletal:         General: No swelling or deformity. Normal range of motion.      Cervical back: Normal range of motion and neck supple.     Skin:     General: Skin is warm and dry.      Capillary Refill: Capillary refill takes less than 2 seconds.     Neurological:      General: No focal deficit present.      Mental Status: He is alert. Mental status is at baseline.      Sensory: No sensory deficit.      Motor: No weakness.     Psychiatric:         Mood and Affect: Mood normal.         Behavior: Behavior normal.        PIC Score  PIC Pain Score: 3 (6/24/2025  1:00 AM)  PIC Incentive Spirometry Score: 4 (6/23/2025  7:54 PM)  PIC Cough Description: 2 (6/23/2025  7:54 PM)  PIC Total Score: 9 (6/23/2025  7:54 PM)        If the Total PIC Score </=5, did you consult APS and evaluate patient for further intervention?: no      Pain:    Incentive Spirometry  Cough  3 = Controlled  4 = Above goal volume 3 = Strong  2 = Moderate  3 = Goal to alert volume 2 = Weak  1 = Severe  2 = Below alert volume 1 = Absent     1 = Unable to perform IS           Lab Results: I have reviewed the following results:  Recent Labs     06/23/25  0651   WBC 8.50   HGB 14.3   HCT 43.2      SODIUM 136   K 4.0      CO2 25   BUN 18   CREATININE 0.70   GLUC 109       Imaging Results Review: No pertinent imaging studies reviewed.  Other Study Results Review: No additional pertinent studies reviewed.

## 2025-06-24 NOTE — ASSESSMENT & PLAN NOTE
New diagnosis on this admission  Continue to monitor on telemetry--repeat EKG 6/23.  Cardiology consult, appreciate recommendations.   No anticoagulation given SDH.  Patient appears to have been on propranolol-consider reinitiating tomorrow pending results of EKG.  Echo pending

## 2025-06-24 NOTE — ASSESSMENT & PLAN NOTE
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   Continue with Keppra 1000 mg twice daily  Continue every 4hr neurochecks  Repeat CT scan if decline in GCS by >2 points  SBP goal 100-140   Continue with Seroquel 50/grams, melatonin, and amitriptyline to help with sleep/wake regulation and agitation secondary to TBI.   Delirium precautions  Plan for 2 week follow up with NSGY tomorrow for wound check and staple/suture removal.   Patient will require follow-up with neurosurgery as an outpatient.

## 2025-06-24 NOTE — PLAN OF CARE
Problem: Nutrition/Hydration-ADULT  Goal: Nutrient/Hydration intake appropriate for improving, restoring or maintaining nutritional needs  Description: Monitor and assess patient's nutrition/hydration status for malnutrition. Collaborate with interdisciplinary team and initiate plan and interventions as ordered.  Monitor patient's weight and dietary intake as ordered or per policy. Utilize nutrition screening tool and intervene as necessary. Determine patient's food preferences and provide high-protein, high-caloric foods as appropriate.     INTERVENTIONS:  - Monitor oral intake, urinary output, labs, and treatment plans  - Assess nutrition and hydration status and recommend course of action  - Evaluate amount of meals eaten  - Assist patient with eating if necessary   - Allow adequate time for meals  - Recommend/ encourage appropriate diets, oral nutritional supplements, and vitamin/mineral supplements  - Order, calculate, and assess calorie counts as needed  - Recommend, monitor, and adjust tube feedings and TPN/PPN based on assessed needs  - Assess need for intravenous fluids  - Provide specific nutrition/hydration education as appropriate  - Include patient/family/caregiver in decisions related to nutrition  Outcome: Progressing     Problem: PAIN - ADULT  Goal: Verbalizes/displays adequate comfort level or baseline comfort level  Description: Interventions:  - Encourage patient to monitor pain and request assistance  - Assess pain using appropriate pain scale  - Administer analgesics as ordered based on type and severity of pain and evaluate response  - Implement non-pharmacological measures as appropriate and evaluate response  - Consider cultural and social influences on pain and pain management  - Notify physician/advanced practitioner if interventions unsuccessful or patient reports new pain  - Educate patient/family on pain management process including their role and importance of  reporting pain   -  Provide non-pharmacologic/complimentary pain relief interventions  Outcome: Progressing     Problem: INFECTION - ADULT  Goal: Absence or prevention of progression during hospitalization  Description: INTERVENTIONS:  - Assess and monitor for signs and symptoms of infection  - Monitor lab/diagnostic results  - Monitor all insertion sites, i.e. indwelling lines, tubes, and drains  - Monitor endotracheal if appropriate and nasal secretions for changes in amount and color  - [place] appropriate cooling/warming therapies per order  - Administer medications as ordered  - Instruct and encourage patient and family to use good hand hygiene technique  - Identify and instruct in appropriate isolation precautions for identified infection/condition  Outcome: Progressing  Goal: Absence of fever/infection during neutropenic period  Description: INTERVENTIONS:  - Monitor WBC  - Perform strict hand hygiene  - Limit to healthy visitors only  - No plants, dried, fresh or silk flowers with winters in patient room  Outcome: Progressing     Problem: SAFETY ADULT  Goal: Patient will remain free of falls  Description: INTERVENTIONS:  - Educate patient/family on patient safety including physical limitations  - Instruct patient to call for assistance with activity   - Consider consulting OT/PT to assist with strengthening/mobility based on AM PAC & JH-HLM score  - Consult OT/PT to assist with strengthening/mobility   - Keep Call bell within reach  - Keep bed low and locked with side rails adjusted as appropriate  - Keep care items and personal belongings within reach  - Initiate and maintain comfort rounds  - Make Fall Risk Sign visible to staff  - Offer Toileting every 2 Hours, in advance of need  - Initiate/Maintain bed alarm  - Obtain necessary fall risk management equipment: chair alarm  - Apply yellow socks and bracelet for high fall risk patients  - Consider moving patient to room near nurses station  Outcome: Progressing  Goal: Maintain  or return to baseline ADL function  Description: INTERVENTIONS:  -  Assess patient's ability to carry out ADLs; assess patient's baseline for ADL function and identify physical deficits which impact ability to perform ADLs (bathing, care of mouth/teeth, toileting, grooming, dressing, etc.)  - Assess/evaluate cause of self-care deficits   - Assess range of motion  - Assess patient's mobility; develop plan if impaired  - Assess patient's need for assistive devices and provide as appropriate  - Encourage maximum independence but intervene and supervise when necessary  - Involve family in performance of ADLs  - Assess for home care needs following discharge   - Consider OT consult to assist with ADL evaluation and planning for discharge  - Provide patient education as appropriate  - Monitor functional capacity and physical performance, use of AM PAC & JH-HLM   - Monitor gait, balance and fatigue with ambulation    Outcome: Progressing  Goal: Maintains/Returns to pre admission functional level  Description: INTERVENTIONS:  - Perform AM-PAC 6 Click Basic Mobility/ Daily Activity assessment daily.  - Set and communicate daily mobility goal to care team and patient/family/caregiver.   - Collaborate with rehabilitation services on mobility goals if consulted  - Perform Range of Motion 3 times a day.  - Reposition patient every 2 hours.  - Dangle patient 3 times a day  - Stand patient 3 times a day  - Ambulate patient 3 times a day  - Out of bed to chair 3 times a day   - Out of bed for meals 3 times a day  - Out of bed for toileting  - Record patient progress and toleration of activity level   Outcome: Progressing     Problem: DISCHARGE PLANNING  Goal: Discharge to home or other facility with appropriate resources  Description: INTERVENTIONS:  - Identify barriers to discharge w/patient and caregiver  - Arrange for needed discharge resources and transportation as appropriate  - Identify discharge learning needs (meds, wound  care, etc.)  - Arrange for interpretive services to assist at discharge as needed  - Refer to Case Management Department for coordinating discharge planning if the patient needs post-hospital services based on physician/advanced practitioner order or complex needs related to functional status, cognitive ability, or social support system  Outcome: Progressing     Problem: Knowledge Deficit  Goal: Patient/family/caregiver demonstrates understanding of disease process, treatment plan, medications, and discharge instructions  Description: Complete learning assessment and assess knowledge base.  Interventions:  - Provide teaching at level of understanding  - Provide teaching via preferred learning methods  Outcome: Progressing     Problem: NEUROSENSORY - ADULT  Goal: Achieves stable or improved neurological status  Description: INTERVENTIONS  - Monitor and report changes in neurological status  - Monitor vital signs such as temperature, blood pressure, glucose, and any other labs ordered   - Initiate measures to prevent increased intracranial pressure  - Monitor for seizure activity and implement precautions if appropriate      Outcome: Progressing  Goal: Remains free of injury related to seizures activity  Description: INTERVENTIONS  - Maintain airway, patient safety  and administer oxygen as ordered  - Monitor patient for seizure activity, document and report duration and description of seizure to physician/advanced practitioner  - If seizure occurs,  ensure patient safety during seizure  - Reorient patient post seizure  - Seizure pads on all 4 side rails  - Instruct patient/family to notify RN of any seizure activity including if an aura is experienced  - Instruct patient/family to call for assistance with activity based on nursing assessment  - Administer anti-seizure medications if ordered    Outcome: Progressing  Goal: Achieves maximal functionality and self care  Description: INTERVENTIONS  - Monitor swallowing  and airway patency with patient fatigue and changes in neurological status  - Encourage and assist patient to increase activity and self care.   - Encourage visually impaired, hearing impaired and aphasic patients to use assistive/communication devices  Outcome: Progressing     Problem: CARDIOVASCULAR - ADULT  Goal: Maintains optimal cardiac output and hemodynamic stability  Description: INTERVENTIONS:  - Monitor I/O, vital signs and rhythm  - Monitor for S/S and trends of decreased cardiac output  - Administer and titrate ordered vasoactive medications to optimize hemodynamic stability  - Assess quality of pulses, skin color and temperature  - Assess for signs of decreased coronary artery perfusion  - Instruct patient to report change in severity of symptoms  Outcome: Progressing  Goal: Absence of cardiac dysrhythmias or at baseline rhythm  Description: INTERVENTIONS:  - Continuous cardiac monitoring, vital signs, obtain 12 lead EKG if ordered  - Administer antiarrhythmic and heart rate control medications as ordered  - Monitor electrolytes and administer replacement therapy as ordered  Outcome: Progressing     Problem: RESPIRATORY - ADULT  Goal: Achieves optimal ventilation and oxygenation  Description: INTERVENTIONS:  - Assess for changes in respiratory status  - Assess for changes in mentation and behavior  - Position to facilitate oxygenation and minimize respiratory effort  - Oxygen administered by appropriate delivery if ordered  - Initiate smoking cessation education as indicated  - Encourage broncho-pulmonary hygiene including cough, deep breathe, Incentive Spirometry  - Assess the need for suctioning and aspirate as needed  - Assess and instruct to report SOB or any respiratory difficulty  - Respiratory Therapy support as indicated  Outcome: Progressing     Problem: GASTROINTESTINAL - ADULT  Goal: Minimal or absence of nausea and/or vomiting  Description: INTERVENTIONS:  - Administer IV fluids if ordered  to ensure adequate hydration  - Maintain NPO status until nausea and vomiting are resolved  - Nasogastric tube if ordered  - Administer ordered antiemetic medications as needed  - Provide nonpharmacologic comfort measures as appropriate  - Advance diet as tolerated, if ordered  - Consider nutrition services referral to assist patient with adequate nutrition and appropriate food choices  Outcome: Progressing  Goal: Maintains or returns to baseline bowel function  Description: INTERVENTIONS:  - Assess bowel function  - Encourage oral fluids to ensure adequate hydration  - Administer IV fluids if ordered to ensure adequate hydration  - Administer ordered medications as needed  - Encourage mobilization and activity  - Consider nutritional services referral to assist patient with adequate nutrition and appropriate food choices  Outcome: Progressing  Goal: Maintains adequate nutritional intake  Description: INTERVENTIONS:  - Monitor percentage of each meal consumed  - Identify factors contributing to decreased intake, treat as appropriate  - Assist with meals as needed  - Monitor I&O, weight, and lab values if indicated  - Obtain nutrition services referral as needed  Outcome: Progressing  Goal: Establish and maintain optimal ostomy function  Description: INTERVENTIONS:  - Assess bowel function  - Encourage oral fluids to ensure adequate hydration  - Administer IV fluids if ordered to ensure adequate hydration   - Administer ordered medications as needed  - Encourage mobilization and activity  - Nutrition services referral to assist patient with appropriate food choices  - Assess stoma site  - Consider wound care consult   Outcome: Progressing  Goal: Oral mucous membranes remain intact  Description: INTERVENTIONS  - Assess oral mucosa and hygiene practices  - Implement preventative oral hygiene regimen  - Implement oral medicated treatments as ordered  - Initiate Nutrition services referral as needed  Outcome:  Progressing     Problem: GENITOURINARY - ADULT  Goal: Maintains or returns to baseline urinary function  Description: INTERVENTIONS:  - Assess urinary function  - Encourage oral fluids to ensure adequate hydration if ordered  - Administer IV fluids as ordered to ensure adequate hydration  - Administer ordered medications as needed  - Offer frequent toileting  - Follow urinary retention protocol if ordered  Outcome: Progressing  Goal: Absence of urinary retention  Description: INTERVENTIONS:  - Assess patient’s ability to void and empty bladder  - Monitor I/O  - Bladder scan as needed  - Discuss with physician/AP medications to alleviate retention as needed  - Discuss catheterization for long term situations as appropriate  Outcome: Progressing  Goal: Urinary catheter remains patent  Description: INTERVENTIONS:  - Assess patency of urinary catheter  - If patient has a chronic pedersen, consider changing catheter if non-functioning  - Follow guidelines for intermittent irrigation of non-functioning urinary catheter  Outcome: Progressing     Problem: METABOLIC, FLUID AND ELECTROLYTES - ADULT  Goal: Electrolytes maintained within normal limits  Description: INTERVENTIONS:  - Monitor labs and assess patient for signs and symptoms of electrolyte imbalances  - Administer electrolyte replacement as ordered  - Monitor response to electrolyte replacements, including repeat lab results as appropriate  - Instruct patient on fluid and nutrition as appropriate  Outcome: Progressing  Goal: Fluid balance maintained  Description: INTERVENTIONS:  - Monitor labs   - Monitor I/O and WT  - Instruct patient on fluid and nutrition as appropriate  - Assess for signs & symptoms of volume excess or deficit  Outcome: Progressing  Goal: Glucose maintained within target range  Description: INTERVENTIONS:  - Monitor Blood Glucose as ordered  - Assess for signs and symptoms of hyperglycemia and hypoglycemia  - Administer ordered medications to  maintain glucose within target range  - Assess nutritional intake and initiate nutrition service referral as needed  Outcome: Progressing   Problem: HEMATOLOGIC - ADULT  Goal: Maintains hematologic stability  Description: INTERVENTIONS  - Assess for signs and symptoms of bleeding or hemorrhage  - Monitor labs  - Administer supportive blood products/factors as ordered and appropriate  Outcome: Progressing     Problem: MUSCULOSKELETAL - ADULT  Goal: Maintain or return mobility to safest level of function  Description: INTERVENTIONS:  - Assess patient's ability to carry out ADLs; assess patient's baseline for ADL function and identify physical deficits which impact ability to perform ADLs (bathing, care of mouth/teeth, toileting, grooming, dressing, etc.)  - Assess/evaluate cause of self-care deficits   - Assess range of motion  - Assess patient's mobility  - Assess patient's need for assistive devices and provide as appropriate  - Encourage maximum independence but intervene and supervise when necessary  - Involve family in performance of ADLs  - Assess for home care needs following discharge   - Consider OT consult to assist with ADL evaluation and planning for discharge  - Provide patient education as appropriate  Outcome: Progressing  Goal: Maintain proper alignment of affected body part  Description: INTERVENTIONS:  - Support, maintain and protect limb and body alignment  - Provide patient/ family with appropriate education  Outcome: Progressing     Problem: Prexisting or High Potential for Compromised Skin Integrity  Goal: Skin integrity is maintained or improved  Description: INTERVENTIONS:  - Identify patients at risk for skin breakdown  - Assess and monitor skin integrity including under and around medical devices   - Assess and monitor nutrition and hydration status  - Monitor labs  - Assess for incontinence   - Turn and reposition patient  - Assist with mobility/ambulation  - Relieve pressure over [place]olo  prominences   - Avoid friction and shearing  - Provide appropriate hygiene as needed including keeping skin clean and dry  - Evaluate need for skin moisturizer/barrier cream  - Collaborate with interdisciplinary team  - Patient/family teaching  - Consider wound care consult    Assess:  - Review Jake scale daily  - Clean and moisturize skin   - Inspect skin when repositioning, toileting, and assisting with ADLS  - Assess under medical devices   - Assess extremities for adequate circulation and sensation     Bed Management:  - Have minimal linens on bed & keep smooth, unwrinkled  - Change linens as needed when moist or perspiring  - Avoid sitting or lying in one position for more than 2 hours while in beds   - Toileting:  - Offer bedside commode  - Assess for incontinence  - Use incontinent care products after each incontinent episode    Activity:  - Mobilize patient 3 times a day  - Encourage activity and walks on unit  - Encourage or provide ROM exercises   - Turn and reposition patient every 2 Hours  - Use appropriate equipment to lift or move patient in bed  - Instruct/ Assist with weight shifting every hour when out of bed in chair  - Consider limitation of chair time 1 hour intervals    Skin Care:  - Avoid use of baby powder, tape, friction and shearing, hot water or constrictive clothing  - Relieve pressure over bony prominences  - Do not massage red bony areas    Next Steps:  - Teach patient strategies to minimize risks  - Consider consults to  interdisciplinary teams   Outcome: Progressing     Problem: DECISION MAKING  Goal: Pt/Family able to effectively weigh alternatives and participate in decision making related to treatment and care  Description: INTERVENTIONS:  - Identify decision maker  - Determine when there are differences among patient's view, family's view, and healthcare provider's view of patient condition and care goals  - Facilitate patient/family articulation of goals for care  - Help  patient/family identify pros/cons of alternative solutions  - Provide information as requested by patient/family  - Respect patient/family rights related to privacy and sharing information   - Serve as a liaison between patient, family and health care team  - Initiate consults as appropriate (Ethics Team, Palliative Care, Family Care Conference, etc.)  Outcome: Progressing     Problem: CONFUSION/THOUGHT DISTURBANCE  Goal: Thought disturbances (confusion, delirium, depression, dementia or psychosis) are managed to maintain or return to baseline mental status and functional level  Description: INTERVENTIONS:  - Assess for possible contributors to  thought disturbance, including but not limited to medications, infection, impaired vision or hearing, underlying metabolic abnormalities, dehydration, respiratory compromise,  psychiatric diagnoses and notify attending PHYSICAN/AP  - Monitor and intervene to maintain adequate nutrition, hydration, elimination, sleep and activity  - Decrease environmental stimuli, including noise as appropriate.  - Provide frequent contacts to provide refocusing, direction and reassurance as needed. Approach patient calmly with eye contact and at their level.  - [place] high risk fall precautions, aspiration precautions and other safety measures, as indicated  - If delirium suspected, notify physician/AP of change in condition and request immediate in-person evaluation  - Pursue consults as appropriate including Geriatric (campus dependent), OT for cognitive evaluation/activity planning, psychiatric, pastoral care, etc.  Outcome: Progressing     Problem: BEHAVIOR  Goal: Pt/Family maintain appropriate behavior and adhere to behavioral management agreement, if implemented  Description: INTERVENTIONS:  - Assess the family dynamic   - Encourage verbalization of thoughts and concerns in a socially appropriate manner  - Assess patient/family's coping skills and non-compliant behavior (including  use of illegal substances).  - Utilize positive, consistent limit setting strategies supporting safety of patient, staff and others  - Initiate consult with Case Management, Spiritual Care or other ancillary services as appropriate  - If a patient's/visitor's behavior jeopardizes the safety of the patient, staff, or others, refer to organization procedure.   - Notify Security of behavior or suspected illegal substances which indicate the need for search of the patient and/or belongings  - Encourage participation in the decision making process about a behavioral management agreement; implement if patient meets criteria  Outcome: Progressing

## 2025-06-24 NOTE — ASSESSMENT & PLAN NOTE
Appears to be new onset prior to this admission.Present on first EKG 6/12/25, previous EKG from 6/12/24 showed SR with 1st degree AV block. No record in chart of being diagnosed or treated for Afib.Patient is a poor historian however he denies being told he had Afib in the past. Echo 2/'14 - EF 65%, moderate concentric LV hypertrophy, trace mitral and tricuspid regurgitation. During this hospitalization he has been in Afib /AfibRVR w/ max HR of 139.   EKG 6/15 - shows rate of 58 in what appears to be an atrial fibrillation w/ pauses and varying capture   EKG 6/23 - Shows irregularly irregular rhythm   HR  over past 24 hours, BP 100s-120s/70s-80s.   No s/s of active infection,or medication that Afib can be attributed to, TSH 2.364 on 6/11, cardiac remodeling noted on Echo 2/'14, no electrolyte abnormality, no s/s of PE.  Plan  Follow up on echo  EKG if rhythm changes or patient becomes symptomatic  Rate control for HR <110   - start metoprolol succinate 25mg michelle  No AC/AP per neurosurgery  K > 4, Mg >2

## 2025-06-24 NOTE — ASSESSMENT & PLAN NOTE
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 Yoon 25)  POD 13 L craniotomy and R karyn holes for evac of radha SDH ( 25)   Arrived at [facility] ER on 25 with complaints of headache and left-sided hip pain after reported fall in the grass the day prior to arrival  Upon further questioning, patient has had some slurred speech for couple days per his wife.  Upon arrival to [facility], patient has significant expressive aphasia and confusion.    Imagin25: Stable mixed density left convexity subdural hematoma, with stable minimal rightward midline shift. 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    Plan:   Continue to closely monitor neuro exam  Frequent neuro checks per primary team   Repeat STAT CTH with any acute decline in GCS > 2pts or more in 1hr   SBP goal 100-140   SDD removed  and 25  Continue keppra for seizure ppx  Hold all AC/AP meds   DVT ppx: SCDs, Lovenox  Medical management per primary team   Pain control per primary team   PT/OT/Speech therapies as tolerated.   Plan discussed by Dr. Yoon with family. No further inpatient treatments at this time   Will plan for DC to rehab   If patient improves will plan for elective treatment of additional DAVF from the outpatient setting.   Cleared for discharge at this time  Neurosurgery will see patient tomorrow for staple/suture removal as he will be 2 weeks s/p intervention. Please reach out with any further questions or concerns.

## 2025-06-24 NOTE — PROGRESS NOTES
Progress Note - Geriatric Medicine   Jasson Carter 75 y.o. male MRN: [id number]  [facility] Encounter: [id number]      Assessment/Plan:    Ambulatory dysfunction with fall  -reportedly mechanical fall day prior to admission   -admitted with below noted injuries   -does not require use of assist device for ambulation at baseline PTA  -endorsed hx prior falls at least one additional in past six months   -remains high risk future falls due to age, hx of fall, deconditioning/debility, impulsivity and unfamiliar environment   -encourage good body mechanics and assist with all transfers  -maintain environment free of fall hazards  -encourage appropriate footwear and adequate lighting at all times when out of bed  -consider home fall risk assessment and personal fall alert system if returning home following rehab   -PT and OT following, anticipate need for rehab on hosp d/c      Subdural hematoma  -s/p fall as outlined above  -CTH on initial presentation noted 14 mm mixed density acute left cerebral convexity subdural hematoma with associated 5 mm rightward midline shift and low-density 7 mm right cerebral convexity subdural hematoma age indeterminate   -s/p left-sided crani and right sided bur hole for evaluation of subdural hematoma 6/11/25  -s/p partial embolization DAVF via left ophthalmic artery 6/12/25   -post-op course complicated by seizure briefly requiring intubation for airway protection as well as acute stroke in multiple vascular territories  -currently on Keppra for seizure ppx   -CTH 6/14/25 notes decrease size of L SDH with no new area of hemorrhage and stable post op changes   -neurochecks per protocol  -Nsx on consult and following, no longer planning for additional inpatient treatments at this time and now cleared for dc from NSx standpoint   -PT/OT/CM following      Altered mental status  -alert and oriented to person place situation and year not month or day on initial consultation,  today very confused oriented only to self otherwise responses completely inappropriate to question asked, nursing notes has been fluctuating worse in afternoon/evenings   -mentation fluctuating during admission typically clearer cognition during day and more impulsive/inattentive in evenings   -at baseline fully oriented independent with ADLs and iADLs  -no prior memory or cognitive concerns reported  -multifactorial including recent SDH requiring surgical evacuation, recent CVA in multiple vascular territories, post traumatic seizure and prolonged hospitalization in setting of TBI  -Elavil dc'd earlier in admission as was not current home medication [facility] and may contribute to confusion/QTc prolongation in older adults   -if complains of persistent neuropathy symptoms can consider trial low dose Gabapentin HS   -currently on Seroquel 50mg BID and 100mg HS, monitor electrolytes and QTc closely (464 msec on 6/23/25), caution with increasing dosage due to risk lowering seizure threshold and pt with known post traumatic seizure earlier in admission    -continue supportive cares and strict delirium precautions      Posttraumatic seizure  -reportedly witnessed tonic clonic activity following TBI  -no seizure activity reported on 24H EEG completed earlier in admission   -continued on course of Keppra for seizure ppx  -continue seizure precautions      Acute CVA of multiple vascular territories   -MRI brain 6/14/25 notes several small scattered acute/early subacute infarcts bilateral frontal and parietal lobes and left subinsular/anterior temporal region  -consider stroke pathway   -remains high risk recurrence, cont secondary risk factor modifications   -Nsx on consult      Dysphagia  -with poor oral intake   -NG tube removed 6/20  -speech on consult, currently on level II dysphagia diet, mech soft with nectar thick liquids   -continue strict aspiration precautions   -speech on consult and following   -Marinol  added 6/19 for potential appetite stimulation without noted improvement   -could consider low dose Mirtazapine 15mg HS with close monitoring of electrolytes, if too sedate with use can consider reducing evening Seroquel dose to allow for addition of Mirtazapine   -encourage family to assist patient with ordering food he likes to promote oral intake      Atrial fibrillation  -reportedly new diagnosis this admission   -now monitoring back on telemetry  -no recent echo fr review, consider obtaining for further evaluation   -rates appear to be well controlled on current regimen  -not currently on systemic a/c due to SDH requiring surgical evacuation as above  -continue optimization of hemodynamics    -consider Cardiology consult per ICU documentation earlier in admission plan was to consult on transfer out of ICU     Cognitive screening  -awake and alert but completely disoriented at time of evaluation   -reportedly fully oriented and independent with ADLs/iADLs at baseline  -no prior cognitive testing on record for review  -TSH WNL 2.364, B12 low at 204 [facility], recommend supplementation to goal at least 400  -at risk cardiovascular, cerebrovascular and TBI related cognitive decline, continue secondary risk factor modifications and supportive cares, consider cognitive testing with OT  -encourage patient remain physically, socially, cognitively active and engaged to maintain cognitive acuity     Impaired Vision  -recommend use of corrective lenses at all appropriate times  -encourage adequate lighting and encourage use of assistance with ambulation  -keep personal belongings close to person to avoid reaching  -encourage appropriate footwear at all times  -consider large font for printed materials provided to patient      Impaired Hearing  -Encourage use of hearing aids at all appropriate times  -encourage providers and caregivers to speak slowly and clearly directly to patient  -minimize background noise  to encourage patient engagement  -consider use of hearing amplifier to reduce risk of straining to hear if hearing aids are not present or are not sufficient   -encourage use of teach back method to ensure clear communication       Frailty syndrome in geriatric patient  -clinical frailty scale stage V, mildly frail, progressive  -multifactorial including age, HTN, HLD now with fall and multitude of acute traumatic injuries superimposed in elderly individual with limited physiologic and metabolic reserves in elderly individual with limited physiologic and metabolic reserves   -continue optimization chronic conditions and address acute derangements as arise  -encourage well balanced nutritional intake, nutrition on consult  -ensure underlying anxiety/mood/depression symptoms are well controlled as may impact response to therapies as well as overall sense of wellbeing and quality of life  -continue psychosocial support of patient and caregivers   -continue to ensure treatment and interventions align with patient wishes and goals of care      High risk developing delirium   -due to age, SDH requiring surgical evacuation, TBI, acute/subacute stroke and prolonged hospitalization   -continue delirium precautions  -encourage normal sleep/wake cycle  -minimize overnight interruptions, group overnight vitals/labs/nursing checks as medically appropriate   -dim lights, close blinds and turn off tv to minimize stimulation and encourage sleep environment in evenings  -ensure that pain if present is well controlled   -monitor for fecal and urinary retention which may precipitate delirium, last BM recorded 6/19  -encourage early mobilization and ambulation with assist once cleared to safely do so  -provide frequent reorientation and redirection as indicated and appropriate   -encourage family and friends at the bedside to help calm patient if anxious  -minimize use of medications which may precipitate or worsen delirium such as  "tramadol, benzodiazepine, anticholinergics, and benadryl  -encourage well balanced hydration and nutrition   -redirect unwanted behaviors as first line     Care coordination: rounded with Lizzy (RN)    Subjective:     Sib is seen and examined at bedside where he is sitting resting comfortably with his son at his side, he is awake and alert pleasant and cooperative, he denies pain or new acute complaints but appetite remains poor. Nursing notes that he remains impulsive in afternoons but is redirectable and sleeps well overnight.     Objective:     Vitals: Blood pressure 109/71, pulse 104, temperature 97.6 °F (36.4 °C), resp. rate 18, height 6' 3.98\" (1.93 m), weight 87.2 kg (192 lb 3.9 oz), SpO2 96%.,Body mass index is 23.41 kg/m².      Intake/Output Summary (Last 24 hours) at 6/24/2025 1149  Last data filed at 6/24/2025 0917  Gross per 24 hour   Intake 1510 ml   Output 700 ml   Net 810 ml     Current Medications: Reviewed    Physical Exam:   Physical Exam  Vitals and nursing note reviewed.   Constitutional:       General: He is not in acute distress.     Appearance: Normal appearance. He is not toxic-appearing.   HENT:      Head: Normocephalic.      Comments: Scalp staples noted bilaterally      Nose: Nose normal.      Mouth/Throat:      Mouth: Mucous membranes are dry.     Eyes:      General: No scleral icterus.        Right eye: No discharge.         Left eye: No discharge.      Conjunctiva/sclera: Conjunctivae normal.     Neck:      Comments: Phonation norm   Cardiovascular:      Rate and Rhythm: Tachycardia present.   Pulmonary:      Effort: Pulmonary effort is normal. No respiratory distress.      Breath sounds: No wheezing.   Abdominal:      General: Bowel sounds are normal. There is no distension.      Palpations: Abdomen is soft.      Tenderness: There is no abdominal tenderness.     Musculoskeletal:      Cervical back: Neck supple.      Right lower leg: No edema.      Left lower leg: No edema.      " Comments: Reduced overall muscle mass      Skin:     General: Skin is warm and dry.     Neurological:      Mental Status: He is alert.      Comments: Awake and alert, impulsive but redirectable    Psychiatric:      Comments: Cooperative         Invasive Devices       Peripheral Intravenous Line  Duration             Peripheral IV 06/21/25 Left Antecubital 3 days                  Lab Results:     I have personally reviewed pertinent lab results including the following:    Results from last 7 days   Lab Units 06/23/25  0651 06/21/25  0329 06/18/25  0615   WBC Thousand/uL 8.50 8.34 9.01   HEMOGLOBIN g/dL 14.3 12.9 12.9   HEMATOCRIT % 43.2 39.4 38.5   PLATELETS Thousands/uL 291 245 208   SEGS PCT %  --  70 74   MONO PCT %  --  9 11   EOS PCT %  --  2 2     Results from last 7 days   Lab Units 06/23/25  0651 06/21/25  0329 06/18/25  0615   POTASSIUM mmol/L 4.0 3.6 3.6   CHLORIDE mmol/L 102 98 105   CO2 mmol/L 25 28 25   BUN mg/dL 18 18 15   CREATININE mg/dL 0.70 0.67 0.54*   CALCIUM mg/dL 8.9 8.7 8.8     I have personally reviewed the following imaging study reports in PACS:    6/22/25- [facility]

## 2025-06-24 NOTE — PROGRESS NOTES
Pastoral Care Progress Note             06/24/25 1300   Clinical Encounter Type   Visited With Patient and family together   Judaism Encounters   Judaism Needs Prayer     Father Leydi visited with pt  and provided prayer and blessing.Chaplains remain available.

## 2025-06-24 NOTE — CONSULTS
Consultation - Cardiology   Name: Jasson Carter 75 y.o. male I MRN: [id number]  [facility] Date of Admission: 6/11/2025   Date of Service: 6/24/2025 I Hospital Day: 13       Inpatient consult to Cardiology     Date/Time  6/24/2025 10:03 AM     Performed by  Blake Arora MD   Authorized by  Mckay Sims PA-C         Physician Requesting Evaluation: Dane Mann,*   Reason for Evaluation / Principal Problem: Atrial Fibrillation    Assessment & Plan  Atrial fibrillation (HCC)  Appears to be new onset prior to this admission.Present on first EKG 6/12/25, previous EKG from 6/12/24 showed SR with 1st degree AV block. No record in chart of being diagnosed or treated for Afib.Patient is a poor historian however he denies being told he had Afib in the past. Echo 2/'14 - EF 65%, moderate concentric LV hypertrophy, trace mitral and tricuspid regurgitation. During this hospitalization he has been in Afib /AfibRVR w/ max HR of 139.   EKG 6/15 - shows rate of 58 in what appears to be an atrial fibrillation w/ pauses and varying capture   EKG 6/23 - Shows irregularly irregular rhythm   HR  over past 24 hours, BP 100s-120s/70s-80s.   No s/s of active infection,or medication that Afib can be attributed to, TSH 2.364 on 6/11, cardiac remodeling noted on Echo 2/'14, no electrolyte abnormality, no s/s of PE.  Plan  Follow up on echo  EKG if rhythm changes or patient becomes symptomatic  Rate control for HR <110   - start metoprolol succinate 25mg daiky  No AC/AP per neurosurgery  K > 4, Mg >2  Hypertension  Losartan 100 mg qd at home (currently held)  Amlodipine 5 mg qd currently  /72 this am  SBP goal 100-140 per Neurosurgery  Plan  Stop Amlodipine  Start metoprolol succinate 25mg daily  SDH (subdural hematoma) (HCC)  Neurosurgery following  Altered mental status  Patient w/ 1:1  Oriented to self, year and situation , but not location  Fall  Initial on 6/11  Repeat 6/22      History  of Present Illness   Jasson Carter is a 75 y.o. male who presented to [facility] ER on 6/11/25 after a fall. Patient had been slurring his words for a few days prior. Found to have B/L SDH and was transferred to [facility] for neurosurgical intervention. PMH of HTN, GERN, HLD, BPH and neuropathy. He received a L craniotomy and R karyn holes for evacuation of SDH on 6/11 , and 6/12 had a partial embolization of cribriform plate. He also had a fall 6/22. He denies any history of Afib, however he is a poor historian. Also of note, patient had a seizure post surgery. Denies any chest pain, chest tightness, SOB, headaches or palpitations.    Review of Systems   Respiratory:  Negative for cough, chest tightness and shortness of breath.    Cardiovascular:  Negative for chest pain, palpitations and leg swelling.   Neurological:  Positive for speech difficulty. Negative for dizziness and light-headedness.     Historical Information   Past Medical History[1]  Past Surgical History[2]  Social History[3]  E-Cigarette/Vaping    E-Cigarette Use Never User      E-Cigarette/Vaping Substances    Nicotine No     THC No     CBD No     Flavoring No     Other No     Unknown No      Family History[4]    Objective :  Temp:  [97.4 °F (36.3 °C)-98.4 °F (36.9 °C)] 97.5 °F (36.4 °C)  HR:  [] 82  BP: (103-123)/(72-79) 119/79  Resp:  [16-19] 18  SpO2:  [95 %-98 %] 97 %  O2 Device: None (Room air)  Nasal Cannula O2 Flow Rate (L/min):  [0 L/min] 0 L/min  Orthostatic Blood Pressures      Flowsheet Row Most Recent Value   Blood Pressure 119/79 filed at 06/24/2025 0658   Patient Position - Orthostatic VS Lying filed at 06/21/2025 1559          First Weight: Weight - Scale: 87.2 kg (192 lb 3.9 oz) (06/11/25 1050)  Vitals:    06/11/25 1050   Weight: 87.2 kg (192 lb 3.9 oz)       Physical Exam  Vitals reviewed.   Constitutional:       General: He is not in acute distress.     Appearance: He is normal weight. He is not ill-appearing, toxic-appearing or  "diaphoretic.   HENT:      Head: Normocephalic and atraumatic.     Eyes:      Pupils: Pupils are equal, round, and reactive to light.       Cardiovascular:      Rate and Rhythm: Normal rate. Rhythm irregular.      Pulses: Normal pulses.      Heart sounds: Normal heart sounds.   Pulmonary:      Effort: Pulmonary effort is normal. No respiratory distress.      Breath sounds: Normal breath sounds.   Abdominal:      Palpations: Abdomen is soft.     Musculoskeletal:      Right lower leg: No edema.      Left lower leg: No edema.     Skin:     General: Skin is warm and dry.      Capillary Refill: Capillary refill takes less than 2 seconds.      Coloration: Skin is pale.     Neurological:      Mental Status: He is alert. He is disoriented.      GCS: GCS eye subscore is 4. GCS verbal subscore is 4. GCS motor subscore is 6.      Comments: Aware of self, year and situation, unable to say he is at [facility] but is aware of being in the hospital for falling.         Lab Results: I have reviewed the following results:  Results from last 7 days   Lab Units 06/23/25  0651 06/21/25  0329 06/18/25  0615   WBC Thousand/uL 8.50 8.34 9.01   HEMOGLOBIN g/dL 14.3 12.9 12.9   HEMATOCRIT % 43.2 39.4 38.5   PLATELETS Thousands/uL 291 245 208     Results from last 7 days   Lab Units 06/23/25  0651 06/21/25  0329 06/18/25  0615   POTASSIUM mmol/L 4.0 3.6 3.6   CHLORIDE mmol/L 102 98 105   CO2 mmol/L 25 28 25   BUN mg/dL 18 18 15   CREATININE mg/dL 0.70 0.67 0.54*   CALCIUM mg/dL 8.9 8.7 8.8         Lab Results   Component Value Date    HGBA1C 5.8 (H) 06/11/2025     Lab Results   Component Value Date    TROPONINI <0.02 12/11/2019             VTE Prophylaxis: VTE covered by:  enoxaparin, Subcutaneous, 30 mg at 06/24/25 0917           Portions of the record may have been created with voice recognition software.  Occasional wrong word or \"sound a like\" substitutions may have occurred due to the inherent limitations of voice recognition " software.  Read the chart carefully and recognize, using context, where substitutions have occurred.  ==  Blake Arora MD  [facility]  Internal Medicine Residency PGY-1           [1]   Past Medical History:  Diagnosis Date    Allergic     Gout     Hypertension     PTSD (post-traumatic stress disorder)    [2]   Past Surgical History:  Procedure Laterality Date    APPENDECTOMY      CHOLECYSTECTOMY      CRANIOTOMY Bilateral 2025    Procedure: Left sided craniotomy and right sided karyn holes for evacuation of subdural hematoma;  Surgeon: Satish De La Torre MD;  Location: [facility];  Service: Neurosurgery    IR CEREBRAL ANGIOGRAPHY / INTERVENTION  2025    TOTAL KNEE ARTHROPLASTY     [3]   Social History  Tobacco Use    Smoking status: Former     Current packs/day: 0.00     Average packs/day: 0.5 packs/day for 20.0 years (10.0 ttl pk-yrs)     Types: Cigarettes     Start date:      Quit date:      Years since quittin.4    Smokeless tobacco: Never   Vaping Use    Vaping status: Never Used   Substance and Sexual Activity    Alcohol use: Yes    Drug use: Not Currently   [4]   Family History  Problem Relation Name Age of Onset    Lung cancer Father

## 2025-06-24 NOTE — PLAN OF CARE
Problem: Nutrition/Hydration-ADULT  Goal: Nutrient/Hydration intake appropriate for improving, restoring or maintaining nutritional needs  Description: Monitor and assess patient's nutrition/hydration status for malnutrition. Collaborate with interdisciplinary team and initiate plan and interventions as ordered.  Monitor patient's weight and dietary intake as ordered or per policy. Utilize nutrition screening tool and intervene as necessary. Determine patient's food preferences and provide high-protein, high-caloric foods as appropriate.     INTERVENTIONS:  - Monitor oral intake, urinary output, labs, and treatment plans  - Assess nutrition and hydration status and recommend course of action  - Evaluate amount of meals eaten  - Assist patient with eating if necessary   - Allow adequate time for meals  - Recommend/ encourage appropriate diets, oral nutritional supplements, and vitamin/mineral supplements  - Order, calculate, and assess calorie counts as needed  - Recommend, monitor, and adjust tube feedings and TPN/PPN based on assessed needs  - Assess need for intravenous fluids  - Provide specific nutrition/hydration education as appropriate  - Include patient/family/caregiver in decisions related to nutrition  Outcome: Progressing     Problem: PAIN - ADULT  Goal: Verbalizes/displays adequate comfort level or baseline comfort level  Description: Interventions:  - Encourage patient to monitor pain and request assistance  - Assess pain using appropriate pain scale  - Administer analgesics as ordered based on type and severity of pain and evaluate response  - Implement non-pharmacological measures as appropriate and evaluate response  - Consider cultural and social influences on pain and pain management  - Notify physician/advanced practitioner if interventions unsuccessful or patient reports new pain  - Educate patient/family on pain management process including their role and importance of  reporting pain   -  Provide non-pharmacologic/complimentary pain relief interventions  Outcome: Progressing     Problem: INFECTION - ADULT  Goal: Absence or prevention of progression during hospitalization  Description: INTERVENTIONS:  - Assess and monitor for signs and symptoms of infection  - Monitor lab/diagnostic results  - Monitor all insertion sites, i.e. indwelling lines, tubes, and drains  - Monitor endotracheal if appropriate and nasal secretions for changes in amount and color  - [place] appropriate cooling/warming therapies per order  - Administer medications as ordered  - Instruct and encourage patient and family to use good hand hygiene technique  - Identify and instruct in appropriate isolation precautions for identified infection/condition  Outcome: Progressing  Goal: Absence of fever/infection during neutropenic period  Description: INTERVENTIONS:  - Monitor WBC  - Perform strict hand hygiene  - Limit to healthy visitors only  - No plants, dried, fresh or silk flowers with winters in patient room  Outcome: Progressing     Problem: SAFETY ADULT  Goal: Patient will remain free of falls  Description: INTERVENTIONS:  - Educate patient/family on patient safety including physical limitations  - Instruct patient to call for assistance with activity   - Consider consulting OT/PT to assist with strengthening/mobility based on AM PAC & JH-HLM score  - Consult OT/PT to assist with strengthening/mobility   - Keep Call bell within reach  - Keep bed low and locked with side rails adjusted as appropriate  - Keep care items and personal belongings within reach  - Initiate and maintain comfort rounds  - Make Fall Risk Sign visible to staff  - Offer Toileting every 2 Hours, in advance of need  - Initiate/Maintain bed alarm  - Obtain necessary fall risk management equipment: chair alarm  - Apply yellow socks and bracelet for high fall risk patients  - Consider moving patient to room near nurses station  Outcome: Progressing     Problem:  DISCHARGE PLANNING  Goal: Discharge to home or other facility with appropriate resources  Description: INTERVENTIONS:  - Identify barriers to discharge w/patient and caregiver  - Arrange for needed discharge resources and transportation as appropriate  - Identify discharge learning needs (meds, wound care, etc.)  - Arrange for interpretive services to assist at discharge as needed  - Refer to Case Management Department for coordinating discharge planning if the patient needs post-hospital services based on physician/advanced practitioner order or complex needs related to functional status, cognitive ability, or social support system  Outcome: Progressing     Problem: Knowledge Deficit  Goal: Patient/family/caregiver demonstrates understanding of disease process, treatment plan, medications, and discharge instructions  Description: Complete learning assessment and assess knowledge base.  Interventions:  - Provide teaching at level of understanding  - Provide teaching via preferred learning methods  Outcome: Progressing     Problem: NEUROSENSORY - ADULT  Goal: Achieves stable or improved neurological status  Description: INTERVENTIONS  - Monitor and report changes in neurological status  - Monitor vital signs such as temperature, blood pressure, glucose, and any other labs ordered   - Initiate measures to prevent increased intracranial pressure  - Monitor for seizure activity and implement precautions if appropriate      Outcome: Progressing  Goal: Remains free of injury related to seizures activity  Description: INTERVENTIONS  - Maintain airway, patient safety  and administer oxygen as ordered  - Monitor patient for seizure activity, document and report duration and description of seizure to physician/advanced practitioner  - If seizure occurs,  ensure patient safety during seizure  - Reorient patient post seizure  - Seizure pads on all 4 side rails  - Instruct patient/family to notify RN of any seizure activity  including if an aura is experienced  - Instruct patient/family to call for assistance with activity based on nursing assessment  - Administer anti-seizure medications if ordered    Outcome: Progressing  Goal: Achieves maximal functionality and self care  Description: INTERVENTIONS  - Monitor swallowing and airway patency with patient fatigue and changes in neurological status  - Encourage and assist patient to increase activity and self care.   - Encourage visually impaired, hearing impaired and aphasic patients to use assistive/communication devices  Outcome: Progressing     Problem: CARDIOVASCULAR - ADULT  Goal: Maintains optimal cardiac output and hemodynamic stability  Description: INTERVENTIONS:  - Monitor I/O, vital signs and rhythm  - Monitor for S/S and trends of decreased cardiac output  - Administer and titrate ordered vasoactive medications to optimize hemodynamic stability  - Assess quality of pulses, skin color and temperature  - Assess for signs of decreased coronary artery perfusion  - Instruct patient to report change in severity of symptoms  Outcome: Progressing  Goal: Absence of cardiac dysrhythmias or at baseline rhythm  Description: INTERVENTIONS:  - Continuous cardiac monitoring, vital signs, obtain 12 lead EKG if ordered  - Administer antiarrhythmic and heart rate control medications as ordered  - Monitor electrolytes and administer replacement therapy as ordered  Outcome: Progressing     Problem: RESPIRATORY - ADULT  Goal: Achieves optimal ventilation and oxygenation  Description: INTERVENTIONS:  - Assess for changes in respiratory status  - Assess for changes in mentation and behavior  - Position to facilitate oxygenation and minimize respiratory effort  - Oxygen administered by appropriate delivery if ordered  - Initiate smoking cessation education as indicated  - Encourage broncho-pulmonary hygiene including cough, deep breathe, Incentive Spirometry  - Assess the need for suctioning and  aspirate as needed  - Assess and instruct to report SOB or any respiratory difficulty  - Respiratory Therapy support as indicated  Outcome: Progressing     Problem: GASTROINTESTINAL - ADULT  Goal: Minimal or absence of nausea and/or vomiting  Description: INTERVENTIONS:  - Administer IV fluids if ordered to ensure adequate hydration  - Maintain NPO status until nausea and vomiting are resolved  - Nasogastric tube if ordered  - Administer ordered antiemetic medications as needed  - Provide nonpharmacologic comfort measures as appropriate  - Advance diet as tolerated, if ordered  - Consider nutrition services referral to assist patient with adequate nutrition and appropriate food choices  Outcome: Progressing  Goal: Maintains or returns to baseline bowel function  Description: INTERVENTIONS:  - Assess bowel function  - Encourage oral fluids to ensure adequate hydration  - Administer IV fluids if ordered to ensure adequate hydration  - Administer ordered medications as needed  - Encourage mobilization and activity  - Consider nutritional services referral to assist patient with adequate nutrition and appropriate food choices  Outcome: Progressing  Goal: Maintains adequate nutritional intake  Description: INTERVENTIONS:  - Monitor percentage of each meal consumed  - Identify factors contributing to decreased intake, treat as appropriate  - Assist with meals as needed  - Monitor I&O, weight, and lab values if indicated  - Obtain nutrition services referral as needed  Outcome: Progressing  Goal: Establish and maintain optimal ostomy function  Description: INTERVENTIONS:  - Assess bowel function  - Encourage oral fluids to ensure adequate hydration  - Administer IV fluids if ordered to ensure adequate hydration   - Administer ordered medications as needed  - Encourage mobilization and activity  - Nutrition services referral to assist patient with appropriate food choices  - Assess stoma site  - Consider wound care consult    Outcome: Progressing  Goal: Oral mucous membranes remain intact  Description: INTERVENTIONS  - Assess oral mucosa and hygiene practices  - Implement preventative oral hygiene regimen  - Implement oral medicated treatments as ordered  - Initiate Nutrition services referral as needed  Outcome: Progressing     Problem: GENITOURINARY - ADULT  Goal: Maintains or returns to baseline urinary function  Description: INTERVENTIONS:  - Assess urinary function  - Encourage oral fluids to ensure adequate hydration if ordered  - Administer IV fluids as ordered to ensure adequate hydration  - Administer ordered medications as needed  - Offer frequent toileting  - Follow urinary retention protocol if ordered  Outcome: Progressing  Goal: Absence of urinary retention  Description: INTERVENTIONS:  - Assess patient’s ability to void and empty bladder  - Monitor I/O  - Bladder scan as needed  - Discuss with physician/AP medications to alleviate retention as needed  - Discuss catheterization for long term situations as appropriate  Outcome: Progressing  Goal: Urinary catheter remains patent  Description: INTERVENTIONS:  - Assess patency of urinary catheter  - If patient has a chronic pedersen, consider changing catheter if non-functioning  - Follow guidelines for intermittent irrigation of non-functioning urinary catheter  Outcome: Progressing     Problem: METABOLIC, FLUID AND ELECTROLYTES - ADULT  Goal: Electrolytes maintained within normal limits  Description: INTERVENTIONS:  - Monitor labs and assess patient for signs and symptoms of electrolyte imbalances  - Administer electrolyte replacement as ordered  - Monitor response to electrolyte replacements, including repeat lab results as appropriate  - Instruct patient on fluid and nutrition as appropriate  Outcome: Progressing  Goal: Fluid balance maintained  Description: INTERVENTIONS:  - Monitor labs   - Monitor I/O and WT  - Instruct patient on fluid and nutrition as appropriate  -  Assess for signs & symptoms of volume excess or deficit  Outcome: Progressing  Goal: Glucose maintained within target range  Description: INTERVENTIONS:  - Monitor Blood Glucose as ordered  - Assess for signs and symptoms of hyperglycemia and hypoglycemia  - Administer ordered medications to maintain glucose within target range  - Assess nutritional intake and initiate nutrition service referral as needed  Outcome: Progressing     Problem: HEMATOLOGIC - ADULT  Goal: Maintains hematologic stability  Description: INTERVENTIONS  - Assess for signs and symptoms of bleeding or hemorrhage  - Monitor labs  - Administer supportive blood products/factors as ordered and appropriate  Outcome: Progressing     Problem: MUSCULOSKELETAL - ADULT  Goal: Maintain or return mobility to safest level of function  Description: INTERVENTIONS:  - Assess patient's ability to carry out ADLs; assess patient's baseline for ADL function and identify physical deficits which impact ability to perform ADLs (bathing, care of mouth/teeth, toileting, grooming, dressing, etc.)  - Assess/evaluate cause of self-care deficits   - Assess range of motion  - Assess patient's mobility  - Assess patient's need for assistive devices and provide as appropriate  - Encourage maximum independence but intervene and supervise when necessary  - Involve family in performance of ADLs  - Assess for home care needs following discharge   - Consider OT consult to assist with ADL evaluation and planning for discharge  - Provide patient education as appropriate  Outcome: Progressing  Goal: Maintain proper alignment of affected body part  Description: INTERVENTIONS:  - Support, maintain and protect limb and body alignment  - Provide patient/ family with appropriate education  Outcome: Progressing     Problem: Prexisting or High Potential for Compromised Skin Integrity  Goal: Skin integrity is maintained or improved  Description: INTERVENTIONS:  - Identify patients at risk for  skin breakdown  - Assess and monitor skin integrity including under and around medical devices   - Assess and monitor nutrition and hydration status  - Monitor labs  - Assess for incontinence   - Turn and reposition patient  - Assist with mobility/ambulation  - Relieve pressure over paolo prominences   - Avoid friction and shearing  - Provide appropriate hygiene as needed including keeping skin clean and dry  - Evaluate need for skin moisturizer/barrier cream  - Collaborate with interdisciplinary team  - Patient/family teaching  - Consider wound care consult    Assess:  - Review Jake scale daily  - Clean and moisturize skin   - Inspect skin when repositioning, toileting, and assisting with ADLS  - Assess under medical devices   - Assess extremities for adequate circulation and sensation     Bed Management:  - Have minimal linens on bed & keep smooth, unwrinkled  - Change linens as needed when moist or perspiring  - Avoid sitting or lying in one position for more than 2 hours while in beds   - Toileting:  - Offer bedside commode  - Assess for incontinence  - Use incontinent care products after each incontinent episode    Activity:  - Mobilize patient 3 times a day  - Encourage activity and walks on unit  - Encourage or provide ROM exercises   - Turn and reposition patient every 2 Hours  - Use appropriate equipment to lift or move patient in bed  - Instruct/ Assist with weight shifting every hour when out of bed in chair  - Consider limitation of chair time 1 hour intervals    Skin Care:  - Avoid use of baby powder, tape, friction and shearing, hot water or constrictive clothing  - Relieve pressure over bony prominences  - Do not massage red bony areas    Next Steps:  - Teach patient strategies to minimize risks  - Consider consults to  interdisciplinary teams   Outcome: Progressing     Problem: DECISION MAKING  Goal: Pt/Family able to effectively weigh alternatives and participate in decision making related to  treatment and care  Description: INTERVENTIONS:  - Identify decision maker  - Determine when there are differences among patient's view, family's view, and healthcare provider's view of patient condition and care goals  - Facilitate patient/family articulation of goals for care  - Help patient/family identify pros/cons of alternative solutions  - Provide information as requested by patient/family  - Respect patient/family rights related to privacy and sharing information   - Serve as a liaison between patient, family and health care team  - Initiate consults as appropriate (Ethics Team, Palliative Care, Family Care Conference, etc.)  Outcome: Progressing     Problem: CONFUSION/THOUGHT DISTURBANCE  Goal: Thought disturbances (confusion, delirium, depression, dementia or psychosis) are managed to maintain or return to baseline mental status and functional level  Description: INTERVENTIONS:  - Assess for possible contributors to  thought disturbance, including but not limited to medications, infection, impaired vision or hearing, underlying metabolic abnormalities, dehydration, respiratory compromise,  psychiatric diagnoses and notify attending PHYSICAN/AP  - Monitor and intervene to maintain adequate nutrition, hydration, elimination, sleep and activity  - Decrease environmental stimuli, including noise as appropriate.  - Provide frequent contacts to provide refocusing, direction and reassurance as needed. Approach patient calmly with eye contact and at their level.  - [place] high risk fall precautions, aspiration precautions and other safety measures, as indicated  - If delirium suspected, notify physician/AP of change in condition and request immediate in-person evaluation  - Pursue consults as appropriate including Geriatric (campus dependent), OT for cognitive evaluation/activity planning, psychiatric, pastoral care, etc.  Outcome: Progressing     Problem: BEHAVIOR  Goal: Pt/Family maintain appropriate behavior  and adhere to behavioral management agreement, if implemented  Description: INTERVENTIONS:  - Assess the family dynamic   - Encourage verbalization of thoughts and concerns in a socially appropriate manner  - Assess patient/family's coping skills and non-compliant behavior (including use of illegal substances).  - Utilize positive, consistent limit setting strategies supporting safety of patient, staff and others  - Initiate consult with Case Management, Spiritual Care or other ancillary services as appropriate  - If a patient's/visitor's behavior jeopardizes the safety of the patient, staff, or others, refer to organization procedure.   - Notify Security of behavior or suspected illegal substances which indicate the need for search of the patient and/or belongings  - Encourage participation in the decision making process about a behavioral management agreement; implement if patient meets criteria  Outcome: Progressing     - Consider nutrition services referral as needed  Outcome: Progressing

## 2025-06-25 ENCOUNTER — TELEPHONE (OUTPATIENT)
Dept: NEUROSURGERY | Facility: CLINIC | Age: 75
End: 2025-06-25

## 2025-06-25 LAB
GLUCOSE SERPL-MCNC: 100 MG/DL (ref 65–140)
GLUCOSE SERPL-MCNC: 109 MG/DL (ref 65–140)
GLUCOSE SERPL-MCNC: 123 MG/DL (ref 65–140)
GLUCOSE SERPL-MCNC: 96 MG/DL (ref 65–140)

## 2025-06-25 PROCEDURE — 99232 SBSQ HOSP IP/OBS MODERATE 35: CPT | Performed by: SURGERY

## 2025-06-25 PROCEDURE — 97535 SELF CARE MNGMENT TRAINING: CPT

## 2025-06-25 PROCEDURE — 97116 GAIT TRAINING THERAPY: CPT

## 2025-06-25 PROCEDURE — 97530 THERAPEUTIC ACTIVITIES: CPT

## 2025-06-25 PROCEDURE — 82948 REAGENT STRIP/BLOOD GLUCOSE: CPT

## 2025-06-25 PROCEDURE — 99024 POSTOP FOLLOW-UP VISIT: CPT | Performed by: RADIOLOGY

## 2025-06-25 PROCEDURE — 99233 SBSQ HOSP IP/OBS HIGH 50: CPT | Performed by: INTERNAL MEDICINE

## 2025-06-25 RX ORDER — OLANZAPINE 10 MG/2ML
2.5 INJECTION, POWDER, FOR SOLUTION INTRAMUSCULAR ONCE
Status: COMPLETED | OUTPATIENT
Start: 2025-06-25 | End: 2025-06-25

## 2025-06-25 RX ORDER — WATER 10 ML/10ML
INJECTION INTRAMUSCULAR; INTRAVENOUS; SUBCUTANEOUS
Status: COMPLETED
Start: 2025-06-25 | End: 2025-06-25

## 2025-06-25 RX ADMIN — DRONABINOL 2.5 MG: 2.5 CAPSULE ORAL at 12:20

## 2025-06-25 RX ADMIN — SENNOSIDES 8.6 MG: 8.6 TABLET, FILM COATED ORAL at 17:21

## 2025-06-25 RX ADMIN — QUETIAPINE 50 MG: 25 TABLET ORAL at 09:05

## 2025-06-25 RX ADMIN — LIDOCAINE 1 PATCH: 700 PATCH TOPICAL at 09:06

## 2025-06-25 RX ADMIN — ENOXAPARIN SODIUM 30 MG: 30 INJECTION SUBCUTANEOUS at 20:03

## 2025-06-25 RX ADMIN — QUETIAPINE 50 MG: 25 TABLET ORAL at 17:21

## 2025-06-25 RX ADMIN — PANTOPRAZOLE SODIUM 40 MG: 40 INJECTION, POWDER, FOR SOLUTION INTRAVENOUS at 06:35

## 2025-06-25 RX ADMIN — LOSARTAN POTASSIUM 100 MG: 50 TABLET, FILM COATED ORAL at 09:06

## 2025-06-25 RX ADMIN — DRONABINOL 2.5 MG: 2.5 CAPSULE ORAL at 17:21

## 2025-06-25 RX ADMIN — Medication 3 MG: at 21:47

## 2025-06-25 RX ADMIN — WATER 10 ML: 1 INJECTION INTRAMUSCULAR; INTRAVENOUS; SUBCUTANEOUS at 20:01

## 2025-06-25 RX ADMIN — ENOXAPARIN SODIUM 30 MG: 30 INJECTION SUBCUTANEOUS at 09:05

## 2025-06-25 RX ADMIN — LEVETIRACETAM 1000 MG: 500 TABLET, FILM COATED ORAL at 09:05

## 2025-06-25 RX ADMIN — SENNOSIDES 8.6 MG: 8.6 TABLET, FILM COATED ORAL at 09:06

## 2025-06-25 RX ADMIN — METOPROLOL SUCCINATE 25 MG: 25 TABLET, EXTENDED RELEASE ORAL at 09:06

## 2025-06-25 RX ADMIN — OLANZAPINE 2.5 MG: 10 INJECTION, POWDER, LYOPHILIZED, FOR SOLUTION INTRAMUSCULAR at 20:01

## 2025-06-25 RX ADMIN — LEVETIRACETAM 1000 MG: 500 TABLET, FILM COATED ORAL at 21:47

## 2025-06-25 RX ADMIN — QUETIAPINE FUMARATE 100 MG: 100 TABLET ORAL at 21:47

## 2025-06-25 RX ADMIN — POLYETHYLENE GLYCOL 3350 17 G: 17 POWDER, FOR SOLUTION ORAL at 09:05

## 2025-06-25 NOTE — PLAN OF CARE
Problem: Nutrition/Hydration-ADULT  Goal: Nutrient/Hydration intake appropriate for improving, restoring or maintaining nutritional needs  Description: Monitor and assess patient's nutrition/hydration status for malnutrition. Collaborate with interdisciplinary team and initiate plan and interventions as ordered.  Monitor patient's weight and dietary intake as ordered or per policy. Utilize nutrition screening tool and intervene as necessary. Determine patient's food preferences and provide high-protein, high-caloric foods as appropriate.     INTERVENTIONS:  - Monitor oral intake, urinary output, labs, and treatment plans  - Assess nutrition and hydration status and recommend course of action  - Evaluate amount of meals eaten  - Assist patient with eating if necessary   - Allow adequate time for meals  - Recommend/ encourage appropriate diets, oral nutritional supplements, and vitamin/mineral supplements  - Order, calculate, and assess calorie counts as needed  - Recommend, monitor, and adjust tube feedings and TPN/PPN based on assessed needs  - Assess need for intravenous fluids  - Provide specific nutrition/hydration education as appropriate  - Include patient/family/caregiver in decisions related to nutrition  Outcome: Progressing     Problem: PAIN - ADULT  Goal: Verbalizes/displays adequate comfort level or baseline comfort level  Description: Interventions:  - Encourage patient to monitor pain and request assistance  - Assess pain using appropriate pain scale  - Administer analgesics as ordered based on type and severity of pain and evaluate response  - Implement non-pharmacological measures as appropriate and evaluate response  - Consider cultural and social influences on pain and pain management  - Notify physician/advanced practitioner if interventions unsuccessful or patient reports new pain  - Educate patient/family on pain management process including their role and importance of  reporting pain   -  Provide non-pharmacologic/complimentary pain relief interventions  Outcome: Progressing     Problem: INFECTION - ADULT  Goal: Absence or prevention of progression during hospitalization  Description: INTERVENTIONS:  - Assess and monitor for signs and symptoms of infection  - Monitor lab/diagnostic results  - Monitor all insertion sites, i.e. indwelling lines, tubes, and drains  - Monitor endotracheal if appropriate and nasal secretions for changes in amount and color  - [place] appropriate cooling/warming therapies per order  - Administer medications as ordered  - Instruct and encourage patient and family to use good hand hygiene technique  - Identify and instruct in appropriate isolation precautions for identified infection/condition  Outcome: Progressing  Goal: Absence of fever/infection during neutropenic period  Description: INTERVENTIONS:  - Monitor WBC  - Perform strict hand hygiene  - Limit to healthy visitors only  - No plants, dried, fresh or silk flowers with winters in patient room  Outcome: Progressing     Problem: SAFETY ADULT  Goal: Patient will remain free of falls  Description: INTERVENTIONS:  - Educate patient/family on patient safety including physical limitations  - Instruct patient to call for assistance with activity   - Consider consulting OT/PT to assist with strengthening/mobility based on AM PAC & JH-HLM score  - Consult OT/PT to assist with strengthening/mobility   - Keep Call bell within reach  - Keep bed low and locked with side rails adjusted as appropriate  - Keep care items and personal belongings within reach  - Initiate and maintain comfort rounds  - Make Fall Risk Sign visible to staff  - Offer Toileting every 2 Hours, in advance of need  - Initiate/Maintain bed alarm  - Obtain necessary fall risk management equipment: chair alarm  - Apply yellow socks and bracelet for high fall risk patients  - Consider moving patient to room near nurses station  Outcome: Progressing  Goal: Maintain  or return to baseline ADL function  Description: INTERVENTIONS:  -  Assess patient's ability to carry out ADLs; assess patient's baseline for ADL function and identify physical deficits which impact ability to perform ADLs (bathing, care of mouth/teeth, toileting, grooming, dressing, etc.)  - Assess/evaluate cause of self-care deficits   - Assess range of motion  - Assess patient's mobility; develop plan if impaired  - Assess patient's need for assistive devices and provide as appropriate  - Encourage maximum independence but intervene and supervise when necessary  - Involve family in performance of ADLs  - Assess for home care needs following discharge   - Consider OT consult to assist with ADL evaluation and planning for discharge  - Provide patient education as appropriate  - Monitor functional capacity and physical performance, use of AM PAC & JH-HLM   - Monitor gait, balance and fatigue with ambulation    Outcome: Progressing  Goal: Maintains/Returns to pre admission functional level  Description: INTERVENTIONS:  - Perform AM-PAC 6 Click Basic Mobility/ Daily Activity assessment daily.  - Set and communicate daily mobility goal to care team and patient/family/caregiver.   - Collaborate with rehabilitation services on mobility goals if consulted  - Perform Range of Motion 3 times a day.  - Reposition patient every 2 hours.  - Dangle patient 3 times a day  - Stand patient 3 times a day  - Ambulate patient 3 times a day  - Out of bed to chair 3 times a day   - Out of bed for meals 3 times a day  - Out of bed for toileting  - Record patient progress and toleration of activity level   Outcome: Progressing     Problem: DISCHARGE PLANNING  Goal: Discharge to home or other facility with appropriate resources  Description: INTERVENTIONS:  - Identify barriers to discharge w/patient and caregiver  - Arrange for needed discharge resources and transportation as appropriate  - Identify discharge learning needs (meds, wound  care, etc.)  - Arrange for interpretive services to assist at discharge as needed  - Refer to Case Management Department for coordinating discharge planning if the patient needs post-hospital services based on physician/advanced practitioner order or complex needs related to functional status, cognitive ability, or social support system  Outcome: Progressing     Problem: Knowledge Deficit  Goal: Patient/family/caregiver demonstrates understanding of disease process, treatment plan, medications, and discharge instructions  Description: Complete learning assessment and assess knowledge base.  Interventions:  - Provide teaching at level of understanding  - Provide teaching via preferred learning methods  Outcome: Progressing     Problem: NEUROSENSORY - ADULT  Goal: Achieves stable or improved neurological status  Description: INTERVENTIONS  - Monitor and report changes in neurological status  - Monitor vital signs such as temperature, blood pressure, glucose, and any other labs ordered   - Initiate measures to prevent increased intracranial pressure  - Monitor for seizure activity and implement precautions if appropriate      Outcome: Progressing  Goal: Remains free of injury related to seizures activity  Description: INTERVENTIONS  - Maintain airway, patient safety  and administer oxygen as ordered  - Monitor patient for seizure activity, document and report duration and description of seizure to physician/advanced practitioner  - If seizure occurs,  ensure patient safety during seizure  - Reorient patient post seizure  - Seizure pads on all 4 side rails  - Instruct patient/family to notify RN of any seizure activity including if an aura is experienced  - Instruct patient/family to call for assistance with activity based on nursing assessment  - Administer anti-seizure medications if ordered    Outcome: Progressing  Goal: Achieves maximal functionality and self care  Description: INTERVENTIONS  - Monitor swallowing  and airway patency with patient fatigue and changes in neurological status  - Encourage and assist patient to increase activity and self care.   - Encourage visually impaired, hearing impaired and aphasic patients to use assistive/communication devices  Outcome: Progressing     Problem: CARDIOVASCULAR - ADULT  Goal: Maintains optimal cardiac output and hemodynamic stability  Description: INTERVENTIONS:  - Monitor I/O, vital signs and rhythm  - Monitor for S/S and trends of decreased cardiac output  - Administer and titrate ordered vasoactive medications to optimize hemodynamic stability  - Assess quality of pulses, skin color and temperature  - Assess for signs of decreased coronary artery perfusion  - Instruct patient to report change in severity of symptoms  Outcome: Progressing  Goal: Absence of cardiac dysrhythmias or at baseline rhythm  Description: INTERVENTIONS:  - Continuous cardiac monitoring, vital signs, obtain 12 lead EKG if ordered  - Administer antiarrhythmic and heart rate control medications as ordered  - Monitor electrolytes and administer replacement therapy as ordered  Outcome: Progressing     Problem: RESPIRATORY - ADULT  Goal: Achieves optimal ventilation and oxygenation  Description: INTERVENTIONS:  - Assess for changes in respiratory status  - Assess for changes in mentation and behavior  - Position to facilitate oxygenation and minimize respiratory effort  - Oxygen administered by appropriate delivery if ordered  - Initiate smoking cessation education as indicated  - Encourage broncho-pulmonary hygiene including cough, deep breathe, Incentive Spirometry  - Assess the need for suctioning and aspirate as needed  - Assess and instruct to report SOB or any respiratory difficulty  - Respiratory Therapy support as indicated  Outcome: Progressing     Problem: GASTROINTESTINAL - ADULT  Goal: Minimal or absence of nausea and/or vomiting  Description: INTERVENTIONS:  - Administer IV fluids if ordered  to ensure adequate hydration  - Maintain NPO status until nausea and vomiting are resolved  - Nasogastric tube if ordered  - Administer ordered antiemetic medications as needed  - Provide nonpharmacologic comfort measures as appropriate  - Advance diet as tolerated, if ordered  - Consider nutrition services referral to assist patient with adequate nutrition and appropriate food choices  Outcome: Progressing  Goal: Maintains or returns to baseline bowel function  Description: INTERVENTIONS:  - Assess bowel function  - Encourage oral fluids to ensure adequate hydration  - Administer IV fluids if ordered to ensure adequate hydration  - Administer ordered medications as needed  - Encourage mobilization and activity  - Consider nutritional services referral to assist patient with adequate nutrition and appropriate food choices  Outcome: Progressing  Goal: Maintains adequate nutritional intake  Description: INTERVENTIONS:  - Monitor percentage of each meal consumed  - Identify factors contributing to decreased intake, treat as appropriate  - Assist with meals as needed  - Monitor I&O, weight, and lab values if indicated  - Obtain nutrition services referral as needed  Outcome: Progressing  Goal: Establish and maintain optimal ostomy function  Description: INTERVENTIONS:  - Assess bowel function  - Encourage oral fluids to ensure adequate hydration  - Administer IV fluids if ordered to ensure adequate hydration   - Administer ordered medications as needed  - Encourage mobilization and activity  - Nutrition services referral to assist patient with appropriate food choices  - Assess stoma site  - Consider wound care consult   Outcome: Progressing  Goal: Oral mucous membranes remain intact  Description: INTERVENTIONS  - Assess oral mucosa and hygiene practices  - Implement preventative oral hygiene regimen  - Implement oral medicated treatments as ordered  - Initiate Nutrition services referral as needed  Outcome:  Progressing     Problem: GENITOURINARY - ADULT  Goal: Maintains or returns to baseline urinary function  Description: INTERVENTIONS:  - Assess urinary function  - Encourage oral fluids to ensure adequate hydration if ordered  - Administer IV fluids as ordered to ensure adequate hydration  - Administer ordered medications as needed  - Offer frequent toileting  - Follow urinary retention protocol if ordered  Outcome: Progressing  Goal: Absence of urinary retention  Description: INTERVENTIONS:  - Assess patient’s ability to void and empty bladder  - Monitor I/O  - Bladder scan as needed  - Discuss with physician/AP medications to alleviate retention as needed  - Discuss catheterization for long term situations as appropriate  Outcome: Progressing  Goal: Urinary catheter remains patent  Description: INTERVENTIONS:  - Assess patency of urinary catheter  - If patient has a chronic pedersen, consider changing catheter if non-functioning  - Follow guidelines for intermittent irrigation of non-functioning urinary catheter  Outcome: Progressing     Problem: METABOLIC, FLUID AND ELECTROLYTES - ADULT  Goal: Electrolytes maintained within normal limits  Description: INTERVENTIONS:  - Monitor labs and assess patient for signs and symptoms of electrolyte imbalances  - Administer electrolyte replacement as ordered  - Monitor response to electrolyte replacements, including repeat lab results as appropriate  - Instruct patient on fluid and nutrition as appropriate  Outcome: Progressing  Goal: Fluid balance maintained  Description: INTERVENTIONS:  - Monitor labs   - Monitor I/O and WT  - Instruct patient on fluid and nutrition as appropriate  - Assess for signs & symptoms of volume excess or deficit  Outcome: Progressing  Goal: Glucose maintained within target range  Description: INTERVENTIONS:  - Monitor Blood Glucose as ordered  - Assess for signs and symptoms of hyperglycemia and hypoglycemia  - Administer ordered medications to  maintain glucose within target range  - Assess nutritional intake and initiate nutrition service referral as needed  Outcome: Progressing     Problem: HEMATOLOGIC - ADULT  Goal: Maintains hematologic stability  Description: INTERVENTIONS  - Assess for signs and symptoms of bleeding or hemorrhage  - Monitor labs  - Administer supportive blood products/factors as ordered and appropriate  Outcome: Progressing     Problem: MUSCULOSKELETAL - ADULT  Goal: Maintain or return mobility to safest level of function  Description: INTERVENTIONS:  - Assess patient's ability to carry out ADLs; assess patient's baseline for ADL function and identify physical deficits which impact ability to perform ADLs (bathing, care of mouth/teeth, toileting, grooming, dressing, etc.)  - Assess/evaluate cause of self-care deficits   - Assess range of motion  - Assess patient's mobility  - Assess patient's need for assistive devices and provide as appropriate  - Encourage maximum independence but intervene and supervise when necessary  - Involve family in performance of ADLs  - Assess for home care needs following discharge   - Consider OT consult to assist with ADL evaluation and planning for discharge  - Provide patient education as appropriate  Outcome: Progressing  Goal: Maintain proper alignment of affected body part  Description: INTERVENTIONS:  - Support, maintain and protect limb and body alignment  - Provide patient/ family with appropriate education  Outcome: Progressing     Problem: Prexisting or High Potential for Compromised Skin Integrity  Goal: Skin integrity is maintained or improved  Description: INTERVENTIONS:  - Identify patients at risk for skin breakdown  - Assess and monitor skin integrity including under and around medical devices   - Assess and monitor nutrition and hydration status  - Monitor labs  - Assess for incontinence   - Turn and reposition patient  - Assist with mobility/ambulation  - Relieve pressure over [place]  prominences   - Avoid friction and shearing  - Provide appropriate hygiene as needed including keeping skin clean and dry  - Evaluate need for skin moisturizer/barrier cream  - Collaborate with interdisciplinary team  - Patient/family teaching  - Consider wound care consult    Assess:  - Review Jake scale daily  - Clean and moisturize skin   - Inspect skin when repositioning, toileting, and assisting with ADLS  - Assess under medical devices   - Assess extremities for adequate circulation and sensation     Bed Management:  - Have minimal linens on bed & keep smooth, unwrinkled  - Change linens as needed when moist or perspiring  - Avoid sitting or lying in one position for more than 2 hours while in beds   - Toileting:  - Offer bedside commode  - Assess for incontinence  - Use incontinent care products after each incontinent episode    Activity:  - Mobilize patient 3 times a day  - Encourage activity and walks on unit  - Encourage or provide ROM exercises   - Turn and reposition patient every 2 Hours  - Use appropriate equipment to lift or move patient in bed  - Instruct/ Assist with weight shifting every hour when out of bed in chair  - Consider limitation of chair time 1 hour intervals    Skin Care:  - Avoid use of baby powder, tape, friction and shearing, hot water or constrictive clothing  - Relieve pressure over bony prominences  - Do not massage red bony areas    Next Steps:  - Teach patient strategies to minimize risks  - Consider consults to  interdisciplinary teams   Outcome: Progressing     Problem: DECISION MAKING  Goal: Pt/Family able to effectively weigh alternatives and participate in decision making related to treatment and care  Description: INTERVENTIONS:  - Identify decision maker  - Determine when there are differences among patient's view, family's view, and healthcare provider's view of patient condition and care goals  - Facilitate patient/family articulation of goals for care  - Help  patient/family identify pros/cons of alternative solutions  - Provide information as requested by patient/family  - Respect patient/family rights related to privacy and sharing information   - Serve as a liaison between patient, family and health care team  - Initiate consults as appropriate (Ethics Team, Palliative Care, Family Care Conference, etc.)  Outcome: Progressing     Problem: CONFUSION/THOUGHT DISTURBANCE  Goal: Thought disturbances (confusion, delirium, depression, dementia or psychosis) are managed to maintain or return to baseline mental status and functional level  Description: INTERVENTIONS:  - Assess for possible contributors to  thought disturbance, including but not limited to medications, infection, impaired vision or hearing, underlying metabolic abnormalities, dehydration, respiratory compromise,  psychiatric diagnoses and notify attending PHYSICAN/AP  - Monitor and intervene to maintain adequate nutrition, hydration, elimination, sleep and activity  - Decrease environmental stimuli, including noise as appropriate.  - Provide frequent contacts to provide refocusing, direction and reassurance as needed. Approach patient calmly with eye contact and at their level.  - [place] high risk fall precautions, aspiration precautions and other safety measures, as indicated  - If delirium suspected, notify physician/AP of change in condition and request immediate in-person evaluation  - Pursue consults as appropriate including Geriatric (campus dependent), OT for cognitive evaluation/activity planning, psychiatric, pastoral care, etc.  Outcome: Progressing     Problem: BEHAVIOR  Goal: Pt/Family maintain appropriate behavior and adhere to behavioral management agreement, if implemented  Description: INTERVENTIONS:  - Assess the family dynamic   - Encourage verbalization of thoughts and concerns in a socially appropriate manner  - Assess patient/family's coping skills and non-compliant behavior (including  use of illegal substances).  - Utilize positive, consistent limit setting strategies supporting safety of patient, staff and others  - Initiate consult with Case Management, Spiritual Care or other ancillary services as appropriate  - If a patient's/visitor's behavior jeopardizes the safety of the patient, staff, or others, refer to organization procedure.   - Notify Security of behavior or suspected illegal substances which indicate the need for search of the patient and/or belongings  - Encourage participation in the decision making process about a behavioral management agreement; implement if patient meets criteria  Outcome: Progressing

## 2025-06-25 NOTE — PLAN OF CARE
Problem: Nutrition/Hydration-ADULT  Goal: Nutrient/Hydration intake appropriate for improving, restoring or maintaining nutritional needs  Description: Monitor and assess patient's nutrition/hydration status for malnutrition. Collaborate with interdisciplinary team and initiate plan and interventions as ordered.  Monitor patient's weight and dietary intake as ordered or per policy. Utilize nutrition screening tool and intervene as necessary. Determine patient's food preferences and provide high-protein, high-caloric foods as appropriate.     INTERVENTIONS:  - Monitor oral intake, urinary output, labs, and treatment plans  - Assess nutrition and hydration status and recommend course of action  - Evaluate amount of meals eaten  - Assist patient with eating if necessary   - Allow adequate time for meals  - Recommend/ encourage appropriate diets, oral nutritional supplements, and vitamin/mineral supplements  - Order, calculate, and assess calorie counts as needed  - Recommend, monitor, and adjust tube feedings and TPN/PPN based on assessed needs  - Assess need for intravenous fluids  - Provide specific nutrition/hydration education as appropriate  - Include patient/family/caregiver in decisions related to nutrition  Outcome: Progressing     Problem: PAIN - ADULT  Goal: Verbalizes/displays adequate comfort level or baseline comfort level  Description: Interventions:  - Encourage patient to monitor pain and request assistance  - Assess pain using appropriate pain scale  - Administer analgesics as ordered based on type and severity of pain and evaluate response  - Implement non-pharmacological measures as appropriate and evaluate response  - Consider cultural and social influences on pain and pain management  - Notify physician/advanced practitioner if interventions unsuccessful or patient reports new pain  - Educate patient/family on pain management process including their role and importance of  reporting pain   -  Provide non-pharmacologic/complimentary pain relief interventions  Outcome: Progressing     Problem: INFECTION - ADULT  Goal: Absence or prevention of progression during hospitalization  Description: INTERVENTIONS:  - Assess and monitor for signs and symptoms of infection  - Monitor lab/diagnostic results  - Monitor all insertion sites, i.e. indwelling lines, tubes, and drains  - Monitor endotracheal if appropriate and nasal secretions for changes in amount and color  - [place] appropriate cooling/warming therapies per order  - Administer medications as ordered  - Instruct and encourage patient and family to use good hand hygiene technique  - Identify and instruct in appropriate isolation precautions for identified infection/condition  Outcome: Progressing  Goal: Absence of fever/infection during neutropenic period  Description: INTERVENTIONS:  - Monitor WBC  - Perform strict hand hygiene  - Limit to healthy visitors only  - No plants, dried, fresh or silk flowers with winters in patient room  Outcome: Progressing     Problem: SAFETY ADULT  Goal: Patient will remain free of falls  Description: INTERVENTIONS:  - Educate patient/family on patient safety including physical limitations  - Instruct patient to call for assistance with activity   - Consider consulting OT/PT to assist with strengthening/mobility based on AM PAC & JH-HLM score  - Consult OT/PT to assist with strengthening/mobility   - Keep Call bell within reach  - Keep bed low and locked with side rails adjusted as appropriate  - Keep care items and personal belongings within reach  - Initiate and maintain comfort rounds  - Make Fall Risk Sign visible to staff  - Offer Toileting every 2 Hours, in advance of need  - Initiate/Maintain bed alarm  - Obtain necessary fall risk management equipment: chair alarm  - Apply yellow socks and bracelet for high fall risk patients  - Consider moving patient to room near nurses station  Outcome: Progressing  Goal: Maintain  or return to baseline ADL function  Description: INTERVENTIONS:  -  Assess patient's ability to carry out ADLs; assess patient's baseline for ADL function and identify physical deficits which impact ability to perform ADLs (bathing, care of mouth/teeth, toileting, grooming, dressing, etc.)  - Assess/evaluate cause of self-care deficits   - Assess range of motion  - Assess patient's mobility; develop plan if impaired  - Assess patient's need for assistive devices and provide as appropriate  - Encourage maximum independence but intervene and supervise when necessary  - Involve family in performance of ADLs  - Assess for home care needs following discharge   - Consider OT consult to assist with ADL evaluation and planning for discharge  - Provide patient education as appropriate  - Monitor functional capacity and physical performance, use of AM PAC & JH-HLM   - Monitor gait, balance and fatigue with ambulation    Outcome: Progressing  Goal: Maintains/Returns to pre admission functional level  Description: INTERVENTIONS:  - Perform AM-PAC 6 Click Basic Mobility/ Daily Activity assessment daily.  - Set and communicate daily mobility goal to care team and patient/family/caregiver.   - Collaborate with rehabilitation services on mobility goals if consulted  - Perform Range of Motion 3 times a day.  - Reposition patient every 2 hours.  - Dangle patient 3 times a day  - Stand patient 3 times a day  - Ambulate patient 3 times a day  - Out of bed to chair 3 times a day   - Out of bed for meals 3 times a day  - Out of bed for toileting  - Record patient progress and toleration of activity level   Outcome: Progressing     Problem: DISCHARGE PLANNING  Goal: Discharge to home or other facility with appropriate resources  Description: INTERVENTIONS:  - Identify barriers to discharge w/patient and caregiver  - Arrange for needed discharge resources and transportation as appropriate  - Identify discharge learning needs (meds, wound  care, etc.)  - Arrange for interpretive services to assist at discharge as needed  - Refer to Case Management Department for coordinating discharge planning if the patient needs post-hospital services based on physician/advanced practitioner order or complex needs related to functional status, cognitive ability, or social support system  Outcome: Progressing     Problem: Knowledge Deficit  Goal: Patient/family/caregiver demonstrates understanding of disease process, treatment plan, medications, and discharge instructions  Description: Complete learning assessment and assess knowledge base.  Interventions:  - Provide teaching at level of understanding  - Provide teaching via preferred learning methods  Outcome: Progressing     Problem: NEUROSENSORY - ADULT  Goal: Achieves stable or improved neurological status  Description: INTERVENTIONS  - Monitor and report changes in neurological status  - Monitor vital signs such as temperature, blood pressure, glucose, and any other labs ordered   - Initiate measures to prevent increased intracranial pressure  - Monitor for seizure activity and implement precautions if appropriate      Outcome: Progressing  Goal: Remains free of injury related to seizures activity  Description: INTERVENTIONS  - Maintain airway, patient safety  and administer oxygen as ordered  - Monitor patient for seizure activity, document and report duration and description of seizure to physician/advanced practitioner  - If seizure occurs,  ensure patient safety during seizure  - Reorient patient post seizure  - Seizure pads on all 4 side rails  - Instruct patient/family to notify RN of any seizure activity including if an aura is experienced  - Instruct patient/family to call for assistance with activity based on nursing assessment  - Administer anti-seizure medications if ordered    Outcome: Progressing  Goal: Achieves maximal functionality and self care  Description: INTERVENTIONS  - Monitor swallowing  and airway patency with patient fatigue and changes in neurological status  - Encourage and assist patient to increase activity and self care.   - Encourage visually impaired, hearing impaired and aphasic patients to use assistive/communication devices  Outcome: Progressing     Problem: CARDIOVASCULAR - ADULT  Goal: Maintains optimal cardiac output and hemodynamic stability  Description: INTERVENTIONS:  - Monitor I/O, vital signs and rhythm  - Monitor for S/S and trends of decreased cardiac output  - Administer and titrate ordered vasoactive medications to optimize hemodynamic stability  - Assess quality of pulses, skin color and temperature  - Assess for signs of decreased coronary artery perfusion  - Instruct patient to report change in severity of symptoms  Outcome: Progressing  Goal: Absence of cardiac dysrhythmias or at baseline rhythm  Description: INTERVENTIONS:  - Continuous cardiac monitoring, vital signs, obtain 12 lead EKG if ordered  - Administer antiarrhythmic and heart rate control medications as ordered  - Monitor electrolytes and administer replacement therapy as ordered  Outcome: Progressing     Problem: RESPIRATORY - ADULT  Goal: Achieves optimal ventilation and oxygenation  Description: INTERVENTIONS:  - Assess for changes in respiratory status  - Assess for changes in mentation and behavior  - Position to facilitate oxygenation and minimize respiratory effort  - Oxygen administered by appropriate delivery if ordered  - Initiate smoking cessation education as indicated  - Encourage broncho-pulmonary hygiene including cough, deep breathe, Incentive Spirometry  - Assess the need for suctioning and aspirate as needed  - Assess and instruct to report SOB or any respiratory difficulty  - Respiratory Therapy support as indicated  Outcome: Progressing     Problem: GASTROINTESTINAL - ADULT  Goal: Minimal or absence of nausea and/or vomiting  Description: INTERVENTIONS:  - Administer IV fluids if ordered  to ensure adequate hydration  - Maintain NPO status until nausea and vomiting are resolved  - Nasogastric tube if ordered  - Administer ordered antiemetic medications as needed  - Provide nonpharmacologic comfort measures as appropriate  - Advance diet as tolerated, if ordered  - Consider nutrition services referral to assist patient with adequate nutrition and appropriate food choices  Outcome: Progressing  Goal: Maintains or returns to baseline bowel function  Description: INTERVENTIONS:  - Assess bowel function  - Encourage oral fluids to ensure adequate hydration  - Administer IV fluids if ordered to ensure adequate hydration  - Administer ordered medications as needed  - Encourage mobilization and activity  - Consider nutritional services referral to assist patient with adequate nutrition and appropriate food choices  Outcome: Progressing  Goal: Maintains adequate nutritional intake  Description: INTERVENTIONS:  - Monitor percentage of each meal consumed  - Identify factors contributing to decreased intake, treat as appropriate  - Assist with meals as needed  - Monitor I&O, weight, and lab values if indicated  - Obtain nutrition services referral as needed  Outcome: Progressing  Goal: Establish and maintain optimal ostomy function  Description: INTERVENTIONS:  - Assess bowel function  - Encourage oral fluids to ensure adequate hydration  - Administer IV fluids if ordered to ensure adequate hydration   - Administer ordered medications as needed  - Encourage mobilization and activity  - Nutrition services referral to assist patient with appropriate food choices  - Assess stoma site  - Consider wound care consult   Outcome: Progressing  Goal: Oral mucous membranes remain intact  Description: INTERVENTIONS  - Assess oral mucosa and hygiene practices  - Implement preventative oral hygiene regimen  - Implement oral medicated treatments as ordered  - Initiate Nutrition services referral as needed  Outcome:  Progressing     Problem: GENITOURINARY - ADULT  Goal: Maintains or returns to baseline urinary function  Description: INTERVENTIONS:  - Assess urinary function  - Encourage oral fluids to ensure adequate hydration if ordered  - Administer IV fluids as ordered to ensure adequate hydration  - Administer ordered medications as needed  - Offer frequent toileting  - Follow urinary retention protocol if ordered  Outcome: Progressing  Goal: Absence of urinary retention  Description: INTERVENTIONS:  - Assess patient’s ability to void and empty bladder  - Monitor I/O  - Bladder scan as needed  - Discuss with physician/AP medications to alleviate retention as needed  - Discuss catheterization for long term situations as appropriate  Outcome: Progressing  Goal: Urinary catheter remains patent  Description: INTERVENTIONS:  - Assess patency of urinary catheter  - If patient has a chronic pedersen, consider changing catheter if non-functioning  - Follow guidelines for intermittent irrigation of non-functioning urinary catheter  Outcome: Progressing     Problem: METABOLIC, FLUID AND ELECTROLYTES - ADULT  Goal: Electrolytes maintained within normal limits  Description: INTERVENTIONS:  - Monitor labs and assess patient for signs and symptoms of electrolyte imbalances  - Administer electrolyte replacement as ordered  - Monitor response to electrolyte replacements, including repeat lab results as appropriate  - Instruct patient on fluid and nutrition as appropriate  Outcome: Progressing  Goal: Fluid balance maintained  Description: INTERVENTIONS:  - Monitor labs   - Monitor I/O and WT  - Instruct patient on fluid and nutrition as appropriate  - Assess for signs & symptoms of volume excess or deficit  Outcome: Progressing  Goal: Glucose maintained within target range  Description: INTERVENTIONS:  - Monitor Blood Glucose as ordered  - Assess for signs and symptoms of hyperglycemia and hypoglycemia  - Administer ordered medications to  maintain glucose within target range  - Assess nutritional intake and initiate nutrition service referral as needed  Outcome: Progressing     Problem: SKIN/TISSUE INTEGRITY - ADULT  Goal: Skin Integrity remains intact(Skin Breakdown Prevention)  Description: Assess:  -Perform Jake assessment every   -Clean and moisturize skin every   -Inspect skin when repositioning, toileting, and assisting with ADLS  -Assess under medical devices such as  every   -Assess extremities for adequate circulation and sensation     Bed Management:  -Have minimal linens on bed & keep smooth, unwrinkled  -Change linens as needed when moist or perspiring  -Avoid sitting or lying in one position for more than  hours while in bed  -Keep HOB at degrees     Toileting:  -Offer bedside commode  -Assess for incontinence every   -Use incontinent care products after each incontinent episode such as     Activity:  -Mobilize patient  times a day  -Encourage activity and walks on unit  -Encourage or provide ROM exercises   -Turn and reposition patient every  Hours  -Use appropriate equipment to lift or move patient in bed  -Instruct/ Assist with weight shifting every  when out of bed in chair  -Consider limitation of chair time  hour intervals    Skin Care:  -Avoid use of baby powder, tape, friction and shearing, hot water or constrictive clothing  -Relieve pressure over bony prominences using   -Do not massage red bony areas    Next Steps:  -Teach patient strategies to minimize risks such as    -Consider consults to  interdisciplinary teams such as   Outcome: Progressing  Goal: Incision(s), wounds(s) or drain site(s) healing without S/S of infection  Description: INTERVENTIONS  - Assess and document dressing, incision, wound bed, drain sites and surrounding tissue  - Provide patient and family education  - Perform skin care/dressing changes every   Outcome: Progressing  Goal: Pressure injury heals and does not worsen  Description: Interventions:  -  Implement low air loss mattress or specialty surface (Criteria met)  - Apply silicone foam dressing  - Instruct/assist with weight shifting every  minutes when in chair   - Limit chair time to  hour intervals  - Use special pressure reducing interventions such as  when in chair   - Apply fecal or urinary incontinence containment device   - Perform passive or active ROM every   - Turn and reposition patient & offload bony prominences every  hours   - Utilize friction reducing device or surface for transfers   - Consider consults to  interdisciplinary teams such as   - Use incontinent care products after each incontinent episode such as  - Consider nutrition services referral as needed  Outcome: Progressing     Problem: HEMATOLOGIC - ADULT  Goal: Maintains hematologic stability  Description: INTERVENTIONS  - Assess for signs and symptoms of bleeding or hemorrhage  - Monitor labs  - Administer supportive blood products/factors as ordered and appropriate  Outcome: Progressing     Problem: MUSCULOSKELETAL - ADULT  Goal: Maintain or return mobility to safest level of function  Description: INTERVENTIONS:  - Assess patient's ability to carry out ADLs; assess patient's baseline for ADL function and identify physical deficits which impact ability to perform ADLs (bathing, care of mouth/teeth, toileting, grooming, dressing, etc.)  - Assess/evaluate cause of self-care deficits   - Assess range of motion  - Assess patient's mobility  - Assess patient's need for assistive devices and provide as appropriate  - Encourage maximum independence but intervene and supervise when necessary  - Involve family in performance of ADLs  - Assess for home care needs following discharge   - Consider OT consult to assist with ADL evaluation and planning for discharge  - Provide patient education as appropriate  Outcome: Progressing  Goal: Maintain proper alignment of affected body part  Description: INTERVENTIONS:  - Support, maintain and  protect limb and body alignment  - Provide patient/ family with appropriate education  Outcome: Progressing     Problem: Prexisting or High Potential for Compromised Skin Integrity  Goal: Skin integrity is maintained or improved  Description: INTERVENTIONS:  - Identify patients at risk for skin breakdown  - Assess and monitor skin integrity including under and around medical devices   - Assess and monitor nutrition and hydration status  - Monitor labs  - Assess for incontinence   - Turn and reposition patient  - Assist with mobility/ambulation  - Relieve pressure over paolo prominences   - Avoid friction and shearing  - Provide appropriate hygiene as needed including keeping skin clean and dry  - Evaluate need for skin moisturizer/barrier cream  - Collaborate with interdisciplinary team  - Patient/family teaching  - Consider wound care consult    Assess:  - Review Jake scale daily  - Clean and moisturize skin   - Inspect skin when repositioning, toileting, and assisting with ADLS  - Assess under medical devices   - Assess extremities for adequate circulation and sensation     Bed Management:  - Have minimal linens on bed & keep smooth, unwrinkled  - Change linens as needed when moist or perspiring  - Avoid sitting or lying in one position for more than 2 hours while in beds   - Toileting:  - Offer bedside commode  - Assess for incontinence  - Use incontinent care products after each incontinent episode    Activity:  - Mobilize patient 3 times a day  - Encourage activity and walks on unit  - Encourage or provide ROM exercises   - Turn and reposition patient every 2 Hours  - Use appropriate equipment to lift or move patient in bed  - Instruct/ Assist with weight shifting every hour when out of bed in chair  - Consider limitation of chair time 1 hour intervals    Skin Care:  - Avoid use of baby powder, tape, friction and shearing, hot water or constrictive clothing  - Relieve pressure over bony prominences  - Do not  massage red bony areas    Next Steps:  - Teach patient strategies to minimize risks  - Consider consults to  interdisciplinary teams   Outcome: Progressing     Problem: DECISION MAKING  Goal: Pt/Family able to effectively weigh alternatives and participate in decision making related to treatment and care  Description: INTERVENTIONS:  - Identify decision maker  - Determine when there are differences among patient's view, family's view, and healthcare provider's view of patient condition and care goals  - Facilitate patient/family articulation of goals for care  - Help patient/family identify pros/cons of alternative solutions  - Provide information as requested by patient/family  - Respect patient/family rights related to privacy and sharing information   - Serve as a liaison between patient, family and health care team  - Initiate consults as appropriate (Ethics Team, Palliative Care, Family Care Conference, etc.)  Outcome: Progressing

## 2025-06-25 NOTE — ASSESSMENT & PLAN NOTE
New diagnosis on this admission  Evident on telemetry and EKG  Cardiology consult, appreciate recommendations.   EF preserved on a echocardiogram  Discontinue amlodipine and start metoprolol  No anticoagulation given SDH.  Follow-up as an outpatient with cardiology

## 2025-06-25 NOTE — ASSESSMENT & PLAN NOTE
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   Continue with Keppra 1000 mg twice daily  Continue every 4hr neurochecks  Repeat CT scan if decline in GCS by >2 points  SBP goal 100-140   Continue with Seroquel, melatonin, and amitriptyline to help with sleep/wake regulation and agitation secondary to TBI.   Delirium precautions  Plan for 2 week follow up with NSGY tomorrow for wound check and staple/suture removal.   Patient will require follow-up with neurosurgery as an outpatient.

## 2025-06-25 NOTE — ASSESSMENT & PLAN NOTE
Secondary to initial fall.  Found incidentally while assessing left shoulder pain after 2nd fall.  XR imaging showed: Acute mild displaced lateral left fifth rib fracture and questionable left lateral sixth rib fracture  Minimal tenderness  Rib fracture protocol  Continue to monitor  Follow-up with trauma as needed.   edge of  bed/good balance

## 2025-06-25 NOTE — OCCUPATIONAL THERAPY NOTE
"  Occupational Therapy Progress Note     Patient Name: Jasson Carter  Today's Date: 6/25/2025  Problem List  Active Problems:    Hypertension    SDH (subdural hematoma) (HCC)    Altered mental status    Fall    Atrial fibrillation (HCC)    Effusion of right knee    Post traumatic seizure (HCC)    Dysphagia    Closed fracture of multiple ribs of left side with routine healing    Left shoulder pain        06/25/25 1102   OT Last Visit   OT Visit Date 06/25/25   Note Type   Note Type Treatment   Pain Assessment   Pain Assessment Tool 0-10   Pain Score No Pain   Restrictions/Precautions   Weight Bearing Precautions Per Order No   RLE Weight Bearing Per Order WBAT   Other Precautions Cognitive;Chair Alarm;Bed Alarm;Fall Risk   Lifestyle   Autonomy I w/ ADLs, I w/ IADLs, I w/ functional mobility and transfers   Reciprocal Relationships Spouse, children   Service to Others Retired   Intrinsic Gratification Likes to go on walks   ADL   Where Assessed Chair   LB Dressing Assistance 3  Moderate Assistance   LB Dressing Deficit Thread RLE into pants;Thread LLE into pants   LB Dressing Comments required increased cueing for motor planning   Transfers   Sit to Stand 4  Minimal assistance   Additional items Assist x 1;Increased time required   Stand to Sit 3  Moderate assistance   Additional items Assist x 1;Increased time required  (cues for hand placement)   Additional Comments worked on dynamic standing balance while holding onto table with L hand and attempting to complete cognitive activities   Coordination   Fine Motor able to hold marker and pen   Subjective   Subjective \"whatever you say honey\"   Cognition   Overall Cognitive Status Impaired   Arousal/Participation Cooperative   Attention Attends with cues to redirect   Orientation Level Oriented to place;Oriented to person;Disoriented to time;Disoriented to situation  (grossly oriented to situation)   Memory Decreased recall of precautions;Decreased recall of recent " events   Following Commands Follows one step commands with increased time or repetition   Comments difficulty following one step directions to complete word searches, maze, etc.   Activity Tolerance   Activity Tolerance Patient tolerated treatment well   Medical Staff Made Aware RN, PTA   Assessment   Assessment Patient participated in Skilled OT session this date with interventions consisting of ADL re training with the use of correct body mechnaics, Energy Conservation techniques, and safety awareness and fall prevention techniques . Patient agreeable to OT treatment session, upon arrival patient was found seated OOB to Chair.  In comparison to previous session, patient with improvements in balance, transfers, attention to task* . Patient requiring frequent rest periods and ocassional safety reminders. Patient continues to be functioning below baseline level, occupational performance remains limited secondary to factors listed above and increased risk for falls and injury.   From OT standpoint, recommendation at time of d/c would be level I discharge resources. Patient to benefit from continued Occupational Therapy treatment while in the hospital to address deficits as defined above and maximize level of functional independence with ADLs and functional mobility.   The patient's raw score on the AM-PAC Daily Activity Inpatient Short Form is 15. A raw score of less than 19 suggests the patient may benefit from discharge to post-acute rehabilitation services. Please refer to the recommendation of the Occupational Therapist for safe discharge planning.   Plan   Treatment Interventions ADL retraining;Visual perceptual retraining;Cognitive reorientation;Patient/family training;Continued evaluation   Goal Expiration Date 07/01/25   OT Treatment Day 3   OT Frequency 3-5x/wk   Discharge Recommendation   Rehab Resource Intensity Level, OT I (Maximum Resource Intensity)   AM-PAC Daily Activity Inpatient   Lower Body Dressing  2   Bathing 2   Toileting 2   Upper Body Dressing 3   Grooming 3   Eating 3   Daily Activity Raw Score 15   Daily Activity Standardized Score (Calc for Raw Score >=11) 34.69   AM-PAC Applied Cognition Inpatient   Following a Speech/Presentation 2   Understanding Ordinary Conversation 3   Taking Medications 2   Remembering Where Things Are Placed or Put Away 2   Remembering List of 4-5 Errands 2   Taking Care of Complicated Tasks 2   Applied Cognition Raw Score 13   Applied Cognition Standardized Score 30.46   Modified [place] Scale   Modified [place] Scale 4         Yary Craig MS, OTR/L

## 2025-06-25 NOTE — PLAN OF CARE
Problem: OCCUPATIONAL THERAPY ADULT  Goal: Performs self-care activities at highest level of function for planned discharge setting.  See evaluation for individualized goals.  Description: Treatment Interventions: ADL retraining, Functional transfer training, UE strengthening/ROM, Endurance training, Cognitive reorientation, Patient/family training, Equipment evaluation/education, Fine motor coordination activities, Compensatory technique education, Continued evaluation, Energy conservation, Activityengagement          See flowsheet documentation for full assessment, interventions and recommendations.   Outcome: Progressing  Note: Limitation: Decreased ADL status, Decreased Safe judgement during ADL, Decreased cognition, Decreased endurance, Decreased fine motor control, Decreased self-care trans, Decreased high-level ADLs  Prognosis: Fair  Assessment: Patient participated in Skilled OT session this date with interventions consisting of ADL re training with the use of correct body mechnaics, Energy Conservation techniques, and safety awareness and fall prevention techniques . Patient agreeable to OT treatment session, upon arrival patient was found seated OOB to Chair.  In comparison to previous session, patient with improvements in balance, transfers, attention to task* . Patient requiring frequent rest periods and ocassional safety reminders. Patient continues to be functioning below baseline level, occupational performance remains limited secondary to factors listed above and increased risk for falls and injury.   From OT standpoint, recommendation at time of d/c would be level I discharge resources. Patient to benefit from continued Occupational Therapy treatment while in the hospital to address deficits as defined above and maximize level of functional independence with ADLs and functional mobility.   The patient's raw score on the -PAC Daily Activity Inpatient Short Form is 15. A raw score of less than 19  suggests the patient may benefit from discharge to post-acute rehabilitation services. Please refer to the recommendation of the Occupational Therapist for safe discharge planning.  Recommendation: Physiatry Consult  Rehab Resource Intensity Level, OT: I (Maximum Resource Intensity)

## 2025-06-25 NOTE — PROGRESS NOTES
Progress Note - Neurosurgery   Name: Jasson Carter 75 y.o. male I MRN: [id number]  Unit/Bed#: [facility] 632-01 I Date of Admission: 6/11/2025   Date of Service: 6/25/2025 I Hospital Day: 14    Assessment & Plan  SDH (subdural hematoma) (HCC)  PPD 13 from Successful partial embolization of cribriform plate DAVF via the left ophthalmic artery (Dr. Yoon 6/12/25)  POD 14 L craniotomy and R karyn holes for evac of radha SDH ( 6/11/25)   Arrived at [facility] ER on 6/11/25 with complaints of headache and left-sided hip pain after reported fall in the grass the day prior to arrival  Upon further questioning, patient has had some slurred speech for couple days per his wife.  [facility], patient has significant expressive aphasia and confusion.    Imaging:   CT head 6/22/25: Stable mixed density left convexity subdural hematoma, with stable minimal rightward midline shift. 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    Plan:   Continue to closely monitor neuro exam  Frequent neuro checks per primary team   Repeat STAT CTH with any acute decline in GCS > 2pts or more in 1hr   SBP goal 100-140   SDD removed 6/11 and 6/12/25  Continue keppra for seizure ppx  Hold all AC/AP meds until cleared by neurosurgery at follow up appointment.  DVT ppx: SCDs, Lovenox  Medical management per primary team   Pain control per primary team   PT/OT/Speech therapies as tolerated.   Plan discussed by Dr. Yoon with family. No further inpatient treatments at this time   Will plan for DC to rehab   If patient improves will plan for elective treatment of additional DAVF from the outpatient setting.   Cleared for discharge at this time  Neurosurgery will follow up in the outpatient setting in 2 weeks with Dr. Yoon. Please reach out with any further questions or concerns.     Please contact the SecureChat role for the Neurosurgery service with any questions/concerns.    Subjective   Sitting in bedside chair. No  acute distress. Still with confusion. Working with therapy and eager for DC to rehab.    Objective :  Temp:  [97.3 °F (36.3 °C)-97.8 °F (36.6 °C)] 97.3 °F (36.3 °C)  HR:  [] 96  BP: (109-123)/(71-83) 114/71  Resp:  [18] 18  SpO2:  [96 %-98 %] 98 %  O2 Device: None (Room air)    I/O         06/23 0701 06/24 0700 06/24 0701 06/25 0700 06/25 0701 06/26 0700    P.O. 1330 640 0    Total Intake(mL/kg) 1330 (15.3) 640 (7.3) 0 (0)    Urine (mL/kg/hr) 850 (0.4) 1350 (0.6)     Stool  0     Total Output 850 1350     Net +480 -710 0           Unmeasured Urine Occurrence 4 x 1 x     Unmeasured Stool Occurrence  1 x           Physical Exam  Constitutional:       General: He is not in acute distress.     Appearance: Normal appearance. He is not ill-appearing.   HENT:      Head: Normocephalic.      Comments: Cranial staples and drain sutures removed. Incisions healing well.    Eyes:      Extraocular Movements: Extraocular movements intact.      Pupils: Pupils are equal, round, and reactive to light.     Pulmonary:      Effort: Pulmonary effort is normal.     Musculoskeletal:      Cervical back: Normal range of motion.     Neurological:      Mental Status: He is alert. He is disoriented.      Cranial Nerves: No cranial nerve deficit.      Sensory: No sensory deficit.      Motor: No weakness.      Comments: Oriented to self only   Psychiatric:         Mood and Affect: Mood normal.         Behavior: Behavior normal.       Lab Results: I have reviewed the following results:  Recent Labs     06/23/25  0651   WBC 8.50   HGB 14.3   HCT 43.2      SODIUM 136   K 4.0      CO2 25   BUN 18   CREATININE 0.70   GLUC 109       Imaging Results Review: I personally reviewed the following image studies in PACS and associated radiology reports: CT head. My interpretation of the radiology images/reports is: See above.  Other Study Results Review: No additional pertinent studies reviewed.    VTE Pharmacologic Prophylaxis:  Sequential compression device (Venodyne)  and Enoxaparin (Lovenox)

## 2025-06-25 NOTE — ASSESSMENT & PLAN NOTE
PPD 13 from Successful partial embolization of cribriform plate DAVF via the left ophthalmic artery (Dr. Yoon 6/12/25)  POD 14 L craniotomy and R karyn holes for evac of radha SDH ( 6/11/25)   Arrived at [facility] ER on 6/11/25 with complaints of headache and left-sided hip pain after reported fall in the grass the day prior to arrival  Upon further questioning, patient has had some slurred speech for couple days per his wife.  Upon arrival to [facility], patient has significant expressive aphasia and confusion.    Imaging:   CT head 6/22/25: Stable mixed density left convexity subdural hematoma, with stable minimal rightward midline shift. 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    Plan:   Continue to closely monitor neuro exam  Frequent neuro checks per primary team   Repeat STAT CTH with any acute decline in GCS > 2pts or more in 1hr   SBP goal 100-140   SDD removed 6/11 and 6/12/25  Continue keppra for seizure ppx  Hold all AC/AP meds until cleared by neurosurgery at follow up appointment.  DVT ppx: SCDs, Lovenox  Medical management per primary team   Pain control per primary team   PT/OT/Speech therapies as tolerated.   Plan discussed by Dr. Yoon with family. No further inpatient treatments at this time   Will plan for DC to rehab   If patient improves will plan for elective treatment of additional DAVF from the outpatient setting.   Cleared for discharge at this time  Neurosurgery will follow up in the outpatient setting in 2 weeks with Dr. Yoon. Please reach out with any further questions or concerns.

## 2025-06-25 NOTE — PHYSICAL THERAPY NOTE
"Physical Therapy Progress Note     06/25/25 1046   PT Last Visit   PT Visit Date 06/25/25   Note Type   Note Type Treatment for insurance authorization   Restrictions/Precautions   RLE Weight Bearing Per Order WBAT  (knee effusion on admit)   Other Precautions Cognitive;Chair Alarm;Bed Alarm;Pain;Fall Risk;WBS   Subjective   Subjective pt encountered supine in bed, pleasant and alert, agreeable to mobiilty.  Pt follows commands with some repetition & answers questions appropriately, but always accurately.  Is generally oriented to location \"hospital\" and reason for admit, but not to date.  He remains impulsive at times as well, requiring redirection for safety   Bed Mobility   Supine to Sit 4  Minimal assistance   Additional items Assist x 1;HOB elevated;Increased time required;Verbal cues   Transfers   Sit to Stand 4  Minimal assistance   Additional items Assist x 1;Armrests;Increased time required   Stand to Sit 4  Minimal assistance   Additional items Assist x 1;Armrests;Increased time required;Verbal cues   Ambulation/Elevation   Gait pattern Short stride;Inconsistent morgan;Decreased foot clearance;Narrow SHEYLA;Improper Weight shift;Poor UE support;Foward flexed  (L pathing deviation)   Gait Assistance 3  Moderate assist   Additional items Assist x 1   Balance   Static Sitting Fair   Static Standing Poor +   Ambulatory Poor   Activity Tolerance   Activity Tolerance Patient tolerated treatment well;Patient limited by fatigue   Nurse Made Aware DELANO Khan   Assessment   Prognosis Fair   Problem List Decreased strength;Decreased range of motion;Impaired balance;Decreased endurance;Decreased mobility;Decreased coordination;Decreased cognition;Impaired judgement;Decreased safety awareness;Decreased skin integrity   Assessment pt continues to perfomr all transfers with improving ability & initiation today, but does require instructions to complete tasks as would be expected given impaired motor planning & lack of " insight to deficits.  He was able to perform all bed level tasks & transfer to sitting without complaints of dizziness when prompted, then ambulated into hallway with improved balance overall compared to most recent PT sessions.  He continues to demonstrate poor RW management & environmental awareness overall, but was better able to negotiate obstacles when following family or staff member standing in front of him.  Seated rests taken to recover after each task due to increased fatigue at the end of each trial.  He was able to cmplete pivot in place prior ot returnign to room, but did have minor instance of palpated retropulsion, necessitating support to prevent likely LOB.  Overall, pt is making steady progress in all areas of mobiilty, but remains well below his baseline mobiilty and remains a high fall risk due to neuromuscular, cognitive, and safety awareness deficits at this time.  Pt will benefit from aggressive, structured therapy interventions to address deficits & maximize long term independence.   Goals   Patient Goals to get better   [facility] Expiration Date 07/01/25   PT Treatment Day 4   Plan   Treatment/Interventions Functional transfer training;LE strengthening/ROM;Elevations;Therapeutic exercise;Endurance training;Equipment eval/education;Patient/family training;Bed mobility;Gait training   Progress Progressing toward goals   PT Frequency 3-5x/wk   Discharge Recommendation   Rehab Resource Intensity Level, PT I (Maximum Resource Intensity)   Equipment Recommended Walker   Walker Package Recommended Wheeled walker   AM-PAC Basic Mobility Inpatient   Turning in Flat Bed Without Bedrails 3   Lying on Back to Sitting on Edge of Flat Bed Without Bedrails 3   Moving Bed to Chair 2   Standing Up From Chair Using Arms 3   Walk in Room 2   Climb 3-5 Stairs With Railing 2   Basic Mobility Inpatient Raw Score 15   Basic Mobility Standardized Score 36.97   [facility] Highest Level Of Mobility   [facility] Goal 4: Move  to chair/commode   -M Achieved 8: Walk 250 feet ot more       Maxime Valente PTA    [facility] Basic Mobility Raw Score less than 17 suggests pt would benefit from post acute rehab.  Please also refer to the recommendation of the Physical Therapist for safe discharge planning.

## 2025-06-25 NOTE — PROGRESS NOTES
Progress Note - Trauma   Name: Jasson Carter 75 y.o. male I MRN: [id number]  [facility] 6[facility] I Date of Admission: 6/11/2025   Date of Service: 6/25/2025 I Hospital Day: 14    Assessment & Plan  Fall  - Status post fall with the below noted injuries.  - Patient suffered repeat fall on 6/22 in the setting of TBI/impulsiveness-CT head and C-spine were negative  - Fall precautions.  - Geriatric Medicine consultation for evaluation, medication review and recommendations.  - PT and OT evaluation and treatment as indicated.  - Case Management consultation for disposition planning.  SDH (subdural hematoma) (HCC)  Secondary to fall  Patient initially found with 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 midline shift s/p L craniotomy and R karyn hole craniotomy for evacuation, R sided drain removed previously  Neurosurgery consulted note appreciated  6/11 left-sided craniotomy with right-sided bur holes for evacuation of subdural hematoma  6/12 DAVF partial embolization via left ophthalmic artery  6/14 CT scan stable. 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   Continue with Keppra 1000 mg twice daily  Continue every 4hr neurochecks  Repeat CT scan if decline in GCS by >2 points  SBP goal 100-140   Continue with Seroquel, melatonin, and amitriptyline to help with sleep/wake regulation and agitation secondary to TBI.   Delirium precautions  Plan for 2 week follow up with NSGY tomorrow for wound check and staple/suture removal.   Patient will require follow-up with neurosurgery as an outpatient.  Altered mental status  - Secondary to TBI  - Medications and delirium precautions as above  - Palliative/Geriatrics on board  Post traumatic seizure (HCC)  - Witnessed possible tonic clonic activity post TBI  - EEG completed and no seizures noted  - On keppra 1000mg BID for  14 day course  Closed fracture of multiple ribs of left side with routine healing  Secondary to initial fall.  Found incidentally while assessing left shoulder pain after 2nd fall.  XR imaging showed: Acute mild displaced lateral left fifth rib fracture and questionable left lateral sixth rib fracture  Minimal tenderness  Rib fracture protocol  Continue to monitor  Follow-up with trauma as needed.  Left shoulder pain  Secondary to fall on 6/22  XR negative for acute traumatic injury-incidentally found to have a left rib fractures  Continue to monitor  Analgesia as needed  Atrial fibrillation (HCC)  New diagnosis on this admission  Evident on telemetry and EKG  Cardiology consult, appreciate recommendations.   EF preserved on a echocardiogram  Discontinue amlodipine and start metoprolol  No anticoagulation given SDH.  Follow-up as an outpatient with cardiology  Hypertension  Home amlodipine changed to metoprolol  Dysphagia  Speech consulted note appreciated  Recommended level 2 dysphagia diet with thin liquids  Continue Marinol  Patient has been tolerating feeds-continue to monitor    Bowel Regimen: Senna   VTE Prophylaxis:VTE covered by:  enoxaparin, Subcutaneous, 30 mg at 06/25/25 0905        Disposition: Planning for discharge to nursing facility tomorrow-patient medically stable for discharge    24 Hour Events : No events reported overnight.  Subjective : Patient describes feeling well today.  He denies any complaints or concerns.  Son is at bedside and also agrees with current plan of care-all questions were answered to his satisfaction.  Plan to discharge tomorrow    Objective :  Temp:  [97.3 °F (36.3 °C)-97.8 °F (36.6 °C)] 97.3 °F (36.3 °C)  HR:  [] 96  BP: (109-123)/(71-83) 114/71  Resp:  [18] 18  SpO2:  [96 %-98 %] 98 %  O2 Device: None (Room air)    I/O         06/23 0701 06/24 0700 06/24 0701 06/25 0700 06/25 0701 06/26 0700    P.O. 1330 640 0    Total Intake(mL/kg) 1330 (15.3) 640 (7.3) 0 (0)     Urine (mL/kg/hr) 850 (0.4) 1350 (0.6)     Stool  0     Total Output 850 1350     Net +480 -710 0           Unmeasured Urine Occurrence 4 x 1 x     Unmeasured Stool Occurrence  1 x           Physical Exam:   GENERAL APPEARANCE: No acute distress  NEURO: GCS 14 due to confusion  HEENT: Bilateral scalp surgical wounds well-approximated with staples.  Neck supple.  CV: Regular rate and irregular rhythm.  +2 radial and dorsalis pedis pulse, bilaterally.  LUNGS: Clear to auscultation, bilaterally.  Chest wall is nontender.  GI: Abdomen is soft nontender.  : Pelvis is stable.  MSK: Moving all extremities.  No deformities.  SKIN: Warm, dry.      PIC Score  PIC Pain Score: 3 (6/25/2025 11:02 AM)  PIC Incentive Spirometry Score: 4 (6/25/2025  9:09 AM)  PIC Cough Description: 2 (6/25/2025  9:09 AM)  PIC Total Score: 9 (6/25/2025  9:09 AM)       If the Total PIC Score </=5, did you consult APS and evaluate patient for further intervention?: yes      Pain:    Incentive Spirometry  Cough  3 = Controlled  4 = Above goal volume 3 = Strong  2 = Moderate  3 = Goal to alert volume 2 = Weak  1 = Severe  2 = Below alert volume 1 = Absent     1 = Unable to perform IS           Lab Results: I have reviewed the following results:  Recent Labs     06/23/25  0651   WBC 8.50   HGB 14.3   HCT 43.2      SODIUM 136   K 4.0      CO2 25   BUN 18   CREATININE 0.70   GLUC 109       Imaging Results Review: No pertinent imaging studies reviewed.  Other Study Results Review: No additional pertinent studies reviewed.

## 2025-06-25 NOTE — CASE MANAGEMENT
Case Management Discharge Planning Note    Patient name Jasson Carter  Location [facility] 632/[facility] 632-01 MRN [id number]  : 1950 Date 2025       Current Admission Date: 2025  Current Admission Diagnosis:SDH (subdural hematoma) ([facility])   Patient Active Problem List    Diagnosis Date Noted    Closed fracture of multiple ribs of left side with routine healing 2025    Left shoulder pain 2025    Dysphagia 2025    Post traumatic seizure ([facility]) 2025    Effusion of right knee 2025    Atrial fibrillation ([facility]) 2025    SDH (subdural hematoma) ([facility]) 2025    Altered mental status 2025    Fall 2025    Hyponatremia 2024    Neuropathy 2021    Reflux esophagitis 2020    Seasonal allergic rhinitis due to pollen 2020    Primary osteoarthritis involving multiple joints 2020    Posttraumatic stress disorder 2020    History of gout 2019    Pure hypercholesterolemia 2017    Hypertension 2017    Benign prostatic hyperplasia without urinary obstruction 2011      LOS (days): 14  Geometric Mean LOS (GMLOS) (days): 6.5  Days to GMLOS:-7.6     OBJECTIVE:  Risk of Unplanned Readmission Score: 17.25         Current admission status: Inpatient   Preferred Pharmacy:   [facility]/pharmacy #[id number] - [place], [place] - [address] [facility]  [address] [facility] [address]  Phone: [phone] Fax: [phone]    Primary Care Provider: Ruben Finch MD    Primary Insurance: MEDICARE  Secondary Insurance: [facility]    DISCHARGE DETAILS:    Plan for d/c tomorrow to [facility], awaiting final questions from facility but will plan on this d/c tomorrow

## 2025-06-25 NOTE — PROGRESS NOTES
Progress Note - Geriatric Medicine   Jasson Carter 75 y.o. male MRN: [id number]  [facility] Encounter: [id number]      Assessment/Plan:    Ambulatory dysfunction with fall  -reportedly mechanical fall day prior to admission   -admitted with below noted injuries   -was not requiring use of assist device for ambulation at baseline PTA  -endorsed hx prior falls at least one additional in past six months   -remains high risk future falls due to age, hx of fall, deconditioning/debility, impulsivity and unfamiliar environment   -encourage good body mechanics and assist with all transfers  -maintain environment free of fall hazards  -encourage appropriate footwear and adequate lighting at all times when out of bed  -consider home fall risk assessment and personal fall alert system if returning home following rehab   -PT and OT following, anticipate rehab on hosp d/c, CM following for assist with dispo planning      Subdural hematoma  -s/p fall as outlined above  -CTH on initial presentation noted 14 mm mixed density acute left cerebral convexity subdural hematoma with associated 5 mm rightward midline shift and low-density 7 mm right cerebral convexity subdural hematoma age indeterminate   -s/p left-sided crani and right sided bur hole for evaluation of subdural hematoma 6/11/25  -s/p partial embolization DAVF via left ophthalmic artery 6/12/25   -post-op course complicated by seizure briefly requiring intubation for airway protection as well as acute stroke in multiple vascular territories  -currently on Keppra for seizure ppx   -CTH 6/14/25 notes decrease size of L SDH with no new area of hemorrhage and stable post op changes   -neurochecks per protocol  -Nsx on consult and following, no longer planning for additional inpatient treatments at this time and now cleared for dc from NSx standpoint with o/p f/u   -PT/OT/CM following      Altered mental status  -alert and oriented to person place situation  and year not month or day on initial consultation, today very confused more inattentive and repeatedly attempting to get up per one to one at bedside, nursing also notes has been fluctuating worse in afternoon/evenings, consider re-timing of afternoon Seroquel to 14:00 hours for better afternoon coverage   -mentation fluctuating during admission typically clearer cognition early in day and more impulsive/inattentive in evenings   -at baseline fully oriented independent with ADLs and iADLs  -no prior memory or cognitive concerns reported  -multifactorial including recent SDH requiring surgical evacuation, recent CVA in multiple vascular territories, post traumatic seizure and prolonged hospitalization in setting of TBI  -Elavil dc'd earlier in admission as was not current home medication [facility] and may contribute to confusion/QTc prolongation in older adults   -if complains of persistent neuropathy symptoms can consider trial low dose Gabapentin HS   -currently on Seroquel 50mg BID and 100mg HS, monitor electrolytes and QTc closely (464 msec on 6/23/25), caution with increasing dosage due to risk lowering seizure threshold and pt with known post traumatic seizure earlier in admission , consider timing adjustment for better control of behaviors as above   -continue supportive cares and strict delirium precautions      Posttraumatic seizure  -reportedly witnessed tonic clonic activity following TBI  -no seizure activity reported on 24H EEG completed earlier in admission   -continued on course of Keppra for seizure ppx  -continue seizure precautions      Acute CVA of multiple vascular territories   -MRI brain 6/14/25 notes several small scattered acute/early subacute infarcts bilateral frontal and parietal lobes and left subinsular/anterior temporal region  -remains high risk recurrence, cont secondary risk factor modifications   -Nsx on consult, close o/p f/u with Neurology/Nsx      Dysphagia  -with poor oral  intake   -NG tube removed 6/20  -speech on consult, currently on level II dysphagia diet, mech soft with nectar thick liquids   -continue strict aspiration precautions   -speech on consult and following   -Marinol added 6/19 for potential appetite stimulation without significant improvement   -could consider low dose Mirtazapine 15mg HS with close monitoring of electrolytes, if too sedate with use can consider reducing evening Seroquel dose to allow for addition of Mirtazapine   -encourage family to assist patient with ordering food he likes to promote oral intake as notes he dislikes hospital food, enjoys PB&J per family      Atrial fibrillation  -reportedly new diagnosis this admission   -monitoring on telemetry  -echo completed 6/24  -rates appear to be well controlled on current regimen  -not currently on systemic a/c due to SDH requiring surgical evacuation as above  -continue optimization of hemodynamics    -Cardiology now on consult appreciate input and recs     Cognitive screening  -awake and alert but completely disoriented at time of evaluation   -reportedly fully oriented and independent with ADLs/iADLs at baseline  -no prior cognitive testing on record for review  -TSH WNL 2.364, B12 low at 204 on [facility], recommend supplementation to goal at least 400  -at risk cardiovascular, cerebrovascular and TBI related cognitive decline, continue secondary risk factor modifications and supportive cares, consider cognitive testing with OT  -encourage patient remain physically, socially, cognitively active and engaged to maintain cognitive acuity     Impaired Vision  -recommend use of corrective lenses at all appropriate times  -encourage adequate lighting and encourage use of assistance with ambulation  -keep personal belongings close to person to avoid reaching  -encourage appropriate footwear at all times  -consider large font for printed materials provided to patient      Impaired Hearing  -Encourage  use of hearing aids at all appropriate times  -encourage providers and caregivers to speak slowly and clearly directly to patient  -minimize background noise to encourage patient engagement  -consider use of hearing amplifier to reduce risk of straining to hear if hearing aids are not present or are not sufficient   -encourage use of teach back method to ensure clear communication       Frailty syndrome in geriatric patient  -clinical frailty scale stage V, mildly frail, progressive  -multifactorial including age, HTN, HLD now with fall and multitude of acute traumatic injuries superimposed in elderly individual with limited physiologic and metabolic reserves in elderly individual with limited physiologic and metabolic reserves   -continue optimization chronic conditions and address acute derangements as arise  -encourage well balanced nutritional intake, nutrition on consult  -ensure underlying anxiety/mood/depression symptoms are well controlled as may impact response to therapies as well as overall sense of wellbeing and quality of life  -continue psychosocial support of patient and caregivers   -continue to ensure treatment and interventions align with patient wishes and goals of care      High risk developing delirium   -due to age, SDH requiring surgical evacuation, TBI, acute/subacute stroke and prolonged hospitalization   -continue delirium precautions  -encourage normal sleep/wake cycle  -minimize overnight interruptions, group overnight vitals/labs/nursing checks as medically appropriate   -dim lights, close blinds and turn off tv to minimize stimulation and encourage sleep environment in evenings  -ensure that pain if present is well controlled   -monitor for fecal and urinary retention which may precipitate delirium, last BM recorded 6/19  -encourage early mobilization and ambulation with assist once cleared to safely do so  -provide frequent reorientation and redirection as indicated and appropriate  "  -encourage family and friends at the bedside to help calm patient if anxious  -minimize use of medications which may precipitate or worsen delirium such as tramadol, benzodiazepine, anticholinergics, and benadryl  -encourage well balanced hydration and nutrition   -redirect unwanted behaviors as first line    Care coordination: rounded with one to one sitter at bedside     Subjective:     Sib is seen and examined at bedside where he is sitting resting with one to one sitter at his side, he remains confused and impulsive but redirectable, denies pain or acute complaints.     Objective:     Vitals: Blood pressure 114/71, pulse 96, temperature (!) 97.3 °F (36.3 °C), resp. rate 18, height 6' 3.9\" (1.928 m), weight 87.1 kg (192 lb), SpO2 98%.,Body mass index is 23.43 kg/m².      Intake/Output Summary (Last 24 hours) at 6/25/2025 1326  Last data filed at 6/25/2025 0909  Gross per 24 hour   Intake 220 ml   Output 1150 ml   Net -930 ml     Current Medications: Reviewed    Physical Exam:   Physical Exam  Vitals and nursing note reviewed.   Constitutional:       General: He is not in acute distress.     Appearance: He is not toxic-appearing.   HENT:      Head: Normocephalic.      Comments: Bilateral scalp surgical sites clean and dry s/p staple removal      Nose: Nose normal.      Mouth/Throat:      Mouth: Mucous membranes are dry.     Eyes:      General: No scleral icterus.        Right eye: No discharge.         Left eye: No discharge.      Conjunctiva/sclera: Conjunctivae normal.     Neck:      Comments: Phonation norm   Cardiovascular:      Rate and Rhythm: Normal rate.   Pulmonary:      Effort: Pulmonary effort is normal. No respiratory distress.      Breath sounds: No wheezing.      Comments: Saturating well on room air   Abdominal:      General: Bowel sounds are normal. There is no distension.      Palpations: Abdomen is soft.      Tenderness: There is no abdominal tenderness.     Musculoskeletal:      Cervical back: " Neck supple.      Right lower leg: No edema.      Left lower leg: No edema.      Comments: Reduced overall muscle mass      Skin:     General: Skin is warm and dry.     Neurological:      Mental Status: He is alert.      Comments: Awake and alert, oriented to self otherwise confused    Psychiatric:      Comments: Impulsive and inattentive but redirectable         Invasive Devices       Peripheral Intravenous Line  Duration             Peripheral IV 06/21/25 Left Antecubital 4 days                  Lab Results:     I have personally reviewed pertinent lab results including the following:    Results from last 7 days   Lab Units 06/23/25  0651 06/21/25  0329   WBC Thousand/uL 8.50 8.34   HEMOGLOBIN g/dL 14.3 12.9   HEMATOCRIT % 43.2 39.4   PLATELETS Thousands/uL 291 245   SEGS PCT %  --  70   MONO PCT %  --  9   EOS PCT %  --  2     Results from last 7 days   Lab Units 06/23/25  0651 06/21/25  0329   POTASSIUM mmol/L 4.0 3.6   CHLORIDE mmol/L 102 98   CO2 mmol/L 25 28   BUN mg/dL 18 18   CREATININE mg/dL 0.70 0.67   CALCIUM mg/dL 8.9 8.7     I have personally reviewed the following imaging study reports in PACS:    No new imaging overnight

## 2025-06-25 NOTE — PLAN OF CARE
Problem: PHYSICAL THERAPY ADULT  Goal: Performs mobility at highest level of function for planned discharge setting.  See evaluation for individualized goals.  Description: Treatment/Interventions: ADL retraining, LE strengthening/ROM, Functional transfer training, Elevations, Therapeutic exercise, Endurance training, Bed mobility, Gait training  Equipment Recommended: Other (Comment) (TBD pending progress and family confirmation of available DME.)       See flowsheet documentation for full assessment, interventions and recommendations.  6/25/2025 1122 by Maxime Valente PTA  Outcome: Progressing  Note: Prognosis: Fair  Problem List: Decreased strength, Decreased range of motion, Impaired balance, Decreased endurance, Decreased mobility, Decreased coordination, Decreased cognition, Impaired judgement, Decreased safety awareness, Decreased skin integrity  Assessment: pt continues to perfomr all transfers with improving ability & initiation today, but does require instructions to complete tasks as would be expected given impaired motor planning & lack of insight to deficits.  He was able to perform all bed level tasks & transfer to sitting without complaints of dizziness when prompted, then ambulated into hallway with improved balance overall compared to most recent PT sessions.  He continues to demonstrate poor RW management & environmental awareness overall, but was better able to negotiate obstacles when following family or staff member standing in front of him.  Seated rests taken to recover after each task due to increased fatigue at the end of each trial.  He was able to cmplete pivot in place prior ot returnign to room, but did have minor instance of palpated retropulsion, necessitating support to prevent likely LOB.  Overall, pt is making steady progress in all areas of mobiilty, but remains well below his baseline mobiilty and remains a high fall risk due to neuromuscular, cognitive, and safety awareness  deficits at this time.  Pt will benefit from aggressive, structured therapy interventions to address deficits & maximize long term independence.  Barriers to Discharge: Inaccessible home environment, Decreased caregiver support     Rehab Resource Intensity Level, PT: I (Maximum Resource Intensity)    See flowsheet documentation for full assessment.     6/25/2025 1122 by Maxime Valente PTA  Outcome: Progressing  Note: Prognosis: Fair  Problem List: Decreased strength, Decreased range of motion, Impaired balance, Decreased endurance, Decreased mobility, Decreased coordination, Decreased cognition, Impaired judgement, Decreased safety awareness, Decreased skin integrity  Assessment: pt continues to perfomr all transfers with improving ability & initiation today, but does require instructions to complete tasks as would be expected given impaired motor planning & lack of insight to deficits.  He was able to perform all bed level tasks & transfer to sitting without complaints of dizziness when prompted, then ambulated into hallway with improved balance overall compared to most recent PT sessions.  He continues to demonstrate poor RW management & environmental awareness overall, but was better able to negotiate obstacles when following family or staff member standing in front of him.  Seated rests taken to recover after each task due to increased fatigue at the end of each trial.  He was able to cmplete pivot in place prior ot returnign to room, but did have minor instance of palpated retropulsion, necessitating support to prevent likely LOB.  Overall, pt is making steady progress in all areas of mobiilty, but remains well below his baseline mobiilty and remains a high fall risk due to neuromuscular, cognitive, and safety awareness deficits at this time.  Pt will benefit from aggressive, structured therapy interventions to address deficits & maximize long term independence.  Barriers to Discharge: Inaccessible home  environment, Decreased caregiver support     Rehab Resource Intensity Level, PT: I (Maximum Resource Intensity)    See flowsheet documentation for full assessment.

## 2025-06-25 NOTE — TELEPHONE ENCOUNTER
6/25/25 - PT IN [facility]  2 WK HFU W/CT HEAD W/MO   **WORKING ON SCHEDULING APT**  7/23/25 6 WK POV W/[facility]  Seen today for his 2 week incision check.  Car would like him back in 2 weeks with another CT head to determine need for IR procedure.  6 week POV with jason should be scheduled from original surgical date of 6/11

## 2025-06-25 NOTE — PLAN OF CARE
Problem: PHYSICAL THERAPY ADULT  Goal: Performs mobility at highest level of function for planned discharge setting.  See evaluation for individualized goals.  Description: Treatment/Interventions: ADL retraining, LE strengthening/ROM, Functional transfer training, Elevations, Therapeutic exercise, Endurance training, Bed mobility, Gait training  Equipment Recommended: Other (Comment) (TBD pending progress and family confirmation of available DME.)       See flowsheet documentation for full assessment, interventions and recommendations.  Outcome: Progressing  Note: Prognosis: Fair  Problem List: Decreased strength, Decreased range of motion, Impaired balance, Decreased endurance, Decreased mobility, Decreased coordination, Decreased cognition, Impaired judgement, Decreased safety awareness, Decreased skin integrity  Assessment: pt continues to perfomr all transfers with improving ability & initiation today, but does require instructions to complete tasks as would be expected given impaired motor planning & lack of insight to deficits.  He was able to perform all bed level tasks & transfer to sitting without complaints of dizziness when prompted, then ambulated into hallway with improved balance overall compared to most recent PT sessions.  He continues to demonstrate poor RW management & environmental awareness overall, but was better able to negotiate obstacles when following family or staff member standing in front of him.  Seated rests taken to recover after each task due to increased fatigue at the end of each trial.  He was able to cmplete pivot in place prior ot returnign to room, but did have minor instance of palpated retropulsion, necessitating support to prevent likely LOB.  Overall, pt is making steady progress in all areas of mobiilty, but remains well below his baseline mobiilty and remains a high fall risk due to neuromuscular, cognitive, and safety awareness deficits at this time.  Pt will benefit  from aggressive, structured therapy interventions to address deficits & maximize long term independence.  Barriers to Discharge: Inaccessible home environment, Decreased caregiver support     Rehab Resource Intensity Level, PT: I (Maximum Resource Intensity)    See flowsheet documentation for full assessment.

## 2025-06-26 LAB
ANION GAP SERPL CALCULATED.3IONS-SCNC: 8 MMOL/L (ref 4–13)
BUN SERPL-MCNC: 16 MG/DL (ref 5–25)
CALCIUM SERPL-MCNC: 8.8 MG/DL (ref 8.4–10.2)
CHLORIDE SERPL-SCNC: 100 MMOL/L (ref 96–108)
CO2 SERPL-SCNC: 25 MMOL/L (ref 21–32)
CREAT SERPL-MCNC: 0.58 MG/DL (ref 0.6–1.3)
GFR SERPL CREATININE-BSD FRML MDRD: 99 ML/MIN/1.73SQ M
GLUCOSE SERPL-MCNC: 106 MG/DL (ref 65–140)
GLUCOSE SERPL-MCNC: 111 MG/DL (ref 65–140)
GLUCOSE SERPL-MCNC: 115 MG/DL (ref 65–140)
GLUCOSE SERPL-MCNC: 124 MG/DL (ref 65–140)
GLUCOSE SERPL-MCNC: 99 MG/DL (ref 65–140)
POTASSIUM SERPL-SCNC: 4.3 MMOL/L (ref 3.5–5.3)
QRS AXIS: -11 DEGREES
QRS AXIS: 0 DEGREES
QRSD INTERVAL: 102 MS
QRSD INTERVAL: 110 MS
QT INTERVAL: 326 MS
QT INTERVAL: 376 MS
QTC INTERVAL: 441 MS
QTC INTERVAL: 466 MS
SODIUM SERPL-SCNC: 133 MMOL/L (ref 135–147)
T WAVE AXIS: 64 DEGREES
T WAVE AXIS: 68 DEGREES
VENTRICULAR RATE: 110 BPM
VENTRICULAR RATE: 92 BPM

## 2025-06-26 PROCEDURE — 80048 BASIC METABOLIC PNL TOTAL CA: CPT | Performed by: NURSE PRACTITIONER

## 2025-06-26 PROCEDURE — 82948 REAGENT STRIP/BLOOD GLUCOSE: CPT

## 2025-06-26 PROCEDURE — 93010 ELECTROCARDIOGRAM REPORT: CPT | Performed by: INTERNAL MEDICINE

## 2025-06-26 PROCEDURE — 92526 ORAL FUNCTION THERAPY: CPT

## 2025-06-26 PROCEDURE — 93005 ELECTROCARDIOGRAM TRACING: CPT

## 2025-06-26 PROCEDURE — 99233 SBSQ HOSP IP/OBS HIGH 50: CPT | Performed by: INTERNAL MEDICINE

## 2025-06-26 PROCEDURE — 99232 SBSQ HOSP IP/OBS MODERATE 35: CPT | Performed by: SURGERY

## 2025-06-26 RX ORDER — QUETIAPINE FUMARATE 25 MG/1
75 TABLET, FILM COATED ORAL 2 TIMES DAILY
Status: DISCONTINUED | OUTPATIENT
Start: 2025-06-26 | End: 2025-06-27 | Stop reason: HOSPADM

## 2025-06-26 RX ADMIN — QUETIAPINE 50 MG: 25 TABLET ORAL at 09:07

## 2025-06-26 RX ADMIN — Medication 3 MG: at 21:32

## 2025-06-26 RX ADMIN — LOSARTAN POTASSIUM 100 MG: 50 TABLET, FILM COATED ORAL at 09:07

## 2025-06-26 RX ADMIN — QUETIAPINE FUMARATE 75 MG: 25 TABLET ORAL at 17:00

## 2025-06-26 RX ADMIN — POLYETHYLENE GLYCOL 3350 17 G: 17 POWDER, FOR SOLUTION ORAL at 09:07

## 2025-06-26 RX ADMIN — METOPROLOL SUCCINATE 25 MG: 25 TABLET, EXTENDED RELEASE ORAL at 09:07

## 2025-06-26 RX ADMIN — ENOXAPARIN SODIUM 30 MG: 30 INJECTION SUBCUTANEOUS at 21:32

## 2025-06-26 RX ADMIN — ACETAMINOPHEN 975 MG: 325 TABLET ORAL at 21:33

## 2025-06-26 RX ADMIN — DRONABINOL 2.5 MG: 2.5 CAPSULE ORAL at 11:29

## 2025-06-26 RX ADMIN — LEVETIRACETAM 1000 MG: 500 TABLET, FILM COATED ORAL at 09:06

## 2025-06-26 RX ADMIN — ENOXAPARIN SODIUM 30 MG: 30 INJECTION SUBCUTANEOUS at 08:58

## 2025-06-26 RX ADMIN — QUETIAPINE FUMARATE 100 MG: 100 TABLET ORAL at 21:32

## 2025-06-26 RX ADMIN — SENNOSIDES 8.6 MG: 8.6 TABLET, FILM COATED ORAL at 16:55

## 2025-06-26 RX ADMIN — DRONABINOL 2.5 MG: 2.5 CAPSULE ORAL at 16:53

## 2025-06-26 RX ADMIN — SENNOSIDES 8.6 MG: 8.6 TABLET, FILM COATED ORAL at 09:07

## 2025-06-26 RX ADMIN — BISACODYL 10 MG: 10 SUPPOSITORY RECTAL at 09:07

## 2025-06-26 NOTE — PROGRESS NOTES
Progress Note - Geriatric Medicine   Jasson Carter 75 y.o. male MRN: [id number]  [facility] Encounter: [id number]      Assessment/Plan:    Ambulatory dysfunction with fall  -reportedly mechanical fall day prior to admission   -admitted with below noted injuries   -was not requiring use of assist device for ambulation at baseline PTA  -endorsed hx prior falls at least one additional in past six months   -remains high risk future falls due to age, hx of fall, deconditioning/debility, impulsivity and unfamiliar environment   -encourage good body mechanics and assist with all transfers  -maintain environment free of fall hazards  -encourage appropriate footwear and adequate lighting at all times when out of bed  -consider home fall risk assessment and personal fall alert system if returning home following rehab   -PT and OT following, anticipate rehab on hosp d/c, CM following for assist with dispo planning      Subdural hematoma  -s/p fall as outlined above  -CTH on initial presentation noted 14 mm mixed density acute left cerebral convexity subdural hematoma with associated 5 mm rightward midline shift and low-density 7 mm right cerebral convexity subdural hematoma age indeterminate   -s/p left-sided crani and right sided bur hole for evaluation of subdural hematoma 6/11/25  -s/p partial embolization DAVF via left ophthalmic artery 6/12/25   -post-op course complicated by seizure briefly requiring intubation for airway protection as well as acute stroke in multiple vascular territories  -currently on Keppra for seizure ppx   -CTH 6/14/25 notes decrease size of L SDH with no new area of hemorrhage and stable post op changes   -neurochecks per protocol  -Nsx on consult and following, no longer planning for additional inpatient treatments at this time and now cleared for dc from NSx standpoint with o/p f/u   -PT/OT/CM following      Altered mental status  -alert and oriented to person place situation  and year not month or day on initial consultation  -today awake and alert confused and impulsive requiring constant redirection  -mentation fluctuating during admission typically clearer cognition early in day and more impulsive/inattentive in afternoon/evenings   -at baseline fully oriented independent with ADLs and iADLs  -no prior memory or cognitive concerns reported  -multifactorial including recent SDH requiring surgical evacuation, recent CVA in multiple vascular territories, post traumatic seizure and prolonged hospitalization in setting of TBI  -Elavil dc'd earlier in admission as was not current home medication [facility] and may contribute to confusion/QTc prolongation in older adults   -if complains of persistent neuropathy symptoms can consider trial low dose Gabapentin HS   -currently on Seroquel 50mg BID and 100mg HS, monitor electrolytes and QTc closely (464 msec on 6/23/25), continued impulsivity required restraints overnight which are now off, increase Seroquel to 75mg BID, keep bedtime dose same, check EKG in am to ensure stability of QTc  -continue supportive cares and strict delirium precautions      Posttraumatic seizure  -reportedly witnessed tonic clonic activity following TBI  -no seizure activity reported on 24H EEG completed earlier in admission   -continued on course of Keppra for seizure ppx  -continue seizure precautions      Acute CVA of multiple vascular territories   -MRI brain 6/14/25 notes several small scattered acute/early subacute infarcts bilateral frontal and parietal lobes and left subinsular/anterior temporal region  -remains high risk recurrence, cont secondary risk factor modifications   -Nsx on consult, close o/p f/u with Neurology/Nsx      Dysphagia  -with poor oral intake   -NG tube removed 6/20  -speech on consult, currently on level II dysphagia diet, mech soft with nectar thick liquids   -continue strict aspiration precautions   -speech on consult and following    -Marinol added 6/19 for potential appetite stimulation without significant improvement   -hopeful for improved oral intake with diet texture upgrade   -could consider low dose Mirtazapine 15mg HS with close monitoring of electrolytes, if too sedate with use can consider reducing evening Seroquel dose to allow for addition of Mirtazapine   -encourage family to assist patient with ordering food he likes to promote oral intake as notes he dislikes hospital food, enjoys PB&J per family      Atrial fibrillation  -reportedly new diagnosis this admission   -monitoring on telemetry  -echo completed 6/24  -rates appear to be well controlled on current regimen  -not currently on systemic a/c due to SDH requiring surgical evacuation as above  -continue optimization of hemodynamics    -Cardiology now on consult appreciate input and recs     Cognitive screening  -awake and alert but completely disoriented at time of evaluation   -reportedly fully oriented and independent with ADLs/iADLs at baseline  -no prior cognitive testing on record for review  -TSH WNL 2.364, B12 low at 204 on [facility], recommend supplementation to goal at least 400  -at risk cardiovascular, cerebrovascular and TBI related cognitive decline, continue secondary risk factor modifications and supportive cares, consider cognitive testing with OT  -encourage patient remain physically, socially, cognitively active and engaged to maintain cognitive acuity     Impaired Vision  -recommend use of corrective lenses at all appropriate times  -encourage adequate lighting and encourage use of assistance with ambulation  -keep personal belongings close to person to avoid reaching  -encourage appropriate footwear at all times  -consider large font for printed materials provided to patient      Impaired Hearing  -Encourage use of hearing aids at all appropriate times  -encourage providers and caregivers to speak slowly and clearly directly to patient  -minimize  background noise to encourage patient engagement  -consider use of hearing amplifier to reduce risk of straining to hear if hearing aids are not present or are not sufficient   -encourage use of teach back method to ensure clear communication       Frailty syndrome in geriatric patient  -clinical frailty scale stage V, mildly frail, progressive  -multifactorial including age, HTN, HLD now with fall and multitude of acute traumatic injuries superimposed in elderly individual with limited physiologic and metabolic reserves in elderly individual with limited physiologic and metabolic reserves   -continue optimization chronic conditions and address acute derangements as arise  -encourage well balanced nutritional intake, nutrition on consult  -ensure underlying anxiety/mood/depression symptoms are well controlled as may impact response to therapies as well as overall sense of wellbeing and quality of life  -continue psychosocial support of patient and caregivers   -continue to ensure treatment and interventions align with patient wishes and goals of care      High risk developing delirium   -due to age, SDH requiring surgical evacuation, TBI, acute/subacute stroke and prolonged hospitalization   -continue delirium precautions  -encourage normal sleep/wake cycle  -minimize overnight interruptions, group overnight vitals/labs/nursing checks as medically appropriate   -dim lights, close blinds and turn off tv to minimize stimulation and encourage sleep environment in evenings  -ensure that pain if present is well controlled   -monitor for fecal and urinary retention which may precipitate delirium, last BM recorded 6/19  -encourage early mobilization and ambulation with assist once cleared to safely do so  -provide frequent reorientation and redirection as indicated and appropriate   -encourage family and friends at the bedside to help calm patient if anxious  -minimize use of medications which may precipitate or worsen  "delirium such as tramadol, benzodiazepine, anticholinergics, and benadryl  -encourage well balanced hydration and nutrition   -redirect unwanted behaviors as first line     Care coordination: rounded with Reyna (RN) and Kimberly (Trauma AP)    Subjective:     Sib is seen and examined at bedside where he is sitting resting, he is restless and confused but redirectable. Nursing notes that overnight he was impulsive with episode of agitation requiring IM zyprexa and soft restraints which have since been removed.     Review of Systems   Unable to perform ROS: Mental status change     Objective:     Vitals: Blood pressure 127/86, pulse 95, temperature (!) 97.3 °F (36.3 °C), resp. rate 17, height 6' 3.9\" (1.928 m), weight 87.1 kg (192 lb), SpO2 96%.,Body mass index is 23.43 kg/m².      Intake/Output Summary (Last 24 hours) at 6/26/2025 1252  Last data filed at 6/26/2025 0825  Gross per 24 hour   Intake 480 ml   Output --   Net 480 ml     Current Medications: Reviewed    Physical Exam:   Physical Exam  Vitals and nursing note reviewed.   Constitutional:       General: He is not in acute distress.     Comments: Thin elderly male    HENT:      Head: Normocephalic.      Nose: Nose normal.      Mouth/Throat:      Mouth: Mucous membranes are dry.     Eyes:      General: No scleral icterus.        Right eye: No discharge.         Left eye: No discharge.      Conjunctiva/sclera: Conjunctivae normal.     Neck:      Comments: Phonation norm   Cardiovascular:      Rate and Rhythm: Normal rate and regular rhythm.   Pulmonary:      Effort: Pulmonary effort is normal. No respiratory distress.      Breath sounds: No wheezing.   Abdominal:      General: Bowel sounds are normal.      Palpations: Abdomen is soft.     Musculoskeletal:      Cervical back: Neck supple.      Right lower leg: No edema.      Left lower leg: No edema.      Comments: Reduced overall muscle mass      Skin:     General: Skin is warm and dry.     Neurological:      Mental " Status: He is alert.      Comments: Awake and alert, confused and impulsive requiring constant redirection    Psychiatric:      Comments: Inattentive but not agitated         Invasive Devices       Peripheral Intravenous Line  Duration             Peripheral IV 06/25/25 Left;Upper;Ventral (anterior) Arm <1 day                  Lab Results:     I have personally reviewed pertinent lab results including the following:    Results from last 7 days   Lab Units 06/23/25  0651 06/21/25  0329   WBC Thousand/uL 8.50 8.34   HEMOGLOBIN g/dL 14.3 12.9   HEMATOCRIT % 43.2 39.4   PLATELETS Thousands/uL 291 245   SEGS PCT %  --  70   MONO PCT %  --  9   EOS PCT %  --  2     Results from last 7 days   Lab Units 06/26/25  0954 06/23/25  0651 06/21/25  0329   POTASSIUM mmol/L 4.3 4.0 3.6   CHLORIDE mmol/L 100 102 98   CO2 mmol/L 25 25 28   BUN mg/dL 16 18 18   CREATININE mg/dL 0.58* 0.70 0.67   CALCIUM mg/dL 8.8 8.9 8.7     I have personally reviewed the following imaging study reports in PACS:    No new imaging overnight

## 2025-06-26 NOTE — PROGRESS NOTES
Average meal completion 0-25% on a level 2 mechanical soft/thin liquid diet. Diet upgraded to regular texture today. Continue oral diet per SLP.     Magic cup ordered TID. Minimal completion per discussion with RN. Patient is accepting liquids and may do better with a liquid ONS. Magic cup discontinued. Ordered ensure plus high protein TID.     Monitor electrolytes. Continue to monitor oral intake and ONS completion. Consider restarting EN if intake remains suboptimal and within goals of care.

## 2025-06-26 NOTE — PROGRESS NOTES
Progress Note - Trauma   Name: Jasson Carter 75 y.o. male I MRN: [id number]  [facility] Date of Admission: 6/11/2025   Date of Service: 6/26/2025 I Hospital Day: 15    Assessment & Plan  Fall  - Status post fall with the below noted injuries.  - Patient suffered repeat fall on 6/22 in the setting of TBI/impulsiveness-CT head and C-spine were negative  - Fall precautions.  - Geriatric Medicine consultation for evaluation, medication review and recommendations.  - PT and OT evaluation and treatment as indicated.  - Case Management consultation for disposition planning.  SDH (subdural hematoma) (HCC)  Secondary to fall  Patient initially found with 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 midline shift s/p L craniotomy and R karyn hole craniotomy for evacuation, R sided drain removed previously  Neurosurgery consulted note appreciated  6/11 left-sided craniotomy with right-sided bur holes for evacuation of subdural hematoma  6/12 DAVF partial embolization via left ophthalmic artery  6/14 CT scan stable. 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   Continue with Keppra 1000 mg twice daily  Continue every 4hr neurochecks  Repeat CT scan if decline in GCS by >2 points  SBP goal 100-140   Continue with Seroquel, melatonin, and amitriptyline to help with sleep/wake regulation and agitation secondary to TBI.   Delirium precautions  Plan for 2 week follow up with NSGY tomorrow for wound check and staple/suture removal.   Patient will require follow-up with neurosurgery as an outpatient.  Altered mental status  - Secondary to TBI  - Medications and delirium precautions as above  - Palliative/Geriatrics on board  Post traumatic seizure (HCC)  - Witnessed possible tonic clonic activity post TBI  - EEG completed and no seizures noted  - On keppra 1000mg BID for  14 day course  Closed fracture of multiple ribs of left side with routine healing  Secondary to initial fall.  Found incidentally while assessing left shoulder pain after 2nd fall.  XR imaging showed: Acute mild displaced lateral left fifth rib fracture and questionable left lateral sixth rib fracture  Minimal tenderness  Rib fracture protocol  Continue to monitor  Follow-up with trauma as needed.  Left shoulder pain  Secondary to fall on 6/22  XR negative for acute traumatic injury-incidentally found to have a left rib fractures  Continue to monitor  Analgesia as needed  Atrial fibrillation (HCC)  New diagnosis on this admission  Evident on telemetry and EKG  Cardiology consult, appreciate recommendations.   EF preserved on a echocardiogram  Discontinue amlodipine and start metoprolol  No anticoagulation given SDH.  Follow-up as an outpatient with cardiology  Hypertension  Home amlodipine changed to metoprolol  Dysphagia  Speech consulted note appreciated  Recommended level 2 dysphagia diet with thin liquids  Continue Marinol  Patient has been tolerating feeds-continue to monitor    Bowel Regimen: Miralax, senna  VTE Prophylaxis:Enoxaparin (Lovenox)  Holding all AC/PC until follow up with NSGY    Disposition: Level I per PT/OT, patient accepted to [facility] once medically stable    Trauma service will follow.    24 Hour Events : No new issues, PT/OT evaluated rec Level I rehab, cleared per NSGY for discahrge    Subjective : Denies HA, Dizziness, lightheadedness, CP, SOB or abdominal pain. At baseline slight confusion. Alert and oriented x 2.    Objective :  Temp:  [97.3 °F (36.3 °C)] 97.3 °F (36.3 °C)  HR:  [] 94  BP: (114-139)/(71-91) 129/91  Resp:  [16-18] 16  SpO2:  [96 %-100 %] 100 %  O2 Device: None (Room air)    I/O         06/24 0701 06/25 0700 06/25 0701 06/26 0700 06/26 0701 06/27 0700    P.O. 640 0     Total Intake(mL/kg) 640 (7.3) 0 (0)     Urine (mL/kg/hr) 1350 (0.6)      Stool 0      Total  "Output 1350      Net -710 0            Unmeasured Urine Occurrence 1 x 2 x     Unmeasured Stool Occurrence 1 x              Physical Exam  Constitutional:       General: He is not in acute distress.     Appearance: Normal appearance. He is not toxic-appearing.   HENT:      Head: Atraumatic.     Eyes:      Pupils: Pupils are equal, round, and reactive to light.       Cardiovascular:      Rate and Rhythm: Normal rate and regular rhythm.      Pulses: Normal pulses.      Heart sounds: Normal heart sounds. No murmur heard.     No gallop.   Pulmonary:      Effort: No respiratory distress.      Breath sounds: Normal breath sounds. No wheezing or rales.   Abdominal:      General: There is no distension.      Palpations: Abdomen is soft.      Tenderness: There is no abdominal tenderness. There is no guarding.     Musculoskeletal:         General: No swelling or tenderness. Normal range of motion.      Cervical back: Normal range of motion.      Right lower leg: No edema.      Left lower leg: No edema.     Skin:     General: Skin is warm.      Capillary Refill: Capillary refill takes less than 2 seconds.     Neurological:      Mental Status: He is alert. Mental status is at baseline.     Psychiatric:         Behavior: Behavior normal.        PIC Score  PIC Pain Score: 3 (6/25/2025  7:35 PM)  PIC Incentive Spirometry Score: 4 (6/25/2025  4:32 PM)  PIC Cough Description: 2 (6/25/2025  4:32 PM)  PIC Total Score: 9 (6/25/2025  4:32 PM)       If the Total PIC Score </=5, did you consult APS and evaluate patient for further intervention?: no      Pain:    Incentive Spirometry  Cough  3 = Controlled  4 = Above goal volume 3 = Strong  2 = Moderate  3 = Goal to alert volume 2 = Weak  1 = Severe  2 = Below alert volume 1 = Absent     1 = Unable to perform IS           Lab Results: I have reviewed the following results:  No results for input(s): \"WBC\", \"HGB\", \"HCT\", \"PLT\", \"BANDSPCT\", \"SODIUM\", \"K\", \"CL\", \"CO2\", \"BUN\", \"CREATININE\", " "\"GLUC\", \"CAIONIZED\", \"MG\", \"PHOS\", \"AST\", \"ALT\", \"ALB\", \"TBILI\", \"DBILI\", \"ALKPHOS\", \"PTT\", \"INR\", \"HSTNI0\", \"HSTNI2\", \"BNP\", \"LACTICACID\" in the last 72 hours.    Imaging Results Review: No pertinent imaging studies reviewed.  Other Study Results Review: No additional pertinent studies reviewed.  "

## 2025-06-26 NOTE — PLAN OF CARE
Problem: Nutrition/Hydration-ADULT  Goal: Nutrient/Hydration intake appropriate for improving, restoring or maintaining nutritional needs  Description: Monitor and assess patient's nutrition/hydration status for malnutrition. Collaborate with interdisciplinary team and initiate plan and interventions as ordered.  Monitor patient's weight and dietary intake as ordered or per policy. Utilize nutrition screening tool and intervene as necessary. Determine patient's food preferences and provide high-protein, high-caloric foods as appropriate.     INTERVENTIONS:  - Monitor oral intake, urinary output, labs, and treatment plans  - Assess nutrition and hydration status and recommend course of action  - Evaluate amount of meals eaten  - Assist patient with eating if necessary   - Allow adequate time for meals  - Recommend/ encourage appropriate diets, oral nutritional supplements, and vitamin/mineral supplements  - Order, calculate, and assess calorie counts as needed  - Recommend, monitor, and adjust tube feedings and TPN/PPN based on assessed needs  - Assess need for intravenous fluids  - Provide specific nutrition/hydration education as appropriate  - Include patient/family/caregiver in decisions related to nutrition  Outcome: Progressing     Problem: PAIN - ADULT  Goal: Verbalizes/displays adequate comfort level or baseline comfort level  Description: Interventions:  - Encourage patient to monitor pain and request assistance  - Assess pain using appropriate pain scale  - Administer analgesics as ordered based on type and severity of pain and evaluate response  - Implement non-pharmacological measures as appropriate and evaluate response  - Consider cultural and social influences on pain and pain management  - Notify physician/advanced practitioner if interventions unsuccessful or patient reports new pain  - Educate patient/family on pain management process including their role and importance of  reporting pain   -  Provide non-pharmacologic/complimentary pain relief interventions  Outcome: Progressing     Problem: INFECTION - ADULT  Goal: Absence or prevention of progression during hospitalization  Description: INTERVENTIONS:  - Assess and monitor for signs and symptoms of infection  - Monitor lab/diagnostic results  - Monitor all insertion sites, i.e. indwelling lines, tubes, and drains  - Monitor endotracheal if appropriate and nasal secretions for changes in amount and color  - [place] appropriate cooling/warming therapies per order  - Administer medications as ordered  - Instruct and encourage patient and family to use good hand hygiene technique  - Identify and instruct in appropriate isolation precautions for identified infection/condition  Outcome: Progressing  Goal: Absence of fever/infection during neutropenic period  Description: INTERVENTIONS:  - Monitor WBC  - Perform strict hand hygiene  - Limit to healthy visitors only  - No plants, dried, fresh or silk flowers with winters in patient room  Outcome: Progressing     Problem: SAFETY ADULT  Goal: Patient will remain free of falls  Description: INTERVENTIONS:  - Educate patient/family on patient safety including physical limitations  - Instruct patient to call for assistance with activity   - Consider consulting OT/PT to assist with strengthening/mobility based on AM PAC & JH-HLM score  - Consult OT/PT to assist with strengthening/mobility   - Keep Call bell within reach  - Keep bed low and locked with side rails adjusted as appropriate  - Keep care items and personal belongings within reach  - Initiate and maintain comfort rounds  - Make Fall Risk Sign visible to staff  - Offer Toileting every 2 Hours, in advance of need  - Initiate/Maintain bed alarm  - Obtain necessary fall risk management equipment: chair alarm  - Apply yellow socks and bracelet for high fall risk patients  - Consider moving patient to room near nurses station  Outcome: Progressing  Goal: Maintain  or return to baseline ADL function  Description: INTERVENTIONS:  -  Assess patient's ability to carry out ADLs; assess patient's baseline for ADL function and identify physical deficits which impact ability to perform ADLs (bathing, care of mouth/teeth, toileting, grooming, dressing, etc.)  - Assess/evaluate cause of self-care deficits   - Assess range of motion  - Assess patient's mobility; develop plan if impaired  - Assess patient's need for assistive devices and provide as appropriate  - Encourage maximum independence but intervene and supervise when necessary  - Involve family in performance of ADLs  - Assess for home care needs following discharge   - Consider OT consult to assist with ADL evaluation and planning for discharge  - Provide patient education as appropriate  - Monitor functional capacity and physical performance, use of AM PAC & JH-HLM   - Monitor gait, balance and fatigue with ambulation    Outcome: Progressing  Goal: Maintains/Returns to pre admission functional level  Description: INTERVENTIONS:  - Perform AM-PAC 6 Click Basic Mobility/ Daily Activity assessment daily.  - Set and communicate daily mobility goal to care team and patient/family/caregiver.   - Collaborate with rehabilitation services on mobility goals if consulted  - Perform Range of Motion 3 times a day.  - Reposition patient every 2 hours.  - Dangle patient 3 times a day  - Stand patient 3 times a day  - Ambulate patient 3 times a day  - Out of bed to chair 3 times a day   - Out of bed for meals 3 times a day  - Out of bed for toileting  - Record patient progress and toleration of activity level   Outcome: Progressing     Problem: DISCHARGE PLANNING  Goal: Discharge to home or other facility with appropriate resources  Description: INTERVENTIONS:  - Identify barriers to discharge w/patient and caregiver  - Arrange for needed discharge resources and transportation as appropriate  - Identify discharge learning needs (meds, wound  care, etc.)  - Arrange for interpretive services to assist at discharge as needed  - Refer to Case Management Department for coordinating discharge planning if the patient needs post-hospital services based on physician/advanced practitioner order or complex needs related to functional status, cognitive ability, or social support system  Outcome: Progressing     Problem: Knowledge Deficit  Goal: Patient/family/caregiver demonstrates understanding of disease process, treatment plan, medications, and discharge instructions  Description: Complete learning assessment and assess knowledge base.  Interventions:  - Provide teaching at level of understanding  - Provide teaching via preferred learning methods  Outcome: Progressing     Problem: NEUROSENSORY - ADULT  Goal: Achieves stable or improved neurological status  Description: INTERVENTIONS  - Monitor and report changes in neurological status  - Monitor vital signs such as temperature, blood pressure, glucose, and any other labs ordered   - Initiate measures to prevent increased intracranial pressure  - Monitor for seizure activity and implement precautions if appropriate      Outcome: Progressing  Goal: Remains free of injury related to seizures activity  Description: INTERVENTIONS  - Maintain airway, patient safety  and administer oxygen as ordered  - Monitor patient for seizure activity, document and report duration and description of seizure to physician/advanced practitioner  - If seizure occurs,  ensure patient safety during seizure  - Reorient patient post seizure  - Seizure pads on all 4 side rails  - Instruct patient/family to notify RN of any seizure activity including if an aura is experienced  - Instruct patient/family to call for assistance with activity based on nursing assessment  - Administer anti-seizure medications if ordered    Outcome: Progressing  Goal: Achieves maximal functionality and self care  Description: INTERVENTIONS  - Monitor swallowing  and airway patency with patient fatigue and changes in neurological status  - Encourage and assist patient to increase activity and self care.   - Encourage visually impaired, hearing impaired and aphasic patients to use assistive/communication devices  Outcome: Progressing     Problem: CARDIOVASCULAR - ADULT  Goal: Maintains optimal cardiac output and hemodynamic stability  Description: INTERVENTIONS:  - Monitor I/O, vital signs and rhythm  - Monitor for S/S and trends of decreased cardiac output  - Administer and titrate ordered vasoactive medications to optimize hemodynamic stability  - Assess quality of pulses, skin color and temperature  - Assess for signs of decreased coronary artery perfusion  - Instruct patient to report change in severity of symptoms  Outcome: Progressing  Goal: Absence of cardiac dysrhythmias or at baseline rhythm  Description: INTERVENTIONS:  - Continuous cardiac monitoring, vital signs, obtain 12 lead EKG if ordered  - Administer antiarrhythmic and heart rate control medications as ordered  - Monitor electrolytes and administer replacement therapy as ordered  Outcome: Progressing     Problem: RESPIRATORY - ADULT  Goal: Achieves optimal ventilation and oxygenation  Description: INTERVENTIONS:  - Assess for changes in respiratory status  - Assess for changes in mentation and behavior  - Position to facilitate oxygenation and minimize respiratory effort  - Oxygen administered by appropriate delivery if ordered  - Initiate smoking cessation education as indicated  - Encourage broncho-pulmonary hygiene including cough, deep breathe, Incentive Spirometry  - Assess the need for suctioning and aspirate as needed  - Assess and instruct to report SOB or any respiratory difficulty  - Respiratory Therapy support as indicated  Outcome: Progressing     Problem: GASTROINTESTINAL - ADULT  Goal: Minimal or absence of nausea and/or vomiting  Description: INTERVENTIONS:  - Administer IV fluids if ordered  to ensure adequate hydration  - Maintain NPO status until nausea and vomiting are resolved  - Nasogastric tube if ordered  - Administer ordered antiemetic medications as needed  - Provide nonpharmacologic comfort measures as appropriate  - Advance diet as tolerated, if ordered  - Consider nutrition services referral to assist patient with adequate nutrition and appropriate food choices  Outcome: Progressing  Goal: Maintains or returns to baseline bowel function  Description: INTERVENTIONS:  - Assess bowel function  - Encourage oral fluids to ensure adequate hydration  - Administer IV fluids if ordered to ensure adequate hydration  - Administer ordered medications as needed  - Encourage mobilization and activity  - Consider nutritional services referral to assist patient with adequate nutrition and appropriate food choices  Outcome: Progressing  Goal: Maintains adequate nutritional intake  Description: INTERVENTIONS:  - Monitor percentage of each meal consumed  - Identify factors contributing to decreased intake, treat as appropriate  - Assist with meals as needed  - Monitor I&O, weight, and lab values if indicated  - Obtain nutrition services referral as needed  Outcome: Progressing  Goal: Establish and maintain optimal ostomy function  Description: INTERVENTIONS:  - Assess bowel function  - Encourage oral fluids to ensure adequate hydration  - Administer IV fluids if ordered to ensure adequate hydration   - Administer ordered medications as needed  - Encourage mobilization and activity  - Nutrition services referral to assist patient with appropriate food choices  - Assess stoma site  - Consider wound care consult   Outcome: Progressing  Goal: Oral mucous membranes remain intact  Description: INTERVENTIONS  - Assess oral mucosa and hygiene practices  - Implement preventative oral hygiene regimen  - Implement oral medicated treatments as ordered  - Initiate Nutrition services referral as needed  Outcome:  Progressing     Problem: GENITOURINARY - ADULT  Goal: Maintains or returns to baseline urinary function  Description: INTERVENTIONS:  - Assess urinary function  - Encourage oral fluids to ensure adequate hydration if ordered  - Administer IV fluids as ordered to ensure adequate hydration  - Administer ordered medications as needed  - Offer frequent toileting  - Follow urinary retention protocol if ordered  Outcome: Progressing  Goal: Absence of urinary retention  Description: INTERVENTIONS:  - Assess patient’s ability to void and empty bladder  - Monitor I/O  - Bladder scan as needed  - Discuss with physician/AP medications to alleviate retention as needed  - Discuss catheterization for long term situations as appropriate  Outcome: Progressing  Goal: Urinary catheter remains patent  Description: INTERVENTIONS:  - Assess patency of urinary catheter  - If patient has a chronic pedersen, consider changing catheter if non-functioning  - Follow guidelines for intermittent irrigation of non-functioning urinary catheter  Outcome: Progressing     Problem: METABOLIC, FLUID AND ELECTROLYTES - ADULT  Goal: Electrolytes maintained within normal limits  Description: INTERVENTIONS:  - Monitor labs and assess patient for signs and symptoms of electrolyte imbalances  - Administer electrolyte replacement as ordered  - Monitor response to electrolyte replacements, including repeat lab results as appropriate  - Instruct patient on fluid and nutrition as appropriate  Outcome: Progressing  Goal: Fluid balance maintained  Description: INTERVENTIONS:  - Monitor labs   - Monitor I/O and WT  - Instruct patient on fluid and nutrition as appropriate  - Assess for signs & symptoms of volume excess or deficit  Outcome: Progressing  Goal: Glucose maintained within target range  Description: INTERVENTIONS:  - Monitor Blood Glucose as ordered  - Assess for signs and symptoms of hyperglycemia and hypoglycemia  - Administer ordered medications to  maintain glucose within target range  - Assess nutritional intake and initiate nutrition service referral as needed  Outcome: Progressing     Problem: SKIN/TISSUE INTEGRITY - ADULT  Goal: Skin Integrity remains intact(Skin Breakdown Prevention)  Description: Assess:  -Perform Jake assessment every   -Clean and moisturize skin every   -Inspect skin when repositioning, toileting, and assisting with ADLS  -Assess under medical devices such as  every   -Assess extremities for adequate circulation and sensation     Bed Management:  -Have minimal linens on bed & keep smooth, unwrinkled  -Change linens as needed when moist or perspiring  -Avoid sitting or lying in one position for more than  hours while in bed  -Keep HOB at degrees     Toileting:  -Offer bedside commode  -Assess for incontinence every   -Use incontinent care products after each incontinent episode such as     Activity:  -Mobilize patient  times a day  -Encourage activity and walks on unit  -Encourage or provide ROM exercises   -Turn and reposition patient every  Hours  -Use appropriate equipment to lift or move patient in bed  -Instruct/ Assist with weight shifting every  when out of bed in chair  -Consider limitation of chair time  hour intervals    Skin Care:  -Avoid use of baby powder, tape, friction and shearing, hot water or constrictive clothing  -Relieve pressure over bony prominences using   -Do not massage red bony areas    Next Steps:  -Teach patient strategies to minimize risks such as    -Consider consults to  interdisciplinary teams such as   Outcome: Progressing  Goal: Incision(s), wounds(s) or drain site(s) healing without S/S of infection  Description: INTERVENTIONS  - Assess and document dressing, incision, wound bed, drain sites and surrounding tissue  - Provide patient and family education  - Perform skin care/dressing changes every   Outcome: Progressing  Goal: Pressure injury heals and does not worsen  Description: Interventions:  -  Implement low air loss mattress or specialty surface (Criteria met)  - Apply silicone foam dressing  - Instruct/assist with weight shifting every  minutes when in chair   - Limit chair time to  hour intervals  - Use special pressure reducing interventions such as  when in chair   - Apply fecal or urinary incontinence containment device   - Perform passive or active ROM every   - Turn and reposition patient & offload bony prominences every  hours   - Utilize friction reducing device or surface for transfers   - Consider consults to  interdisciplinary teams such as   - Use incontinent care products after each incontinent episode such as  - Consider nutrition services referral as needed  Outcome: Progressing     Problem: HEMATOLOGIC - ADULT  Goal: Maintains hematologic stability  Description: INTERVENTIONS  - Assess for signs and symptoms of bleeding or hemorrhage  - Monitor labs  - Administer supportive blood products/factors as ordered and appropriate  Outcome: Progressing     Problem: MUSCULOSKELETAL - ADULT  Goal: Maintain or return mobility to safest level of function  Description: INTERVENTIONS:  - Assess patient's ability to carry out ADLs; assess patient's baseline for ADL function and identify physical deficits which impact ability to perform ADLs (bathing, care of mouth/teeth, toileting, grooming, dressing, etc.)  - Assess/evaluate cause of self-care deficits   - Assess range of motion  - Assess patient's mobility  - Assess patient's need for assistive devices and provide as appropriate  - Encourage maximum independence but intervene and supervise when necessary  - Involve family in performance of ADLs  - Assess for home care needs following discharge   - Consider OT consult to assist with ADL evaluation and planning for discharge  - Provide patient education as appropriate  Outcome: Progressing  Goal: Maintain proper alignment of affected body part  Description: INTERVENTIONS:  - Support, maintain and  protect limb and body alignment  - Provide patient/ family with appropriate education  Outcome: Progressing     Problem: Prexisting or High Potential for Compromised Skin Integrity  Goal: Skin integrity is maintained or improved  Description: INTERVENTIONS:  - Identify patients at risk for skin breakdown  - Assess and monitor skin integrity including under and around medical devices   - Assess and monitor nutrition and hydration status  - Monitor labs  - Assess for incontinence   - Turn and reposition patient  - Assist with mobility/ambulation  - Relieve pressure over paolo prominences   - Avoid friction and shearing  - Provide appropriate hygiene as needed including keeping skin clean and dry  - Evaluate need for skin moisturizer/barrier cream  - Collaborate with interdisciplinary team  - Patient/family teaching  - Consider wound care consult    Assess:  - Review Jake scale daily  - Clean and moisturize skin   - Inspect skin when repositioning, toileting, and assisting with ADLS  - Assess under medical devices   - Assess extremities for adequate circulation and sensation     Bed Management:  - Have minimal linens on bed & keep smooth, unwrinkled  - Change linens as needed when moist or perspiring  - Avoid sitting or lying in one position for more than 2 hours while in beds   - Toileting:  - Offer bedside commode  - Assess for incontinence  - Use incontinent care products after each incontinent episode    Activity:  - Mobilize patient 3 times a day  - Encourage activity and walks on unit  - Encourage or provide ROM exercises   - Turn and reposition patient every 2 Hours  - Use appropriate equipment to lift or move patient in bed  - Instruct/ Assist with weight shifting every hour when out of bed in chair  - Consider limitation of chair time 1 hour intervals    Skin Care:  - Avoid use of baby powder, tape, friction and shearing, hot water or constrictive clothing  - Relieve pressure over bony prominences  - Do not  massage red bony areas    Next Steps:  - Teach patient strategies to minimize risks  - Consider consults to  interdisciplinary teams   Outcome: Progressing     Problem: DECISION MAKING  Goal: Pt/Family able to effectively weigh alternatives and participate in decision making related to treatment and care  Description: INTERVENTIONS:  - Identify decision maker  - Determine when there are differences among patient's view, family's view, and healthcare provider's view of patient condition and care goals  - Facilitate patient/family articulation of goals for care  - Help patient/family identify pros/cons of alternative solutions  - Provide information as requested by patient/family  - Respect patient/family rights related to privacy and sharing information   - Serve as a liaison between patient, family and health care team  - Initiate consults as appropriate (Ethics Team, Palliative Care, Family Care Conference, etc.)  Outcome: Progressing     Problem: SAFETY,RESTRAINT: NV/NON-SELF DESTRUCTIVE BEHAVIOR  Goal: Remains free of harm/injury (restraint for non violent/non self-detsructive behavior)  Description: INTERVENTIONS:  - Instruct patient/family regarding restraint use   - Assess and monitor physiologic and psychological status   - Provide interventions and comfort measures to meet assessed patient needs   - Identify and implement measures to help patient regain control  - Assess readiness for release of restraint   Outcome: Progressing  Goal: Returns to optimal restraint-free functioning  Description: INTERVENTIONS:  - Assess the patient's behavior and symptoms that indicate continued need for restraint  - Identify and implement measures to help patient regain control  - Assess readiness for release of restraint   Outcome: Progressing     Problem: CONFUSION/THOUGHT DISTURBANCE  Goal: Thought disturbances (confusion, delirium, depression, dementia or psychosis) are managed to maintain or return to baseline mental  status and functional level  Description: INTERVENTIONS:  - Assess for possible contributors to  thought disturbance, including but not limited to medications, infection, impaired vision or hearing, underlying metabolic abnormalities, dehydration, respiratory compromise,  psychiatric diagnoses and notify attending PHYSICAN/AP  - Monitor and intervene to maintain adequate nutrition, hydration, elimination, sleep and activity  - Decrease environmental stimuli, including noise as appropriate.  - Provide frequent contacts to provide refocusing, direction and reassurance as needed. Approach patient calmly with eye contact and at their level.  - [place] high risk fall precautions, aspiration precautions and other safety measures, as indicated  - If delirium suspected, notify physician/AP of change in condition and request immediate in-person evaluation  - Pursue consults as appropriate including Geriatric (campus dependent), OT for cognitive evaluation/activity planning, psychiatric, pastoral care, etc.  Outcome: Progressing     Problem: BEHAVIOR  Goal: Pt/Family maintain appropriate behavior and adhere to behavioral management agreement, if implemented  Description: INTERVENTIONS:  - Assess the family dynamic   - Encourage verbalization of thoughts and concerns in a socially appropriate manner  - Assess patient/family's coping skills and non-compliant behavior (including use of illegal substances).  - Utilize positive, consistent limit setting strategies supporting safety of patient, staff and others  - Initiate consult with Case Management, Spiritual Care or other ancillary services as appropriate  - If a patient's/visitor's behavior jeopardizes the safety of the patient, staff, or others, refer to organization procedure.   - Notify Security of behavior or suspected illegal substances which indicate the need for search of the patient and/or belongings  - Encourage participation in the decision making process about a  behavioral management agreement; implement if patient meets criteria  Outcome: Progressing

## 2025-06-26 NOTE — SPEECH THERAPY NOTE
"Speech Language/Pathology  Speech-Language Pathology Progress Note      Patient Name: Jasson Carter    Today's Date: 6/26/2025      Subjective:  Pt was awake and alert. He was sitting up in chair at bedside. Patient stated \" \".    Objective:  Pt was seen today for dysphagia therapy. Current diet is regular with thin liquids. Diet just upgraded today.  Pt was on room air. Oral care had already been completed. Focus of today's session was to maximize PO intake safety and improve use of strategies to minimize risk of aspiration. Textures offered today included regular solid and thin liquid via straw.  Swallow function:   Bolus retrieval and manipulation were slow.  The pt needs max encouragement to take po, to retrieve the material.  He declined mult times.  Breakdown and AP transfer was WFL. Pharyngeal swallow initiation was present. Hyolaryngeal excursion was adequate. No s/s aspiration occurred during session today.  Pt with increased confusion and at times nonsensical.  He has some difficulty following commands and answering questions.   Assessment:  Pt is tolerating regular but is not taking a sufficient amount.    Plan:  Continue regular and thin liquids. Continue ST follow up. Subsequent sessions to focus on maximizing PO intake safety and improving use of strategies to minimize risk of aspiration.  Will follow as able.   "

## 2025-06-27 VITALS
WEIGHT: 192 LBS | TEMPERATURE: 97.7 F | DIASTOLIC BLOOD PRESSURE: 81 MMHG | OXYGEN SATURATION: 97 % | SYSTOLIC BLOOD PRESSURE: 133 MMHG | HEART RATE: 96 BPM | RESPIRATION RATE: 18 BRPM | BODY MASS INDEX: 23.38 KG/M2 | HEIGHT: 76 IN

## 2025-06-27 LAB
ANION GAP SERPL CALCULATED.3IONS-SCNC: 7 MMOL/L (ref 4–13)
BUN SERPL-MCNC: 24 MG/DL (ref 5–25)
CALCIUM SERPL-MCNC: 9.1 MG/DL (ref 8.4–10.2)
CHLORIDE SERPL-SCNC: 101 MMOL/L (ref 96–108)
CO2 SERPL-SCNC: 28 MMOL/L (ref 21–32)
CREAT SERPL-MCNC: 0.92 MG/DL (ref 0.6–1.3)
ERYTHROCYTE [DISTWIDTH] IN BLOOD BY AUTOMATED COUNT: 12.3 % (ref 11.6–15.1)
GFR SERPL CREATININE-BSD FRML MDRD: 80 ML/MIN/1.73SQ M
GLUCOSE SERPL-MCNC: 104 MG/DL (ref 65–140)
GLUCOSE SERPL-MCNC: 105 MG/DL (ref 65–140)
GLUCOSE SERPL-MCNC: 114 MG/DL (ref 65–140)
HCT VFR BLD AUTO: 40.8 % (ref 36.5–49.3)
HGB BLD-MCNC: 13.5 G/DL (ref 12–17)
MAGNESIUM SERPL-MCNC: 2 MG/DL (ref 1.9–2.7)
MCH RBC QN AUTO: 32.6 PG (ref 26.8–34.3)
MCHC RBC AUTO-ENTMCNC: 33.1 G/DL (ref 31.4–37.4)
MCV RBC AUTO: 99 FL (ref 82–98)
PHOSPHATE SERPL-MCNC: 3.7 MG/DL (ref 2.3–4.1)
PLATELET # BLD AUTO: 360 THOUSANDS/UL (ref 149–390)
PMV BLD AUTO: 10.3 FL (ref 8.9–12.7)
POTASSIUM SERPL-SCNC: 3.6 MMOL/L (ref 3.5–5.3)
QRS AXIS: -32 DEGREES
QRSD INTERVAL: 100 MS
QT INTERVAL: 370 MS
QTC INTERVAL: 456 MS
RBC # BLD AUTO: 4.14 MILLION/UL (ref 3.88–5.62)
SODIUM SERPL-SCNC: 136 MMOL/L (ref 135–147)
T WAVE AXIS: 56 DEGREES
VENTRICULAR RATE: 91 BPM
WBC # BLD AUTO: 8.28 THOUSAND/UL (ref 4.31–10.16)

## 2025-06-27 PROCEDURE — 99238 HOSP IP/OBS DSCHRG MGMT 30/<: CPT | Performed by: NURSE PRACTITIONER

## 2025-06-27 PROCEDURE — 93005 ELECTROCARDIOGRAM TRACING: CPT

## 2025-06-27 PROCEDURE — 85027 COMPLETE CBC AUTOMATED: CPT | Performed by: NURSE PRACTITIONER

## 2025-06-27 PROCEDURE — 82948 REAGENT STRIP/BLOOD GLUCOSE: CPT

## 2025-06-27 PROCEDURE — 84100 ASSAY OF PHOSPHORUS: CPT | Performed by: NURSE PRACTITIONER

## 2025-06-27 PROCEDURE — 99232 SBSQ HOSP IP/OBS MODERATE 35: CPT | Performed by: INTERNAL MEDICINE

## 2025-06-27 PROCEDURE — 83735 ASSAY OF MAGNESIUM: CPT | Performed by: NURSE PRACTITIONER

## 2025-06-27 PROCEDURE — 93010 ELECTROCARDIOGRAM REPORT: CPT | Performed by: INTERNAL MEDICINE

## 2025-06-27 PROCEDURE — NC001 PR NO CHARGE: Performed by: SURGERY

## 2025-06-27 PROCEDURE — 80048 BASIC METABOLIC PNL TOTAL CA: CPT | Performed by: NURSE PRACTITIONER

## 2025-06-27 RX ORDER — DRONABINOL 2.5 MG/1
2.5 CAPSULE ORAL
Start: 2025-06-27 | End: 2025-07-07

## 2025-06-27 RX ORDER — METOPROLOL SUCCINATE 25 MG/1
25 TABLET, EXTENDED RELEASE ORAL DAILY
Start: 2025-06-28

## 2025-06-27 RX ORDER — ACETAMINOPHEN 325 MG/1
975 TABLET ORAL EVERY 8 HOURS PRN
Start: 2025-06-27

## 2025-06-27 RX ORDER — QUETIAPINE FUMARATE 100 MG/1
100 TABLET, FILM COATED ORAL
Start: 2025-06-27

## 2025-06-27 RX ORDER — PANTOPRAZOLE SODIUM 40 MG/10ML
40 INJECTION, POWDER, LYOPHILIZED, FOR SOLUTION INTRAVENOUS
Qty: 1 EACH | Refills: 0 | Status: SHIPPED | OUTPATIENT
Start: 2025-06-28

## 2025-06-27 RX ORDER — QUETIAPINE FUMARATE 25 MG/1
75 TABLET, FILM COATED ORAL 2 TIMES DAILY
Start: 2025-06-27

## 2025-06-27 RX ADMIN — METOPROLOL SUCCINATE 25 MG: 25 TABLET, EXTENDED RELEASE ORAL at 10:12

## 2025-06-27 RX ADMIN — DRONABINOL 2.5 MG: 2.5 CAPSULE ORAL at 12:54

## 2025-06-27 RX ADMIN — ENOXAPARIN SODIUM 30 MG: 30 INJECTION SUBCUTANEOUS at 10:11

## 2025-06-27 RX ADMIN — LOSARTAN POTASSIUM 100 MG: 50 TABLET, FILM COATED ORAL at 10:12

## 2025-06-27 RX ADMIN — QUETIAPINE FUMARATE 75 MG: 25 TABLET ORAL at 10:12

## 2025-06-27 RX ADMIN — PANTOPRAZOLE SODIUM 40 MG: 40 INJECTION, POWDER, FOR SOLUTION INTRAVENOUS at 06:40

## 2025-06-27 NOTE — CASE MANAGEMENT
Case Management Discharge Planning Note    Patient name Jasson Carter  Location [facility]-01 MRN [id number]  : 1950 Date 2025       Current Admission Date: 2025  Current Admission Diagnosis:SDH (subdural hematoma) (HCC)   Patient Active Problem List    Diagnosis Date Noted    Closed fracture of multiple ribs of left side with routine healing 2025    Left shoulder pain 2025    Dysphagia 2025    Post traumatic seizure (HCC) 2025    Effusion of right knee 2025    Atrial fibrillation (HCC) 2025    SDH (subdural hematoma) ([facility]) 2025    Altered mental status 2025    Fall 2025    Hyponatremia 2024    Neuropathy 2021    Reflux esophagitis 2020    Seasonal allergic rhinitis due to pollen 2020    Primary osteoarthritis involving multiple joints 2020    Posttraumatic stress disorder 2020    History of gout 2019    Pure hypercholesterolemia 2017    Hypertension 2017    Benign prostatic hyperplasia without urinary obstruction 2011      LOS (days): 16  Geometric Mean LOS (GMLOS) (days): 6.5  Days to GMLOS:-9.5     OBJECTIVE:  Risk of Unplanned Readmission Score: 19.15         Current admission status: Inpatient   Preferred Pharmacy:   [id number] - [place], [place] - [address]  [address] [place] [address]  Phone: [phone] Fax: [phone]    Primary Care Provider: Ruben Finch MD    Primary Insurance: MEDICARE  Secondary Insurance: [facility]    DISCHARGE DETAILS:    CM spoke to Nakita from [facility]  She is willing to accept the pt today  They're requesting an EKG done, and then to have the actual tracing sent to them  CM attempted to burn discs of the pt's images from the hospital stay, but the burner is down, so Radiology will attempt to push the images via EPIC    CM left a voicemail for pt's son, Kem [phone], informing him of  the pt's d/c  CM then spoke to pt's son Abraham and reviewed d/c plan    Pt's transport is scheduled for 1245  Pt's son Abraham and nurse, are aware

## 2025-06-27 NOTE — PLAN OF CARE
Problem: Nutrition/Hydration-ADULT  Goal: Nutrient/Hydration intake appropriate for improving, restoring or maintaining nutritional needs  Description: Monitor and assess patient's nutrition/hydration status for malnutrition. Collaborate with interdisciplinary team and initiate plan and interventions as ordered.  Monitor patient's weight and dietary intake as ordered or per policy. Utilize nutrition screening tool and intervene as necessary. Determine patient's food preferences and provide high-protein, high-caloric foods as appropriate.     INTERVENTIONS:  - Monitor oral intake, urinary output, labs, and treatment plans  - Assess nutrition and hydration status and recommend course of action  - Evaluate amount of meals eaten  - Assist patient with eating if necessary   - Allow adequate time for meals  - Recommend/ encourage appropriate diets, oral nutritional supplements, and vitamin/mineral supplements  - Order, calculate, and assess calorie counts as needed  - Recommend, monitor, and adjust tube feedings and TPN/PPN based on assessed needs  - Assess need for intravenous fluids  - Provide specific nutrition/hydration education as appropriate  - Include patient/family/caregiver in decisions related to nutrition  Outcome: Progressing     Problem: PAIN - ADULT  Goal: Verbalizes/displays adequate comfort level or baseline comfort level  Description: Interventions:  - Encourage patient to monitor pain and request assistance  - Assess pain using appropriate pain scale  - Administer analgesics as ordered based on type and severity of pain and evaluate response  - Implement non-pharmacological measures as appropriate and evaluate response  - Consider cultural and social influences on pain and pain management  - Notify physician/advanced practitioner if interventions unsuccessful or patient reports new pain  - Educate patient/family on pain management process including their role and importance of  reporting pain   -  Provide non-pharmacologic/complimentary pain relief interventions  Outcome: Progressing     Problem: INFECTION - ADULT  Goal: Absence or prevention of progression during hospitalization  Description: INTERVENTIONS:  - Assess and monitor for signs and symptoms of infection  - Monitor lab/diagnostic results  - Monitor all insertion sites, i.e. indwelling lines, tubes, and drains  - Monitor endotracheal if appropriate and nasal secretions for changes in amount and color  - [place] appropriate cooling/warming therapies per order  - Administer medications as ordered  - Instruct and encourage patient and family to use good hand hygiene technique  - Identify and instruct in appropriate isolation precautions for identified infection/condition  Outcome: Progressing  Goal: Absence of fever/infection during neutropenic period  Description: INTERVENTIONS:  - Monitor WBC  - Perform strict hand hygiene  - Limit to healthy visitors only  - No plants, dried, fresh or silk flowers with winters in patient room  Outcome: Progressing     Problem: SAFETY ADULT  Goal: Patient will remain free of falls  Description: INTERVENTIONS:  - Educate patient/family on patient safety including physical limitations  - Instruct patient to call for assistance with activity   - Consider consulting OT/PT to assist with strengthening/mobility based on AM PAC & JH-HLM score  - Consult OT/PT to assist with strengthening/mobility   - Keep Call bell within reach  - Keep bed low and locked with side rails adjusted as appropriate  - Keep care items and personal belongings within reach  - Initiate and maintain comfort rounds  - Make Fall Risk Sign visible to staff  - Offer Toileting every 2 Hours, in advance of need  - Initiate/Maintain bed alarm  - Obtain necessary fall risk management equipment: chair alarm  - Apply yellow socks and bracelet for high fall risk patients  - Consider moving patient to room near nurses station  Outcome: Progressing  Goal: Maintain  or return to baseline ADL function  Description: INTERVENTIONS:  -  Assess patient's ability to carry out ADLs; assess patient's baseline for ADL function and identify physical deficits which impact ability to perform ADLs (bathing, care of mouth/teeth, toileting, grooming, dressing, etc.)  - Assess/evaluate cause of self-care deficits   - Assess range of motion  - Assess patient's mobility; develop plan if impaired  - Assess patient's need for assistive devices and provide as appropriate  - Encourage maximum independence but intervene and supervise when necessary  - Involve family in performance of ADLs  - Assess for home care needs following discharge   - Consider OT consult to assist with ADL evaluation and planning for discharge  - Provide patient education as appropriate  - Monitor functional capacity and physical performance, use of AM PAC & JH-HLM   - Monitor gait, balance and fatigue with ambulation    Outcome: Progressing  Goal: Maintains/Returns to pre admission functional level  Description: INTERVENTIONS:  - Perform AM-PAC 6 Click Basic Mobility/ Daily Activity assessment daily.  - Set and communicate daily mobility goal to care team and patient/family/caregiver.   - Collaborate with rehabilitation services on mobility goals if consulted  - Perform Range of Motion 3 times a day.  - Reposition patient every 2 hours.  - Dangle patient 3 times a day  - Stand patient 3 times a day  - Ambulate patient 3 times a day  - Out of bed to chair 3 times a day   - Out of bed for meals 3 times a day  - Out of bed for toileting  - Record patient progress and toleration of activity level   Outcome: Progressing     Problem: DISCHARGE PLANNING  Goal: Discharge to home or other facility with appropriate resources  Description: INTERVENTIONS:  - Identify barriers to discharge w/patient and caregiver  - Arrange for needed discharge resources and transportation as appropriate  - Identify discharge learning needs (meds, wound  care, etc.)  - Arrange for interpretive services to assist at discharge as needed  - Refer to Case Management Department for coordinating discharge planning if the patient needs post-hospital services based on physician/advanced practitioner order or complex needs related to functional status, cognitive ability, or social support system  Outcome: Progressing     Problem: Knowledge Deficit  Goal: Patient/family/caregiver demonstrates understanding of disease process, treatment plan, medications, and discharge instructions  Description: Complete learning assessment and assess knowledge base.  Interventions:  - Provide teaching at level of understanding  - Provide teaching via preferred learning methods  Outcome: Progressing     Problem: NEUROSENSORY - ADULT  Goal: Achieves stable or improved neurological status  Description: INTERVENTIONS  - Monitor and report changes in neurological status  - Monitor vital signs such as temperature, blood pressure, glucose, and any other labs ordered   - Initiate measures to prevent increased intracranial pressure  - Monitor for seizure activity and implement precautions if appropriate      Outcome: Progressing  Goal: Remains free of injury related to seizures activity  Description: INTERVENTIONS  - Maintain airway, patient safety  and administer oxygen as ordered  - Monitor patient for seizure activity, document and report duration and description of seizure to physician/advanced practitioner  - If seizure occurs,  ensure patient safety during seizure  - Reorient patient post seizure  - Seizure pads on all 4 side rails  - Instruct patient/family to notify RN of any seizure activity including if an aura is experienced  - Instruct patient/family to call for assistance with activity based on nursing assessment  - Administer anti-seizure medications if ordered    Outcome: Progressing  Goal: Achieves maximal functionality and self care  Description: INTERVENTIONS  - Monitor swallowing  and airway patency with patient fatigue and changes in neurological status  - Encourage and assist patient to increase activity and self care.   - Encourage visually impaired, hearing impaired and aphasic patients to use assistive/communication devices  Outcome: Progressing     Problem: CARDIOVASCULAR - ADULT  Goal: Maintains optimal cardiac output and hemodynamic stability  Description: INTERVENTIONS:  - Monitor I/O, vital signs and rhythm  - Monitor for S/S and trends of decreased cardiac output  - Administer and titrate ordered vasoactive medications to optimize hemodynamic stability  - Assess quality of pulses, skin color and temperature  - Assess for signs of decreased coronary artery perfusion  - Instruct patient to report change in severity of symptoms  Outcome: Progressing  Goal: Absence of cardiac dysrhythmias or at baseline rhythm  Description: INTERVENTIONS:  - Continuous cardiac monitoring, vital signs, obtain 12 lead EKG if ordered  - Administer antiarrhythmic and heart rate control medications as ordered  - Monitor electrolytes and administer replacement therapy as ordered  Outcome: Progressing     Problem: RESPIRATORY - ADULT  Goal: Achieves optimal ventilation and oxygenation  Description: INTERVENTIONS:  - Assess for changes in respiratory status  - Assess for changes in mentation and behavior  - Position to facilitate oxygenation and minimize respiratory effort  - Oxygen administered by appropriate delivery if ordered  - Initiate smoking cessation education as indicated  - Encourage broncho-pulmonary hygiene including cough, deep breathe, Incentive Spirometry  - Assess the need for suctioning and aspirate as needed  - Assess and instruct to report SOB or any respiratory difficulty  - Respiratory Therapy support as indicated  Outcome: Progressing     Problem: GASTROINTESTINAL - ADULT  Goal: Minimal or absence of nausea and/or vomiting  Description: INTERVENTIONS:  - Administer IV fluids if ordered  to ensure adequate hydration  - Maintain NPO status until nausea and vomiting are resolved  - Nasogastric tube if ordered  - Administer ordered antiemetic medications as needed  - Provide nonpharmacologic comfort measures as appropriate  - Advance diet as tolerated, if ordered  - Consider nutrition services referral to assist patient with adequate nutrition and appropriate food choices  Outcome: Progressing  Goal: Maintains or returns to baseline bowel function  Description: INTERVENTIONS:  - Assess bowel function  - Encourage oral fluids to ensure adequate hydration  - Administer IV fluids if ordered to ensure adequate hydration  - Administer ordered medications as needed  - Encourage mobilization and activity  - Consider nutritional services referral to assist patient with adequate nutrition and appropriate food choices  Outcome: Progressing  Goal: Maintains adequate nutritional intake  Description: INTERVENTIONS:  - Monitor percentage of each meal consumed  - Identify factors contributing to decreased intake, treat as appropriate  - Assist with meals as needed  - Monitor I&O, weight, and lab values if indicated  - Obtain nutrition services referral as needed  Outcome: Progressing  Goal: Establish and maintain optimal ostomy function  Description: INTERVENTIONS:  - Assess bowel function  - Encourage oral fluids to ensure adequate hydration  - Administer IV fluids if ordered to ensure adequate hydration   - Administer ordered medications as needed  - Encourage mobilization and activity  - Nutrition services referral to assist patient with appropriate food choices  - Assess stoma site  - Consider wound care consult   Outcome: Progressing  Goal: Oral mucous membranes remain intact  Description: INTERVENTIONS  - Assess oral mucosa and hygiene practices  - Implement preventative oral hygiene regimen  - Implement oral medicated treatments as ordered  - Initiate Nutrition services referral as needed  Outcome:  Progressing     Problem: GENITOURINARY - ADULT  Goal: Maintains or returns to baseline urinary function  Description: INTERVENTIONS:  - Assess urinary function  - Encourage oral fluids to ensure adequate hydration if ordered  - Administer IV fluids as ordered to ensure adequate hydration  - Administer ordered medications as needed  - Offer frequent toileting  - Follow urinary retention protocol if ordered  Outcome: Progressing  Goal: Absence of urinary retention  Description: INTERVENTIONS:  - Assess patient’s ability to void and empty bladder  - Monitor I/O  - Bladder scan as needed  - Discuss with physician/AP medications to alleviate retention as needed  - Discuss catheterization for long term situations as appropriate  Outcome: Progressing  Goal: Urinary catheter remains patent  Description: INTERVENTIONS:  - Assess patency of urinary catheter  - If patient has a chronic pedersen, consider changing catheter if non-functioning  - Follow guidelines for intermittent irrigation of non-functioning urinary catheter  Outcome: Progressing     Problem: METABOLIC, FLUID AND ELECTROLYTES - ADULT  Goal: Electrolytes maintained within normal limits  Description: INTERVENTIONS:  - Monitor labs and assess patient for signs and symptoms of electrolyte imbalances  - Administer electrolyte replacement as ordered  - Monitor response to electrolyte replacements, including repeat lab results as appropriate  - Instruct patient on fluid and nutrition as appropriate  Outcome: Progressing  Goal: Fluid balance maintained  Description: INTERVENTIONS:  - Monitor labs   - Monitor I/O and WT  - Instruct patient on fluid and nutrition as appropriate  - Assess for signs & symptoms of volume excess or deficit  Outcome: Progressing  Goal: Glucose maintained within target range  Description: INTERVENTIONS:  - Monitor Blood Glucose as ordered  - Assess for signs and symptoms of hyperglycemia and hypoglycemia  - Administer ordered medications to  maintain glucose within target range  - Assess nutritional intake and initiate nutrition service referral as needed  Outcome: Progressing     Problem: SKIN/TISSUE INTEGRITY - ADULT  Goal: Skin Integrity remains intact(Skin Breakdown Prevention)  Description: Assess:  -Perform Jake assessment every   -Clean and moisturize skin every   -Inspect skin when repositioning, toileting, and assisting with ADLS  -Assess under medical devices such as  every   -Assess extremities for adequate circulation and sensation     Bed Management:  -Have minimal linens on bed & keep smooth, unwrinkled  -Change linens as needed when moist or perspiring  -Avoid sitting or lying in one position for more than  hours while in bed  -Keep HOB at degrees     Toileting:  -Offer bedside commode  -Assess for incontinence every   -Use incontinent care products after each incontinent episode such as     Activity:  -Mobilize patient  times a day  -Encourage activity and walks on unit  -Encourage or provide ROM exercises   -Turn and reposition patient every  Hours  -Use appropriate equipment to lift or move patient in bed  -Instruct/ Assist with weight shifting every  when out of bed in chair  -Consider limitation of chair time  hour intervals    Skin Care:  -Avoid use of baby powder, tape, friction and shearing, hot water or constrictive clothing  -Relieve pressure over bony prominences using   -Do not massage red bony areas    Next Steps:  -Teach patient strategies to minimize risks such as    -Consider consults to  interdisciplinary teams such as   Outcome: Progressing  Goal: Incision(s), wounds(s) or drain site(s) healing without S/S of infection  Description: INTERVENTIONS  - Assess and document dressing, incision, wound bed, drain sites and surrounding tissue  - Provide patient and family education  - Perform skin care/dressing changes every   Outcome: Progressing  Goal: Pressure injury heals and does not worsen  Description: Interventions:  -  Implement low air loss mattress or specialty surface (Criteria met)  - Apply silicone foam dressing  - Instruct/assist with weight shifting every  minutes when in chair   - Limit chair time to  hour intervals  - Use special pressure reducing interventions such as  when in chair   - Apply fecal or urinary incontinence containment device   - Perform passive or active ROM every   - Turn and reposition patient & offload bony prominences every  hours   - Utilize friction reducing device or surface for transfers   - Consider consults to  interdisciplinary teams such as   - Use incontinent care products after each incontinent episode such as  - Consider nutrition services referral as needed  Outcome: Progressing     Problem: HEMATOLOGIC - ADULT  Goal: Maintains hematologic stability  Description: INTERVENTIONS  - Assess for signs and symptoms of bleeding or hemorrhage  - Monitor labs  - Administer supportive blood products/factors as ordered and appropriate  Outcome: Progressing     Problem: MUSCULOSKELETAL - ADULT  Goal: Maintain or return mobility to safest level of function  Description: INTERVENTIONS:  - Assess patient's ability to carry out ADLs; assess patient's baseline for ADL function and identify physical deficits which impact ability to perform ADLs (bathing, care of mouth/teeth, toileting, grooming, dressing, etc.)  - Assess/evaluate cause of self-care deficits   - Assess range of motion  - Assess patient's mobility  - Assess patient's need for assistive devices and provide as appropriate  - Encourage maximum independence but intervene and supervise when necessary  - Involve family in performance of ADLs  - Assess for home care needs following discharge   - Consider OT consult to assist with ADL evaluation and planning for discharge  - Provide patient education as appropriate  Outcome: Progressing  Goal: Maintain proper alignment of affected body part  Description: INTERVENTIONS:  - Support, maintain and  protect limb and body alignment  - Provide patient/ family with appropriate education  Outcome: Progressing     Problem: Prexisting or High Potential for Compromised Skin Integrity  Goal: Skin integrity is maintained or improved  Description: INTERVENTIONS:  - Identify patients at risk for skin breakdown  - Assess and monitor skin integrity including under and around medical devices   - Assess and monitor nutrition and hydration status  - Monitor labs  - Assess for incontinence   - Turn and reposition patient  - Assist with mobility/ambulation  - Relieve pressure over paolo prominences   - Avoid friction and shearing  - Provide appropriate hygiene as needed including keeping skin clean and dry  - Evaluate need for skin moisturizer/barrier cream  - Collaborate with interdisciplinary team  - Patient/family teaching  - Consider wound care consult    Assess:  - Review Jake scale daily  - Clean and moisturize skin   - Inspect skin when repositioning, toileting, and assisting with ADLS  - Assess under medical devices   - Assess extremities for adequate circulation and sensation     Bed Management:  - Have minimal linens on bed & keep smooth, unwrinkled  - Change linens as needed when moist or perspiring  - Avoid sitting or lying in one position for more than 2 hours while in beds   - Toileting:  - Offer bedside commode  - Assess for incontinence  - Use incontinent care products after each incontinent episode    Activity:  - Mobilize patient 3 times a day  - Encourage activity and walks on unit  - Encourage or provide ROM exercises   - Turn and reposition patient every 2 Hours  - Use appropriate equipment to lift or move patient in bed  - Instruct/ Assist with weight shifting every hour when out of bed in chair  - Consider limitation of chair time 1 hour intervals    Skin Care:  - Avoid use of baby powder, tape, friction and shearing, hot water or constrictive clothing  - Relieve pressure over bony prominences  - Do not  massage red bony areas    Next Steps:  - Teach patient strategies to minimize risks  - Consider consults to  interdisciplinary teams   Outcome: Progressing     Problem: DECISION MAKING  Goal: Pt/Family able to effectively weigh alternatives and participate in decision making related to treatment and care  Description: INTERVENTIONS:  - Identify decision maker  - Determine when there are differences among patient's view, family's view, and healthcare provider's view of patient condition and care goals  - Facilitate patient/family articulation of goals for care  - Help patient/family identify pros/cons of alternative solutions  - Provide information as requested by patient/family  - Respect patient/family rights related to privacy and sharing information   - Serve as a liaison between patient, family and health care team  - Initiate consults as appropriate (Ethics Team, Palliative Care, Family Care Conference, etc.)  Outcome: Progressing     Problem: CONFUSION/THOUGHT DISTURBANCE  Goal: Thought disturbances (confusion, delirium, depression, dementia or psychosis) are managed to maintain or return to baseline mental status and functional level  Description: INTERVENTIONS:  - Assess for possible contributors to  thought disturbance, including but not limited to medications, infection, impaired vision or hearing, underlying metabolic abnormalities, dehydration, respiratory compromise,  psychiatric diagnoses and notify attending PHYSICAN/AP  - Monitor and intervene to maintain adequate nutrition, hydration, elimination, sleep and activity  - Decrease environmental stimuli, including noise as appropriate.  - Provide frequent contacts to provide refocusing, direction and reassurance as needed. Approach patient calmly with eye contact and at their level.  - [place] high risk fall precautions, aspiration precautions and other safety measures, as indicated  - If delirium suspected, notify physician/AP of change in condition  and request immediate in-person evaluation  - Pursue consults as appropriate including Geriatric (campus dependent), OT for cognitive evaluation/activity planning, psychiatric, pastoral care, etc.  Outcome: Progressing     Problem: BEHAVIOR  Goal: Pt/Family maintain appropriate behavior and adhere to behavioral management agreement, if implemented  Description: INTERVENTIONS:  - Assess the family dynamic   - Encourage verbalization of thoughts and concerns in a socially appropriate manner  - Assess patient/family's coping skills and non-compliant behavior (including use of illegal substances).  - Utilize positive, consistent limit setting strategies supporting safety of patient, staff and others  - Initiate consult with Case Management, Spiritual Care or other ancillary services as appropriate  - If a patient's/visitor's behavior jeopardizes the safety of the patient, staff, or others, refer to organization procedure.   - Notify Security of behavior or suspected illegal substances which indicate the need for search of the patient and/or belongings  - Encourage participation in the decision making process about a behavioral management agreement; implement if patient meets criteria  Outcome: Progressing     Problem: SAFETY,RESTRAINT: NV/NON-SELF DESTRUCTIVE BEHAVIOR  Goal: Remains free of harm/injury (restraint for non violent/non self-detsructive behavior)  Description: INTERVENTIONS:  - Instruct patient/family regarding restraint use   - Assess and monitor physiologic and psychological status   - Provide interventions and comfort measures to meet assessed patient needs   - Identify and implement measures to help patient regain control  - Assess readiness for release of restraint   Outcome: Progressing  Goal: Returns to optimal restraint-free functioning  Description: INTERVENTIONS:  - Assess the patient's behavior and symptoms that indicate continued need for restraint  - Identify and implement measures to help  patient regain control  - Assess readiness for release of restraint   Outcome: Progressing

## 2025-06-27 NOTE — ASSESSMENT & PLAN NOTE
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   Continue with Keppra 1000 mg twice daily  Continue every 4hr neurochecks  Repeat CT scan if decline in GCS by >2 points  SBP goal 100-140   Continue with Seroquel, melatonin, and amitriptyline to help with sleep/wake regulation and agitation secondary to TBI.   Delirium precautions  Plan for 2 week follow up with NSGY tomorrow for wound check and staple/suture removal.   Patient will require follow-up with neurosurgery as an outpatient.  Continue 1:1

## 2025-06-27 NOTE — PROGRESS NOTES
Progress Note - Trauma   Name: Jasson Carter 75 y.o. male I MRN: [id number]  [facility] I Date of Admission: 6/11/2025   Date of Service: 6/27/2025 I Hospital Day: 16    Assessment & Plan  Fall  - Status post fall with the below noted injuries.  - Patient suffered repeat fall on 6/22 in the setting of TBI/impulsiveness-CT head and C-spine were negative  - Fall precautions.  - Geriatric Medicine consultation for evaluation, medication review and recommendations.  - PT and OT evaluation and treatment as indicated.  - Case Management consultation for disposition planning.  SDH (subdural hematoma) (HCC)  Secondary to fall  Patient initially found with 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 midline shift s/p L craniotomy and R karyn hole craniotomy for evacuation, R sided drain removed previously  Neurosurgery consulted note appreciated  6/11 left-sided craniotomy with right-sided bur holes for evacuation of subdural hematoma  6/12 DAVF partial embolization via left ophthalmic artery  6/14 CT scan stable. 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   Continue with Keppra 1000 mg twice daily  Continue every 4hr neurochecks  Repeat CT scan if decline in GCS by >2 points  SBP goal 100-140   Continue with Seroquel, melatonin, and amitriptyline to help with sleep/wake regulation and agitation secondary to TBI.   Delirium precautions  Plan for 2 week follow up with NSGY tomorrow for wound check and staple/suture removal.   Patient will require follow-up with neurosurgery as an outpatient.  Continue 1:1  Altered mental status  - Secondary to TBI  - Medications and delirium precautions as above  - Palliative/Geriatrics on board  Post traumatic seizure (HCC)  - Witnessed possible tonic clonic activity post TBI  - EEG completed and no seizures noted  - On keppra  1000mg BID for 14 day course  Closed fracture of multiple ribs of left side with routine healing  Secondary to initial fall.  Found incidentally while assessing left shoulder pain after 2nd fall.  XR imaging showed: Acute mild displaced lateral left fifth rib fracture and questionable left lateral sixth rib fracture  Minimal tenderness  Rib fracture protocol  Continue to monitor  Follow-up with trauma as needed.  Left shoulder pain  Secondary to fall on 6/22  XR negative for acute traumatic injury-incidentally found to have a left rib fractures  Continue to monitor  Analgesia as needed  Atrial fibrillation (HCC)  New diagnosis on this admission  Evident on telemetry and EKG  Cardiology consult, appreciate recommendations.   EF preserved on a echocardiogram  Discontinue amlodipine and start metoprolol  No anticoagulation given SDH.  Follow-up as an outpatient with cardiology  Hypertension  Home amlodipine changed to metoprolol  Dysphagia  Speech consulted note appreciated  Recommended level 2 dysphagia diet with thin liquids  Continue Marinol  Patient has been tolerating feeds-continue to monitor    Bowel Regimen: dulcolax, Mirolax  VTE Prophylaxis:Enoxaparin (Lovenox)     Disposition: [facility] 1[facility] [place] [facility]     24 Hour Events : None  Subjective : Demented but follows commands. Slept well overnight per nursing    Objective :  Temp:  [97.2 °F (36.2 °C)-97.6 °F (36.4 °C)] 97.2 °F (36.2 °C)  HR:  [] 74  BP: (114-132)/(64-92) 114/64  Resp:  [17] 17  SpO2:  [96 %-97 %] 97 %  O2 Device: None (Room air)    I/O         06/25 0701  06/26 0700 06/26 0701  06/27 0700 06/27 0701  06/28 0700    P.O. 0 1310     Total Intake(mL/kg) 0 (0) 1310 (15)     Urine (mL/kg/hr)       Stool       Total Output       Net 0 +1310            Unmeasured Urine Occurrence 2 x 3 x             Physical Exam  Constitutional:       General: He is not in acute distress.     Appearance: He is not toxic-appearing.   HENT:      Head: Normocephalic and  atraumatic.      Mouth/Throat:      Mouth: Mucous membranes are dry.     Eyes:      General: No scleral icterus.     Pupils: Pupils are equal, round, and reactive to light.       Cardiovascular:      Rate and Rhythm: Normal rate.      Pulses: Normal pulses.      Heart sounds: No murmur heard.  Pulmonary:      Effort: Pulmonary effort is normal. No respiratory distress.      Breath sounds: Normal breath sounds. No wheezing or rhonchi.   Abdominal:      General: Abdomen is flat. There is no distension.      Palpations: Abdomen is soft.      Tenderness: There is no abdominal tenderness.     Musculoskeletal:         General: No deformity. Normal range of motion.      Cervical back: Normal range of motion. No rigidity.     Skin:     General: Skin is warm.      Findings: No erythema.     Neurological:      Mental Status: He is disoriented.      Comments: 4/5 strength in LUE - chronic  Folllow commands but disoriented  Lower extremities 5/5   No gross sensory deficits               Lab Results: I have reviewed the following results:  Recent Labs     06/26/25  0954 06/27/25  0647   WBC  --  8.28   HGB  --  13.5   HCT  --  40.8   PLT  --  360   SODIUM 133*  --    K 4.3  --      --    CO2 25  --    BUN 16  --    CREATININE 0.58*  --    GLUC 115  --

## 2025-06-27 NOTE — DISCHARGE SUMMARY
"Discharge Summary - Trauma   Name: Jasson Carter 75 y.o. male I MRN: [id number]  [facility] Date of Admission: 6/11/2025   Date of Service: 6/27/2025 I Hospital Day: 16    Admission Date: 6/11/2025 1045  Discharge Date: 06/27/25  Admitting Diagnosis: SDH (subdural hematoma) ([facility]) [S06.5XAA]  Discharge Diagnosis:   Medical Problems       Resolved Problems  Date Reviewed: 6/11/2025   None         HPI: \"Jasson Carter is a 75 y.o. male who presents significant past medical history of GERD, hyperlipidemia, hypertension, BPH and peripheral neuropathy for that is seeing a pain for a fall which occurred yesterday.  Patient currently presented to [facility] with a GCS of 13 and acute mental status change from a GCS of 15.  Noted to have expressive aphasia.  Unable to follow commands in the trauma bay.  Stat neurosurgical consult placed after repeat head CT showed worsening of brain bleed.  Patient will go to the ICU postoperatively.  Family updated by trauma attending and neurosurgical team.\" - Per Yayo Morrow's H&P on 6/11/2025    Procedures Performed:   Orders Placed This Encounter   Procedures    Intubation       Summary of Hospital Course: Patient was admitted to the trauma's service after being found to have SDH.  He underwent left craniotomy with right-sided bur holes for evaluation and subdural hematoma on 6/11.  On 6/12 he underwent DAVF with partial embolization via left ophthalmic artery.  6/14 CT scan was stable.  He will continue on Keppra 1000 mg twice daily and follow-up with neurosurgery and neurology as an outpatient.  During his hospitalization he was incidentally found to have left rib fracture but no significant pain.  He may follow-up with the trauma service as needed.  He was also incidentally found to have atrial fibrillation and was evaluated by cardiology.  His home amlodipine was changed to metoprolol.  No anticoagulant given SDH.  He will follow-up with his primary " care doctor/cardiology for continued monitoring and management.  During his hospitalization he was also noted to have difficulty eating and was evaluated by speech who cleared for regular diet. He has had increased appetite since that time and tolerating his diet.  He was evaluated by PT/OT and recommended for rehab.  PMR also evaluated patient and cleared him for rehab placement which he will be discharged later today.  For any other details regarding hospitalization, please see EMR      Family communication: I did discuss with patient's son regarding his discharge and he agrees with patient being discharged today.  We highlighted his hospitalization, significant events, and medication changes which he confirmed understanding.  He did not have any questions or concerns at the completion of our conversation.    Significant Findings, Care, Treatment and Services Provided:   MRI brain wo contrast  Result Date: 6/16/2025  Impression: FINDINGS/IMPRESSION: Nondiagnostic examination given severe motion degradation of sagittal T1 sequence and grainy motion degraded image quality of the DWI sequence. Susceptibility artifact along left parietal convexity is likely related to known subdural hematoma with bilateral scattered pneumocephalus. Large scalp soft tissue swelling along left head extending to left temporalis region. Recommend repeat MRI brain without contrast for further evaluation. Please see MRI head without contrast dated 6/12/2025 for further evaluation. The study was marked in EPIC for immediate notification. Workstation performed: [id number]     XR hand 3+ vw left  Result Date: 6/16/2025  Impression: There is some arthritis noted, most prominent at the first CMC joint. No acute pathology seen Computerized Assisted Algorithm (CAA) may have been used to analyze all applicable images. Workstation performed: [id number]     XR knee 1 or 2 vw right  Result Date: 6/16/2025  Impression: Tricompartmental osteoarthritis  which is most severe laterally with very large joint effusion. Computerized Assisted Algorithm (CAA) may have been used to analyze all applicable images. Workstation performed: [id number]     XR chest portable ICU  Result Date: 6/15/2025  Impression: No acute cardiopulmonary disease. See subsequent chest CT. ET tube 5 cm above the jose. 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 Workstation performed: [id number]     CT head wo contrast  Result Date: 6/14/2025  Impression: Slight interval decrease in size of left subdural hematoma as detailed above. No new/additional areas of intracranial hemorrhage. Stable postsurgical changes and other ancillary findings detailed above. Workstation performed: [id number]     MRA head wo contrast  Result Date: 6/14/2025  Impression: No significant stenosis or occlusion. No aneurysm. Flow-related enhancement within the small draining veins related to known cribriform plate dural AV fistula Workstation performed: [id number]     MRI brain wo contrast  Result Date: 6/14/2025  Impression: Several small scattered acute/early subacute infarcts in the bilateral frontal and parietal lobes and left subinsular/anterior temporal region. Stable residual left subdural hematoma measuring up to 1.3 cm. Stable mass effect with 3 millimeters of left-to-right shift Thin residual extra-axial collection on the right The study was marked in EPIC for immediate notification. 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 Successful partial fistula embolization via the left ophthalmic artery. Workstation performed: [id number]     CT VENOGRAM BRAIN  Result Date: 6/13/2025  Impression: Venous sinuses and deep cerebral veins are patent. Stable posttreatment changes status post partial embolization of  cribriform plate dural AV fistula. Persistent visualization of small draining veins extending superiorly from the cribriform plate. Stable residual left subdural hematoma.. Workstation performed: [id number]     CTA head and neck w wo contrast  Result Date: 6/12/2025  Impression: CT Brain: No acute infarct or parenchymal hemorrhage. Postoperative changes status post left craniotomy for hematoma evacuation. Stable residual hematoma and mass effect. Stable right frontal karyn hole with subdural drain without significant residual right subdural collection.. Decreasing postoperative pneumocephaly CT Angiography: No significant stenosis of the cervical carotid or vertebral arteries No significant intracranial stenosis, large vessel occlusion or aneurysm. Residual filling of the cribriform plate dural AV fistula status post embolization of the left anterior ethmoidal artery. Workstation performed: [id number]     CTA head and neck w wo contrast  Result Date: 6/12/2025  Impression: 1. Postsurgical change from partial evacuation of left hemispheric subdural hematoma. Residual hemorrhage measures up to 9 mm in thickness. 2. Evacuation of right hemispheric subdural collection. Surgical drain and gas over the right convexity. 3. Reduced left to right midline shift, 3 mm. 4. No hemodynamically significant stenosis, dissection or occlusion of the carotid or vertebral arteries or major vessels of the [place]. Workstation performed: [id number]     XR hip/pelv 2-3 vws left if performed  Result Date: 6/11/2025  Impression: No acute osseous abnormality. Computerized Assisted Algorithm (CAA) may have been used to analyze all applicable images. Workstation performed: [id number]     CT head wo contrast  Result Date: 6/11/2025  Impression: Stable mixed density left convexity subdural hematoma containing hyperdense acute blood products, with stable approximately 5 mm of left-to-right midline shift. Stable hypodense right convexity  subdural hematoma. Workstation performed: [id number]     CT head without contrast  Result Date: 6/11/2025  Impression: 1.  14 mm mixed density acute left cerebral convexity subdural hematoma with associated 5 mm rightward midline shift. 2.  Low-density 7 mm right cerebral convexity subdural hematoma, age indeterminate. 3.  No cervical spine fracture or traumatic malalignment. Findings were discussed with Dr. Brand on 6/11/25 at 9:23 AM. Workstation performed: [id number]     CT cervical spine without contrast  Result Date: 6/11/2025  Impression: 1.  14 mm mixed density acute left cerebral convexity subdural hematoma with associated 5 mm rightward midline shift. 2.  Low-density 7 mm right cerebral convexity subdural hematoma, age indeterminate. 3.  No cervical spine fracture or traumatic malalignment. Findings were discussed with Dr. Brand on 6/11/25 at 9:23 AM. Workstation performed: [id number]         Complications: None    Condition at Discharge: good       Discharge instructions/Information to patient and family:   See After Visit Summary (AVS) for information provided to patient and family.      Provisions for Follow-Up Care:  See after visit summary for information related to follow-up care and any pertinent home health orders.      PCP: Ruben Finch MD    Disposition: See After Visit Summary for discharge disposition information.    Planned Readmission: No     Discharge Medications:  See after visit summary for reconciled discharge medications provided to patient and family.      Discharge Statement:  I have spent a total time of 25 minutes in caring for this patient on the day of the visit/encounter. .   3 = A little assistance

## 2025-06-27 NOTE — PROGRESS NOTES
Progress Note - Geriatric Medicine   Jasson Carter 75 y.o. male MRN: [id number]  [facility] Encounter: [id number]      Assessment/Plan:    Ambulatory dysfunction with fall  -reportedly mechanical fall day prior to admission   -admitted with below noted injuries   -was not requiring use of assist device for ambulation at baseline PTA  -endorsed hx prior falls at least one additional in past six months   -remains high risk future falls due to age, hx of fall, deconditioning/debility, TBI, impulsivity and unfamiliar environment   -encourage good body mechanics and assist with all transfers  -maintain environment free of fall hazards  -encourage appropriate footwear and adequate lighting at all times when out of bed  -consider home fall risk assessment and personal fall alert system if returning home following rehab   -PT and OT following, anticipate rehab on hosp d/c, CM following for assist with dispo planning, potential dc to rehab later today     Subdural hematoma  -s/p fall as outlined above  -CTH on initial presentation noted 14 mm mixed density acute left cerebral convexity subdural hematoma with associated 5 mm rightward midline shift and low-density 7 mm right cerebral convexity subdural hematoma age indeterminate   -s/p left-sided crani and right sided bur hole for evaluation of subdural hematoma 6/11/25  -s/p partial embolization DAVF via left ophthalmic artery 6/12/25   -post-op course complicated by seizure briefly requiring intubation for airway protection as well as acute stroke in multiple vascular territories  -Keppra for seizure ppx   -CTH 6/14/25 notes decrease size of L SDH with no new area of hemorrhage and stable post op changes   -neurochecks per protocol  -Nsx on consult and following, no additional inpatient treatments planned at this time, cleared for dc from NSx standpoint with o/p f/u   -PT/OT/CM following      Altered mental status  -alert and oriented to person place  situation and year not month or day on initial consultation  -today awake and alert confused and impulsive requiring constant redirection  -mentation fluctuating during admission typically clearer cognition early in day and more impulsive/inattentive in afternoon/evenings   -at baseline fully oriented independent with ADLs and iADLs  -no prior memory or cognitive concerns reported  -multifactorial including recent SDH requiring surgical evacuation, recent CVA in multiple vascular territories, post traumatic seizure and prolonged hospitalization in setting of TBI  -Elavil dc'd earlier in admission as was not current home medication [facility] and may contribute to confusion/QTc prolongation in older adults, do not restart on dc  -if complains of persistent neuropathy symptoms can consider trial low dose Gabapentin HS   -Seroquel increased to 75mg BID and 100mg HS, monitor electrolytes and QTc closely (456msec on EKG 6/27)  -continue supportive cares and strict delirium precautions   -responds better to in person one to one than virtual and physical restraints worsen behaviors and confusion and thus in person one to one preferred      Posttraumatic seizure  -reportedly witnessed tonic clonic activity following TBI  -no seizure activity reported on 24H EEG completed earlier in admission   -currently on Keppra   -continue seizure precautions      Acute CVA of multiple vascular territories   -MRI brain 6/14/25 notes several small scattered acute/early subacute infarcts bilateral frontal and parietal lobes and left subinsular/anterior temporal region  -remains high risk recurrence, cont secondary risk factor modifications   -Nsx on consult, close o/p f/u with Neurology/Nsx      Dysphagia  -with poor oral intake   -NG tube removed 6/20  -speech on consult, currently on level II dysphagia diet, mech soft with nectar thick liquids   -continue strict aspiration precautions   -speech on consult and following   -Marinol  added 6/19 for potential appetite stimulation without significant improvement   -hopeful for improved oral intake with diet texture upgrade   -could consider low dose Mirtazapine 15mg HS with close monitoring of electrolytes, if too sedate with use can consider reducing evening Seroquel dose to allow for addition of Mirtazapine   -encourage family to assist patient with ordering food he likes to promote oral intake as notes he dislikes hospital food, enjoys PB&J per family      Atrial fibrillation  -reportedly new diagnosis this admission   -monitoring on telemetry  -echo completed 6/24  -rates appear to be well controlled on current regimen  -not currently on systemic a/c due to SDH requiring surgical evacuation as above  -continue optimization of hemodynamics    -Cardiology now on consult appreciate input and recs     Cognitive screening  -awake and alert but completely disoriented at time of evaluation   -reportedly fully oriented and independent with ADLs/iADLs at baseline  -no prior cognitive testing on record for review  -TSH WNL 2.364, B12 low at 204 on [facility], recommend supplementation to goal at least 400  -at risk cardiovascular, cerebrovascular and TBI related cognitive decline, continue secondary risk factor modifications and supportive cares, consider cognitive testing with OT  -encourage patient remain physically, socially, cognitively active and engaged to maintain cognitive acuity     Impaired Vision  -recommend use of corrective lenses at all appropriate times  -encourage adequate lighting and encourage use of assistance with ambulation  -keep personal belongings close to person to avoid reaching  -encourage appropriate footwear at all times  -consider large font for printed materials provided to patient      Impaired Hearing  -Encourage use of hearing aids at all appropriate times  -encourage providers and caregivers to speak slowly and clearly directly to patient  -minimize background  noise to encourage patient engagement  -consider use of hearing amplifier to reduce risk of straining to hear if hearing aids are not present or are not sufficient   -encourage use of teach back method to ensure clear communication       Frailty syndrome in geriatric patient  -clinical frailty scale stage V, mildly frail, progressive  -multifactorial including age, HTN, HLD now with fall and multitude of acute traumatic injuries superimposed in elderly individual with limited physiologic and metabolic reserves in elderly individual with limited physiologic and metabolic reserves   -continue optimization chronic conditions and address acute derangements as arise  -encourage well balanced nutritional intake, nutrition on consult  -ensure underlying anxiety/mood/depression symptoms are well controlled as may impact response to therapies as well as overall sense of wellbeing and quality of life  -continue psychosocial support of patient and caregivers   -continue to ensure treatment and interventions align with patient wishes and goals of care      High risk developing delirium   -due to age, SDH requiring surgical evacuation, TBI, acute/subacute stroke and prolonged hospitalization   -continue strict delirium precautions  -encourage normal sleep/wake cycle  -minimize overnight interruptions, group overnight vitals/labs/nursing checks as medically appropriate   -dim lights, close blinds and turn off tv to minimize stimulation and encourage sleep environment in evenings  -ensure that pain if present is well controlled   -monitor for fecal and urinary retention which may precipitate delirium, last BM recorded 6/19  -encourage early mobilization and ambulation with assist once cleared to safely do so  -provide frequent reorientation and redirection as indicated and appropriate   -encourage family and friends at the bedside to help calm patient if anxious  -encourage well balanced hydration and nutrition, requires  "encouragement for oral intake    -redirect unwanted behaviors as first line    Care coordination: rounded with Reyna (RN)    Subjective:     Sib is seen and examined at bedside where he is lying resting, he is pleasantly confused calm and cooperative, he states that he slept well and denies pain or acute complaints. Nursing notes that he was much less restless last night and one to one sitter notes that he has been calm and cooperative this morning.     Review of Systems   Unable to perform ROS: Mental status change     Objective:     Vitals: Blood pressure 135/75, pulse 94, temperature (!) 97.2 °F (36.2 °C), temperature source Axillary, resp. rate 18, height 6' 3.9\" (1.928 m), weight 87.1 kg (192 lb), SpO2 97%.,Body mass index is 23.43 kg/m².      Intake/Output Summary (Last 24 hours) at 6/27/2025 0913  Last data filed at 6/27/2025 0500  Gross per 24 hour   Intake 830 ml   Output --   Net 830 ml     Current Medications: Reviewed    Physical Exam:   Physical Exam  Vitals and nursing note reviewed.   Constitutional:       General: He is not in acute distress.     Appearance: Normal appearance. He is not toxic-appearing.   HENT:      Head: Normocephalic.      Nose: Nose normal.      Mouth/Throat:      Mouth: Mucous membranes are dry.     Eyes:      General: No scleral icterus.        Right eye: No discharge.         Left eye: No discharge.      Conjunctiva/sclera: Conjunctivae normal.     Neck:      Comments: Phonation norm   Cardiovascular:      Rate and Rhythm: Normal rate and regular rhythm.   Pulmonary:      Effort: Pulmonary effort is normal. No respiratory distress.      Breath sounds: No wheezing.      Comments: Saturating well on room air   Abdominal:      General: Bowel sounds are normal. There is no distension.      Palpations: Abdomen is soft.     Musculoskeletal:      Cervical back: Neck supple.      Comments: Reduced overall muscle mass      Skin:     General: Skin is warm and dry.     Neurological:      " Mental Status: He is alert.      Comments: Awake and alert, pleasantly confused calm cooperative    Psychiatric:      Comments: Impulsive but redirectable         Invasive Devices       Peripheral Intravenous Line  Duration             Peripheral IV 06/25/25 Left;Upper;Ventral (anterior) Arm 1 day                  Lab Results:     I have personally reviewed pertinent lab results including the following:    Results from last 7 days   Lab Units 06/27/25  0647 06/23/25  0651 06/21/25  0329   WBC Thousand/uL 8.28 8.50 8.34   HEMOGLOBIN g/dL 13.5 14.3 12.9   HEMATOCRIT % 40.8 43.2 39.4   PLATELETS Thousands/uL 360 291 245   SEGS PCT %  --   --  70   MONO PCT %  --   --  9   EOS PCT %  --   --  2     Results from last 7 days   Lab Units 06/27/25  0647 06/26/25  0954 06/23/25  0651   POTASSIUM mmol/L 3.6 4.3 4.0   CHLORIDE mmol/L 101 100 102   CO2 mmol/L 28 25 25   BUN mg/dL 24 16 18   CREATININE mg/dL 0.92 0.58* 0.70   CALCIUM mg/dL 9.1 8.8 8.9     I have personally reviewed the following imaging study reports in PACS:    No new imaging overnight

## 2025-06-30 NOTE — TELEPHONE ENCOUNTER
6/30/25 - PT DISCHARGED TO [facility] AT [facility] [place] [phone]  2 WK HFU W/CT HEAD W/MO   **WORKING ON SCHEDULING APT**  7/23/25 6 WK POV W/DIDIER     06/27/2025- PT STILL IN HOSPITAL

## 2025-07-03 LAB
GRAM STN SPEC: NORMAL

## 2025-07-07 ENCOUNTER — TELEPHONE (OUTPATIENT)
Age: 75
End: 2025-07-07

## 2025-07-07 NOTE — TELEPHONE ENCOUNTER
Dr. Figueredo from [facility] called in. PT's family would like to move forward with PEG tube placement.     Dr. Figueredo would like to speak with Dr. Smith to coordinate PT's hospital admission and discuss PEG tube placement, neurosurgery consult, repeat CT scan, and potential embolization.     Dr. Figueredo Phone: [phone]

## 2025-07-08 ENCOUNTER — TELEPHONE (OUTPATIENT)
Dept: NEUROSURGERY | Facility: CLINIC | Age: 75
End: 2025-07-08

## 2025-07-08 ENCOUNTER — HOSPITAL ENCOUNTER (INPATIENT)
Facility: HOSPITAL | Age: 75
LOS: 29 days | Discharge: NURSING, REHAB OR LONG-TERM CARE | DRG: 305 | End: 2025-08-07
Attending: SURGERY | Admitting: SURGERY
Payer: MEDICARE

## 2025-07-08 NOTE — TELEPHONE ENCOUNTER
Received call from [facility]. He states that patient has not had any improvement in his mental status since he's been there additionally the patient is not eating so they are considering bringing him to ED for PEG tube placement. He states that they obtained CT head scan today which was read as acute on chronic SDH, 11 mm maximal thickness. Of note, this imaging study or report is not available in epic for review. He states he wants to make us aware that they are planning on bringing patient to ED. Will forward to on call provider so they are aware.

## 2025-07-09 ENCOUNTER — APPOINTMENT (EMERGENCY)
Dept: RADIOLOGY | Facility: HOSPITAL | Age: 75
DRG: 305 | End: 2025-07-09
Payer: MEDICARE

## 2025-07-10 ENCOUNTER — ANESTHESIA EVENT (INPATIENT)
Dept: PERIOP | Facility: HOSPITAL | Age: 75
End: 2025-07-10
Payer: MEDICARE

## 2025-07-10 ENCOUNTER — ANESTHESIA (INPATIENT)
Dept: PERIOP | Facility: HOSPITAL | Age: 75
End: 2025-07-10
Payer: MEDICARE

## 2025-07-10 PROBLEM — M87.9 OSTEONECROSIS OF LEFT HIP (HCC): Status: ACTIVE | Noted: 2025-07-10

## 2025-07-10 RX ORDER — FENTANYL CITRATE 50 UG/ML
INJECTION, SOLUTION INTRAMUSCULAR; INTRAVENOUS AS NEEDED
Status: DISCONTINUED | OUTPATIENT
Start: 2025-07-10 | End: 2025-07-10

## 2025-07-10 RX ORDER — LIDOCAINE HYDROCHLORIDE 10 MG/ML
INJECTION, SOLUTION EPIDURAL; INFILTRATION; INTRACAUDAL; PERINEURAL AS NEEDED
Status: DISCONTINUED | OUTPATIENT
Start: 2025-07-10 | End: 2025-07-10

## 2025-07-10 RX ORDER — SODIUM CHLORIDE, SODIUM LACTATE, POTASSIUM CHLORIDE, CALCIUM CHLORIDE 600; 310; 30; 20 MG/100ML; MG/100ML; MG/100ML; MG/100ML
INJECTION, SOLUTION INTRAVENOUS CONTINUOUS PRN
Status: DISCONTINUED | OUTPATIENT
Start: 2025-07-10 | End: 2025-07-10

## 2025-07-10 RX ORDER — DEXAMETHASONE SODIUM PHOSPHATE 10 MG/ML
INJECTION, SOLUTION INTRAMUSCULAR; INTRAVENOUS AS NEEDED
Status: DISCONTINUED | OUTPATIENT
Start: 2025-07-10 | End: 2025-07-10

## 2025-07-10 RX ORDER — ONDANSETRON 2 MG/ML
INJECTION INTRAMUSCULAR; INTRAVENOUS AS NEEDED
Status: DISCONTINUED | OUTPATIENT
Start: 2025-07-10 | End: 2025-07-10

## 2025-07-10 RX ORDER — ROCURONIUM BROMIDE 10 MG/ML
INJECTION, SOLUTION INTRAVENOUS AS NEEDED
Status: DISCONTINUED | OUTPATIENT
Start: 2025-07-10 | End: 2025-07-10

## 2025-07-10 RX ORDER — PROPOFOL 10 MG/ML
INJECTION, EMULSION INTRAVENOUS AS NEEDED
Status: DISCONTINUED | OUTPATIENT
Start: 2025-07-10 | End: 2025-07-10

## 2025-07-10 RX ADMIN — SODIUM CHLORIDE, SODIUM LACTATE, POTASSIUM CHLORIDE, AND CALCIUM CHLORIDE: .6; .31; .03; .02 INJECTION, SOLUTION INTRAVENOUS at 22:37

## 2025-07-10 RX ADMIN — FENTANYL CITRATE 50 MCG: 50 INJECTION INTRAMUSCULAR; INTRAVENOUS at 22:47

## 2025-07-10 RX ADMIN — FENTANYL CITRATE 50 MCG: 50 INJECTION INTRAMUSCULAR; INTRAVENOUS at 22:42

## 2025-07-10 RX ADMIN — ONDANSETRON 4 MG: 2 INJECTION INTRAMUSCULAR; INTRAVENOUS at 23:23

## 2025-07-10 RX ADMIN — DEXAMETHASONE SODIUM PHOSPHATE 10 MG: 10 INJECTION, SOLUTION INTRAMUSCULAR; INTRAVENOUS at 22:59

## 2025-07-10 RX ADMIN — PROPOFOL 100 MG: 10 INJECTION, EMULSION INTRAVENOUS at 22:42

## 2025-07-10 RX ADMIN — LIDOCAINE HYDROCHLORIDE 50 MG: 10 INJECTION, SOLUTION EPIDURAL; INFILTRATION; INTRACAUDAL; PERINEURAL at 22:42

## 2025-07-10 RX ADMIN — SUGAMMADEX 200 MG: 100 INJECTION, SOLUTION INTRAVENOUS at 23:10

## 2025-07-10 RX ADMIN — ROCURONIUM BROMIDE 50 MG: 10 INJECTION, SOLUTION INTRAVENOUS at 22:43

## 2025-07-11 PROBLEM — G93.5 BRAIN COMPRESSION (HCC): Status: ACTIVE | Noted: 2025-07-11

## 2025-07-11 PROBLEM — E43 SEVERE PROTEIN-CALORIE MALNUTRITION (HCC): Status: ACTIVE | Noted: 2025-07-11

## 2025-07-11 NOTE — ANESTHESIA POSTPROCEDURE EVALUATION
Post-Op Assessment Note    CV Status:  Stable  Pain Score: 0    Pain management: adequate    Multimodal analgesia used between 6 hours prior to anesthesia start to PACU discharge    Mental Status:  Sleepy   Hydration Status:  Euvolemic   PONV Controlled:  Controlled   Airway Patency:  Patent  Two or more mitigation strategies used for obstructive sleep apnea   Post Op Vitals Reviewed: Yes    No anethesia notable event occurred.    Staff: CRNA           Last Filed PACU Vitals:  Vitals Value Taken Time   Temp 97.1    Pulse 76 07/10/25 23:30   /90 07/10/25 23:30   Resp 4 07/10/25 23:30   SpO2 100 % 07/10/25 23:30   Vitals shown include unfiled device data.

## 2025-07-11 NOTE — ANESTHESIA PREPROCEDURE EVALUATION
Procedure:  INSERTION PEG TUBE (Abdomen)    Relevant Problems   CARDIO   (+) A-fib (HCC)   (+) Hypertension   (+) Pure hypercholesterolemia      GI/HEPATIC   (+) Dysphagia      /RENAL   (+) BPH (benign prostatic hyperplasia)      MUSCULOSKELETAL   (+) Primary osteoarthritis involving multiple joints      NEURO/PSYCH   (+) Post traumatic seizures (HCC)   (+) Posttraumatic stress disorder   (+) SDH (subdural hematoma) (HCC)        Physical Exam    Airway     Mallampati score: II  TM Distance: >3 FB  Neck ROM: full      Cardiovascular      Dental       Pulmonary      Neurological      Other Findings        Anesthesia Plan  ASA Score- 3     Anesthesia Type- general with ASA Monitors.         Additional Monitors:     Airway Plan: Oral ETT.           Plan Factors-Exercise tolerance (METS): >4 METS.    Chart reviewed.                      Induction- intravenous.    Postoperative Plan- .   Monitoring Plan - Monitoring plan - standard ASA monitoring  Post Operative Pain Plan - multimodal analgesia    Perioperative Resuscitation Plan - Level 1 - Full Code.       Informed Consent- Anesthetic plan and risks discussed with patient.  I personally reviewed this patient with the CRNA. Discussed and agreed on the Anesthesia Plan with the CRNA..      NPO Status:  Vitals Value Taken Time   Date of last liquid 07/09/25 07/10/25 22:21   Time of last liquid     Date of last solid 07/09/25 07/10/25 22:21   Time of last solid

## 2025-07-11 NOTE — ANESTHESIA POSTPROCEDURE EVALUATION
Post-Op Assessment Note    CV Status:  Stable  Pain Score: 0    Pain management: adequate    Multimodal analgesia used between 6 hours prior to anesthesia start to PACU discharge    Mental Status:  Alert   Hydration Status:  Stable   PONV Controlled:  Controlled   Airway Patency:  Patent  Two or more mitigation strategies used for obstructive sleep apnea   Post Op Vitals Reviewed: Yes    No anethesia notable event occurred.    Staff: Anesthesiologist           Last Filed PACU Vitals:  Vitals Value Taken Time   Temp 97.1 °F (36.2 °C) 07/10/25 23:30   Pulse 85 07/10/25 23:57   /103 07/10/25 23:48   Resp 13 07/10/25 23:57   SpO2 96 % 07/10/25 23:57   Vitals shown include unfiled device data.    Modified Esther:     Vitals Value Taken Time   Activity 0 07/10/25 23:30   Respiration 2 07/10/25 23:30   Circulation 2 07/10/25 23:30   Consciousness 0 07/10/25 23:30   Oxygen Saturation 1 07/10/25 23:30     Modified Esther Score: 5

## 2025-07-12 ENCOUNTER — ANESTHESIA (INPATIENT)
Dept: PERIOP | Facility: HOSPITAL | Age: 75
End: 2025-07-12
Payer: MEDICARE

## 2025-07-12 ENCOUNTER — ANESTHESIA EVENT (INPATIENT)
Dept: PERIOP | Facility: HOSPITAL | Age: 75
End: 2025-07-12
Payer: MEDICARE

## 2025-07-12 RX ORDER — FENTANYL CITRATE 50 UG/ML
INJECTION, SOLUTION INTRAMUSCULAR; INTRAVENOUS AS NEEDED
Status: DISCONTINUED | OUTPATIENT
Start: 2025-07-12 | End: 2025-07-12

## 2025-07-12 RX ORDER — PROPOFOL 10 MG/ML
INJECTION, EMULSION INTRAVENOUS AS NEEDED
Status: DISCONTINUED | OUTPATIENT
Start: 2025-07-12 | End: 2025-07-12

## 2025-07-12 RX ORDER — SODIUM CHLORIDE 9 MG/ML
INJECTION, SOLUTION INTRAVENOUS CONTINUOUS PRN
Status: DISCONTINUED | OUTPATIENT
Start: 2025-07-12 | End: 2025-07-12

## 2025-07-12 RX ORDER — ROCURONIUM BROMIDE 10 MG/ML
INJECTION, SOLUTION INTRAVENOUS AS NEEDED
Status: DISCONTINUED | OUTPATIENT
Start: 2025-07-12 | End: 2025-07-12

## 2025-07-12 RX ORDER — LIDOCAINE HYDROCHLORIDE 10 MG/ML
INJECTION, SOLUTION EPIDURAL; INFILTRATION; INTRACAUDAL; PERINEURAL AS NEEDED
Status: DISCONTINUED | OUTPATIENT
Start: 2025-07-12 | End: 2025-07-12

## 2025-07-12 RX ORDER — SUCCINYLCHOLINE/SOD CL,ISO/PF 100 MG/5ML
SYRINGE (ML) INTRAVENOUS AS NEEDED
Status: DISCONTINUED | OUTPATIENT
Start: 2025-07-12 | End: 2025-07-12

## 2025-07-12 RX ORDER — ONDANSETRON 2 MG/ML
INJECTION INTRAMUSCULAR; INTRAVENOUS AS NEEDED
Status: DISCONTINUED | OUTPATIENT
Start: 2025-07-12 | End: 2025-07-12

## 2025-07-12 RX ADMIN — SODIUM CHLORIDE: 0.9 INJECTION, SOLUTION INTRAVENOUS at 09:53

## 2025-07-12 RX ADMIN — CEFAZOLIN SODIUM 2000 MG: 2 SOLUTION INTRAVENOUS at 10:07

## 2025-07-12 RX ADMIN — FENTANYL CITRATE 50 MCG: 50 INJECTION INTRAMUSCULAR; INTRAVENOUS at 10:37

## 2025-07-12 RX ADMIN — FENTANYL CITRATE 25 MCG: 50 INJECTION INTRAMUSCULAR; INTRAVENOUS at 10:39

## 2025-07-12 RX ADMIN — SUGAMMADEX 200 MG: 100 INJECTION, SOLUTION INTRAVENOUS at 11:00

## 2025-07-12 RX ADMIN — ONDANSETRON 4 MG: 2 INJECTION INTRAMUSCULAR; INTRAVENOUS at 10:31

## 2025-07-12 RX ADMIN — PROPOFOL 100 MG: 10 INJECTION, EMULSION INTRAVENOUS at 10:03

## 2025-07-12 RX ADMIN — ROCURONIUM BROMIDE 30 MG: 10 INJECTION, SOLUTION INTRAVENOUS at 10:07

## 2025-07-12 RX ADMIN — FENTANYL CITRATE 25 MCG: 50 INJECTION INTRAMUSCULAR; INTRAVENOUS at 10:42

## 2025-07-12 RX ADMIN — SODIUM CHLORIDE: 0.9 INJECTION, SOLUTION INTRAVENOUS at 11:03

## 2025-07-12 RX ADMIN — Medication 100 MG: at 10:03

## 2025-07-12 RX ADMIN — LIDOCAINE HYDROCHLORIDE 30 MG: 10 INJECTION, SOLUTION EPIDURAL; INFILTRATION; INTRACAUDAL; PERINEURAL at 10:03

## 2025-07-12 NOTE — ANESTHESIA POSTPROCEDURE EVALUATION
Post-Op Assessment Note    CV Status:  Stable  Pain Score: 0    Pain management: adequate       Mental Status:  Lethargic and awake   Hydration Status:  Stable   PONV Controlled:  None   Airway Patency:  Patent     Post Op Vitals Reviewed: Yes    No anethesia notable event occurred.    Staff: Anesthesiologist, CRNA           Last Filed PACU Vitals:  Vitals Value Taken Time   Temp 97.1 °F (36.2 °C) 07/12/25 11:15   Pulse 85 07/12/25 11:16   /112 07/12/25 11:16   Resp 30 07/12/25 11:16   SpO2 100 % 07/12/25 11:16   Vitals shown include unfiled device data.

## 2025-07-12 NOTE — ANESTHESIA PREPROCEDURE EVALUATION
Procedure:  INSERTION GASTROSTOMY TUBE OPEN (Abdomen)    Relevant Problems   CARDIO   (+) A-fib (HCC)   (+) Hypertension   (+) Pure hypercholesterolemia      GI/HEPATIC   (+) Dysphagia      /RENAL   (+) BPH (benign prostatic hyperplasia)      MUSCULOSKELETAL   (+) Primary osteoarthritis involving multiple joints      NEURO/PSYCH   (+) Post traumatic seizures (HCC)   (+) Posttraumatic stress disorder   (+) SDH (subdural hematoma) (HCC)        Physical Exam    Airway     Mallampati score: II    Neck ROM: full      Cardiovascular      Dental       Pulmonary      Neurological      Other Findings        Anesthesia Plan  ASA Score- 3     Anesthesia Type- general with ASA Monitors.         Additional Monitors:     Airway Plan: Oral ETT.    Comment: I, Dr. Roberson, the attending physician, have personally seen and evaluated the patient prior to anesthetic care.  I have reviewed the pre-anesthetic record, and other medical records if appropriate to the anesthetic care.  If a CRNA is involved in the case, I have reviewed the CRNA assessment, if present, and agree.  The patient is in a suitable condition to proceed with my formulated anesthetic plan.  .       Plan Factors-    Chart reviewed.                      Induction-     Postoperative Plan-     Perioperative Resuscitation Plan - Level 1 - Full Code.       Informed Consent- Anesthetic plan and risks discussed with patient.  I personally reviewed this patient with the CRNA. Discussed and agreed on the Anesthesia Plan with the CRNA..      NPO Status:  Vitals Value Taken Time   Date of last liquid 07/09/25 07/10/25 22:21   Time of last liquid     Date of last solid 07/09/25 07/10/25 22:21   Time of last solid

## 2025-07-12 NOTE — ANESTHESIA POSTPROCEDURE EVALUATION
Post-Op Assessment Note    CV Status:  Stable    Pain management: adequate       Mental Status:  Alert and awake   Hydration Status:  Euvolemic   PONV Controlled:  Controlled   Airway Patency:  Patent     Post Op Vitals Reviewed: Yes    No anethesia notable event occurred.    Staff: Anesthesiologist           Last Filed PACU Vitals:  Vitals Value Taken Time   Temp 97.1 °F (36.2 °C) 07/12/25 11:15   Pulse 89 07/12/25 12:26   /103 07/12/25 12:03   Resp 17 07/12/25 12:07   SpO2 96 % 07/12/25 12:26   Vitals shown include unfiled device data.    Modified Esther:     Vitals Value Taken Time   Activity 2 07/12/25 12:00   Respiration 2 07/12/25 12:00   Circulation 2 07/12/25 12:00   Consciousness 1 07/12/25 12:00   Oxygen Saturation 2 07/12/25 12:00     Modified Esther Score: 9

## 2025-07-15 ENCOUNTER — APPOINTMENT (INPATIENT)
Dept: RADIOLOGY | Facility: HOSPITAL | Age: 75
DRG: 305 | End: 2025-07-15
Payer: MEDICARE

## 2025-07-16 ENCOUNTER — APPOINTMENT (INPATIENT)
Dept: NEUROLOGY | Facility: CLINIC | Age: 75
DRG: 305 | End: 2025-07-16
Payer: MEDICARE

## 2025-07-16 ENCOUNTER — APPOINTMENT (INPATIENT)
Dept: RADIOLOGY | Facility: HOSPITAL | Age: 75
DRG: 305 | End: 2025-07-16
Payer: MEDICARE

## 2025-07-17 ENCOUNTER — APPOINTMENT (INPATIENT)
Dept: NEUROLOGY | Facility: CLINIC | Age: 75
DRG: 305 | End: 2025-07-17
Attending: PSYCHIATRY & NEUROLOGY
Payer: MEDICARE

## 2025-07-17 ENCOUNTER — TRANSITIONAL CARE MANAGEMENT (OUTPATIENT)
Age: 75
End: 2025-07-17

## 2025-07-19 ENCOUNTER — APPOINTMENT (INPATIENT)
Dept: RADIOLOGY | Facility: HOSPITAL | Age: 75
DRG: 305 | End: 2025-07-19
Payer: MEDICARE

## 2025-07-19 PROBLEM — D72.829 LEUKOCYTOSIS: Status: ACTIVE | Noted: 2025-07-19

## 2025-07-19 PROBLEM — K11.20 PAROTITIS: Status: ACTIVE | Noted: 2025-07-19

## 2025-07-25 ENCOUNTER — APPOINTMENT (INPATIENT)
Dept: RADIOLOGY | Facility: HOSPITAL | Age: 75
DRG: 305 | End: 2025-07-25
Payer: MEDICARE

## 2025-07-26 ENCOUNTER — APPOINTMENT (INPATIENT)
Dept: RADIOLOGY | Facility: HOSPITAL | Age: 75
DRG: 305 | End: 2025-07-26
Payer: MEDICARE

## 2025-07-27 ENCOUNTER — APPOINTMENT (INPATIENT)
Dept: RADIOLOGY | Facility: HOSPITAL | Age: 75
DRG: 305 | End: 2025-07-27
Attending: RADIOLOGY
Payer: MEDICARE

## 2025-07-28 PROBLEM — D72.829 LEUKOCYTOSIS: Status: RESOLVED | Noted: 2025-07-19 | Resolved: 2025-07-28

## 2025-07-28 PROBLEM — M10.9 GOUT: Status: ACTIVE | Noted: 2025-07-28

## 2025-08-02 ENCOUNTER — APPOINTMENT (INPATIENT)
Dept: RADIOLOGY | Facility: HOSPITAL | Age: 75
DRG: 305 | End: 2025-08-02
Payer: MEDICARE

## 2025-08-03 PROBLEM — T85.528A DISLODGED GASTROSTOMY TUBE: Status: ACTIVE | Noted: 2025-08-03

## 2025-08-04 ENCOUNTER — APPOINTMENT (INPATIENT)
Dept: RADIOLOGY | Facility: HOSPITAL | Age: 75
DRG: 305 | End: 2025-08-04
Attending: STUDENT IN AN ORGANIZED HEALTH CARE EDUCATION/TRAINING PROGRAM
Payer: MEDICARE

## (undated) DEVICE — INTENDED FOR TISSUE SEPARATION, AND OTHER PROCEDURES THAT REQUIRE A SHARP SURGICAL BLADE TO PUNCTURE OR CUT.: Brand: BARD-PARKER SAFETY BLADES SIZE 10, STERILE

## (undated) DEVICE — TIBURON SPLIT SHEET: Brand: CONVERTORS

## (undated) DEVICE — TRAY FOLEY 16FR URIMETER SURESTEP

## (undated) DEVICE — ANTIBACTERIAL VIOLET BRAIDED (POLYGLACTIN 910), SYNTHETIC ABSORBABLE SUTURE: Brand: COATED VICRYL

## (undated) DEVICE — 3M™ IOBAN™ 2 ANTIMICROBIAL INCISE DRAPE 6650EZ: Brand: IOBAN™ 2

## (undated) DEVICE — INTENDED FOR TISSUE SEPARATION, AND OTHER PROCEDURES THAT REQUIRE A SHARP SURGICAL BLADE TO PUNCTURE OR CUT.: Brand: BARD-PARKER ® CARBON RIB-BACK BLADES

## (undated) DEVICE — HEMOSTATIC MATRIX SURGIFLO 8ML W/THROMBIN

## (undated) DEVICE — PREP SURGICAL PURPREP 26ML

## (undated) DEVICE — Device: Brand: IQ SYSTEM

## (undated) DEVICE — 3M™ STERI-STRIP™ REINFORCED ADHESIVE SKIN CLOSURES, R1547, 1/2 IN X 4 IN (12 MM X 100 MM), 6 STRIPS/ENVELOPE: Brand: 3M™ STERI-STRIP™

## (undated) DEVICE — SKN PRP WNG SPNGE PVP SCRB STR: Brand: MEDLINE INDUSTRIES, INC.

## (undated) DEVICE — RANEY SCALP CLIP STERILE: Brand: AESCULAP

## (undated) DEVICE — TOOL MR8-9MH30 MR8 9CM MATCH 3MM: Brand: MIDAS REX MR8

## (undated) DEVICE — SLIM BODY SKIN STAPLER: Brand: APPOSE ULC

## (undated) DEVICE — GLOVE SRG BIOGEL 8

## (undated) DEVICE — SUT NUROLON 4-0 TF CR/8 18 IN C584D

## (undated) DEVICE — LIGHT HANDLE COVER SLEEVE DISP BLUE STELLAR

## (undated) DEVICE — DRAPE INTESTINAL ISOLATION BAG

## (undated) DEVICE — PACK CRANIOTOMY PBDS RF

## (undated) DEVICE — GLOVE INDICATOR PI UNDERGLOVE SZ 8 BLUE

## (undated) DEVICE — SPECIMEN CONTAINER STERILE PEEL PACK

## (undated) DEVICE — MONITORR™ ICP EXTERNAL CSF DRAINAGE AND MONITORING SYSTEM: Brand: MONITORR™

## (undated) DEVICE — TOOL MR8-F2/7TA23 MR8 F2/7CM TAPER 2.3MM: Brand: MIDAS REX MR8

## (undated) DEVICE — DRAPE SHEET THREE QUARTER

## (undated) DEVICE — ICP CATH BACTISEAL EVD LRG 1.9MM X 35CM

## (undated) DEVICE — REM POLYHESIVE ADULT PATIENT RETURN ELECTRODE: Brand: VALLEYLAB